# Patient Record
Sex: FEMALE | Race: WHITE | NOT HISPANIC OR LATINO | Employment: OTHER | URBAN - METROPOLITAN AREA
[De-identification: names, ages, dates, MRNs, and addresses within clinical notes are randomized per-mention and may not be internally consistent; named-entity substitution may affect disease eponyms.]

---

## 2017-11-16 ENCOUNTER — HOSPITAL ENCOUNTER (OUTPATIENT)
Dept: RADIOLOGY | Facility: HOSPITAL | Age: 82
Discharge: HOME | End: 2017-11-16
Attending: FAMILY MEDICINE
Payer: MEDICARE

## 2017-11-16 VITALS — HEIGHT: 60 IN | BODY MASS INDEX: 23.56 KG/M2 | WEIGHT: 120 LBS

## 2017-11-16 DIAGNOSIS — Z12.39 SCREENING BREAST EXAMINATION: ICD-10-CM

## 2017-11-16 DIAGNOSIS — Z12.39 SCREENING BREAST EXAMINATION: Primary | ICD-10-CM

## 2017-11-16 PROCEDURE — 77067 SCR MAMMO BI INCL CAD: CPT | Mod: TC

## 2017-11-20 DIAGNOSIS — R92.8 ABNORMAL MAMMOGRAM: Primary | ICD-10-CM

## 2017-11-30 ENCOUNTER — HOSPITAL ENCOUNTER (OUTPATIENT)
Dept: RADIOLOGY | Facility: HOSPITAL | Age: 82
Discharge: HOME | End: 2017-11-30
Attending: FAMILY MEDICINE
Payer: MEDICARE

## 2017-11-30 DIAGNOSIS — R92.8 ABNORMAL MAMMOGRAM: ICD-10-CM

## 2017-11-30 PROCEDURE — 77061 BREAST TOMOSYNTHESIS UNI: CPT | Mod: TC

## 2017-11-30 PROCEDURE — 77065 DX MAMMO INCL CAD UNI: CPT | Mod: 26,,, | Performed by: RADIOLOGY

## 2017-11-30 PROCEDURE — 77061 BREAST TOMOSYNTHESIS UNI: CPT | Mod: 26,,, | Performed by: RADIOLOGY

## 2018-08-08 ENCOUNTER — HOSPITAL ENCOUNTER (INPATIENT)
Facility: HOSPITAL | Age: 83
LOS: 2 days | Discharge: HOME | DRG: 683 | End: 2018-08-10
Attending: EMERGENCY MEDICINE | Admitting: INTERNAL MEDICINE
Payer: MEDICARE

## 2018-08-08 DIAGNOSIS — R55 SYNCOPE: ICD-10-CM

## 2018-08-08 DIAGNOSIS — I10 ESSENTIAL HYPERTENSION: ICD-10-CM

## 2018-08-08 DIAGNOSIS — N17.9 AKI (ACUTE KIDNEY INJURY): Primary | ICD-10-CM

## 2018-08-08 DIAGNOSIS — N17.9 ACUTE RENAL FAILURE SUPERIMPOSED ON CHRONIC KIDNEY DISEASE, UNSPECIFIED CKD STAGE, UNSPECIFIED ACUTE RENAL FAILURE TYPE: ICD-10-CM

## 2018-08-08 DIAGNOSIS — R55 SYNCOPE, UNSPECIFIED SYNCOPE TYPE: ICD-10-CM

## 2018-08-08 DIAGNOSIS — E86.0 DEHYDRATION: ICD-10-CM

## 2018-08-08 DIAGNOSIS — R55 SYNCOPE AND COLLAPSE: ICD-10-CM

## 2018-08-08 DIAGNOSIS — E78.5 HYPERLIPIDEMIA, UNSPECIFIED HYPERLIPIDEMIA TYPE: ICD-10-CM

## 2018-08-08 DIAGNOSIS — N18.9 ACUTE RENAL FAILURE SUPERIMPOSED ON CHRONIC KIDNEY DISEASE, UNSPECIFIED CKD STAGE, UNSPECIFIED ACUTE RENAL FAILURE TYPE: ICD-10-CM

## 2018-08-08 PROBLEM — K51.90 ULCERATIVE COLITIS: Status: ACTIVE | Noted: 2018-08-08

## 2018-08-08 PROBLEM — E03.9 HYPOTHYROID: Status: ACTIVE | Noted: 2018-08-08

## 2018-08-08 PROBLEM — M25.512 ACUTE PAIN OF LEFT SHOULDER: Status: ACTIVE | Noted: 2018-08-08

## 2018-08-08 PROBLEM — I50.9 CONGESTIVE HEART FAILURE (CHF): Status: ACTIVE | Noted: 2018-08-08

## 2018-08-08 PROBLEM — I24.89 DEMAND ISCHEMIA: Status: ACTIVE | Noted: 2018-08-08

## 2018-08-08 LAB
ALBUMIN SERPL BCP-MCNC: 3.7 G/DL
ALP SERPL-CCNC: 50 U/L
ALT SERPL W/O P-5'-P-CCNC: 25 U/L
ANION GAP SERPL CALC-SCNC: 18 MMOL/L
AST SERPL-CCNC: 26 U/L
BACTERIA #/AREA URNS HPF: ABNORMAL /HPF
BASOPHILS # BLD AUTO: 0 K/UL
BASOPHILS NFR BLD: 0 %
BILIRUB SERPL-MCNC: 1.1 MG/DL
BILIRUB UR QL STRIP: NEGATIVE
BUN SERPL-MCNC: 112 MG/DL
CALCIUM SERPL-MCNC: 10.5 MG/DL
CHLORIDE SERPL-SCNC: 93 MMOL/L
CLARITY UR: CLEAR
CO2 SERPL-SCNC: 25 MMOL/L
COLOR UR: YELLOW
CREAT SERPL-MCNC: 3.3 MG/DL
DIFFERENTIAL METHOD: ABNORMAL
EOSINOPHIL # BLD AUTO: 0 K/UL
EOSINOPHIL NFR BLD: 0.1 %
ERYTHROCYTE [DISTWIDTH] IN BLOOD BY AUTOMATED COUNT: 14.4 %
EST. GFR  (AFRICAN AMERICAN): 14 ML/MIN/1.73 M^2
EST. GFR  (NON AFRICAN AMERICAN): 12 ML/MIN/1.73 M^2
GLUCOSE SERPL-MCNC: 142 MG/DL
GLUCOSE UR QL STRIP: NEGATIVE
HCT VFR BLD AUTO: 38.6 %
HGB BLD-MCNC: 13 G/DL
HGB UR QL STRIP: NEGATIVE
HYALINE CASTS #/AREA URNS LPF: 1 /LPF
KETONES UR QL STRIP: NEGATIVE
LEUKOCYTE ESTERASE UR QL STRIP: ABNORMAL
LYMPHOCYTES # BLD AUTO: 0.5 K/UL
LYMPHOCYTES NFR BLD: 2.7 %
MAGNESIUM SERPL-MCNC: 2.4 MG/DL
MCH RBC QN AUTO: 31 PG
MCHC RBC AUTO-ENTMCNC: 33.7 G/DL
MCV RBC AUTO: 92 FL
MICROSCOPIC COMMENT: ABNORMAL
MONOCYTES # BLD AUTO: 1.2 K/UL
MONOCYTES NFR BLD: 6.5 %
NEUTROPHILS # BLD AUTO: 16.2 K/UL
NEUTROPHILS NFR BLD: 90.7 %
NITRITE UR QL STRIP: NEGATIVE
PH UR STRIP: 6 [PH] (ref 5–8)
PLATELET # BLD AUTO: 279 K/UL
PMV BLD AUTO: 9.1 FL
POTASSIUM SERPL-SCNC: 4.1 MMOL/L
PROT SERPL-MCNC: 7.6 G/DL
PROT UR QL STRIP: NEGATIVE
RBC # BLD AUTO: 4.19 M/UL
RBC #/AREA URNS HPF: 1 /HPF (ref 0–4)
SODIUM SERPL-SCNC: 136 MMOL/L
SP GR UR STRIP: <=1.005 (ref 1–1.03)
TROPONIN I SERPL DL<=0.01 NG/ML-MCNC: 0.03 NG/ML
URN SPEC COLLECT METH UR: ABNORMAL
UROBILINOGEN UR STRIP-ACNC: NEGATIVE EU/DL
WBC # BLD AUTO: 17.8 K/UL
WBC #/AREA URNS HPF: 2 /HPF (ref 0–5)

## 2018-08-08 PROCEDURE — 85025 COMPLETE CBC W/AUTO DIFF WBC: CPT

## 2018-08-08 PROCEDURE — 12000002 HC ACUTE/MED SURGE SEMI-PRIVATE ROOM

## 2018-08-08 PROCEDURE — 25000003 PHARM REV CODE 250: Performed by: EMERGENCY MEDICINE

## 2018-08-08 PROCEDURE — 83735 ASSAY OF MAGNESIUM: CPT

## 2018-08-08 PROCEDURE — 93010 ELECTROCARDIOGRAM REPORT: CPT | Mod: ,,, | Performed by: INTERNAL MEDICINE

## 2018-08-08 PROCEDURE — 85379 FIBRIN DEGRADATION QUANT: CPT

## 2018-08-08 PROCEDURE — 93005 ELECTROCARDIOGRAM TRACING: CPT

## 2018-08-08 PROCEDURE — 81000 URINALYSIS NONAUTO W/SCOPE: CPT

## 2018-08-08 PROCEDURE — 99222 1ST HOSP IP/OBS MODERATE 55: CPT | Mod: AI,,, | Performed by: INTERNAL MEDICINE

## 2018-08-08 PROCEDURE — 96360 HYDRATION IV INFUSION INIT: CPT

## 2018-08-08 PROCEDURE — 36415 COLL VENOUS BLD VENIPUNCTURE: CPT

## 2018-08-08 PROCEDURE — 83036 HEMOGLOBIN GLYCOSYLATED A1C: CPT

## 2018-08-08 PROCEDURE — 87086 URINE CULTURE/COLONY COUNT: CPT

## 2018-08-08 PROCEDURE — 63600175 PHARM REV CODE 636 W HCPCS: Performed by: NURSE PRACTITIONER

## 2018-08-08 PROCEDURE — 82550 ASSAY OF CK (CPK): CPT

## 2018-08-08 PROCEDURE — 84484 ASSAY OF TROPONIN QUANT: CPT

## 2018-08-08 PROCEDURE — 99285 EMERGENCY DEPT VISIT HI MDM: CPT | Mod: 25

## 2018-08-08 PROCEDURE — 80053 COMPREHEN METABOLIC PANEL: CPT

## 2018-08-08 RX ORDER — RAMELTEON 8 MG/1
8 TABLET ORAL NIGHTLY PRN
Status: DISCONTINUED | OUTPATIENT
Start: 2018-08-08 | End: 2018-08-10 | Stop reason: HOSPADM

## 2018-08-08 RX ORDER — GLUCAGON 1 MG
1 KIT INJECTION
Status: DISCONTINUED | OUTPATIENT
Start: 2018-08-08 | End: 2018-08-10 | Stop reason: HOSPADM

## 2018-08-08 RX ORDER — HYDRALAZINE HYDROCHLORIDE 50 MG/1
50 TABLET, FILM COATED ORAL EVERY 12 HOURS
COMMUNITY
End: 2019-01-01 | Stop reason: SDUPTHER

## 2018-08-08 RX ORDER — ASPIRIN 81 MG/1
81 TABLET ORAL DAILY
Status: DISCONTINUED | OUTPATIENT
Start: 2018-08-09 | End: 2018-08-10 | Stop reason: HOSPADM

## 2018-08-08 RX ORDER — ACETAMINOPHEN 325 MG/1
650 TABLET ORAL EVERY 4 HOURS PRN
Status: DISCONTINUED | OUTPATIENT
Start: 2018-08-08 | End: 2018-08-10 | Stop reason: HOSPADM

## 2018-08-08 RX ORDER — LEVALBUTEROL 1.25 MG/.5ML
1.25 SOLUTION, CONCENTRATE RESPIRATORY (INHALATION) EVERY 8 HOURS
Status: DISCONTINUED | OUTPATIENT
Start: 2018-08-09 | End: 2018-08-10 | Stop reason: HOSPADM

## 2018-08-08 RX ORDER — FOLIC ACID 0.4 MG
800 TABLET ORAL DAILY
Status: DISCONTINUED | OUTPATIENT
Start: 2018-08-09 | End: 2018-08-10 | Stop reason: HOSPADM

## 2018-08-08 RX ORDER — GLUCOSAM/CHONDRO/HERB 149/HYAL 750-100 MG
1 TABLET ORAL DAILY
Status: DISCONTINUED | OUTPATIENT
Start: 2018-08-09 | End: 2018-08-10 | Stop reason: HOSPADM

## 2018-08-08 RX ORDER — TORSEMIDE 20 MG/1
20 TABLET ORAL DAILY
Status: ON HOLD | COMMUNITY
End: 2018-08-10

## 2018-08-08 RX ORDER — IBUPROFEN 200 MG
24 TABLET ORAL
Status: DISCONTINUED | OUTPATIENT
Start: 2018-08-08 | End: 2018-08-10 | Stop reason: HOSPADM

## 2018-08-08 RX ORDER — ENOXAPARIN SODIUM 100 MG/ML
30 INJECTION SUBCUTANEOUS EVERY 24 HOURS
Status: DISCONTINUED | OUTPATIENT
Start: 2018-08-08 | End: 2018-08-10 | Stop reason: HOSPADM

## 2018-08-08 RX ORDER — SODIUM CHLORIDE 0.9 % (FLUSH) 0.9 %
5 SYRINGE (ML) INJECTION
Status: DISCONTINUED | OUTPATIENT
Start: 2018-08-08 | End: 2018-08-10 | Stop reason: HOSPADM

## 2018-08-08 RX ORDER — ONDANSETRON 2 MG/ML
4 INJECTION INTRAMUSCULAR; INTRAVENOUS EVERY 8 HOURS PRN
Status: DISCONTINUED | OUTPATIENT
Start: 2018-08-08 | End: 2018-08-10 | Stop reason: HOSPADM

## 2018-08-08 RX ORDER — SODIUM CHLORIDE 9 MG/ML
INJECTION, SOLUTION INTRAVENOUS CONTINUOUS
Status: DISCONTINUED | OUTPATIENT
Start: 2018-08-08 | End: 2018-08-09

## 2018-08-08 RX ORDER — IBUPROFEN 200 MG
16 TABLET ORAL
Status: DISCONTINUED | OUTPATIENT
Start: 2018-08-08 | End: 2018-08-10 | Stop reason: HOSPADM

## 2018-08-08 RX ORDER — PRAVASTATIN SODIUM 10 MG/1
20 TABLET ORAL DAILY
Status: DISCONTINUED | OUTPATIENT
Start: 2018-08-09 | End: 2018-08-10 | Stop reason: HOSPADM

## 2018-08-08 RX ORDER — AMOXICILLIN 250 MG
1 CAPSULE ORAL 2 TIMES DAILY
Status: DISCONTINUED | OUTPATIENT
Start: 2018-08-08 | End: 2018-08-10 | Stop reason: HOSPADM

## 2018-08-08 RX ADMIN — SODIUM CHLORIDE 500 ML: 0.9 INJECTION, SOLUTION INTRAVENOUS at 04:08

## 2018-08-08 RX ADMIN — ENOXAPARIN SODIUM 30 MG: 100 INJECTION SUBCUTANEOUS at 10:08

## 2018-08-08 NOTE — ED NOTES
"Pt presents to ED with c/o near syncopal episode x 2 today. States first episode occurred this AM. Pt denies dizziness; lightheadedness, nausea or vomiting. No c/o chest pain or SOB. Pt states she just feels "fatigued." Denies LOC. States she did hit her head during the fall and has a mild headache. Pt placed on BP, pulse ox, cardiac monitor. KEITH. MD at bedside. Will monitor.  "

## 2018-08-08 NOTE — ED PROVIDER NOTES
[facility]: [facility]2018    SCRIBE #1 NOTE: I, Nena Gallardo, am scribing for, and in the presence of, Dr. Villalpando .       History     Chief Complaint   Patient presents with    Fatigue     near syncope this am.       Time seen by provider: 3:09 PM on 08/08/2018    Izabella Fulton is a 85 y.o. female with a hx of HTN, TIA, and thyroid disease who presents to the ED via EMS with c/o left arm and hand pain s/p near syncopal episode PTA. Pt states she "didn't feel right" before the episode. She notes she hit her head on the floor. Pt is being treated for "fluid around my heart" with Torsemide BID. She states she had 3 xrays taken yesterday which show improvement. She is followed by Corinne GAN in Dr. Steve Mitchell's office. NKDA noted. Pt denies CP, abd pain, vomiting and diarrhea.       The history is provided by the patient.     Review of patient's allergies indicates:  No Known Allergies  Past Medical History:   Diagnosis Date    Anticoagulant long-term use     baby aspirin    Hypertension     Thyroid disease     TIA (transient ischemic attack)     Ulcerative colitis     Wears hearing aid      Past Surgical History:   Procedure Laterality Date    COLON SURGERY  05/17/12    exp lap, perfered colon    EYE SURGERY      left eye cataract     History reviewed. No pertinent family history.  Social History   Substance Use Topics    Smoking status: Never Smoker    Smokeless tobacco: Not on file    Alcohol use No     Review of Systems   Constitutional: Negative for fever.        + for "near syncope"   HENT: Negative for sore throat.    Eyes: Negative for redness.   Respiratory: Negative for shortness of breath.    Cardiovascular: Negative for chest pain.   Gastrointestinal: Negative for abdominal pain, diarrhea, nausea and vomiting.   Genitourinary: Negative for dysuria.   Musculoskeletal: Positive for arthralgias (Lt hand pain ) and myalgias (LUE). Negative for back pain.   Skin: Negative for rash. "   Neurological: Negative for weakness.   Hematological: Does not bruise/bleed easily.       Physical Exam     Initial Vitals [08/08/18 1506]   BP Pulse Resp Temp SpO2   (!) 152/65 72 16 98 °F (36.7 °C) 96 %      MAP       --         Physical Exam    Nursing note and vitals reviewed.  Constitutional: She appears well-developed and well-nourished. She is not diaphoretic. No distress.   HENT:   Head: Normocephalic and atraumatic.   Eyes: EOM are normal. Pupils are equal, round, and reactive to light.   Neck: Normal range of motion. Neck supple.   Cardiovascular: Normal rate, regular rhythm, normal heart sounds and intact distal pulses. Exam reveals no gallop and no friction rub.    No murmur heard.  Pulmonary/Chest: Breath sounds normal. No respiratory distress. She has no wheezes. She has no rhonchi. She has no rales.   Abdominal: Soft. Bowel sounds are normal. There is no tenderness. There is no rebound and no guarding.   Musculoskeletal: Normal range of motion.   Neurological: She is alert and oriented to person, place, and time.   Skin: Skin is warm and dry.   Psychiatric: She has a normal mood and affect. Her behavior is normal. Judgment and thought content normal.         ED Course   Critical Care  Performed by: Joce Villalpando MD  Authorized by: Joce Villalpando MD   Direct patient critical care time: 14 minutes  Ordering / reviewing critical care time: 11 minutes  Documentation critical care time: 12 minutes  Consulting other physicians critical care time: 7 minutes  Total critical care time (exclusive of procedural time) : 44 minutes  Critical care was time spent personally by me on the following activities: examination of patient, development of treatment plan with patient or surrogate, ordering and performing treatments and interventions, ordering and review of laboratory studies and obtaining history from patient or surrogate.        Labs Reviewed   COMPREHENSIVE METABOLIC PANEL - Abnormal; Notable for  the following:        Result Value    Chloride 93 (*)     Glucose 142 (*)     BUN, Bld 112 (*)     Creatinine 3.3 (*)     Total Bilirubin 1.1 (*)     Alkaline Phosphatase 50 (*)     Anion Gap 18 (*)     eGFR if  14 (*)     eGFR if non  12 (*)     All other components within normal limits   CBC W/ AUTO DIFFERENTIAL - Abnormal; Notable for the following:     WBC 17.80 (*)     MPV 9.1 (*)     Gran # (ANC) 16.2 (*)     Lymph # 0.5 (*)     Mono # 1.2 (*)     Gran% 90.7 (*)     Lymph% 2.7 (*)     All other components within normal limits   TROPONIN I - Abnormal; Notable for the following:     Troponin I 0.027 (*)     All other components within normal limits   URINALYSIS - Abnormal; Notable for the following:     Specific Gravity, UA <=1.005 (*)     Leukocytes, UA 2+ (*)     All other components within normal limits   URINALYSIS MICROSCOPIC - Abnormal; Notable for the following:     Bacteria, UA Few (*)     All other components within normal limits   MAGNESIUM     EKG Readings: (Independently Interpreted)   Sinus bradycardia at a rate of 59 bpm. Jackie axis. Normal interval. No acute ST segment changes.        Imaging Results          CT Head Without Contrast (Final result)  Result time 08/08/18 16:20:44    Final result by Kathleen Saunders MD (08/08/18 16:20:44)                 Impression:      Mild involutional changes and findings suggesting mild microvascular ischemic change with no acute intracranial findings      Electronically signed by: Kathleen Saunders MD  Date:    08/08/2018  Time:    16:20             Narrative:    EXAMINATION:  CT HEAD WITHOUT CONTRAST    CLINICAL HISTORY:  Head trauma, ataxia;    TECHNIQUE:  Low dose axial images were obtained through the head.  Coronal and sagittal reformations were also performed. Contrast was not administered.    COMPARISON:  None.    FINDINGS:  There is mild dilatation of ventricles, sulci, fissures.  There is mild decreased density in white  matter suggesting mild microvascular ischemic change.  There is no acute intracranial finding.  There is no acute intracranial hemorrhage.  There is no intracranial mass effect.  There is no acute major vascular territory infarct.  The mastoids appear well pneumatized, visualized paranasal sinuses essentially clear, and the calvarium appears intact without depressed skull fracture.                                 Medical Decision Making:   History:   Old Medical Records: I decided to obtain old medical records.  Initial Assessment:   85-year-old female presented with a chief complaint of fatigue and a near syncopal episode.  Clinical Tests:   Lab Tests: Ordered and Reviewed  Radiological Study: Ordered and Reviewed  Medical Tests: Ordered and Reviewed  ED Management:  The patient was emergently evaluated in the emergency department, her evaluation was significant for an elderly female with a normal neurologic exam.  The patient's EKG showed no acute abnormalities per my independent interpretation.  The patient's CT scan of her head showed no acute abnormalities.  The patient's labs were concerning for acute kidney injury, an elevated BUN, dehydration, a mildly elevated troponin,  and metabolic acidosis.  The patient was aggressively treated with an IV fluid bolus in the emergency department.  The etiology of her symptoms is likely her recent usage of diuretics.  The patient will be admitted for further IV fluid rehydration.  I have discussed the case with the hospitalist on call, Dr. Chavis.  He has accepted the patient for admission.            Scribe Attestation:   Scribe #1: I performed the above scribed service and the documentation accurately describes the services I performed. I attest to the accuracy of the note.    I, Dr. Joce Villalpando, personally performed the services described in this documentation. All medical record entries made by the scribe were at my direction and in my presence.  I have reviewed  the chart and agree that the record reflects my personal performance and is accurate and complete. Joce Villalpando MD.  6:04 PM 08/08/2018             Clinical Impression:     1. FERCHO (acute kidney injury)    2. Syncope    3. Dehydration                                 Joce Villalpando MD  08/08/18 9381       Joce Villalpando MD  08/14/18 2881

## 2018-08-08 NOTE — ED NOTES
Report called to floor. Pt updated. Verbalizes understanding. Pt in NAD and VS are stable at care handoff. No other needs are identified.

## 2018-08-09 PROBLEM — M25.512 ACUTE PAIN OF LEFT SHOULDER: Status: ACTIVE | Noted: 2018-08-09

## 2018-08-09 PROBLEM — I24.89 DEMAND ISCHEMIA: Status: ACTIVE | Noted: 2018-08-09

## 2018-08-09 LAB
ALBUMIN SERPL BCP-MCNC: 3 G/DL
ALP SERPL-CCNC: 44 U/L
ALT SERPL W/O P-5'-P-CCNC: 18 U/L
ANION GAP SERPL CALC-SCNC: 12 MMOL/L
AST SERPL-CCNC: 21 U/L
BASOPHILS # BLD AUTO: 0 K/UL
BASOPHILS NFR BLD: 0.5 %
BILIRUB SERPL-MCNC: 0.9 MG/DL
BUN SERPL-MCNC: 99 MG/DL
CALCIUM SERPL-MCNC: 8.8 MG/DL
CHLORIDE SERPL-SCNC: 103 MMOL/L
CK SERPL-CCNC: 135 U/L
CO2 SERPL-SCNC: 25 MMOL/L
CREAT SERPL-MCNC: 2.3 MG/DL
D DIMER PPP IA.FEU-MCNC: 29.6 MG/L FEU
DIFFERENTIAL METHOD: ABNORMAL
EOSINOPHIL # BLD AUTO: 0.6 K/UL
EOSINOPHIL NFR BLD: 6.8 %
ERYTHROCYTE [DISTWIDTH] IN BLOOD BY AUTOMATED COUNT: 14.8 %
EST. GFR  (AFRICAN AMERICAN): 22 ML/MIN/1.73 M^2
EST. GFR  (NON AFRICAN AMERICAN): 19 ML/MIN/1.73 M^2
ESTIMATED AVG GLUCOSE: 111 MG/DL
GLUCOSE SERPL-MCNC: 104 MG/DL
HBA1C MFR BLD HPLC: 5.5 %
HCT VFR BLD AUTO: 34.6 %
HGB BLD-MCNC: 11.7 G/DL
LYMPHOCYTES # BLD AUTO: 1.5 K/UL
LYMPHOCYTES NFR BLD: 17.9 %
MAGNESIUM SERPL-MCNC: 2.5 MG/DL
MCH RBC QN AUTO: 31.7 PG
MCHC RBC AUTO-ENTMCNC: 33.9 G/DL
MCV RBC AUTO: 94 FL
MONOCYTES # BLD AUTO: 1.1 K/UL
MONOCYTES NFR BLD: 13.2 %
NEUTROPHILS # BLD AUTO: 5.2 K/UL
NEUTROPHILS NFR BLD: 61.6 %
PHOSPHATE SERPL-MCNC: 4.1 MG/DL
PLATELET # BLD AUTO: 255 K/UL
PMV BLD AUTO: 8.7 FL
POCT GLUCOSE: 128 MG/DL (ref 70–110)
POCT GLUCOSE: 146 MG/DL (ref 70–110)
POCT GLUCOSE: 220 MG/DL (ref 70–110)
POTASSIUM SERPL-SCNC: 3.5 MMOL/L
PROT SERPL-MCNC: 6.4 G/DL
RBC # BLD AUTO: 3.69 M/UL
SODIUM SERPL-SCNC: 140 MMOL/L
TROPONIN I SERPL DL<=0.01 NG/ML-MCNC: 0.05 NG/ML
TROPONIN I SERPL DL<=0.01 NG/ML-MCNC: 0.05 NG/ML
WBC # BLD AUTO: 8.5 K/UL

## 2018-08-09 PROCEDURE — 12000002 HC ACUTE/MED SURGE SEMI-PRIVATE ROOM

## 2018-08-09 PROCEDURE — 84100 ASSAY OF PHOSPHORUS: CPT

## 2018-08-09 PROCEDURE — G8978 MOBILITY CURRENT STATUS: HCPCS | Mod: CK

## 2018-08-09 PROCEDURE — 97162 PT EVAL MOD COMPLEX 30 MIN: CPT

## 2018-08-09 PROCEDURE — G8988 SELF CARE GOAL STATUS: HCPCS | Mod: CK

## 2018-08-09 PROCEDURE — 25000003 PHARM REV CODE 250: Performed by: EMERGENCY MEDICINE

## 2018-08-09 PROCEDURE — 94640 AIRWAY INHALATION TREATMENT: CPT

## 2018-08-09 PROCEDURE — 25000003 PHARM REV CODE 250: Performed by: NURSE PRACTITIONER

## 2018-08-09 PROCEDURE — 97535 SELF CARE MNGMENT TRAINING: CPT

## 2018-08-09 PROCEDURE — 25000242 PHARM REV CODE 250 ALT 637 W/ HCPCS: Performed by: NURSE PRACTITIONER

## 2018-08-09 PROCEDURE — 83735 ASSAY OF MAGNESIUM: CPT

## 2018-08-09 PROCEDURE — 80053 COMPREHEN METABOLIC PANEL: CPT

## 2018-08-09 PROCEDURE — G8987 SELF CARE CURRENT STATUS: HCPCS | Mod: CK

## 2018-08-09 PROCEDURE — 99232 SBSQ HOSP IP/OBS MODERATE 35: CPT | Mod: ,,, | Performed by: INTERNAL MEDICINE

## 2018-08-09 PROCEDURE — 36415 COLL VENOUS BLD VENIPUNCTURE: CPT

## 2018-08-09 PROCEDURE — 94761 N-INVAS EAR/PLS OXIMETRY MLT: CPT

## 2018-08-09 PROCEDURE — 84484 ASSAY OF TROPONIN QUANT: CPT

## 2018-08-09 PROCEDURE — 97165 OT EVAL LOW COMPLEX 30 MIN: CPT

## 2018-08-09 PROCEDURE — 63600175 PHARM REV CODE 636 W HCPCS: Performed by: NURSE PRACTITIONER

## 2018-08-09 PROCEDURE — 85025 COMPLETE CBC W/AUTO DIFF WBC: CPT

## 2018-08-09 PROCEDURE — G8979 MOBILITY GOAL STATUS: HCPCS | Mod: CH

## 2018-08-09 RX ORDER — INSULIN ASPART 100 [IU]/ML
0-5 INJECTION, SOLUTION INTRAVENOUS; SUBCUTANEOUS
Status: DISCONTINUED | OUTPATIENT
Start: 2018-08-09 | End: 2018-08-10 | Stop reason: HOSPADM

## 2018-08-09 RX ADMIN — SODIUM CHLORIDE: 0.9 INJECTION, SOLUTION INTRAVENOUS at 01:08

## 2018-08-09 RX ADMIN — INSULIN ASPART 2 UNITS: 100 INJECTION, SOLUTION INTRAVENOUS; SUBCUTANEOUS at 12:08

## 2018-08-09 RX ADMIN — FOLIC ACID TAB 400 MCG 800 MCG: 400 TAB at 10:08

## 2018-08-09 RX ADMIN — LEVOTHYROXINE SODIUM 75 MCG: 25 TABLET ORAL at 06:08

## 2018-08-09 RX ADMIN — ASPIRIN 81 MG: 81 TABLET, COATED ORAL at 10:08

## 2018-08-09 RX ADMIN — LEVALBUTEROL 1.25 MG: 1.25 SOLUTION, CONCENTRATE RESPIRATORY (INHALATION) at 08:08

## 2018-08-09 RX ADMIN — ENOXAPARIN SODIUM 30 MG: 100 INJECTION SUBCUTANEOUS at 05:08

## 2018-08-09 RX ADMIN — OMEGA-3 FATTY ACIDS CAP 1000 MG 1 CAPSULE: 1000 CAP at 10:08

## 2018-08-09 RX ADMIN — LEVALBUTEROL 1.25 MG: 1.25 SOLUTION, CONCENTRATE RESPIRATORY (INHALATION) at 03:08

## 2018-08-09 NOTE — ASSESSMENT & PLAN NOTE
Likely secondary to IVVD.  Baseline creat/eGFR is unknown; however, pt states that she is followed by Dr. Shultz.  Continue IVF hydration.  Follow renal panel and electrolytes closely.  Follow renal recommendations-will consult Dr. Shultz.  Check Renal Ultrasound.  Avoid nephrotoxins.  Urine analysis-done.

## 2018-08-09 NOTE — PLAN OF CARE
The pt stated that she lives with her spouse who was also at bedside, Ry Fulton [phone]. She denies HH and DME. [facility] choice [facility]. PCP is Dr. Mitchell and insurance is [employer]. I educated the pt on the blue discharge folder and wrote my name and number on the pts white board. Betsy PHILLIP Ramakrishna, [facility] [place]     08/09/18 1207   Discharge Assessment   Assessment Type Discharge Planning Assessment   Confirmed/corrected address and phone number on facesheet? Yes   Assessment information obtained from? Patient;Caregiver   Communicated expected length of stay with patient/caregiver no   Prior to hospitilization cognitive status: Alert/Oriented   Prior to hospitalization functional status: Independent   Current cognitive status: Alert/Oriented   Current Functional Status: Independent   Lives With spouse   Able to Return to Prior Arrangements yes   Is patient able to care for self after discharge? Yes   Who are your caregiver(s) and their phone number(s)? spouse- Ry Fulton [phone]   Readmission Within The Last 30 Days no previous admission in last 30 days   Patient currently being followed by outpatient case management? No   Patient currently receives any other outside agency services? No   Equipment Currently Used at Home none   Do you have any problems affording any of your prescribed medications? No   Is the patient taking medications as prescribed? yes   Does the patient have transportation home? Yes   Transportation Available family or friend will provide   Does the patient receive services at the [facility]? No   Discharge Plan A Home with family   Discharge Plan B Home   Patient/Family In Agreement With Plan yes

## 2018-08-09 NOTE — SUBJECTIVE & OBJECTIVE
Past Medical History:   Diagnosis Date    Anticoagulant long-term use     baby aspirin    Hypertension     Thyroid disease     TIA (transient ischemic attack)     Ulcerative colitis     Wears hearing aid        Past Surgical History:   Procedure Laterality Date    COLON SURGERY  05/17/12    exp lap, perfered colon    EYE SURGERY      left eye cataract       Review of patient's allergies indicates:  No Known Allergies    No current facility-administered medications on file prior to encounter.      Current Outpatient Prescriptions on File Prior to Encounter   Medication Sig    acetaminophen (TYLENOL) 325 MG tablet Take 2 tablets (650 mg total) by mouth every 6 (six) hours as needed (or equal to 101 degree F).    amlodipine (NORVASC) 5 MG tablet Take 5 mg by mouth once daily.     aspirin (ECOTRIN) 81 MG EC tablet Take 81 mg by mouth once daily.      calcium-vitamin D 500-125 mg-unit tablet Take 1 tablet by mouth 2 (two) times daily.      DOCOSAHEXANOIC ACID/VIT C/LUT (EYE HEALTH ORAL) Take 1 tablet by mouth 2 (two) times daily.      fish oil-omega-3 fatty acids 300-1,000 mg capsule Take 1 g by mouth once daily.      folic acid (FOLVITE) 800 MCG tablet Take 800 mcg by mouth once daily.      hydrochlorothiazide (MICROZIDE) 12.5 mg capsule Take 12.5 mg by mouth once daily.      levothyroxine (SYNTHROID) 75 MCG tablet Take 75 mcg by mouth once daily.      losartan (COZAAR) 50 MG tablet Take 50 mg by mouth once daily.      mesalamine (ASACOL) 400 mg EC tablet Take 400 mg by mouth 2 (two) times daily.      metoprolol tartrate (LOPRESSOR) 50 MG tablet Take 50 mg by mouth 2 (two) times daily.     pravastatin (PRAVACHOL) 20 MG tablet Take 20 mg by mouth once daily.      [DISCONTINUED] alendronate (FOSAMAX) 70 MG tablet Take 70 mg by mouth every 7 days. Take 1 tablet once weekly in the morning with a full glass of water on an empty stomach. Do not consume food or lie down for at least 30 minutes afterwards.      [DISCONTINUED] furosemide (LASIX) 40 MG tablet Take 20 mg by mouth every Mon, Wed, Fri.       Family History     Problem Relation (Age of Onset)    Alzheimer's disease Mother    Heart disease Father        Social History Main Topics    Smoking status: Never Smoker    Smokeless tobacco: Not on file    Alcohol use No    Drug use: No    Sexual activity: Not on file     Review of Systems   Constitutional: Positive for fatigue. Negative for chills and fever.   HENT: Negative for sore throat and trouble swallowing.    Eyes: Negative for photophobia and visual disturbance.   Respiratory: Positive for cough and shortness of breath. Negative for wheezing.    Cardiovascular: Negative for chest pain and palpitations.   Gastrointestinal: Negative for abdominal pain, diarrhea, nausea and vomiting.   Endocrine: Negative for polyphagia and polyuria.   Genitourinary: Negative for dysuria and frequency.   Musculoskeletal: Positive for arthralgias (left shoulder and left hand, 5th finger). Negative for joint swelling.   Skin: Negative for rash and wound.   Neurological: Positive for dizziness, syncope, weakness and light-headedness.   Psychiatric/Behavioral: Negative for agitation and confusion.   All other systems reviewed and are negative.    Objective:     Vital Signs (Most Recent):  Temp: 97.7 °F (36.5 °C) (08/08/18 2011)  Pulse: 61 (08/08/18 2011)  Resp: 16 (08/08/18 2011)  BP: (!) 146/63 (08/08/18 2011)  SpO2: (!) 93 % (08/08/18 2011) Vital Signs (24h Range):  Temp:  [97.7 °F (36.5 °C)-98 °F (36.7 °C)] 97.7 °F (36.5 °C)  Pulse:  [59-72] 61  Resp:  [16-18] 16  SpO2:  [92 %-96 %] 93 %  BP: (111-176)/(54-77) 146/63     Weight: 54.4 kg (119 lb 14.9 oz)  Body mass index is 23.42 kg/m².    Physical Exam   Constitutional: She is oriented to person, place, and time. She appears well-developed.   Thin appearing female   HENT:   Head: Normocephalic and atraumatic.   Mouth/Throat: Oropharynx is clear and moist.   Eyes:  Conjunctivae and EOM are normal. Pupils are equal, round, and reactive to light.   Neck: Normal range of motion. Neck supple. No JVD present.   Cardiovascular: Normal rate, regular rhythm and intact distal pulses.    Pulmonary/Chest: Effort normal and breath sounds normal.   Abdominal: Soft. Bowel sounds are normal. She exhibits no distension. There is no tenderness.   Musculoskeletal: Normal range of motion. She exhibits no edema.   Neurological: She is alert and oriented to person, place, and time.   Skin: Skin is warm and dry. Capillary refill takes less than 2 seconds.   Psychiatric: She has a normal mood and affect. Her behavior is normal. Judgment and thought content normal.   Vitals reviewed.        CRANIAL NERVES     CN III, IV, VI   Pupils are equal, round, and reactive to light.  Extraocular motions are normal.        Significant Labs:   CBC:   Recent Labs  Lab 08/08/18  1534   WBC 17.80*   HGB 13.0   HCT 38.6        CMP:   Recent Labs  Lab 08/08/18  1535      K 4.1   CL 93*   CO2 25   *   *   CREATININE 3.3*   CALCIUM 10.5   PROT 7.6   ALBUMIN 3.7   BILITOT 1.1*   ALKPHOS 50*   AST 26   ALT 25   ANIONGAP 18*   EGFRNONAA 12*     Cardiac Markers: No results for input(s): CKMB, MYOGLOBIN, BNP, TROPISTAT in the last 48 hours.  Magnesium:   Recent Labs  Lab 08/08/18  1535   MG 2.4     Urine Studies:   Recent Labs  Lab 08/08/18  1650   COLORU Yellow   APPEARANCEUA Clear   PHUR 6.0   SPECGRAV <=1.005*   PROTEINUA Negative   GLUCUA Negative   KETONESU Negative   BILIRUBINUA Negative   OCCULTUA Negative   NITRITE Negative   UROBILINOGEN Negative   LEUKOCYTESUR 2+*   RBCUA 1   WBCUA 2   BACTERIA Few*   HYALINECASTS 1       Significant Imaging:  CT head:    Impression:       Mild involutional changes and findings suggesting mild microvascular ischemic change with no acute intracranial findings

## 2018-08-09 NOTE — PROGRESS NOTES
08/09/18 0859   Patient Assessment/Suction   Level of Consciousness (AVPU) alert   Respiratory Effort Normal   Expansion/Accessory Muscles/Retractions expansion symmetric   All Lung Fields Breath Sounds clear;diminished   PRE-TX-O2-ETCO2   O2 Device (Oxygen Therapy) room air   SpO2 (!) 94 %   Pulse Oximetry Type Intermittent   $ Pulse Oximetry - Multiple Charge Pulse Oximetry - Multiple   Pulse 84   Resp 18   Aerosol Therapy   $ Aerosol Therapy Charges Aerosol Treatment   Respiratory Treatment Status given   SVN/Inhaler Treatment Route mouthpiece   Position During Treatment HOB at 45 degrees   Patient Tolerance good   Xop 1.25 Q8, tolerated tx well, vitals as charted.

## 2018-08-09 NOTE — PLAN OF CARE
Problem: Physical Therapy Goal  Goal: Physical Therapy Goal  Goals to be met by: 2018     Patient will increase functional independence with mobility by performin. Supine to sit with [place]  2. Sit to supine with [place]  3. Sit to stand transfer with [place]  4. Bed to chair transfer with modified [place] using RW.  5. Gait  x 250 feet with Modified [place] using RW     Outcome: Ongoing (interventions implemented as appropriate)  PT lorraine completed this am. Pt was able to tolerate bed mobility, transfers and gait x 35ft x 2 CGA with RW. Pt was left supine in bed w/ needs in reach, lines intact, and  at bedside.

## 2018-08-09 NOTE — PLAN OF CARE
I attempted to complete the discharge assessment however the pt is having a procedure done at bedside. Betsy Durbin LMSW     08/09/18 1135   Discharge Assessment   Assessment Type Discharge Planning Assessment

## 2018-08-09 NOTE — PLAN OF CARE
Problem: Occupational Therapy Goal  Goal: Occupational Therapy Goal  Goals to be met by: 8/17/18     Patient will increase functional independence with ADLs by performing:    LE Dressing with Set-up Assistance, Stand-by Assistance and Assistive Devices as needed.  Grooming while standing at sink with Supervision and Assistive Devices as needed.  Toileting from toilet with Stand-by Assistance and Assistive Devices as needed for hygiene and clothing management.   Toilet transfer to toilet with Supervision.  Upper extremity exercise program x10 reps per handout, with supervision.    Outcome: Ongoing (interventions implemented as appropriate)  OT evaluation completed today. Goals & care plan established.    GOPI Valentin  8/9/2018

## 2018-08-09 NOTE — PLAN OF CARE
Problem: Patient Care Overview  Goal: Plan of Care Review  Outcome: Ongoing (interventions implemented as appropriate)  PT AAO X4. PT able to verbalize needs/want. Plan of care reviewed with patient/spouce at the beginning of the shift. Patient/spouce  verbalized understanding. Hourly rounds completed on this patient throughout this shift. Patient denies any pain this shift.  at bedside attentive to patient. Patient ambulates with 1 person assist. Patient has remained free from fall/injury. Side rails up X2, bed in lowest, locked position, needs attended to , call light within reach will continue to monitor.

## 2018-08-09 NOTE — CONSULTS
"Nephrology Consult Note        Patient Name: Izabella Fulton  MRN: [id number]    Patient Class: IP- Inpatient   Admission Date: 8/8/2018  Length of Stay: 1 days  Date of Service: 8/9/2018    Attending Physician: Rachel Chvais MD  Primary Care Provider: Steve Mitchell MD    Reason for Consult: kaitlynn/ckd3/syncope/anemia/chf/htn    SUBJECTIVE:     HPI: 85F who follows with Dr. 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 She hit her head, and seem mine she lost consciousness briefly. Notably, she had her diuretics increased recently for "fluid around heart". She feels fine now. CT-head is OK, CXR is unremarkable, renal US shows luis alfredo-vesicular calcifications ? Fibroma.    Past Medical History:   Diagnosis Date    Anticoagulant long-term use     baby aspirin    Hypertension     Thyroid disease     TIA (transient ischemic attack)     Ulcerative colitis     Wears hearing aid      Past Surgical History:   Procedure Laterality Date    COLON SURGERY  05/17/12    exp lap, perfered colon    EYE SURGERY      left eye cataract     Family History   Problem Relation Age of Onset    Alzheimer's disease Mother     Heart disease Father      Social History   Substance Use Topics    Smoking status: Never Smoker    Smokeless tobacco: Not on file    Alcohol use No       Review of patient's allergies indicates:  No Known Allergies    Outpatient meds:  No current facility-administered medications on file prior to encounter.      Current Outpatient Prescriptions on File Prior to Encounter   Medication Sig Dispense Refill    acetaminophen (TYLENOL) 325 MG tablet Take 2 tablets (650 mg total) by mouth every 6 (six) hours as needed (or equal to 101 degree F). 30 tablet 0    amlodipine (NORVASC) 5 MG tablet Take 5 mg by mouth once daily.       aspirin (ECOTRIN) 81 MG EC tablet Take 81 mg by mouth once daily.        calcium-vitamin D 500-125 mg-unit tablet Take 1 tablet by mouth 2 (two) times daily.    "     DOCOSAHEXANOIC ACID/VIT C/LUT (EYE HEALTH ORAL) Take 1 tablet by mouth 2 (two) times daily.        fish oil-omega-3 fatty acids 300-1,000 mg capsule Take 1 g by mouth once daily.        folic acid (FOLVITE) 800 MCG tablet Take 800 mcg by mouth once daily.        hydrochlorothiazide (MICROZIDE) 12.5 mg capsule Take 12.5 mg by mouth once daily.        levothyroxine (SYNTHROID) 75 MCG tablet Take 75 mcg by mouth once daily.        losartan (COZAAR) 50 MG tablet Take 50 mg by mouth once daily.        mesalamine (ASACOL) 400 mg EC tablet Take 400 mg by mouth 2 (two) times daily.        metoprolol tartrate (LOPRESSOR) 50 MG tablet Take 50 mg by mouth 2 (two) times daily.       pravastatin (PRAVACHOL) 20 MG tablet Take 20 mg by mouth once daily.           Scheduled meds:   aspirin  81 mg Oral Daily    enoxaparin  30 mg Subcutaneous Daily    folic acid  800 mcg Oral Daily    levalbuterol  1.25 mg Nebulization Q8H    levothyroxine  75 mcg Oral Before breakfast    omega 3-dha-epa-fish oil  1 capsule Oral Daily    pravastatin  20 mg Oral Daily    senna-docusate 8.6-50 mg  1 tablet Oral BID       Infusions:   sodium chloride 0.9% 125 mL/hr at 08/09/18 0157       PRN meds:  acetaminophen, dextrose 50%, dextrose 50%, glucagon (human recombinant), glucose, glucose, insulin aspart U-100, ondansetron, ramelteon, sodium chloride 0.9%    Review of Systems:  Review of Systems   Constitutional: Negative for chills, fever, malaise/fatigue and weight loss.   HENT: Negative for hearing loss and nosebleeds.    Eyes: Negative for blurred vision, double vision and photophobia.   Respiratory: Negative for cough, shortness of breath and wheezing.    Cardiovascular: Negative for chest pain, palpitations and leg swelling.   Gastrointestinal: Negative for abdominal pain, constipation, diarrhea, heartburn, nausea and vomiting.   Genitourinary: Negative for dysuria, frequency and urgency.   Musculoskeletal: Negative for falls,  joint pain and myalgias.   Skin: Negative for itching and rash.   Neurological: Negative for dizziness, speech change, focal weakness, loss of consciousness and headaches.   Endo/Heme/Allergies: Does not bruise/bleed easily.   Psychiatric/Behavioral: Negative for depression and substance abuse. The patient is not nervous/anxious.        OBJECTIVE:     Vital Signs and IO (Last 24H):  Temp:  [97.4 °F (36.3 °C)-98.2 °F (36.8 °C)]   Pulse:  [59-84]   Resp:  [16-18]   BP: ()/(46-82)   SpO2:  [90 %-96 %]   I/O last 3 completed shifts:  In: 781.3 [P.O.:400; I.V.:381.3]  Out: -     Wt Readings from Last 5 Encounters:   08/09/18 54 kg (119 lb)   11/16/17 54.4 kg (120 lb)   10/30/16 54.4 kg (120 lb)   05/22/12 54.4 kg (120 lb)   05/21/12 55.3 kg (122 lb)         Physical Exam:  Physical Exam   Constitutional: She is oriented to person, place, and time. She appears well-developed and well-nourished.   HENT:   Head: Normocephalic and atraumatic.   Mouth/Throat: Oropharynx is clear and moist.   Eyes: EOM are normal. Pupils are equal, round, and reactive to light. No scleral icterus.   Neck: Neck supple.   Cardiovascular: Normal rate and regular rhythm.    Pulmonary/Chest: Effort normal. No stridor. No respiratory distress.   Abdominal: Soft. She exhibits no distension.   Musculoskeletal: Normal range of motion. She exhibits no edema or deformity.   Neurological: She is alert and oriented to person, place, and time. No cranial nerve deficit.   Skin: Skin is warm and dry. No rash noted. She is not diaphoretic. No erythema.   Psychiatric: She has a normal mood and affect. Her behavior is normal.       Body mass index is 23.24 kg/m².    Laboratory:    Recent Labs  Lab 08/08/18  1535 08/09/18  0529    140   K 4.1 3.5   CL 93* 103   CO2 25 25   * 99*   CREATININE 3.3* 2.3*   ESTGFRAFRICA 14* 22*   EGFRNONAA 12* 19*   CALCIUM 10.5 8.8   ALBUMIN 3.7 3.0*   PHOS  --  4.1         Recent Labs  Lab 08/08/18  8649  08/09/18  0529   WBC 17.80* 8.50   HGB 13.0 11.7*   HCT 38.6 34.6*    255   MCV 92 94   MCHC 33.7 33.9   MONO 6.5  1.2* 13.2  1.1*         Recent Labs  Lab 08/08/18  1535 08/09/18  0529   ALKPHOS 50* 44*   BILITOT 1.1* 0.9   PROT 7.6 6.4   ALBUMIN 3.7 3.0*   ALT 25 18   AST 26 21         ASSESSMENT/PLAN:     Active Hospital Problems    Diagnosis  POA    *Acute-on-chronic kidney injury [N17.9, N18.9]  Yes    Syncope and collapse [R55]  Yes    Essential hypertension [I10]  Yes    Hypothyroid [E03.9]  Yes    Ulcerative colitis [K51.90]  Yes    Hyperlipidemia [E78.5]  Yes    Congestive heart failure (CHF) [I50.9]  Yes    Acute pain of left shoulder [M25.512]  Unknown    Demand ischemia [I24.8]  Unknown      Resolved Hospital Problems    Diagnosis Date Resolved POA   No resolved problems to display.     FERCHO seesm hemodynamic due to volume depletion and/or hypotension.  CKD stage 3, baseline scr 1.5  Syncope, likely cardiogenic.  No NSAIDs, ACEI/ARB, IV contrast or other nephrotoxins.  Keep MAP > 60, SBP > 100.  Dose meds for GFR < 30 ml/min.  Renal diet - low K, low phos.    Anemia  Stable. Monitor.  No need for JONNY.  No need for IV iron.    HTN, h/o CHF  Hypotensive. Minimize fluids.  Tolerate asymptomatic HTN up to -160.  Hold home BP meds.  Cards consult.    Thank you for allowing us to participate in the care of your patient!   We will follow the patient and provide recommendations as needed.    Alexei Araiza MD    [facility]  [address] [place] [address]  [place] Patel [id number]    [phone] - tel  [phone] - fax    8/9/2018 1:35 PM

## 2018-08-09 NOTE — PT/OT/SLP EVAL
Occupational Therapy   Evaluation    Name: Izabella Fulton  MRN: [id number]  Admitting Diagnosis:  Acute-on-chronic kidney injury      Recommendations:     Discharge Recommendations: home health PT  Discharge Equipment Recommendations:  none  Barriers to discharge:  None    History:     Occupational Profile:  Living Environment: Pt lives with  in a H with 1 small BJORN.  Previous level of function: Independent with all ADL's and IADL's including driving, shopping, cooking and cleaning.  Roles and Routines: Pt enjoys sewing her own clothes.  Equipment Owned:  cane, straight, walker, rolling, bath bench, 3-in-1 commode (Pt reported she was not using any of this equipment.)  Assistance upon Discharge: Pt's  can help her.    Past Medical History:   Diagnosis Date    Anticoagulant long-term use     baby aspirin    Hypertension     Thyroid disease     TIA (transient ischemic attack)     Ulcerative colitis     Wears hearing aid        Past Surgical History:   Procedure Laterality Date    COLON SURGERY  05/17/12    exp lap, perfered colon    EYE SURGERY      left eye cataract       Subjective     Chief Complaint: coccyx pain with bed mobility  Patient/Family stated goals: To go home soon.  Communicated with: Rao, nurse prior to session.  Pain/Comfort:  · Pain Rating 1: 0/10  · Location - Side 1: Left  · Location 1: shoulder  · Pain Addressed 1:  (Brief pain with shoulder flexion > 90* which subsided once she lowered her arm.)  · Pain Rating Post-Intervention 1: 0/10    Patients cultural, spiritual, Confucianist conflicts given the current situation:      Objective:     Patient found with: peripheral IV, telemetry    General Precautions: Standard, fall   Orthopedic Precautions:N/A   Braces: N/A     Occupational Performance:    Bed Mobility:    · Patient completed Scooting/Bridging with stand by assistance  · Patient completed Supine to Sit with stand by assistance and with side rail    Functional  Mobility/Transfers:  · Patient completed Sit <> Stand Transfer with contact guard assistance and cues for hand placement  with  rolling walker   · Patient completed Toilet Transfer Stand Pivot technique with contact guard assistance and cues for hand placement with  rolling walker and grab bars  · Functional Mobility: Pt walked from bedside to bathroom, then to sink & back to bedside using walker with CGA & no LOB noted but with a few cues for walker management.    Activities of Daily Living:  · Feeding:  independence    · Grooming: stand by assistance standing at sink for all tasks  · Lower Body Dressing: stand by assistance  to don/doff socks seated EOB; CGA in standing for clothing management.  · Toileting: contact guard to minimum assistance and and cues for safety & to complete task seated on toilet with reaching to floor to pull her shorts up at toilet. Pt attempted with difficulty to reach floor from standing level.    Cognitive/Visual Perceptual:  Cognitive/Psychosocial Skills:     -       Oriented to: Person, Place, Time and Situation   -       Follows Commands/attention:Follows multistep  commands  -       Communication: clear/fluent  -       Safety awareness/insight to disability: impaired   -       Mood/Affect/Coping skills/emotional control: Appropriate to situation, Cooperative and Pleasant  Visual/Perceptual:      -Intact    Physical Exam:  Balance:    -       SBA static & CGA dynamic standing  Postural examination/scapula alignment:    -       Rounded shoulders  -       Forward head  -       Posterior pelvic tilt  Skin integrity: Visible skin intact  Edema:  None noted  Dominant hand:    -       right  Upper Extremity Range of Motion:     -       Right Upper Extremity: WFL  -       Left Upper Extremity: WFL except shoulder 2/2 pain >90 flexion  Upper Extremity Strength:    -       Right Upper Extremity: WFL  -       Left Upper Extremity: WFL except shoulder   Strength:    -       Right Upper  "Extremity: WFL  -       Left Upper Extremity: WFL  Fine Motor Coordination:    -       Intact    Patient left up in chair with all lines intact, call button in reach and Yma notified and  present    [facility] 6 Click:  [facility] Total Score: 19    Treatment & Education:  OT ed pt on OT role & POC as well as discharge recommendations.  OT ed patient on safety with walker use for functional mobility with cues for hand placement & sequencing.   OT educated patient on toilet transfer techniques using rolling walker.  OT ed pt on fall risk and strongly advised pt to call for help for all OOB mobility.  Education:    Assessment:     Izabella Fulton is a 85 y.o. female with a medical diagnosis of Acute-on-chronic kidney injury.  She presents with generalized weakness, impaired activity tolerance, balance and safety awareness and L shoulder pain from her fall a decline in functional status due to the below listed impairments, impacting ADLs and functional mobility. Pt needed a few cues for safety with standing ADL's and walker use and is at risk for falls. Pt put forth good effort with all treatment activities and should progress with continued therapy.  Performance deficits affecting function are impaired self care skills, weakness, impaired balance, impaired endurance, impaired functional mobilty, gait instability, decreased safety awareness, decreased upper extremity function, pain.      Rehab Prognosis:  Good; patient would benefit from acute skilled OT services to address these deficits and reach maximum level of function.         Clinical Decision Makin.  OT Low:  "Pt evaluation falls under low complexity for evaluation coding due to performance deficits noted in 1-3 areas as stated above and 0 co-morbities affecting current functional status. Data obtained from problem focused assessments. No modifications or assistance was required for completion of evaluation. Only brief occupational profile and history " "review completed."     Plan:     Patient to be seen 2 x/week to address the above listed problems via self-care/home management, therapeutic activities, therapeutic exercises  · Plan of Care Expires: 09/09/18  · Plan of Care Reviewed with: patient, spouse    This Plan of care has been discussed with the patient who was involved in its development and understands and is in agreement with the identified goals and treatment plan    GOALS:    Occupational Therapy Goals        Problem: Occupational Therapy Goal    Goal Priority Disciplines Outcome Interventions   Occupational Therapy Goal     OT, PT/OT Ongoing (interventions implemented as appropriate)    Description:  Goals to be met by: 8/17/18     Patient will increase functional independence with ADLs by performing:    LE Dressing with Set-up Assistance, Stand-by Assistance and Assistive Devices as needed.  Grooming while standing at sink with Supervision and Assistive Devices as needed.  Toileting from toilet with Stand-by Assistance and Assistive Devices as needed for hygiene and clothing management.   Toilet transfer to toilet with Supervision.  Upper extremity exercise program x10 reps per handout, with supervision.                      Time Tracking:     OT Date of Treatment: 08/09/18  OT Start Time: 1346  OT Stop Time: 1429  OT Total Time (min): 43 min    Billable Minutes:Evaluation 10  Self Care/Home Management 33    GOPI Guerrero  8/9/2018    "

## 2018-08-09 NOTE — PROGRESS NOTES
"Progress Note  Hospital Medicine  Patient Name:Izabella Fulton  MRN:  [id number]  Patient Class: IP- Inpatient  Admit Date: 8/8/2018  Length of Stay: 1 days  Expected Discharge Date:   Attending Physician: Rachel Chavis MD  Primary Care Provider:  Steve Mithcell MD    SUBJECTIVE:     Principal Problem: Acute-on-chronic kidney injury  Initial history of present illness: Izabella Fulton is a 85 y.o. female with a hx of HTN, TIA, HLD, CKD, ulcerative colitis, and thyroid disease who presented to the ED via EMS following a syncopal event while at home earlier today.  Pt states that she was in her kitchen when she began feeling dizzy.  She next remembers finding herself on the floor.  She fell onto her L side, and reports L shoulder and L hand pain which is now improving. She began having shortness of breath, cough and lower extremity edema after returning home from a vacation in [place] 3 weeks ago.  She has been followed closely by her PCP, stating that she has been seen weekly over the past 3 weeks.  Reports a history of intermittent lower extremity edema for which she takes torsemide as needed.  Reports that torsemide was increased to twice daily after being seen in her doctor's office 3 weeks ago due to "fluid around my heart."  She reports serial CXR weekly over the past 3 weeks, the last being yesterday demonstrating continued improvement.  Endorses lower extremity edema and shortness of breath have greatly improved.  She was unable to get off the floor after her fall today, but was able to call 911 for assistance.  Pt denies CP, abd pain, dysuria, vomiting and diarrhea. She is followed by Dr. Shultz for CKD.  Assessment in ED revealed positive orthostatic blood pressure, , and creat 3.3.  She is admitted to the service of hospital medicine for further evaluation and treatment.        PMH/PSH/SH/FH/Meds: reviewed.    Symptoms/Review of Systems: Feeling better with IVF hydration. No shortness of breath, cough, " chest pain or headache, fever or abdominal pain.     Diet:  Adequate intake.    Activity level: Up with assistance  Pain:  Patient reports no pain.       OBJECTIVE:   Vital Signs (Most Recent):      Temp: 98.2 °F (36.8 °C) (08/09/18 0746)  Pulse: 84 (08/09/18 0859)  Resp: 18 (08/09/18 0859)  BP: (!) 141/82 (08/09/18 0746)  SpO2: (!) 94 % (08/09/18 0859)       Vital Signs Range (Last 24H):  Temp:  [97.5 °F (36.4 °C)-98.2 °F (36.8 °C)]   Pulse:  [59-84]   Resp:  [16-18]   BP: ()/(46-82)   SpO2:  [90 %-96 %]     Weight: 54.4 kg (119 lb 14.9 oz)  Body mass index is 23.42 kg/m².    Intake/Output Summary (Last 24 hours) at 08/09/18 0954  Last data filed at 08/09/18 0500   Gross per 24 hour   Intake           781.25 ml   Output                0 ml   Net           781.25 ml     Physical Examination:  Constitutional: She is oriented to person, place, and time. She appears well-developed.   Thin appearing female   HENT:   Head: Normocephalic and atraumatic.   Mouth/Throat: Oropharynx is clear and moist.   Eyes: Conjunctivae and EOM are normal. Pupils are equal, round, and reactive to light.   Neck: Normal range of motion. Neck supple. No JVD present.   Cardiovascular: Normal rate, regular rhythm and intact distal pulses.    Pulmonary/Chest: Effort normal and breath sounds normal.   Abdominal: Soft. Bowel sounds are normal. She exhibits no distension. There is no tenderness.   Musculoskeletal: Normal range of motion. She exhibits no edema.   Neurological: She is alert and oriented to person, place, and time.   Skin: Skin is warm and dry. Capillary refill takes less than 2 seconds.   Psychiatric: She has a normal mood and affect. Her behavior is normal. Judgment and thought content normal.   Vitals reviewed.  CRANIAL NERVES   CN III, IV, VI   Pupils are equal, round, and reactive to light.  Extraocular motions are normal.     CBC:    Recent Labs  Lab 08/08/18  1534 08/09/18  0529   WBC 17.80* 8.50   RBC 4.19 3.69*   HGB  13.0 11.7*   HCT 38.6 34.6*    255   MCV 92 94   MCH 31.0 31.7*   MCHC 33.7 33.9   BMP    Recent Labs  Lab 08/08/18  1535 08/09/18  0529   * 104    140   K 4.1 3.5   CL 93* 103   CO2 25 25   * 99*   CREATININE 3.3* 2.3*   CALCIUM 10.5 8.8   MG 2.4 2.5      Diagnostic Results:  Microbiology Results (last 7 days)     Procedure Component Value Units Date/Time    Urine culture [[id number]] Collected:  08/08/18 1650    Order Status:  Sent Specimen:  Urine Updated:  08/08/18 2236         CT head: Mild involutional changes and findings suggesting mild microvascular ischemic change with no acute intracranial findings.    CXR: No acute cardiopulmonary disease.    VQ scan:   Perfusion: Uniform perfusion.  No mismatch other perfusion defects suggesting pulmonary artery embolism. Ventilation: Uniform ventilation.  No significant air trapping.      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  Small 5 mm bone fragment a probable accessory ossicle.  Acute fracture or dislocation is not seen.    Left hand x-ray:  Severe degenerative change, osteopenia. No acute fracture or dislocation as visualized on the 2 projections.    Renal US:   Elevated resistive indices bilaterally consistent with intrinsic renal disease.  No hydronephrosis right kidney.  Just mild prominence of the left renal collecting structures.  Left ureteral jet is visualized suggesting that is not left ureteral obstruction. Cystic appear posterior to the bladder on the right and large calcific density projecting posterior to the bladder midline.  Calcification may represent calcified uterine fibroid and the cystic lesion is also suspect suggested on earlier CT of 2012 although there are normal recent studies for comparison.      Assessment/Plan:     * Acute-on-chronic kidney injury     Likely secondary to IVVD.  Baseline creat/eGFR is unknown; however, pt states that she is followed by Dr. Shultz.  Continue IVF  hydration.  Follow renal panel and electrolytes closely.  Follow renal recommendations-will consult Dr. Shultz.  Follow Renal Ultrasound.  Avoid nephrotoxins.  Urine analysis-done.       Demand ischemia     Likely secondary to hypotension Vs renal insufficiency.   Initial troponin mildly elevated in the absence of chest pain-will trend  Monitor telemetry  Follow ECHO results.       Acute pain of left shoulder     Sustained after syncopal episode at home, causing patient to fall onto L side with resulting pain.  Follow xray L shoulder  PT/OT consult  Treat for pain as needed.       Congestive heart failure (CHF)     Suspected chronic  TTE in am.       Hyperlipidemia      Chronic; continue current statin       Ulcerative colitis     Chronic; controlled  Hold mesalamine due to FERCHO-resume when clinically indicated       Elevated d-dimer  VQ scan reviewed.  Order venous doppler scan of legs.     Hypothyroid     Chronic  Obtain TSH; continue levothyroxine       Essential hypertension      Chronic.  Hold antihypertensives for now due to + orthostatic BP.  Monitor and treat as clinically indicated.        Syncope and collapse     Likely secondary to postural hypotension-positive orthostatic B/P.  She has been taking torsemide bid x 3 weeks, /creat 3.3.  IVVD.  IV hydration initiated in ED-continue /hr.  Orthostatic BP qshift  CT head-done in ED negative for acute process.   Check d-dimer-c/o SOB, hx recent travel  Monitor telemetry.  Hold antihypertensives for now.    Trend troponin-initial mildly elevated.  Will also check CK-pt on floor, unable to get up for unknown period of time.  TTE in am     VTE Risk Mitigation         Ordered     enoxaparin injection 30 mg  Daily      08/08/18 2120     Place sequential compression device  Until discontinued      08/08/18 2120     IP VTE HIGH RISK PATIENT  Once      08/08/18 2120     Place ESTUARDO hose  Until discontinued      08/08/18 1809     Place sequential compression  device  Until discontinued      08/08/18 1701        Rachel Chavis MD  Department of Hospital Medicine   [facility]

## 2018-08-09 NOTE — PT/OT/SLP EVAL
Physical Therapy Evaluation    Patient Name:  Izabella Fulton   MRN:  [id number]    Recommendations:     Discharge Recommendations:  home health PT   Discharge Equipment Recommendations: none   Barriers to discharge: None    Assessment:     Izabella Fulton is a 85 y.o. female admitted with a medical diagnosis of Acute-on-chronic kidney injury.  She presents with the following impairments/functional limitations:  weakness, impaired endurance, impaired functional mobilty, gait instability, impaired balance, decreased coordination which limits safe functional mobility.    Rehab Prognosis:  GOOD; patient would benefit from acute skilled PT services to address these deficits and reach maximum level of function.      Recent Surgery: * No surgery found *      Plan:     During this hospitalization, patient to be seen 6 x/week to address the above listed problems via gait training, therapeutic activities, therapeutic exercises  · Plan of Care Expires:  08/31/18   Plan of Care Reviewed with: patient, spouse    Subjective     Communicated with nurse Rao prior to session.  Patient found supine in bed upon PT entry to room, agreeable to evaluation.      Chief Complaint: no major complaints  Patient comments/goals: to return home  Pain/Comfort:  · Pain Rating 1: 0/10    Patients cultural, spiritual, Temple conflicts given the current situation:      Living Environment:  Pt lives in single story home with small threshold step.  Prior to admission, patients level of function was independent.  Patient has the following equipment: cane, straight, walker, rolling, bath bench.  DME owned (not currently used): pt asserts that she did not use any DME/AD at home but they were from previous surgeties.  Upon discharge, patient will have assistance from family and will benefit from HHPT.    Objective:     Patient found with: peripheral IV, telemetry     General Precautions: Standard, fall   Orthopedic Precautions:N/A   Braces: N/A      Exams:  · RLE ROM: WFL  · RLE Strength: WFL  · LLE ROM: WFL  · LLE Strength: WFL    Functional Mobility:  · Bed Mobility:     · Supine to Sit: stand by assistance  · Sit to Supine: stand by assistance  · Transfers:     · Sit to Stand:  contact guard assistance with rolling walker  · Bed to Chair: contact guard assistance with  rolling walker  using  Stand Pivot  · Gait: 35ft x 2 CGA with RW    AM-PAC 6 CLICK MOBILITY  Total Score:16       Therapeutic Activities and Exercises:       Patient left supine with all lines intact, call button in reach and nursing notified.    GOALS:    Physical Therapy Goals        Problem: Physical Therapy Goal    Goal Priority Disciplines Outcome Goal Variances Interventions   Physical Therapy Goal     PT/OT, PT Ongoing (interventions implemented as appropriate)     Description:  Goals to be met by: 2018     Patient will increase functional independence with mobility by performin. Supine to sit with [place]  2. Sit to supine with [place]  3. Sit to stand transfer with [place]  4. Bed to chair transfer with modified [place] using RW.  5. Gait  x 250 feet with Modified [place] using RW                       History:     Past Medical History:   Diagnosis Date    Anticoagulant long-term use     baby aspirin    Hypertension     Thyroid disease     TIA (transient ischemic attack)     Ulcerative colitis     Wears hearing aid        Past Surgical History:   Procedure Laterality Date    COLON SURGERY  12    exp lap, perfered colon    EYE SURGERY      left eye cataract       Clinical Decision Making:     History  Co-morbidities and personal factors that may impact the plan of care Examination  Body Structures and Functions, activity limitations and participation restrictions that may impact the plan of care Clinical Presentation   Decision Making/ Complexity Score   Co-morbidities:   [] Time since onset of injury / illness / exacerbation  []  Status of current condition  []Patient's cognitive status and safety concerns    [] Multiple Medical Problems (see med hx)  Personal Factors:   [] Patient's age  [] Prior Level of function   [] Patient's home situation (environment and family support)  [] Patient's level of motivation  [] Expected progression of patient      HISTORY:(criteria)    [] 13194 - no personal factors/history    [] 47323 - has 1-2 personal factor/comorbidity     [] 61459 - has >3 personal factor/comorbidity     Body Regions:  [] Objective examination findings  [] Head     []  Neck  [] Trunk   [] Upper Extremity  [] Lower Extremity    Body Systems:  [] For communication ability, affect, cognition, language, and learning style: the assessment of the ability to make needs known, consciousness, orientation (person, place, and time), expected emotional /behavioral responses, and learning preferences (eg, learning barriers, education  needs)  [] For the neuromuscular system: a general assessment of gross coordinated movement (eg, balance, gait, locomotion, transfers, and transitions) and motor function  (motor control and motor learning)  [] For the musculoskeletal system: the assessment of gross symmetry, gross range of motion, gross strength, height, and weight  [] For the integumentary system: the assessment of pliability(texture), presence of scar formation, skin color, and skin integrity  [] For cardiovascular/pulmonary system: the assessment of heart rate, respiratory rate, blood pressure, and edema     Activity limitations:    [] Patient's cognitive status and saf ety concerns          [] Status of current condition      [] Weight bearing restriction  [] Cardiopulmunary Restriction    Participation Restrictions:   [] Goals and goal agreement with the patient     [] Rehab potential (prognosis) and probable outcome      Examination of Body System: (criteria)    [] 71776 - addressing 1-2 elements    [] 02384 - addressing a total of 3 or more  elements     [] 05672 -  Addressing a total of 4 or more elements         Clinical Presentation: (criteria)  Choose one     On examination of body system using standardized tests and measures patient presents with (CHOOSE ONE) elements from any of the following: body structures and functions, activity limitations, and/or participation restrictions.  Leading to a clinical presentation that is considered (CHOOSE ONE)                              Clinical Decision Making  (Eval Complexity):  Choose One     Time Tracking:     PT Received On: 08/09/18  PT Start Time: 1030     PT Stop Time: 1043  PT Total Time (min): 13 min     Billable Minutes: Evaluation 13      [facility] Kylie, PT  08/09/2018

## 2018-08-09 NOTE — ASSESSMENT & PLAN NOTE
Chronic.  Hold antihypertensives for now due to + orthostatic BP.  Monitor and treat as clinically indicated.

## 2018-08-09 NOTE — H&P
"[facility] Hospital Medicine  History & Physical    Patient Name: Izabella Fulton  MRN: [id number]  Admission Date: 8/8/2018  Attending Physician: Rachel Chavis MD   Primary Care Provider: Steve Mitchell MD         Patient information was obtained from patient and ER records.     Subjective:     Principal Problem:Acute-on-chronic kidney injury    Chief Complaint:   Chief Complaint   Patient presents with    Fatigue     near syncope this am.        HPI: Izabella Fulton is a 85 y.o. female with a hx of HTN, TIA, HLD, CKD, ulcerative colitis, and thyroid disease who presented to the ED via EMS following a syncopal event while at home earlier today.  Pt states that she was in her kitchen when she began feeling dizzy.  She next remembers finding herself on the floor.  She fell onto her L side, and reports L shoulder and L hand pain which is now improving. She began having shortness of breath, cough and lower extremity edema after returning home from a vacation in [place] 3 weeks ago.  She has been followed closely by her PCP, stating that she has been seen weekly over the past 3 weeks.  Reports a history of intermittent lower extremity edema for which she takes torsemide as needed.  Reports that torsemide was increased to twice daily after being seen in her doctor's office 3 weeks ago due to "fluid around my heart."  She reports serial CXR weekly over the past 3 weeks, the last being yesterday demonstrating continued improvement.  Endorses lower extremity edema and shortness of breath have greatly improved.  She was unable to get off the floor after her fall today, but was able to call 911 for assistance.  Pt denies CP, abd pain, dysuria, vomiting and diarrhea. She is followed by Dr. Shultz for CKD.  Assessment in ED revealed positive orthostatic blood pressure, , and creat 3.3.  She is admitted to the service of hospital medicine for further evaluation and treatment.    Past Medical History: "   Diagnosis Date    Anticoagulant long-term use       baby aspirin    Hypertension      Thyroid disease      TIA (transient ischemic attack)      Ulcerative colitis      Wears hearing aid                 Past Surgical History:   Procedure Laterality Date    COLON SURGERY   05/17/12     exp lap, perfered colon    EYE SURGERY         left eye cataract         Review of patient's allergies indicates:  No Known Allergies     No current facility-administered medications on file prior to encounter.            Current Outpatient Prescriptions on File Prior to Encounter   Medication Sig    acetaminophen (TYLENOL) 325 MG tablet Take 2 tablets (650 mg total) by mouth every 6 (six) hours as needed (or equal to 101 degree F).    amlodipine (NORVASC) 5 MG tablet Take 5 mg by mouth once daily.     aspirin (ECOTRIN) 81 MG EC tablet Take 81 mg by mouth once daily.      calcium-vitamin D 500-125 mg-unit tablet Take 1 tablet by mouth 2 (two) times daily.      DOCOSAHEXANOIC ACID/VIT C/LUT (EYE HEALTH ORAL) Take 1 tablet by mouth 2 (two) times daily.      fish oil-omega-3 fatty acids 300-1,000 mg capsule Take 1 g by mouth once daily.      folic acid (FOLVITE) 800 MCG tablet Take 800 mcg by mouth once daily.      hydrochlorothiazide (MICROZIDE) 12.5 mg capsule Take 12.5 mg by mouth once daily.      levothyroxine (SYNTHROID) 75 MCG tablet Take 75 mcg by mouth once daily.      losartan (COZAAR) 50 MG tablet Take 50 mg by mouth once daily.      mesalamine (ASACOL) 400 mg EC tablet Take 400 mg by mouth 2 (two) times daily.      metoprolol tartrate (LOPRESSOR) 50 MG tablet Take 50 mg by mouth 2 (two) times daily.     pravastatin (PRAVACHOL) 20 MG tablet Take 20 mg by mouth once daily.      [DISCONTINUED] alendronate (FOSAMAX) 70 MG tablet Take 70 mg by mouth every 7 days. Take 1 tablet once weekly in the morning with a full glass of water on an empty stomach. Do not consume food or lie down for at least 30 minutes  afterwards.     [DISCONTINUED] furosemide (LASIX) 40 MG tablet Take 20 mg by mouth every Mon, Wed, Fri.             Family History      Problem Relation (Age of Onset)     Alzheimer's disease Mother     Heart disease Father               Social History Main Topics    Smoking status: Never Smoker    Smokeless tobacco: Not on file    Alcohol use No    Drug use: No    Sexual activity: Not on file      Review of Systems   Constitutional: Positive for fatigue. Negative for chills and fever.   HENT: Negative for sore throat and trouble swallowing.    Eyes: Negative for photophobia and visual disturbance.   Respiratory: Positive for cough and shortness of breath. Negative for wheezing.    Cardiovascular: Negative for chest pain and palpitations.   Gastrointestinal: Negative for abdominal pain, diarrhea, nausea and vomiting.   Endocrine: Negative for polyphagia and polyuria.   Genitourinary: Negative for dysuria and frequency.   Musculoskeletal: Positive for arthralgias (left shoulder and left hand, 5th finger). Negative for joint swelling.   Skin: Negative for rash and wound.   Neurological: Positive for dizziness, syncope, weakness and light-headedness.   Psychiatric/Behavioral: Negative for agitation and confusion.   All other systems reviewed and are negative.     Objective:      Vital Signs (Most Recent):  Temp: 97.7 °F (36.5 °C) (08/08/18 2011)  Pulse: 61 (08/08/18 2011)  Resp: 16 (08/08/18 2011)  BP: (!) 146/63 (08/08/18 2011)  SpO2: (!) 93 % (08/08/18 2011) Vital Signs (24h Range):  Temp:  [97.7 °F (36.5 °C)-98 °F (36.7 °C)] 97.7 °F (36.5 °C)  Pulse:  [59-72] 61  Resp:  [16-18] 16  SpO2:  [92 %-96 %] 93 %  BP: (111-176)/(54-77) 146/63      Weight: 54.4 kg (119 lb 14.9 oz)  Body mass index is 23.42 kg/m².     Physical Exam   Constitutional: She is oriented to person, place, and time. She appears well-developed.   Thin appearing female   HENT:   Head: Normocephalic and atraumatic.   Mouth/Throat: Oropharynx is  clear and moist.   Eyes: Conjunctivae and EOM are normal. Pupils are equal, round, and reactive to light.   Neck: Normal range of motion. Neck supple. No JVD present.   Cardiovascular: Normal rate, regular rhythm and intact distal pulses.    Pulmonary/Chest: Effort normal and breath sounds normal.   Abdominal: Soft. Bowel sounds are normal. She exhibits no distension. There is no tenderness.   Musculoskeletal: Normal range of motion. She exhibits no edema.   Neurological: She is alert and oriented to person, place, and time.   Skin: Skin is warm and dry. Capillary refill takes less than 2 seconds.   Psychiatric: She has a normal mood and affect. Her behavior is normal. Judgment and thought content normal.   Vitals reviewed.          CRANIAL NERVES      CN III, IV, VI   Pupils are equal, round, and reactive to light.  Extraocular motions are normal.         Significant Labs:   CBC:   Recent Labs  Lab 08/08/18  1534   WBC 17.80*   HGB 13.0   HCT 38.6         CMP:   Recent Labs  Lab 08/08/18  1535      K 4.1   CL 93*   CO2 25   *   *   CREATININE 3.3*   CALCIUM 10.5   PROT 7.6   ALBUMIN 3.7   BILITOT 1.1*   ALKPHOS 50*   AST 26   ALT 25   ANIONGAP 18*   EGFRNONAA 12*      Magnesium:   Recent Labs  Lab 08/08/18  1535   MG 2.4      Urine Studies:   Recent Labs  Lab 08/08/18  1650   COLORU Yellow   APPEARANCEUA Clear   PHUR 6.0   SPECGRAV <=1.005*   PROTEINUA Negative   GLUCUA Negative   KETONESU Negative   BILIRUBINUA Negative   OCCULTUA Negative   NITRITE Negative   UROBILINOGEN Negative   LEUKOCYTESUR 2+*   RBCUA 1   WBCUA 2   BACTERIA Few*   HYALINECASTS 1         Significant Imaging:  CT head:    Impression:       Mild involutional changes and findings suggesting mild microvascular ischemic change with no acute intracranial findings           Assessment/Plan:     * Acute-on-chronic kidney injury    Likely secondary to IVVD.  Baseline creat/eGFR is unknown; however, pt states that she is  followed by Dr. Shultz.  Continue IVF hydration.  Follow renal panel and electrolytes closely.  Follow renal recommendations-will consult Dr. Shultz.  Check Renal Ultrasound.  Avoid nephrotoxins.  Urine analysis-done.                    Demand ischemia    Likely secondary to hypotension.  Initial troponin mildly elevated in the absence of chest pain-will trend  EKG obtained in ED with no evidence of ischemia  Monitor telemetry  TTE in am.            Acute pain of left shoulder    Sustained after syncopal episode at home, causing patient to fall onto L side with resulting pain.  Obtain xray L shoulder  PT/OT consult  Treat for pain as needed.          Congestive heart failure (CHF)    Suspected chronic  TTE in am.          Hyperlipidemia     Chronic; continue current statin          Ulcerative colitis    Chronic; controlled  Hold mesalamine due to FERCHO-resume when clinically indicated        Hypothyroid    Chronic  Obtain TSH; continue levothyroxine            Essential hypertension     Chronic.  Hold antihypertensives for now due to + orthostatic BP.  Monitor and treat as clinically indicated.           Syncope and collapse    Likely secondary to postural hypotension-positive orthostatic B/P.  She has been taking torsemide bid x 3 weeks, /creat 3.3.  IVVD.  IV hydration initiated in ED-continue /hr.  Orthostatic BP qshift  CT head-done in ED negative for acute process.   Check d-dimer-c/o SOB, hx recent travel  Monitor telemetry.  Hold antihypertensives for now.    Trend troponin-initial mildly elevated.  Will also check CK-pt on floor, unable to get up for unknown period of time.  TTE in am            VTE Risk Mitigation         Ordered     enoxaparin injection 30 mg  Daily      08/08/18 2120     Place ESTUARDO hose  Until discontinued      08/08/18 2120     Place sequential compression device  Until discontinued      08/08/18 2120     IP VTE HIGH RISK PATIENT  Once      08/08/18 2120     Place ESTUARDO hose  Until  discontinued      08/08/18 1809     Place sequential compression device  Until discontinued      08/08/18 1809         Time spent seeing patient( greater than 1/2 spent in direct contact) : 60 minutes      PARI Moore  Department of Hospital Medicine   [facility]

## 2018-08-09 NOTE — HPI
"Izabella Fulton is a 85 y.o. female with a hx of HTN, TIA, HLD, CKD, ulcerative colitis, and thyroid disease who presented to the ED via EMS following a syncopal event while at home earlier today.  Pt states that she was in her kitchen when she began feeling dizzy.  She next remembers finding herself on the floor.  She fell onto her L side, and reports L shoulder and L hand pain which is now improving. She began having shortness of breath, cough and lower extremity edema after returning home from a vacation in [place] 3 weeks ago.  She has been followed closely by her PCP, stating that she has been seen weekly over the past 3 weeks.  Reports a history of intermittent lower extremity edema for which she takes torsemide as needed.  Reports that torsemide was increased to twice daily after being seen in her doctor's office 3 weeks ago due to "fluid around my heart."  She reports serial CXR weekly over the past 3 weeks, the last being yesterday demonstrating continued improvement.  Endorses lower extremity edema and shortness of breath have greatly improved.  She was unable to get off the floor after her fall today, but was able to call 911 for assistance.  Pt denies CP, abd pain, dysuria, vomiting and diarrhea. She is followed by Dr. Shultz for CKD.  Assessment in ED revealed positive orthostatic blood pressure, , and creat 3.3.  She is admitted to the service of hospital medicine for further evaluation and treatment.  "

## 2018-08-09 NOTE — ASSESSMENT & PLAN NOTE
Likely secondary to hypotension.  Initial troponin mildly elevated in the absence of chest pain-will trend  EKG obtained in ED with no evidence of ischemia  Monitor telemetry  TTE in am.

## 2018-08-09 NOTE — ASSESSMENT & PLAN NOTE
Likely secondary to postural hypotension-positive orthostatic B/P.  She has been taking torsemide bid x 3 weeks, /creat 3.3.  IVVD.  IV hydration initiated in ED-continue /hr.  Orthostatic BP qshift  CT head-done in ED negative for acute process.   Check d-dimer-c/o SOB, hx recent travel  Monitor telemetry.  Hold antihypertensives for now.    Trend troponin-initial WNL  TTE in am

## 2018-08-09 NOTE — H&P
"[facility] Hospital Medicine  History & Physical    Patient Name: Izabella Fulton  MRN: [id number]  Admission Date: 8/8/2018  Attending Physician: Rachel Chavis MD   Primary Care Provider: Steve Mitchell MD         Patient information was obtained from patient and ER records.     Subjective:     Principal Problem:Acute-on-chronic kidney injury    Chief Complaint:   Chief Complaint   Patient presents with    Fatigue     near syncope this am.        HPI: Izabella Fulton is a 85 y.o. female with a hx of HTN, TIA, HLD, CKD, ulcerative colitis, and thyroid disease who presented to the ED via EMS following a syncopal event while at home earlier today.  Pt states that she was in her kitchen when she began feeling dizzy.  She next remembers finding herself on the floor.  She fell onto her L side, and reports L shoulder and L hand pain which is now improving. She began having shortness of breath, cough and lower extremity edema after returning home from a vacation in [place] 3 weeks ago.  She has been followed closely by her PCP, stating that she has been seen weekly over the past 3 weeks.  Reports a history of intermittent lower extremity edema for which she takes torsemide as needed.  Reports that torsemide was increased to twice daily after being seen in her doctor's office 3 weeks ago due to "fluid around my heart."  She reports serial CXR weekly over the past 3 weeks, the last being yesterday demonstrating continued improvement.  Endorses lower extremity edema and shortness of breath have greatly improved.  She was unable to get off the floor after her fall today, but was able to call 911 for assistance.  Pt denies CP, abd pain, dysuria, vomiting and diarrhea. She is followed by Dr. Shultz for CKD.  Assessment in ED revealed positive orthostatic blood pressure, , and creat 3.3.  She is admitted to the service of hospital medicine for further evaluation and treatment.      Assessment/Plan:     * " Acute-on-chronic kidney injury    Likely secondary to IVVD.  Baseline creat/eGFR is unknown; however, pt states that she is followed by Dr. Shultz.  Continue IVF hydration.  Follow renal panel and electrolytes closely.  Follow renal recommendations-will consult Dr. Shultz.  Check Renal Ultrasound.  Avoid nephrotoxins.  Urine analysis-done.                    Acute pain of left shoulder    Sustained after syncopal episode at home, causing patient to fall onto L side with resulting pain.  Obtain xray L shoulder  PT/OT consult  Treat for pain as needed.          Congestive heart failure (CHF)    Suspected chronic  TTE in am.          Hyperlipidemia     Chronic; continue current statin          Ulcerative colitis    Chronic; controlled  Hold mesalamine due to FERCHO-resume when clinically indicated        Hypothyroid    Chronic  Obtain TSH; continue levothyroxine            Essential hypertension     Chronic.  Hold antihypertensives for now due to + orthostatic BP.  Monitor and treat as clinically indicated.           Syncope and collapse    Likely secondary to postural hypotension-positive orthostatic B/P.  She has been taking torsemide bid x 3 weeks, /creat 3.3.  IVVD.  IV hydration initiated in ED-continue /hr.  Orthostatic BP qshift  CT head-done in ED negative for acute process.   Check d-dimer-c/o SOB, hx recent travel  Monitor telemetry.  Hold antihypertensives for now.    Trend troponin-initial WNL  TTE in am            VTE Risk Mitigation         Ordered     enoxaparin injection 30 mg  Daily      08/08/18 2120     Place ESTUARDO hose  Until discontinued      08/08/18 2120     Place sequential compression device  Until discontinued      08/08/18 2120     IP VTE HIGH RISK PATIENT  Once      08/08/18 2120     Place ESTUARDO hose  Until discontinued      08/08/18 1809     Place sequential compression device  Until discontinued      08/08/18 1809      Time spent seeing patient( greater than 1/2 spent in direct  contact) : 60 minutes       PARI Moore  Department of Hospital Medicine   [facility]

## 2018-08-09 NOTE — PLAN OF CARE
Problem: Patient Care Overview  Goal: Plan of Care Review  Outcome: Ongoing (interventions implemented as appropriate)  Bed in low position, call light within reach, patient instructed to use call light for mobility to maintain fall precautions.  Cardiac and VS monitored.  Patient is NPO since midnight T.T.E. in A.M. .   NaCl running 125 ml/hr.  No complaints at this time, patient is resting between care.

## 2018-08-09 NOTE — ASSESSMENT & PLAN NOTE
Sustained after syncopal episode at home, causing patient to fall onto L side with resulting pain.  Obtain xray L shoulder  PT/OT consult  Treat for pain as needed.

## 2018-08-09 NOTE — ASSESSMENT & PLAN NOTE
Likely secondary to postural hypotension-positive orthostatic B/P.  She has been taking torsemide bid x 3 weeks, /creat 3.3.  IVVD.  IV hydration initiated in ED-continue /hr.  Orthostatic BP qshift  CT head-done in ED negative for acute process.   Check d-dimer-c/o SOB, hx recent travel  Monitor telemetry.  Hold antihypertensives for now.    Trend troponin-initial mildly elevated.  Will also check CK-pt on floor, unable to get up for unknown period of time.  TTE in am

## 2018-08-10 VITALS
SYSTOLIC BLOOD PRESSURE: 132 MMHG | HEIGHT: 60 IN | OXYGEN SATURATION: 94 % | RESPIRATION RATE: 18 BRPM | WEIGHT: 119 LBS | HEART RATE: 77 BPM | BODY MASS INDEX: 23.36 KG/M2 | DIASTOLIC BLOOD PRESSURE: 59 MMHG | TEMPERATURE: 97 F

## 2018-08-10 LAB
ALBUMIN SERPL BCP-MCNC: 2.9 G/DL
ALP SERPL-CCNC: 45 U/L
ALT SERPL W/O P-5'-P-CCNC: 17 U/L
ANION GAP SERPL CALC-SCNC: 11 MMOL/L
AORTIC ROOT ANNULUS: 2.32 CM
AORTIC VALVE CUSP SEPERATION: 1.66 CM
AST SERPL-CCNC: 19 U/L
AV MEAN GRADIENT: 10.29 MMHG
AV PEAK GRADIENT: 17.33 MMHG
AV VALVE AREA: 2.67 CM2
BACTERIA UR CULT: NORMAL
BASOPHILS # BLD AUTO: 0.1 K/UL
BASOPHILS NFR BLD: 0.6 %
BILIRUB SERPL-MCNC: 0.6 MG/DL
BSA FOR ECHO PROCEDURE: 1.51 M2
BUN SERPL-MCNC: 64 MG/DL
CALCIUM SERPL-MCNC: 8.6 MG/DL
CHLORIDE SERPL-SCNC: 108 MMOL/L
CO2 SERPL-SCNC: 24 MMOL/L
CREAT SERPL-MCNC: 1.6 MG/DL
CV ECHO LV RWT: 0.72 CM
DIFFERENTIAL METHOD: ABNORMAL
DOP CALC AO PEAK VEL: 2.08 M/S
DOP CALC AO VTI: 47.4 CM
DOP CALC LVOT AREA: 3.27 CM2
DOP CALC LVOT DIAMETER: 2.04 CM
DOP CALC LVOT STROKE VOLUME: 126.69 CM3
DOP CALCLVOT PEAK VEL VTI: 38.78 CM
E WAVE DECELERATION TIME: 174.96 MSEC
E/A RATIO: 1.11
E/E' RATIO: 12.4
ECHO LV POSTERIOR WALL: 1.14 CM (ref 0.6–1.1)
EOSINOPHIL # BLD AUTO: 0.3 K/UL
EOSINOPHIL NFR BLD: 3.7 %
ERYTHROCYTE [DISTWIDTH] IN BLOOD BY AUTOMATED COUNT: 14.8 %
EST. GFR  (AFRICAN AMERICAN): 34 ML/MIN/1.73 M^2
EST. GFR  (NON AFRICAN AMERICAN): 29 ML/MIN/1.73 M^2
FRACTIONAL SHORTENING: 38 % (ref 28–44)
GLUCOSE SERPL-MCNC: 108 MG/DL
HCT VFR BLD AUTO: 33.8 %
HGB BLD-MCNC: 11.3 G/DL
INTERVENTRICULAR SEPTUM: 1.21 CM (ref 0.6–1.1)
IVRT: 0.08 MSEC
LEFT ATRIUM SIZE: 3.54 CM
LEFT INTERNAL DIMENSION IN SYSTOLE: 2.03 CM (ref 2.1–4)
LEFT VENTRICLE MASS INDEX: 78.2 G/M2
LEFT VENTRICULAR INTERNAL DIMENSION IN DIASTOLE: 3.25 CM (ref 3.5–6)
LEFT VENTRICULAR MASS: 118.15 G
LV LATERAL E/E' RATIO: 10.33
LV SEPTAL E/E' RATIO: 15.5
LYMPHOCYTES # BLD AUTO: 1.7 K/UL
LYMPHOCYTES NFR BLD: 19.7 %
MCH RBC QN AUTO: 31.5 PG
MCHC RBC AUTO-ENTMCNC: 33.4 G/DL
MCV RBC AUTO: 94 FL
MONOCYTES # BLD AUTO: 1.2 K/UL
MONOCYTES NFR BLD: 13.6 %
MV PEAK A VEL: 0.84 M/S
MV PEAK E VEL: 0.93 M/S
MV STENOSIS PRESSURE HALF TIME: 50.74 MS
MV VALVE AREA P 1/2 METHOD: 4.34 CM2
NEUTROPHILS # BLD AUTO: 5.4 K/UL
NEUTROPHILS NFR BLD: 62.4 %
PISA TR MAX VEL: 3.09 M/S
PLATELET # BLD AUTO: 235 K/UL
PMV BLD AUTO: 8.8 FL
POTASSIUM SERPL-SCNC: 3.4 MMOL/L
PROT SERPL-MCNC: 6.3 G/DL
PULM VEIN A" WAVE DURATION": 115 MSEC
PV PEAK GRADIENT: 5.94 MMHG
PV PEAK VELOCITY: 1.22 CM/S
RA PRESSURE: 3 MMHG
RBC # BLD AUTO: 3.58 M/UL
RIGHT VENTRICULAR END-DIASTOLIC DIMENSION: 3.79 CM
SODIUM SERPL-SCNC: 143 MMOL/L
TDI LATERAL: 0.09
TDI SEPTAL: 0.06
TDI: 0.08
TR MAX PG: 38.19 MMHG
TRICUSPID ANNULAR PLANE SYSTOLIC EXCURSION: 0.04 CM
TV REST PULMONARY ARTERY PRESSURE: 41.19 MMHG
WBC # BLD AUTO: 8.7 K/UL

## 2018-08-10 PROCEDURE — 25000242 PHARM REV CODE 250 ALT 637 W/ HCPCS: Performed by: NURSE PRACTITIONER

## 2018-08-10 PROCEDURE — 25000003 PHARM REV CODE 250: Performed by: NURSE PRACTITIONER

## 2018-08-10 PROCEDURE — 25000003 PHARM REV CODE 250: Performed by: INTERNAL MEDICINE

## 2018-08-10 PROCEDURE — 36415 COLL VENOUS BLD VENIPUNCTURE: CPT

## 2018-08-10 PROCEDURE — 94640 AIRWAY INHALATION TREATMENT: CPT

## 2018-08-10 PROCEDURE — 80053 COMPREHEN METABOLIC PANEL: CPT

## 2018-08-10 PROCEDURE — 99239 HOSP IP/OBS DSCHRG MGMT >30: CPT | Mod: ,,, | Performed by: INTERNAL MEDICINE

## 2018-08-10 PROCEDURE — 85025 COMPLETE CBC W/AUTO DIFF WBC: CPT

## 2018-08-10 PROCEDURE — 97116 GAIT TRAINING THERAPY: CPT

## 2018-08-10 PROCEDURE — 94761 N-INVAS EAR/PLS OXIMETRY MLT: CPT

## 2018-08-10 RX ORDER — METOPROLOL TARTRATE 50 MG/1
50 TABLET ORAL 2 TIMES DAILY
Status: DISCONTINUED | OUTPATIENT
Start: 2018-08-10 | End: 2018-08-10 | Stop reason: HOSPADM

## 2018-08-10 RX ORDER — LOSARTAN POTASSIUM 25 MG/1
50 TABLET ORAL DAILY
Status: DISCONTINUED | OUTPATIENT
Start: 2018-08-10 | End: 2018-08-10 | Stop reason: HOSPADM

## 2018-08-10 RX ORDER — FERROUS SULFATE, DRIED 160(50) MG
1 TABLET, EXTENDED RELEASE ORAL 2 TIMES DAILY
Status: DISCONTINUED | OUTPATIENT
Start: 2018-08-10 | End: 2018-08-10 | Stop reason: HOSPADM

## 2018-08-10 RX ORDER — AMLODIPINE BESYLATE 5 MG/1
5 TABLET ORAL DAILY
Status: DISCONTINUED | OUTPATIENT
Start: 2018-08-10 | End: 2018-08-10 | Stop reason: HOSPADM

## 2018-08-10 RX ORDER — TORSEMIDE 10 MG/1
10 TABLET ORAL DAILY
Qty: 30 TABLET | Refills: 0 | Status: ON HOLD | OUTPATIENT
Start: 2018-08-10 | End: 2020-01-01 | Stop reason: SDUPTHER

## 2018-08-10 RX ORDER — HYDRALAZINE HYDROCHLORIDE 25 MG/1
25 TABLET, FILM COATED ORAL EVERY 12 HOURS
Status: DISCONTINUED | OUTPATIENT
Start: 2018-08-10 | End: 2018-08-10 | Stop reason: HOSPADM

## 2018-08-10 RX ADMIN — LEVOTHYROXINE SODIUM 75 MCG: 25 TABLET ORAL at 05:08

## 2018-08-10 RX ADMIN — HYDRALAZINE HYDROCHLORIDE 25 MG: 25 TABLET, FILM COATED ORAL at 05:08

## 2018-08-10 RX ADMIN — AMLODIPINE BESYLATE 5 MG: 5 TABLET ORAL at 12:08

## 2018-08-10 RX ADMIN — LEVALBUTEROL 1.25 MG: 1.25 SOLUTION, CONCENTRATE RESPIRATORY (INHALATION) at 12:08

## 2018-08-10 RX ADMIN — METOPROLOL TARTRATE 50 MG: 50 TABLET ORAL at 12:08

## 2018-08-10 RX ADMIN — FOLIC ACID TAB 400 MCG 800 MCG: 400 TAB at 08:08

## 2018-08-10 RX ADMIN — CALCIUM CARBONATE-VITAMIN D TAB 500 MG-200 UNIT 1 TABLET: 500-200 TAB at 12:08

## 2018-08-10 RX ADMIN — LEVALBUTEROL 1.25 MG: 1.25 SOLUTION, CONCENTRATE RESPIRATORY (INHALATION) at 07:08

## 2018-08-10 RX ADMIN — ASPIRIN 81 MG: 81 TABLET, COATED ORAL at 08:08

## 2018-08-10 RX ADMIN — LOSARTAN POTASSIUM 50 MG: 25 TABLET ORAL at 12:08

## 2018-08-10 RX ADMIN — OMEGA-3 FATTY ACIDS CAP 1000 MG 1 CAPSULE: 1000 CAP at 08:08

## 2018-08-10 NOTE — PT/OT/SLP PROGRESS
"Physical Therapy Treatment    Patient Name:  Izabella Fulton   MRN:  [id number]    Recommendations:     Discharge Recommendations:  home health PT, home with home health   Discharge Equipment Recommendations: none   Barriers to discharge: None    Assessment:     Izabella Fulton is a 85 y.o. female admitted with a medical diagnosis of Acute-on-chronic kidney injury.  She presents with the following impairments/functional limitations:  weakness, impaired endurance, gait instability, decreased coordination, impaired balance which limits safe functional mobility.    Rehab Prognosis:  GOOD; patient would benefit from acute skilled PT services to address these deficits and reach maximum level of function.      Recent Surgery: * No surgery found *      Plan:     During this hospitalization, patient to be seen 6 x/week to address the above listed problems via gait training, therapeutic activities, therapeutic exercises  · Plan of Care Expires:  08/31/18   Plan of Care Reviewed with: patient, spouse    Subjective     Communicated with nurse Cervantes prior to session.  Patient found supine in bed with  at bedside upon PT entry to room, agreeable to treatment.      Chief Complaint: no complaints  Patient comments/goals: to go home "I wasn't planning on staying too long"  Pain/Comfort:  · Pain Rating 1: 0/10    Patients cultural, spiritual, Yarsani conflicts given the current situation:      Objective:     Patient found with: peripheral IV, telemetry     General Precautions: Standard, fall   Orthopedic Precautions:N/A   Braces: N/A     Functional Mobility:  · Bed Mobility:     · Supine to Sit: stand by assistance  · Sit to Supine: stand by assistance  · Transfers:     · Sit to Stand:  stand by assistance/contact guard assistance with rolling walker  · Bed to Chair: stand by assistance/contact guard assistance with  rolling walker  using  Stand Pivot  · Gait: 250ft CGA/SBA with RW, occasional issues with obstacle " avoidance      AM-PAC 6 CLICK MOBILITY          Therapeutic Activities and Exercises:   tolerated AP, LAQ, hip abd/add and marching x 10    Patient left supine with all lines intact, call button in reach, nursing notified and  present..    GOALS:    Physical Therapy Goals        Problem: Physical Therapy Goal    Goal Priority Disciplines Outcome Goal Variances Interventions   Physical Therapy Goal     PT/OT, PT Ongoing (interventions implemented as appropriate)     Description:  Goals to be met by: 2018     Patient will increase functional independence with mobility by performin. Supine to sit with [place]  2. Sit to supine with [place]  3. Sit to stand transfer with [place]  4. Bed to chair transfer with modified [place] using RW.  5. Gait  x 250 feet with Modified [place] using RW                       Time Tracking:     PT Received On: 08/10/18  PT Start Time: 0954     PT Stop Time: 1013  PT Total Time (min): 19 min     Billable Minutes: Gait Training 19    Treatment Type: Treatment  PT/PTA: PT           Alisson Espinal, PT  08/10/2018

## 2018-08-10 NOTE — PROGRESS NOTES
08/10/18 0031   Patient Assessment/Suction   Level of Consciousness (AVPU) alert   Respiratory Effort Unlabored   All Lung Fields Breath Sounds diminished   NOEL Breath Sounds diminished   LLL Breath Sounds diminished   RUL Breath Sounds diminished   RML Breath Sounds diminished   RLL Breath Sounds diminished   PRE-TX-O2-ETCO2   O2 Device (Oxygen Therapy) room air   SpO2 (!) 93 %  (ra)   Pulse Oximetry Type Intermittent   $ Pulse Oximetry - Multiple Charge Pulse Oximetry - Multiple   Pulse 71   Resp 14   Aerosol Therapy   $ Aerosol Therapy Charges Aerosol Treatment   Respiratory Treatment Status given   SVN/Inhaler Treatment Route mouthpiece;with oxygen   Position During Treatment HOB at 45 degrees   Patient Tolerance good   Post-Treatment   Post-treatment Heart Rate (beats/min) 70   Post-treatment Resp Rate (breaths/min) 15   All Fields Breath Sounds unchanged

## 2018-08-10 NOTE — CONSULTS
"Nephrology Consult Note        Patient Name: Izabella Fulton  MRN: [id number]    Patient Class: IP- Inpatient   Admission Date: 8/8/2018  Length of Stay: 2 days  Date of Service: 8/10/2018    Attending Physician: Rachel Chavis MD  Primary Care Provider: Steve Mitchell MD    Reason for Consult: kaitlynn/ckd3/syncope/anemia/chf/htn    SUBJECTIVE:     HPI: 85F who follows with Dr. 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 She hit her head, and seem mine she lost consciousness briefly. Notably, she had her diuretics increased recently for "fluid around heart". She feels fine now. CT-head is OK, CXR is unremarkable, renal US shows luis alfredo-vesicular calcifications ? Fibroma.    8/10 VSS, no complains. Can be dc from renals stand-point with outpatient f/u.    Review of patient's allergies indicates:  No Known Allergies    Outpatient meds:  No current facility-administered medications on file prior to encounter.      Current Outpatient Prescriptions on File Prior to Encounter   Medication Sig Dispense Refill    acetaminophen (TYLENOL) 325 MG tablet Take 2 tablets (650 mg total) by mouth every 6 (six) hours as needed (or equal to 101 degree F). 30 tablet 0    amlodipine (NORVASC) 5 MG tablet Take 5 mg by mouth once daily.       aspirin (ECOTRIN) 81 MG EC tablet Take 81 mg by mouth once daily.        calcium-vitamin D 500-125 mg-unit tablet Take 1 tablet by mouth 2 (two) times daily.        DOCOSAHEXANOIC ACID/VIT C/LUT (EYE HEALTH ORAL) Take 1 tablet by mouth 2 (two) times daily.        fish oil-omega-3 fatty acids 300-1,000 mg capsule Take 1 g by mouth once daily.        folic acid (FOLVITE) 800 MCG tablet Take 800 mcg by mouth once daily.        hydrochlorothiazide (MICROZIDE) 12.5 mg capsule Take 12.5 mg by mouth once daily.        levothyroxine (SYNTHROID) 75 MCG tablet Take 75 mcg by mouth once daily.        losartan (COZAAR) 50 MG tablet Take 50 mg by mouth once daily.        " mesalamine (ASACOL) 400 mg EC tablet Take 400 mg by mouth 2 (two) times daily.        metoprolol tartrate (LOPRESSOR) 50 MG tablet Take 50 mg by mouth 2 (two) times daily.       pravastatin (PRAVACHOL) 20 MG tablet Take 20 mg by mouth once daily.           Scheduled meds:   amLODIPine  5 mg Oral Daily    aspirin  81 mg Oral Daily    calcium-vitamin D3  1 tablet Oral BID    enoxaparin  30 mg Subcutaneous Daily    folic acid  800 mcg Oral Daily    hydrALAZINE  25 mg Oral Q12H    levalbuterol  1.25 mg Nebulization Q8H    levothyroxine  75 mcg Oral Before breakfast    losartan  50 mg Oral Daily    metoprolol tartrate  50 mg Oral BID    omega 3-dha-epa-fish oil  1 capsule Oral Daily    pravastatin  20 mg Oral Daily    senna-docusate 8.6-50 mg  1 tablet Oral BID       Infusions:      PRN meds:  acetaminophen, dextrose 50%, dextrose 50%, glucagon (human recombinant), glucose, glucose, insulin aspart U-100, ondansetron, ramelteon, sodium chloride 0.9%    Review of Systems:  ROS    OBJECTIVE:     Vital Signs and IO (Last 24H):  Temp:  [96.4 °F (35.8 °C)-98.3 °F (36.8 °C)]   Pulse:  []   Resp:  [14-19]   BP: (128-197)/(60-80)   SpO2:  [89 %-97 %]   I/O last 3 completed shifts:  In: 1611.3 [P.O.:1230; I.V.:381.3]  Out: -     Wt Readings from Last 5 Encounters:   08/09/18 54 kg (119 lb)   11/16/17 54.4 kg (120 lb)   10/30/16 54.4 kg (120 lb)   05/22/12 54.4 kg (120 lb)   05/21/12 55.3 kg (122 lb)         Physical Exam:  Physical Exam   Constitutional: She is oriented to person, place, and time. She appears well-developed and well-nourished.   HENT:   Head: Normocephalic and atraumatic.   Mouth/Throat: Oropharynx is clear and moist.   Eyes: EOM are normal. Pupils are equal, round, and reactive to light. No scleral icterus.   Neck: Neck supple.   Cardiovascular: Normal rate and regular rhythm.    Pulmonary/Chest: Effort normal. No stridor. No respiratory distress.   Abdominal: Soft. She exhibits no  distension.   Musculoskeletal: Normal range of motion. She exhibits no edema or deformity.   Neurological: She is alert and oriented to person, place, and time. No cranial nerve deficit.   Skin: Skin is warm and dry. No rash noted. She is not diaphoretic. No erythema.   Psychiatric: She has a normal mood and affect. Her behavior is normal.       Body mass index is 23.24 kg/m².    Laboratory:    Recent Labs  Lab 08/08/18  1535 08/09/18  0529 08/10/18  0440    140 143   K 4.1 3.5 3.4*   CL 93* 103 108   CO2 25 25 24   * 99* 64*   CREATININE 3.3* 2.3* 1.6*   ESTGFRAFRICA 14* 22* 34*   EGFRNONAA 12* 19* 29*   CALCIUM 10.5 8.8 8.6*   ALBUMIN 3.7 3.0* 2.9*   PHOS  --  4.1  --          Recent Labs  Lab 08/08/18  1534 08/09/18  0529 08/10/18  0440   WBC 17.80* 8.50 8.70   HGB 13.0 11.7* 11.3*   HCT 38.6 34.6* 33.8*    255 235   MCV 92 94 94   MCHC 33.7 33.9 33.4   MONO 6.5  1.2* 13.2  1.1* 13.6  1.2*         Recent Labs  Lab 08/08/18  1535 08/09/18  0529 08/10/18  0440   ALKPHOS 50* 44* 45*   BILITOT 1.1* 0.9 0.6   PROT 7.6 6.4 6.3   ALBUMIN 3.7 3.0* 2.9*   ALT 25 18 17   AST 26 21 19         ASSESSMENT/PLAN:     Active Hospital Problems    Diagnosis  POA    *Acute-on-chronic kidney injury [N17.9, N18.9]  Yes    Acute pain of left shoulder [M25.512]  Yes    Demand ischemia [I24.8]  Yes    Syncope and collapse [R55]  Yes    Essential hypertension [I10]  Yes    Hypothyroid [E03.9]  Yes    Ulcerative colitis [K51.90]  Yes    Hyperlipidemia [E78.5]  Yes    Congestive heart failure (CHF) [I50.9]  Yes      Resolved Hospital Problems    Diagnosis Date Resolved POA   No resolved problems to display.     FERCHO seesm hemodynamic due to volume depletion and/or hypotension. Resolved  CKD stage 3, baseline scr 1.5  Syncope  Keep MAP > 60, SBP > 100.  Dose meds for GFR < 30 ml/min.  Renal diet - low K, low phos.    Anemia  Stable. Monitor.  No need for JONNY.  No need for IV iron.    HTN, h/o CHF  Tolerate  asymptomatic HTN up to -160.    Thank you for allowing us to participate in the care of your patient!   We will follow the patient and provide recommendations as needed.    Alexei Araiza MD    [facility]  [address] [place] [address]  [place]el [address]    [phone] - tel  [phone] - fax    8/10/2018 1:35 PM

## 2018-08-10 NOTE — PLAN OF CARE
Pt's nurseBilly verified with the pt's  at the bedside that they do have a RW at home for the pt to use....ANDI Jaramillo       08/10/18 1007   Discharge Reassessment   Assessment Type Discharge Planning Reassessment

## 2018-08-10 NOTE — PLAN OF CARE
08/10/18 0738   Patient Assessment/Suction   Level of Consciousness (AVPU) alert   Respiratory Effort Unlabored;Normal   Expansion/Accessory Muscles/Retractions no retractions;no use of accessory muscles   All Lung Fields Breath Sounds diminished;clear   PRE-TX-O2-ETCO2   O2 Device (Oxygen Therapy) room air   SpO2 97 %   Pulse Oximetry Type Intermittent   $ Pulse Oximetry - Multiple Charge Pulse Oximetry - Multiple   Pulse 68   Resp 16   Aerosol Therapy   $ Aerosol Therapy Charges Aerosol Treatment   Respiratory Treatment Status given   SVN/Inhaler Treatment Route mouthpiece   Position During Treatment HOB at 45 degrees   Patient Tolerance good   Post-Treatment   Post-treatment Heart Rate (beats/min) 71   Post-treatment Resp Rate (breaths/min) 16   All Fields Breath Sounds unchanged

## 2018-08-10 NOTE — PLAN OF CARE
Problem: Fall Risk (Adult)  Goal: Absence of Falls  Patient will demonstrate the desired outcomes by discharge/transition of care.   Outcome: Ongoing (interventions implemented as appropriate)  Alert/oriented/pleasant  C/o mild pain with movement but refuses medication each time offered.   BP increased. Denies s/s. Will continue to monitor.  Out of bed with moderate assist  Sinus rhythm  Clear yellow urine  Denies syncope  Will monitor labs  Concerned about being sent home and inability to care for self and elderly and ailing spouse. Discussed plan of care options  Safe

## 2018-08-10 NOTE — PLAN OF CARE
Problem: Physical Therapy Goal  Goal: Physical Therapy Goal  Goals to be met by: 2018     Patient will increase functional independence with mobility by performin. Supine to sit with [place]  2. Sit to supine with [place]  3. Sit to stand transfer with [place]  4. Bed to chair transfer with modified [place] using RW.  5. Gait  x 250 feet with Modified [place] using RW      Outcome: Ongoing (interventions implemented as appropriate)  PT session completed this am. Pt was able to tolerate bed mobility, transfers and gait x 250ft CGA/SBA with RW. Demonstrates narrow LEONARDO, decreased step length and foot clearance, kyphotic posture. Tolerated seated BLE therex. Pt was left supine in bed w/ needs in reach and lines intact.

## 2018-08-10 NOTE — DISCHARGE SUMMARY
"Discharge Summary  Hospital Medicine    Admit Date: 8/8/2018    Date and Time: 8/10/40665:37 PM    Discharge Attending Physician: Rachel Chavis MD    Primary Care Physician: Steve Mitchell MD    Diagnoses:  Active Hospital Problems    Diagnosis  POA    *Acute-on-chronic kidney injury [N17.9, N18.9]  Yes    FERCHO (acute kidney injury) [N17.9]  Unknown    Dehydration [E86.0]  Unknown    Acute pain of left shoulder [M25.512]  Yes    Demand ischemia [I24.8]  Yes    Syncope [R55]  Yes    Essential hypertension [I10]  Yes    Hypothyroid [E03.9]  Yes    Ulcerative colitis [K51.90]  Yes    Hyperlipidemia [E78.5]  Yes    Congestive heart failure (CHF) [I50.9]  Yes      Resolved Hospital Problems    Diagnosis Date Resolved POA   No resolved problems to display.     Discharged Condition: Good    Hospital Course:   Izabella Fulton is a 85 y.o. female with a hx of HTN, TIA, HLD, CKD, ulcerative colitis, and thyroid disease who presented to the ED via EMS following a syncopal event while at home earlier today.  Pt states that she was in her kitchen when she began feeling dizzy.  She next remembered finding herself on the floor.  She fell onto her L side, and reports L shoulder and L hand pain which was now improving. She began having shortness of breath, cough and lower extremity edema after returning home from a vacation in [place] 3 weeks ago.  She has been followed closely by her PCP, stating that she has been seen weekly over the past 3 weeks.  Reported a history of intermittent lower extremity edema for which she takes torsemide as needed.  Reports that torsemide was increased to twice daily after being seen in her doctor's office 3 weeks ago due to "fluid around my heart."  She reports serial CXR weekly over the past 3 weeks, the last being yesterday demonstrating continued improvement.  Endorses lower extremity edema and shortness of breath have greatly improved.  She was unable to get off the floor after her fall " today, but was able to call 911 for assistance.  Pt denies CP, abd pain, dysuria, vomiting and diarrhea. She is followed by Dr. Shultz for CKD.  Assessment in ED revealed positive orthostatic blood pressure, , and creat 3.3.  She is admitted to the service of hospital medicine for further evaluation and treatment. Patient was admitted to Hospitalist medicine service. Patient was evaluated by Dr. Araiza. Patient treated with IVF hydration with much improved renal functions. Symptoms improved, patient is ambulating without any problems, anxious to go home. Fall precautions discussed. Diuretic dose has been reduced. Follow up BMP in 7 days is scheduled. Patient was discharged home in stable condition with following discharge plan of care. Total time with the patient was 30 minutes and greater than 50% was spent in counseling and coordination of care. The assessment and plan have been discussed at length. Physicians' notes reviewed. Labs and procedure reviewed.     Consults: Dr. Araiza    Significant Diagnostic Studies:   CT head: Mild involutional changes and findings suggesting mild microvascular ischemic change with no acute intracranial findings.     CXR: No acute cardiopulmonary disease.     VQ scan:   Perfusion: Uniform perfusion.  No mismatch other perfusion defects suggesting pulmonary artery embolism. Ventilation: Uniform ventilation.  No significant air trapping.        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  Small 5 mm bone fragment a probable accessory ossicle.  Acute fracture or dislocation is not seen.     Left hand x-ray:  Severe degenerative change, osteopenia. No acute fracture or dislocation as visualized on the 2 projections.     Renal US:   Elevated resistive indices bilaterally consistent with intrinsic renal disease.  No hydronephrosis right kidney.  Just mild prominence of the left renal collecting structures.  Left ureteral jet is visualized suggesting  that is not left ureteral obstruction. Cystic appear posterior to the bladder on the right and large calcific density projecting posterior to the bladder midline.  Calcification may represent calcified uterine fibroid and the cystic lesion is also suspect suggested on earlier CT of 2012 although there are normal recent studies for comparison.     Venous doppler scan of lower extremities: No evidence of deep venous thrombosis in either lower extremity.    Microbiology Results (last 7 days)     Procedure Component Value Units Date/Time    Urine culture [[id number]] Collected:  08/08/18 1650    Order Status:  Completed Specimen:  Urine Updated:  08/10/18 1402     Urine Culture, Routine No significant growth        Special Treatments/Procedures: None  Disposition: Home or Self Care    Medications:  Reconciled Home Medications: Current Discharge Medication List      CONTINUE these medications which have CHANGED    Details   torsemide (DEMADEX) 10 MG Tab Take 1 tablet (10 mg total) by mouth once daily.  Qty: 30 tablet, Refills: 0         CONTINUE these medications which have NOT CHANGED    Details   acetaminophen (TYLENOL) 325 MG tablet Take 2 tablets (650 mg total) by mouth every 6 (six) hours as needed (or equal to 101 degree F).  Qty: 30 tablet, Refills: 0      amlodipine (NORVASC) 5 MG tablet Take 5 mg by mouth once daily.       aspirin (ECOTRIN) 81 MG EC tablet Take 81 mg by mouth once daily.        calcium-vitamin D 500-125 mg-unit tablet Take 1 tablet by mouth 2 (two) times daily.        denosumab (PROLIA) 60 mg/mL Syrg Inject 60 mg into the skin every 6 (six) months.       DOCOSAHEXANOIC ACID/VIT C/LUT (EYE HEALTH ORAL) Take 1 tablet by mouth 2 (two) times daily.        fish oil-omega-3 fatty acids 300-1,000 mg capsule Take 1 g by mouth once daily.        folic acid (FOLVITE) 800 MCG tablet Take 800 mcg by mouth once daily.        hydrALAZINE (APRESOLINE) 25 MG tablet Take 25 mg by mouth every 12 (twelve) hours.       levothyroxine (SYNTHROID) 75 MCG tablet Take 75 mcg by mouth once daily.        losartan (COZAAR) 50 MG tablet Take 50 mg by mouth once daily.        mesalamine (ASACOL) 400 mg EC tablet Take 400 mg by mouth 2 (two) times daily.        metoprolol tartrate (LOPRESSOR) 50 MG tablet Take 50 mg by mouth 2 (two) times daily.       pravastatin (PRAVACHOL) 20 MG tablet Take 20 mg by mouth once daily.           STOP taking these medications       hydrochlorothiazide (MICROZIDE) 12.5 mg capsule Comments:   Reason for Stopping:               Discharge Procedure Orders  Diet diabetic     Diet Cardiac   Scheduling Instructions: Patient to monitor daily weight, follow low salt diet and in case if gain more than 3 pounds in 24 hours, please call your doctor for further advice.     Other restrictions (specify):   Order Comments: PLEASE OBSERVE FALL PRECAUTIONS   Scheduling Instructions: Use walker for ambulation     Call MD for:   Order Comments: For worsening symptoms, chest pain, shortness of breath, increased abdominal pain, high grade fever, stroke or stroke like symptoms, immediately go to the nearest Emergency Room or call 911 as soon as possible.       Follow-up Information     Steve Mitchell MD In 1 week.    Specialty:  Family Medicine  Contact information:  [address] [facility]  [address]  [facility]  [place] [address]  [phone]             David Shultz MD In 2 weeks.    Specialty:  Nephrology  Contact information:  [address] [facility] [place]  [place] [address]  [phone]             Please follow up.    Contact information:  Have BMP checked in 7 days.            Please follow up.    Contact information:  Wear thigh high stockings during daytime.

## 2018-08-10 NOTE — NURSING
Discharge instructions given to pt and  Rx for lab and torsemide given to pt.  Pt verbalizes understanding of discharge instructions and has no further questions about avs at this time.  Pt discharged to home via w/c with  and transporter.

## 2018-08-11 NOTE — PLAN OF CARE
08/11/18 0831   Final Note   Assessment Type Final Discharge Note   Discharge Disposition Home

## 2019-01-01 ENCOUNTER — TELEPHONE (OUTPATIENT)
Dept: FAMILY MEDICINE | Facility: CLINIC | Age: 84
End: 2019-01-01

## 2019-01-01 ENCOUNTER — HOSPITAL ENCOUNTER (INPATIENT)
Facility: HOSPITAL | Age: 84
LOS: 1 days | Discharge: HOME | DRG: 394 | End: 2019-05-15
Attending: EMERGENCY MEDICINE | Admitting: INTERNAL MEDICINE
Payer: MEDICARE

## 2019-01-01 ENCOUNTER — HOSPITAL ENCOUNTER (OUTPATIENT)
Dept: RADIOLOGY | Facility: HOSPITAL | Age: 84
Discharge: HOME | End: 2019-12-27
Attending: NURSE PRACTITIONER
Payer: MEDICARE

## 2019-01-01 ENCOUNTER — ANESTHESIA (OUTPATIENT)
Dept: MEDSURG UNIT | Facility: HOSPITAL | Age: 84
End: 2019-01-01

## 2019-01-01 ENCOUNTER — PATIENT OUTREACH (OUTPATIENT)
Dept: ADMINISTRATIVE | Facility: CLINIC | Age: 84
End: 2019-01-01

## 2019-01-01 ENCOUNTER — ANESTHESIA EVENT (OUTPATIENT)
Dept: MEDSURG UNIT | Facility: HOSPITAL | Age: 84
End: 2019-01-01

## 2019-01-01 ENCOUNTER — HOSPITAL ENCOUNTER (EMERGENCY)
Facility: HOSPITAL | Age: 84
Discharge: HOME | End: 2019-12-26
Attending: EMERGENCY MEDICINE
Payer: MEDICARE

## 2019-01-01 ENCOUNTER — OFFICE VISIT (OUTPATIENT)
Dept: FAMILY MEDICINE | Facility: CLINIC | Age: 84
End: 2019-01-01
Payer: MEDICARE

## 2019-01-01 ENCOUNTER — CLINICAL SUPPORT (OUTPATIENT)
Dept: FAMILY MEDICINE | Facility: CLINIC | Age: 84
End: 2019-01-01
Payer: MEDICARE

## 2019-01-01 VITALS
OXYGEN SATURATION: 96 % | TEMPERATURE: 98 F | RESPIRATION RATE: 18 BRPM | WEIGHT: 110 LBS | BODY MASS INDEX: 23.6 KG/M2 | HEART RATE: 67 BPM | DIASTOLIC BLOOD PRESSURE: 72 MMHG | SYSTOLIC BLOOD PRESSURE: 159 MMHG

## 2019-01-01 VITALS
HEIGHT: 60 IN | HEART RATE: 76 BPM | TEMPERATURE: 100 F | DIASTOLIC BLOOD PRESSURE: 67 MMHG | BODY MASS INDEX: 21.01 KG/M2 | SYSTOLIC BLOOD PRESSURE: 152 MMHG | OXYGEN SATURATION: 94 % | WEIGHT: 107 LBS | RESPIRATION RATE: 18 BRPM

## 2019-01-01 VITALS
HEIGHT: 57 IN | SYSTOLIC BLOOD PRESSURE: 174 MMHG | BODY MASS INDEX: 23.73 KG/M2 | HEART RATE: 56 BPM | DIASTOLIC BLOOD PRESSURE: 64 MMHG | WEIGHT: 110 LBS

## 2019-01-01 DIAGNOSIS — S22.49XA CLOSED FRACTURE OF MULTIPLE RIBS, UNSPECIFIED LATERALITY, INITIAL ENCOUNTER: Primary | ICD-10-CM

## 2019-01-01 DIAGNOSIS — N18.4 CKD (CHRONIC KIDNEY DISEASE) STAGE 4, GFR 15-29 ML/MIN: ICD-10-CM

## 2019-01-01 DIAGNOSIS — I10 HYPERTENSION, UNSPECIFIED TYPE: Primary | ICD-10-CM

## 2019-01-01 DIAGNOSIS — K46.0 INCARCERATED HERNIA: ICD-10-CM

## 2019-01-01 DIAGNOSIS — E03.9 ACQUIRED HYPOTHYROIDISM: ICD-10-CM

## 2019-01-01 DIAGNOSIS — K56.609 SBO (SMALL BOWEL OBSTRUCTION): Primary | ICD-10-CM

## 2019-01-01 DIAGNOSIS — N18.4 HYPERTENSIVE KIDNEY DISEASE WITH STAGE 4 CHRONIC KIDNEY DISEASE: Primary | ICD-10-CM
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
## 2019-01-01 DIAGNOSIS — I12.9 HYPERTENSIVE KIDNEY DISEASE WITH STAGE 4 CHRONIC KIDNEY DISEASE: Primary | ICD-10-CM

## 2019-01-01 DIAGNOSIS — E03.9 HYPOTHYROIDISM, UNSPECIFIED TYPE: ICD-10-CM

## 2019-01-01 DIAGNOSIS — E78.5 HYPERLIPIDEMIA, UNSPECIFIED HYPERLIPIDEMIA TYPE: Primary | ICD-10-CM

## 2019-01-01 DIAGNOSIS — J90 PLEURAL EFFUSION: ICD-10-CM

## 2019-01-01 DIAGNOSIS — S22.42XA CLOSED FRACTURE OF MULTIPLE RIBS OF LEFT SIDE, INITIAL ENCOUNTER: Primary | ICD-10-CM

## 2019-01-01 DIAGNOSIS — I10 ESSENTIAL HYPERTENSION: ICD-10-CM

## 2019-01-01 DIAGNOSIS — E78.2 MIXED HYPERLIPIDEMIA: ICD-10-CM

## 2019-01-01 DIAGNOSIS — I87.2 VENOUS INSUFFICIENCY OF BOTH LOWER EXTREMITIES: ICD-10-CM

## 2019-01-01 DIAGNOSIS — M81.0 OSTEOPOROSIS, UNSPECIFIED OSTEOPOROSIS TYPE, UNSPECIFIED PATHOLOGICAL FRACTURE PRESENCE: Primary | ICD-10-CM

## 2019-01-01 DIAGNOSIS — I10 HYPERTENSION, UNSPECIFIED TYPE: ICD-10-CM

## 2019-01-01 DIAGNOSIS — S22.49XA CLOSED FRACTURE OF MULTIPLE RIBS, UNSPECIFIED LATERALITY, INITIAL ENCOUNTER: ICD-10-CM

## 2019-01-01 LAB
ABO GROUP BLD: NORMAL
ALBUMIN SERPL BCP-MCNC: 2.9 G/DL (ref 3.5–5.2)
ALBUMIN SERPL BCP-MCNC: 3.2 G/DL (ref 3.5–5.2)
ALBUMIN SERPL BCP-MCNC: 3.8 G/DL (ref 3.5–5.2)
ALBUMIN SERPL-MCNC: 3.9 G/DL (ref 3.6–5.1)
ALBUMIN/CREAT UR: 196 MCG/MG CREAT
ALBUMIN/GLOB SERPL: 1.2 (CALC) (ref 1–2.5)
ALP SERPL-CCNC: 51 U/L (ref 55–135)
ALP SERPL-CCNC: 57 U/L (ref 33–130)
ALP SERPL-CCNC: 58 U/L (ref 55–135)
ALT SERPL W/O P-5'-P-CCNC: 24 U/L (ref 10–44)
ALT SERPL W/O P-5'-P-CCNC: 35 U/L (ref 10–44)
ALT SERPL-CCNC: 14 U/L (ref 6–29)
ANION GAP SERPL CALC-SCNC: 12 MMOL/L (ref 8–16)
ANION GAP SERPL CALC-SCNC: 12 MMOL/L (ref 8–16)
ANION GAP SERPL CALC-SCNC: 9 MMOL/L (ref 8–16)
APPEARANCE UR: CLEAR
AST SERPL-CCNC: 21 U/L (ref 10–35)
AST SERPL-CCNC: 27 U/L (ref 10–40)
AST SERPL-CCNC: 39 U/L (ref 10–40)
BACTERIA #/AREA URNS HPF: ABNORMAL /HPF
BACTERIA #/AREA URNS HPF: NORMAL /HPF
BACTERIA UR CULT: ABNORMAL
BACTERIA UR CULT: NORMAL
BASOPHILS # BLD AUTO: 0 K/UL (ref 0–0.2)
BASOPHILS # BLD AUTO: 0 K/UL (ref 0–0.2)
BASOPHILS # BLD AUTO: 0.03 K/UL (ref 0–0.2)
BASOPHILS # BLD AUTO: 51 CELLS/UL (ref 0–200)
BASOPHILS NFR BLD AUTO: 0.8 %
BASOPHILS NFR BLD: 0.2 % (ref 0–1.9)
BASOPHILS NFR BLD: 0.3 % (ref 0–1.9)
BASOPHILS NFR BLD: 0.4 % (ref 0–1.9)
BILIRUB SERPL-MCNC: 0.3 MG/DL (ref 0.1–1)
BILIRUB SERPL-MCNC: 0.5 MG/DL (ref 0.1–1)
BILIRUB SERPL-MCNC: 0.5 MG/DL (ref 0.2–1.2)
BILIRUB UR QL STRIP: NEGATIVE
BILIRUB UR QL STRIP: NEGATIVE
BLD GP AB SCN CELLS X3 SERPL QL: NORMAL
BUN SERPL-MCNC: 35 MG/DL (ref 8–23)
BUN SERPL-MCNC: 40 MG/DL (ref 8–23)
BUN SERPL-MCNC: 53 MG/DL (ref 8–23)
BUN SERPL-MCNC: 56 MG/DL (ref 7–25)
BUN/CREAT SERPL: 30 (CALC) (ref 6–22)
CALCIUM SERPL-MCNC: 8.3 MG/DL (ref 8.7–10.5)
CALCIUM SERPL-MCNC: 9.3 MG/DL (ref 8.6–10.4)
CALCIUM SERPL-MCNC: 9.3 MG/DL (ref 8.7–10.5)
CALCIUM SERPL-MCNC: 9.6 MG/DL (ref 8.7–10.5)
CHLORIDE SERPL-SCNC: 104 MMOL/L (ref 95–110)
CHLORIDE SERPL-SCNC: 106 MMOL/L (ref 98–110)
CHLORIDE SERPL-SCNC: 107 MMOL/L (ref 95–110)
CHLORIDE SERPL-SCNC: 109 MMOL/L (ref 95–110)
CHOLEST SERPL-MCNC: 154 MG/DL
CHOLEST/HDLC SERPL: 2.3 (CALC)
CLARITY UR: CLEAR
CO2 SERPL-SCNC: 21 MMOL/L (ref 23–29)
CO2 SERPL-SCNC: 21 MMOL/L (ref 23–29)
CO2 SERPL-SCNC: 24 MMOL/L (ref 20–32)
CO2 SERPL-SCNC: 24 MMOL/L (ref 23–29)
COLOR UR: YELLOW
COLOR UR: YELLOW
CREAT SERPL-MCNC: 1.5 MG/DL (ref 0.5–1.4)
CREAT SERPL-MCNC: 1.8 MG/DL (ref 0.5–1.4)
CREAT SERPL-MCNC: 1.85 MG/DL (ref 0.6–0.88)
CREAT SERPL-MCNC: 2.3 MG/DL (ref 0.5–1.4)
CREAT UR-MCNC: 67 MG/DL (ref 20–275)
CRP SERPL-MCNC: 0.7 MG/L (ref 0–8.2)
DIFFERENTIAL METHOD: ABNORMAL
EOSINOPHIL # BLD AUTO: 0 K/UL (ref 0–0.5)
EOSINOPHIL # BLD AUTO: 0 K/UL (ref 0–0.5)
EOSINOPHIL # BLD AUTO: 0.1 K/UL (ref 0–0.5)
EOSINOPHIL # BLD AUTO: 179 CELLS/UL (ref 15–500)
EOSINOPHIL NFR BLD AUTO: 2.8 %
EOSINOPHIL NFR BLD: 0.1 % (ref 0–8)
EOSINOPHIL NFR BLD: 0.2 % (ref 0–8)
EOSINOPHIL NFR BLD: 0.6 % (ref 0–8)
ERYTHROCYTE [DISTWIDTH] IN BLOOD BY AUTOMATED COUNT: 13.2 % (ref 11–15)
ERYTHROCYTE [DISTWIDTH] IN BLOOD BY AUTOMATED COUNT: 14.6 % (ref 11.5–14.5)
ERYTHROCYTE [DISTWIDTH] IN BLOOD BY AUTOMATED COUNT: 14.6 % (ref 11.5–14.5)
ERYTHROCYTE [DISTWIDTH] IN BLOOD BY AUTOMATED COUNT: 14.9 % (ref 11.5–14.5)
EST. GFR  (AFRICAN AMERICAN): 22 ML/MIN/1.73 M^2
EST. GFR  (AFRICAN AMERICAN): 29 ML/MIN/1.73 M^2
EST. GFR  (AFRICAN AMERICAN): 36 ML/MIN/1.73 M^2
EST. GFR  (NON AFRICAN AMERICAN): 19 ML/MIN/1.73 M^2
EST. GFR  (NON AFRICAN AMERICAN): 25 ML/MIN/1.73 M^2
EST. GFR  (NON AFRICAN AMERICAN): 31 ML/MIN/1.73 M^2
GFRSERPLBLD MDRD-ARVRAT: 24 ML/MIN/1.73M2
GLOBULIN SER CALC-MCNC: 3.2 G/DL (CALC) (ref 1.9–3.7)
GLUCOSE SERPL-MCNC: 114 MG/DL (ref 70–110)
GLUCOSE SERPL-MCNC: 127 MG/DL (ref 70–110)
GLUCOSE SERPL-MCNC: 86 MG/DL (ref 70–110)
GLUCOSE SERPL-MCNC: 96 MG/DL (ref 65–99)
GLUCOSE UR QL STRIP: NEGATIVE
GLUCOSE UR QL STRIP: NEGATIVE
HCT VFR BLD AUTO: 36.6 % (ref 35–45)
HCT VFR BLD AUTO: 36.6 % (ref 37–48.5)
HCT VFR BLD AUTO: 37.4 % (ref 37–48.5)
HCT VFR BLD AUTO: 41.5 % (ref 37–48.5)
HDLC SERPL-MCNC: 68 MG/DL
HGB BLD-MCNC: 11.8 G/DL (ref 11.7–15.5)
HGB BLD-MCNC: 12 G/DL (ref 12–16)
HGB BLD-MCNC: 12.3 G/DL (ref 12–16)
HGB BLD-MCNC: 13.5 G/DL (ref 12–16)
HGB UR QL STRIP: NEGATIVE
HGB UR QL STRIP: NEGATIVE
HYALINE CASTS #/AREA URNS LPF: 0 /LPF
HYALINE CASTS #/AREA URNS LPF: ABNORMAL /LPF
IMM GRANULOCYTES # BLD AUTO: 0.05 K/UL (ref 0–0.04)
KETONES UR QL STRIP: NEGATIVE
KETONES UR QL STRIP: NEGATIVE
LDLC SERPL CALC-MCNC: 72 MG/DL (CALC)
LEUKOCYTE ESTERASE UR QL STRIP: ABNORMAL
LEUKOCYTE ESTERASE UR QL STRIP: NEGATIVE
LIPASE SERPL-CCNC: 18 U/L (ref 4–60)
LIPASE SERPL-CCNC: 30 U/L (ref 4–60)
LYMPHOCYTES # BLD AUTO: 0.7 K/UL (ref 1–4.8)
LYMPHOCYTES # BLD AUTO: 0.8 K/UL (ref 1–4.8)
LYMPHOCYTES # BLD AUTO: 0.9 K/UL (ref 1–4.8)
LYMPHOCYTES # BLD AUTO: 986 CELLS/UL (ref 850–3900)
LYMPHOCYTES NFR BLD AUTO: 15.4 %
LYMPHOCYTES NFR BLD: 7.2 % (ref 18–48)
LYMPHOCYTES NFR BLD: 8.2 % (ref 18–48)
LYMPHOCYTES NFR BLD: 8.9 % (ref 18–48)
MCH RBC QN AUTO: 30.8 PG (ref 27–31)
MCH RBC QN AUTO: 31.1 PG (ref 27–31)
MCH RBC QN AUTO: 31.6 PG (ref 27–33)
MCH RBC QN AUTO: 31.8 PG (ref 27–31)
MCHC RBC AUTO-ENTMCNC: 32.2 G/DL (ref 32–36)
MCHC RBC AUTO-ENTMCNC: 32.4 G/DL (ref 32–36)
MCHC RBC AUTO-ENTMCNC: 32.9 G/DL (ref 32–36)
MCHC RBC AUTO-ENTMCNC: 32.9 G/DL (ref 32–36)
MCV RBC AUTO: 95 FL (ref 82–98)
MCV RBC AUTO: 95 FL (ref 82–98)
MCV RBC AUTO: 97 FL (ref 82–98)
MCV RBC AUTO: 98.1 FL (ref 80–100)
MICROALBUMIN UR-MCNC: 13.1 MG/DL
MICROSCOPIC COMMENT: NORMAL
MONOCYTES # BLD AUTO: 0.9 K/UL (ref 0.3–1)
MONOCYTES # BLD AUTO: 1.1 K/UL (ref 0.3–1)
MONOCYTES # BLD AUTO: 1.3 K/UL (ref 0.3–1)
MONOCYTES # BLD AUTO: 870 CELLS/UL (ref 200–950)
MONOCYTES NFR BLD AUTO: 13.6 %
MONOCYTES NFR BLD: 10 % (ref 4–15)
MONOCYTES NFR BLD: 14.5 % (ref 4–15)
MONOCYTES NFR BLD: 9.8 % (ref 4–15)
NEUTROPHILS # BLD AUTO: 4314 CELLS/UL (ref 1500–7800)
NEUTROPHILS # BLD AUTO: 6.9 K/UL (ref 1.8–7.7)
NEUTROPHILS # BLD AUTO: 7.8 K/UL (ref 1.8–7.7)
NEUTROPHILS # BLD AUTO: 8.9 K/UL (ref 1.8–7.7)
NEUTROPHILS NFR BLD AUTO: 67.4 %
NEUTROPHILS NFR BLD: 75.8 % (ref 38–73)
NEUTROPHILS NFR BLD: 81 % (ref 38–73)
NEUTROPHILS NFR BLD: 82.2 % (ref 38–73)
NITRITE UR QL STRIP: NEGATIVE
NITRITE UR QL STRIP: NEGATIVE
NONHDLC SERPL-MCNC: 86 MG/DL (CALC)
NRBC BLD-RTO: 0 /100 WBC
PH UR STRIP: 6.5 [PH] (ref 5–8)
PH UR STRIP: 7 [PH] (ref 5–8)
PHOSPHATE SERPL-MCNC: 3.7 MG/DL (ref 2.7–4.5)
PLATELET # BLD AUTO: 203 K/UL (ref 150–350)
PLATELET # BLD AUTO: 251 K/UL (ref 150–350)
PLATELET # BLD AUTO: 268 THOUSAND/UL (ref 140–400)
PLATELET # BLD AUTO: 274 K/UL (ref 150–350)
PMV BLD AUTO: 10.6 FL (ref 9.2–12.9)
PMV BLD AUTO: 8.8 FL (ref 9.2–12.9)
PMV BLD AUTO: 9 FL (ref 9.2–12.9)
PMV BLD REES-ECKER: 11.2 FL (ref 7.5–12.5)
POTASSIUM SERPL-SCNC: 4 MMOL/L (ref 3.5–5.1)
POTASSIUM SERPL-SCNC: 4 MMOL/L (ref 3.5–5.1)
POTASSIUM SERPL-SCNC: 4.2 MMOL/L (ref 3.5–5.3)
POTASSIUM SERPL-SCNC: 4.3 MMOL/L (ref 3.5–5.1)
PROT SERPL-MCNC: 6.8 G/DL (ref 6–8.4)
PROT SERPL-MCNC: 7.1 G/DL (ref 6.1–8.1)
PROT SERPL-MCNC: 7.7 G/DL (ref 6–8.4)
PROT UR QL STRIP: ABNORMAL
PROT UR QL STRIP: ABNORMAL
RBC # BLD AUTO: 3.73 MILLION/UL (ref 3.8–5.1)
RBC # BLD AUTO: 3.86 M/UL (ref 4–5.4)
RBC # BLD AUTO: 3.87 M/UL (ref 4–5.4)
RBC # BLD AUTO: 4.37 M/UL (ref 4–5.4)
RBC #/AREA URNS HPF: 2 /HPF (ref 0–4)
RBC #/AREA URNS HPF: ABNORMAL /HPF
RH BLD: NORMAL
SODIUM SERPL-SCNC: 137 MMOL/L (ref 136–145)
SODIUM SERPL-SCNC: 140 MMOL/L (ref 136–145)
SODIUM SERPL-SCNC: 142 MMOL/L (ref 135–146)
SODIUM SERPL-SCNC: 142 MMOL/L (ref 136–145)
SP GR UR STRIP: 1.01 (ref 1–1.03)
SP GR UR STRIP: 1.02 (ref 1–1.03)
SQUAMOUS #/AREA URNS HPF: ABNORMAL /HPF
TRIGL SERPL-MCNC: 60 MG/DL
TSH SERPL-ACNC: 0.9 MIU/L (ref 0.4–4.5)
URN SPEC COLLECT METH UR: ABNORMAL
UROBILINOGEN UR STRIP-ACNC: NEGATIVE EU/DL
WBC # BLD AUTO: 10.8 K/UL (ref 3.9–12.7)
WBC # BLD AUTO: 6.4 THOUSAND/UL (ref 3.8–10.8)
WBC # BLD AUTO: 9.06 K/UL (ref 3.9–12.7)
WBC # BLD AUTO: 9.6 K/UL (ref 3.9–12.7)
WBC #/AREA URNS HPF: 1 /HPF (ref 0–5)
WBC #/AREA URNS HPF: ABNORMAL /HPF

## 2019-01-01 PROCEDURE — 85025 COMPLETE CBC W/AUTO DIFF WBC: CPT

## 2019-01-01 PROCEDURE — 71046 X-RAY EXAM CHEST 2 VIEWS: CPT | Mod: TC,FY

## 2019-01-01 PROCEDURE — 1101F PT FALLS ASSESS-DOCD LE1/YR: CPT | Mod: S$GLB,,, | Performed by: NURSE PRACTITIONER

## 2019-01-01 PROCEDURE — 99223 1ST HOSP IP/OBS HIGH 75: CPT | Mod: ,,, | Performed by: SURGERY

## 2019-01-01 PROCEDURE — 99284 EMERGENCY DEPT VISIT MOD MDM: CPT | Mod: 25

## 2019-01-01 PROCEDURE — 83690 ASSAY OF LIPASE: CPT

## 2019-01-01 PROCEDURE — 12000002 HC ACUTE/MED SURGE SEMI-PRIVATE ROOM

## 2019-01-01 PROCEDURE — 99285 EMERGENCY DEPT VISIT HI MDM: CPT | Mod: 25

## 2019-01-01 PROCEDURE — 96375 TX/PRO/DX INJ NEW DRUG ADDON: CPT

## 2019-01-01 PROCEDURE — 96374 THER/PROPH/DIAG INJ IV PUSH: CPT

## 2019-01-01 PROCEDURE — 25000003 PHARM REV CODE 250: Performed by: NURSE PRACTITIONER

## 2019-01-01 PROCEDURE — 99213 PR OFFICE/OUTPT VISIT, EST, LEVL III, 20-29 MIN: ICD-10-PCS | Mod: S$GLB,,, | Performed by: NURSE PRACTITIONER

## 2019-01-01 PROCEDURE — 86901 BLOOD TYPING SEROLOGIC RH(D): CPT

## 2019-01-01 PROCEDURE — 36415 COLL VENOUS BLD VENIPUNCTURE: CPT

## 2019-01-01 PROCEDURE — 80053 COMPREHEN METABOLIC PANEL: CPT

## 2019-01-01 PROCEDURE — 1159F MED LIST DOCD IN RCRD: CPT | Mod: S$GLB,,, | Performed by: NURSE PRACTITIONER

## 2019-01-01 PROCEDURE — 99223 PR INITIAL HOSPITAL CARE,LEVL III: ICD-10-PCS | Mod: ,,, | Performed by: SURGERY

## 2019-01-01 PROCEDURE — 96376 TX/PRO/DX INJ SAME DRUG ADON: CPT

## 2019-01-01 PROCEDURE — 1101F PR PT FALLS ASSESS DOC 0-1 FALLS W/OUT INJ PAST YR: ICD-10-PCS | Mod: S$GLB,,, | Performed by: NURSE PRACTITIONER

## 2019-01-01 PROCEDURE — 1159F PR MEDICATION LIST DOCUMENTED IN MEDICAL RECORD: ICD-10-PCS | Mod: S$GLB,,, | Performed by: NURSE PRACTITIONER

## 2019-01-01 PROCEDURE — 80069 RENAL FUNCTION PANEL: CPT

## 2019-01-01 PROCEDURE — G0180 PR HOME HEALTH MD CERTIFICATION: ICD-10-PCS | Mod: ,,, | Performed by: HOSPITALIST

## 2019-01-01 PROCEDURE — 99900035 HC TECH TIME PER 15 MIN (STAT)

## 2019-01-01 PROCEDURE — 94799 UNLISTED PULMONARY SVC/PX: CPT

## 2019-01-01 PROCEDURE — 25000003 PHARM REV CODE 250: Performed by: EMERGENCY MEDICINE

## 2019-01-01 PROCEDURE — G0180 MD CERTIFICATION HHA PATIENT: HCPCS | Mod: ,,, | Performed by: HOSPITALIST

## 2019-01-01 PROCEDURE — 81000 URINALYSIS NONAUTO W/SCOPE: CPT

## 2019-01-01 PROCEDURE — 86850 RBC ANTIBODY SCREEN: CPT

## 2019-01-01 PROCEDURE — 96372 THER/PROPH/DIAG INJ SC/IM: CPT | Mod: S$GLB,,, | Performed by: FAMILY MEDICINE

## 2019-01-01 PROCEDURE — 71046 XR CHEST PA AND LATERAL: ICD-10-PCS | Mod: 26,,, | Performed by: RADIOLOGY

## 2019-01-01 PROCEDURE — 96372 PR INJECTION,THERAP/PROPH/DIAG2ST, IM OR SUBCUT: ICD-10-PCS | Mod: S$GLB,,, | Performed by: FAMILY MEDICINE

## 2019-01-01 PROCEDURE — 71046 X-RAY EXAM CHEST 2 VIEWS: CPT | Mod: 26,,, | Performed by: RADIOLOGY

## 2019-01-01 PROCEDURE — 63600175 PHARM REV CODE 636 W HCPCS: Performed by: EMERGENCY MEDICINE

## 2019-01-01 PROCEDURE — 94761 N-INVAS EAR/PLS OXIMETRY MLT: CPT

## 2019-01-01 PROCEDURE — 99213 OFFICE O/P EST LOW 20 MIN: CPT | Mod: S$GLB,,, | Performed by: NURSE PRACTITIONER

## 2019-01-01 PROCEDURE — 86140 C-REACTIVE PROTEIN: CPT

## 2019-01-01 PROCEDURE — 96361 HYDRATE IV INFUSION ADD-ON: CPT

## 2019-01-01 RX ORDER — AMLODIPINE BESYLATE 5 MG/1
5 TABLET ORAL
Status: COMPLETED | OUTPATIENT
Start: 2019-01-01 | End: 2019-01-01

## 2019-01-01 RX ORDER — MORPHINE SULFATE 2 MG/ML
2 INJECTION, SOLUTION INTRAMUSCULAR; INTRAVENOUS
Status: COMPLETED | OUTPATIENT
Start: 2019-01-01 | End: 2019-01-01

## 2019-01-01 RX ORDER — AMLODIPINE BESYLATE 5 MG/1
5 TABLET ORAL DAILY
Qty: 90 TABLET | Refills: 1 | Status: ON HOLD | OUTPATIENT
Start: 2019-01-01 | End: 2020-01-01 | Stop reason: HOSPADM

## 2019-01-01 RX ORDER — TELMISARTAN 20 MG/1
40 TABLET ORAL DAILY
Status: DISCONTINUED | OUTPATIENT
Start: 2019-01-01 | End: 2019-01-01 | Stop reason: HOSPADM

## 2019-01-01 RX ORDER — HYDRALAZINE HYDROCHLORIDE 50 MG/1
50 TABLET, FILM COATED ORAL EVERY 12 HOURS
Qty: 180 TABLET | Refills: 1 | Status: SHIPPED | OUTPATIENT
Start: 2019-01-01 | End: 2019-01-01 | Stop reason: SDUPTHER

## 2019-01-01 RX ORDER — CALCITRIOL 0.25 UG/1
0.25 CAPSULE ORAL
COMMUNITY

## 2019-01-01 RX ORDER — PRAVASTATIN SODIUM 20 MG/1
20 TABLET ORAL DAILY
Qty: 90 TABLET | Refills: 1 | Status: SHIPPED | OUTPATIENT
Start: 2019-01-01

## 2019-01-01 RX ORDER — AMLODIPINE BESYLATE 5 MG/1
5 TABLET ORAL DAILY
Status: DISCONTINUED | OUTPATIENT
Start: 2019-01-01 | End: 2019-01-01 | Stop reason: HOSPADM

## 2019-01-01 RX ORDER — ONDANSETRON 2 MG/ML
4 INJECTION INTRAMUSCULAR; INTRAVENOUS
Status: COMPLETED | OUTPATIENT
Start: 2019-01-01 | End: 2019-01-01

## 2019-01-01 RX ORDER — PRAVASTATIN SODIUM 10 MG/1
20 TABLET ORAL DAILY
Status: DISCONTINUED | OUTPATIENT
Start: 2019-01-01 | End: 2019-01-01 | Stop reason: HOSPADM

## 2019-01-01 RX ORDER — AMLODIPINE BESYLATE 5 MG/1
5 TABLET ORAL DAILY
Qty: 90 TABLET | Refills: 1 | Status: SHIPPED | OUTPATIENT
Start: 2019-01-01 | End: 2019-01-01 | Stop reason: SDUPTHER

## 2019-01-01 RX ORDER — HYDRALAZINE HYDROCHLORIDE 50 MG/1
50 TABLET, FILM COATED ORAL 3 TIMES DAILY
Qty: 270 TABLET | Refills: 1
Start: 2019-01-01 | End: 2020-01-01 | Stop reason: SDUPTHER

## 2019-01-01 RX ORDER — SODIUM CHLORIDE 9 MG/ML
INJECTION, SOLUTION INTRAVENOUS CONTINUOUS
Status: DISCONTINUED | OUTPATIENT
Start: 2019-01-01 | End: 2019-01-01 | Stop reason: HOSPADM

## 2019-01-01 RX ORDER — LEVOTHYROXINE SODIUM 100 UG/1
100 TABLET ORAL
Status: DISCONTINUED | OUTPATIENT
Start: 2019-01-01 | End: 2019-01-01 | Stop reason: HOSPADM

## 2019-01-01 RX ORDER — MORPHINE SULFATE 2 MG/ML
2 INJECTION, SOLUTION INTRAMUSCULAR; INTRAVENOUS EVERY 4 HOURS PRN
Status: DISCONTINUED | OUTPATIENT
Start: 2019-01-01 | End: 2019-01-01 | Stop reason: HOSPADM

## 2019-01-01 RX ORDER — RAMELTEON 8 MG/1
8 TABLET ORAL NIGHTLY PRN
Status: DISCONTINUED | OUTPATIENT
Start: 2019-01-01 | End: 2019-01-01 | Stop reason: HOSPADM

## 2019-01-01 RX ORDER — ACETAMINOPHEN 325 MG/1
650 TABLET ORAL EVERY 4 HOURS PRN
Status: DISCONTINUED | OUTPATIENT
Start: 2019-01-01 | End: 2019-01-01 | Stop reason: HOSPADM

## 2019-01-01 RX ORDER — METOPROLOL TARTRATE 50 MG/1
50 TABLET ORAL 2 TIMES DAILY
Status: DISCONTINUED | OUTPATIENT
Start: 2019-01-01 | End: 2019-01-01 | Stop reason: HOSPADM

## 2019-01-01 RX ORDER — TELMISARTAN 40 MG/1
40 TABLET ORAL DAILY
Status: ON HOLD | COMMUNITY
End: 2020-01-01 | Stop reason: HOSPADM

## 2019-01-01 RX ORDER — ONDANSETRON 2 MG/ML
8 INJECTION INTRAMUSCULAR; INTRAVENOUS EVERY 8 HOURS PRN
Status: DISCONTINUED | OUTPATIENT
Start: 2019-01-01 | End: 2019-01-01 | Stop reason: HOSPADM

## 2019-01-01 RX ORDER — NEOMYCIN SULFATE, POLYMYXIN B SULFATE AND DEXAMETHASONE 3.5; 10000; 1 MG/ML; [USP'U]/ML; MG/ML
1 SUSPENSION/ DROPS OPHTHALMIC EVERY 8 HOURS
COMMUNITY
End: 2020-01-01 | Stop reason: CLARIF

## 2019-01-01 RX ORDER — HYDRALAZINE HYDROCHLORIDE 20 MG/ML
10 INJECTION INTRAMUSCULAR; INTRAVENOUS EVERY 6 HOURS PRN
Status: DISCONTINUED | OUTPATIENT
Start: 2019-01-01 | End: 2019-01-01 | Stop reason: HOSPADM

## 2019-01-01 RX ORDER — HYDRALAZINE HYDROCHLORIDE 25 MG/1
50 TABLET, FILM COATED ORAL EVERY 12 HOURS
Status: DISCONTINUED | OUTPATIENT
Start: 2019-01-01 | End: 2019-01-01 | Stop reason: HOSPADM

## 2019-01-01 RX ORDER — LEVOTHYROXINE SODIUM 100 UG/1
100 TABLET ORAL DAILY
Qty: 90 TABLET | Refills: 1 | Status: SHIPPED | OUTPATIENT
Start: 2019-01-01

## 2019-01-01 RX ORDER — HYDRALAZINE HYDROCHLORIDE 25 MG/1
25 TABLET, FILM COATED ORAL
Status: COMPLETED | OUTPATIENT
Start: 2019-01-01 | End: 2019-01-01

## 2019-01-01 RX ADMIN — LEVOTHYROXINE SODIUM 100 MCG: 100 TABLET ORAL at 06:05

## 2019-01-01 RX ADMIN — SODIUM CHLORIDE 500 ML: 0.9 INJECTION, SOLUTION INTRAVENOUS at 08:05

## 2019-01-01 RX ADMIN — TELMISARTAN 40 MG: 20 TABLET ORAL at 09:05

## 2019-01-01 RX ADMIN — MORPHINE SULFATE 2 MG: 2 INJECTION, SOLUTION INTRAMUSCULAR; INTRAVENOUS at 11:05

## 2019-01-01 RX ADMIN — METOPROLOL TARTRATE 50 MG: 50 TABLET ORAL at 09:05

## 2019-01-01 RX ADMIN — AMLODIPINE BESYLATE 5 MG: 5 TABLET ORAL at 09:05

## 2019-01-01 RX ADMIN — HYDRALAZINE HYDROCHLORIDE 25 MG: 25 TABLET, FILM COATED ORAL at 09:05

## 2019-01-01 RX ADMIN — ONDANSETRON 4 MG: 2 INJECTION INTRAMUSCULAR; INTRAVENOUS at 11:05

## 2019-01-01 RX ADMIN — SODIUM CHLORIDE: 0.9 INJECTION, SOLUTION INTRAVENOUS at 09:05

## 2019-01-01 RX ADMIN — PRAVASTATIN SODIUM 20 MG: 10 TABLET ORAL at 09:05

## 2019-01-01 RX ADMIN — HYDRALAZINE HYDROCHLORIDE 50 MG: 25 TABLET, FILM COATED ORAL at 09:05

## 2019-01-01 RX ADMIN — SODIUM CHLORIDE: 0.9 INJECTION, SOLUTION INTRAVENOUS at 01:05

## 2019-01-01 RX ADMIN — ONDANSETRON 4 MG: 2 INJECTION INTRAMUSCULAR; INTRAVENOUS at 08:05

## 2019-05-14 PROBLEM — K56.609 SBO (SMALL BOWEL OBSTRUCTION): Status: ACTIVE | Noted: 2019-01-01

## 2019-05-15 PROBLEM — K46.0 INCARCERATED HERNIA: Status: ACTIVE | Noted: 2019-01-01

## 2019-05-15 PROBLEM — R55 SYNCOPE: Status: RESOLVED | Noted: 2018-08-08 | Resolved: 2019-01-01

## 2019-05-15 PROBLEM — N18.30 CKD (CHRONIC KIDNEY DISEASE) STAGE 3, GFR 30-59 ML/MIN: Status: ACTIVE | Noted: 2019-01-01

## 2019-05-15 PROBLEM — I24.89 DEMAND ISCHEMIA: Status: RESOLVED | Noted: 2018-08-09 | Resolved: 2019-01-01

## 2019-05-15 PROBLEM — N17.9 ACUTE-ON-CHRONIC KIDNEY INJURY: Status: RESOLVED | Noted: 2018-08-08 | Resolved: 2019-01-01

## 2019-05-15 PROBLEM — N18.9 ACUTE-ON-CHRONIC KIDNEY INJURY: Status: RESOLVED | Noted: 2018-08-08 | Resolved: 2019-01-01

## 2019-05-15 PROBLEM — M25.512 ACUTE PAIN OF LEFT SHOULDER: Status: RESOLVED | Noted: 2018-08-09 | Resolved: 2019-01-01

## 2019-05-15 PROBLEM — I50.9 CONGESTIVE HEART FAILURE (CHF): Status: RESOLVED | Noted: 2018-08-08 | Resolved: 2019-01-01

## 2019-05-15 NOTE — PLAN OF CARE
05/15/19 1500   Medicare Message   Important Message from Medicare regarding Discharge Appeal Rights Other (comments)  (patient left before signed)

## 2019-05-15 NOTE — ASSESSMENT & PLAN NOTE
I have booked pt for emergency surgery to repair incarcerated hernia believed to cause obstruction. Pt refusing surgery.  Notes that she is no longer in pain.  Denies n/v.  Pt is not in pain but does have an incarcerated hernia for which I would recommend surgery.  PT continues to refuse surgery stating that she is too old and does not want mesh.  Risks of refusal were d/w pt.  She expresses understanding.     Juan Pablo d/w primary team.  Okay to advance diet and d/c home

## 2019-05-15 NOTE — H&P
[facility] Medicine  History & Physical    Patient Name: Izabella Fulton  MRN: [id number]  Admission Date: 5/14/2019  Attending Physician: Bharath Gonzales MD   Primary Care Provider: Steve Mitchell MD         Patient information was obtained from patient, past medical records and ER records.     Subjective:     Principal Problem:SBO (small bowel obstruction)    Chief Complaint:   Chief Complaint   Patient presents with    Nausea     started today, emesis x 3    Abdominal Pain        HPI: Ms. Fulton is an 87yo F with a PMH of HTN, HLP, CKD3, Ulcerative Colitis, Hypothyroid, and TIA. She presents to the ED with c/o Abdominal pain. It started today and is associated with N&V. She also had 1 episode of diarrhea. While in the ED, laisha had an abdominal CT which showed a SBO and incarcerated hernia. General surgery was notified and determined that surgical intervention not needed tonight. She was ordered 8mg IV Zofran and 2mg IV Morphine. She was also hypertensive and given amlodipine 5mg and po hydralazine 25mg. Nurse is currently at bedside giving her the Morphine and Zofran. Her spouse is at bedside.        Past Medical History:   Diagnosis Date    Anticoagulant long-term use     baby aspirin    Hypertension     Thyroid disease     TIA (transient ischemic attack)     Ulcerative colitis     Wears hearing aid        Past Surgical History:   Procedure Laterality Date    COLON SURGERY  05/17/12    exp lap, perfered colon    COLONOSCOPY N/A 5/22/2012    Performed by [facility] at [facility] ENDO    EXPLORATORY-LAPAROTOMY  5/23/2012    Performed by Rene Bell MD at [facility] OR    EYE SURGERY      left eye cataract       Review of patient's allergies indicates:  No Known Allergies    No current facility-administered medications on file prior to encounter.      Current Outpatient Medications on File Prior to Encounter   Medication Sig    amlodipine (NORVASC) 5 MG tablet Take 5 mg by mouth  once daily.     aspirin (ECOTRIN) 81 MG EC tablet Take 81 mg by mouth once daily.      calcitRIOL (ROCALTROL) 0.25 MCG Cap Take 0.25 mcg by mouth twice a week.  and Thursday    calcium-vitamin D 500-125 mg-unit tablet Take 1 tablet by mouth 2 (two) times daily.      denosumab (PROLIA) 60 mg/mL Syrg Inject 60 mg into the skin every 6 (six) months.     DOCOSAHEXANOIC ACID/VIT C/LUT (EYE HEALTH ORAL) Take 1 tablet by mouth 2 (two) times daily.      fish oil-omega-3 fatty acids 300-1,000 mg capsule Take 1 g by mouth once daily.      folic acid (FOLVITE) 800 MCG tablet Take 800 mcg by mouth once daily.      hydrALAZINE (APRESOLINE) 50 MG tablet Take 50 mg by mouth every 12 (twelve) hours.     levothyroxine (SYNTHROID) 100 MCG tablet Take 100 mcg by mouth once daily.     metoprolol tartrate (LOPRESSOR) 50 MG tablet Take 50 mg by mouth 2 (two) times daily.     neomycin-polymyxin-dexamethasone (MAXITROL) 3.5mg/mL-10,000 unit/mL-0.1 % DrpS Place 1 drop into both eyes every 8 (eight) hours.    pravastatin (PRAVACHOL) 20 MG tablet Take 20 mg by mouth once daily.      telmisartan (MICARDIS) 40 MG Tab Take 40 mg by mouth once daily.    torsemide (DEMADEX) 10 MG Tab Take 1 tablet (10 mg total) by mouth once daily. (Patient taking differently: Take 10 mg by mouth twice a week. Tuesday and Friday)     Family History     Problem Relation (Age of Onset)    Mother:   Father:      Alzheimer's disease Mother    Heart disease Father        Tobacco Use    Smoking status: Never Smoker    Smokeless tobacco: Never Used   Substance and Sexual Activity    Alcohol use: No    Drug use: No    Sexual activity: Not on file     Review of Systems   Constitutional: Negative for chills, diaphoresis and fever.   HENT: Negative for congestion.    Eyes: Negative for visual disturbance.   Respiratory: Negative for cough and shortness of breath.    Cardiovascular: Negative for chest pain and palpitations.    Gastrointestinal: Positive for abdominal pain, diarrhea, nausea and vomiting. Negative for blood in stool.   Genitourinary: Negative for dysuria and hematuria.   Musculoskeletal: Negative for arthralgias.   Skin: Negative for wound.   Neurological: Negative for dizziness, syncope and headaches.   Psychiatric/Behavioral: Negative for confusion and hallucinations.     Objective:     Vital Signs (Most Recent):  Temp: 97.6 °F (36.4 °C) (05/14/19 1953)  Pulse: 63 (05/14/19 2300)  Resp: 15 (05/14/19 1953)  BP: (!) 184/78 (05/14/19 2259)  SpO2: 95 % (05/14/19 2300) Vital Signs (24h Range):  Temp:  [97.6 °F (36.4 °C)] 97.6 °F (36.4 °C)  Pulse:  [63-69] 63  Resp:  [15] 15  SpO2:  [95 %] 95 %  BP: (184-217)/(78-88) 184/78     Weight: 48.5 kg (107 lb)  Body mass index is 20.9 kg/m².    Physical Exam   Constitutional: She is oriented to person, place, and time. No distress.   HENT:   Head: Normocephalic.   Eyes: Pupils are equal, round, and reactive to light.   Neck: Normal range of motion. Neck supple. No JVD present. No tracheal deviation present.   Cardiovascular: Normal rate, regular rhythm, normal heart sounds and intact distal pulses.   Pulmonary/Chest: Effort normal and breath sounds normal. No respiratory distress.   Abdominal: Soft. Bowel sounds are normal. She exhibits no distension. There is tenderness. There is no guarding.   (+) umbilical hernia, tender to palpation   Musculoskeletal: Normal range of motion. She exhibits no edema.   Generalized weakness   Neurological: She is alert and oriented to person, place, and time. No cranial nerve deficit.   Skin: Skin is warm and dry. Capillary refill takes less than 2 seconds.         CRANIAL NERVES     CN III, IV, VI   Pupils are equal, round, and reactive to light.       Significant Labs:   CBC:   Recent Labs   Lab 05/14/19 2053   WBC 10.80   HGB 13.5   HCT 41.5        CMP:   Recent Labs   Lab 05/14/19 2053      K 4.0      CO2 21*   *   BUN  40*   CREATININE 1.8*   CALCIUM 9.6   PROT 7.7   ALBUMIN 3.8   BILITOT 0.5   ALKPHOS 58   AST 27   ALT 24   ANIONGAP 12   EGFRNONAA 25*     Urine Studies:   Recent Labs   Lab 05/14/19  2212   COLORU Yellow   APPEARANCEUA Clear   PHUR 7.0   SPECGRAV 1.010   PROTEINUA 2+*   GLUCUA Negative   KETONESU Negative   BILIRUBINUA Negative   OCCULTUA Negative   NITRITE Negative   UROBILINOGEN Negative   LEUKOCYTESUR Negative   RBCUA 2   WBCUA 1   BACTERIA Rare   HYALINECASTS 0       Significant Imaging:     CT Abd/pelvis w/o Contrast: Reviewed radiologist's report-- IMPRESSION:  1. Multifocal right paramedian and inferior lateral right lower quadrant abdominal wall hernias  containing distal small bowel loops with appearance of small bowel incarceration and developing  distal small bowel obstruction.  2. Colonic diverticulosis without CT evidence of diverticulitis.  3. Calcified uterine fibroid.  4. Atherosclerotic vascular disease.    Assessment/Plan:     * SBO (small bowel obstruction)  NPO  IV Antiemetics  IV pain management  IVF Hydration  Consult General Surgery  Insert NGT if develops intractable N&V      Incarcerated hernia  NPO  Consult General surgery  IV pain management      CKD (chronic kidney disease) stage 3, GFR 30-59 ml/min  Creat stable, but labs show mild dehydration  IVF Hydration  Avoid NSAIDs/Nephrotoxic agents  Renal dose medications  Monitor labs      Ulcerative colitis  Had diarrhea x1 today  Not on maintenance medication  Check CRP        Hypothyroid  Chronic. Continue home levothyroxine dose        Essential hypertension  Currently uncontrolled--likely due to missing BP medications today and pain  Given oral hydralazine and amlodipine in ED  Continue chronic medications, except diuretic  IV Hydralazine prn  Monitor VS        VTE Risk Mitigation (From admission, onward)    High Risk  TEDs for possible surgery        Joelle Freedman NP  Department of Hospital Medicine   [facility]    Addendum: Patient seen and examined. I agree with the findings, assessment and plan as outlined in the note by NP.  Patient is a pleasant 86-year-old  female with past medical history significant for ulcerative colitis, chronic kidney disease stage 3, hypothyroidism, prior history of transient ischemic attack, hypertension and hyperlipidemia.  Patient has a longstanding incisional abdominal wall hernia.  Patient was admitted with complaint of abdominal pain and 1 episode of diarrhea.  A CT scan of the abdomen showed small-bowel obstruction with incarcerated hernia.  Patient was evaluated by Dr. Valle from General surgery.  Later patient's abdominal pain dramatically resolved.  Despite recommendation by Dr. Valle for surgical repair of incarcerated hernia which believed to cause small-bowel obstruction, patient refused surgery.  Patient stated her pain has resolved and she has had this hernia for years.  Patient and her  are aware of risks of not undergoing surgical repair which include worsening of her symptom, recurrent small-bowel obstruction and intestinal ischemia/infarction.  Dr. Valle approved advancement of diet which patient is tolerating well and is anxious to be discharged.  Patient states she will discuss with her primary care physician in 2 days and will make definite decisions later on.  Patient will follow up with Dr. Valle in case if she decides to undergo elective abdominal surgery.  Patient has been cleared for discharge as per Dr. Valle.  See discharge orders for details.

## 2019-05-15 NOTE — ASSESSMENT & PLAN NOTE
Creat stable, but labs show mild dehydration  IVF Hydration  Avoid NSAIDs/Nephrotoxic agents  Renal dose medications  Monitor labs

## 2019-05-15 NOTE — PLAN OF CARE
Problem: Adult Inpatient Plan of Care  Goal: Plan of Care Review  Outcome: Ongoing (interventions implemented as appropriate)  A&Ox4. Pt educated to call for assistance. Plan of care discussed with patient and spouse, all questions answered. C/o pain addressed with prn medication. Comfort level established. Pt remains free from fall/injury throughout shift. IV fluids infusing per orders, pt tolerating well. NPO orders maintained. Telemetry on and being monitored. Pt in NSR. Spouse at bedside. Non-skid socks in place when pt out of bed. Bed in lowest position, wheels locked, side rails up x2, and call light within reach. No distress noted at this time. Will Continue to observe.

## 2019-05-15 NOTE — SUBJECTIVE & OBJECTIVE
Past Medical History:   Diagnosis Date    Anticoagulant long-term use     baby aspirin    Hypertension     Thyroid disease     TIA (transient ischemic attack)     Ulcerative colitis     Wears hearing aid        Past Surgical History:   Procedure Laterality Date    COLON SURGERY  05/17/12    exp lap, perfered colon    COLONOSCOPY N/A 5/22/2012    Performed by [facility] at [facility] ENDO    EXPLORATORY-LAPAROTOMY  5/23/2012    Performed by Rene Bell MD at [facility] OR    EYE SURGERY      left eye cataract       Review of patient's allergies indicates:  No Known Allergies    No current facility-administered medications on file prior to encounter.      Current Outpatient Medications on File Prior to Encounter   Medication Sig    amlodipine (NORVASC) 5 MG tablet Take 5 mg by mouth once daily.     aspirin (ECOTRIN) 81 MG EC tablet Take 81 mg by mouth once daily.      calcitRIOL (ROCALTROL) 0.25 MCG Cap Take 0.25 mcg by mouth twice a week. Sunday and Thursday    calcium-vitamin D 500-125 mg-unit tablet Take 1 tablet by mouth 2 (two) times daily.      denosumab (PROLIA) 60 mg/mL Syrg Inject 60 mg into the skin every 6 (six) months.     DOCOSAHEXANOIC ACID/VIT C/LUT (EYE HEALTH ORAL) Take 1 tablet by mouth 2 (two) times daily.      fish oil-omega-3 fatty acids 300-1,000 mg capsule Take 1 g by mouth once daily.      folic acid (FOLVITE) 800 MCG tablet Take 800 mcg by mouth once daily.      hydrALAZINE (APRESOLINE) 50 MG tablet Take 50 mg by mouth every 12 (twelve) hours.     levothyroxine (SYNTHROID) 100 MCG tablet Take 100 mcg by mouth once daily.     metoprolol tartrate (LOPRESSOR) 50 MG tablet Take 50 mg by mouth 2 (two) times daily.     neomycin-polymyxin-dexamethasone (MAXITROL) 3.5mg/mL-10,000 unit/mL-0.1 % DrpS Place 1 drop into both eyes every 8 (eight) hours.    pravastatin (PRAVACHOL) 20 MG tablet Take 20 mg by mouth once daily.      telmisartan (MICARDIS) 40 MG Tab Take 40 mg by mouth once  daily.    torsemide (DEMADEX) 10 MG Tab Take 1 tablet (10 mg total) by mouth once daily. (Patient taking differently: Take 10 mg by mouth twice a week. Tuesday and Friday)     Family History     Problem Relation (Age of Onset)    Alzheimer's disease Mother    Heart disease Father        Tobacco Use    Smoking status: Never Smoker    Smokeless tobacco: Never Used   Substance and Sexual Activity    Alcohol use: No    Drug use: No    Sexual activity: Not on file     Review of Systems   Constitutional: Negative for chills, diaphoresis and fever.   HENT: Negative for congestion.    Eyes: Negative for visual disturbance.   Respiratory: Negative for cough and shortness of breath.    Cardiovascular: Negative for chest pain and palpitations.   Gastrointestinal: Positive for abdominal pain, diarrhea, nausea and vomiting. Negative for blood in stool.   Genitourinary: Negative for dysuria and hematuria.   Musculoskeletal: Negative for arthralgias.   Skin: Negative for wound.   Neurological: Negative for dizziness, syncope and headaches.   Psychiatric/Behavioral: Negative for confusion and hallucinations.     Objective:     Vital Signs (Most Recent):  Temp: 97.6 °F (36.4 °C) (05/14/19 1953)  Pulse: 63 (05/14/19 2300)  Resp: 15 (05/14/19 1953)  BP: (!) 184/78 (05/14/19 2259)  SpO2: 95 % (05/14/19 2300) Vital Signs (24h Range):  Temp:  [97.6 °F (36.4 °C)] 97.6 °F (36.4 °C)  Pulse:  [63-69] 63  Resp:  [15] 15  SpO2:  [95 %] 95 %  BP: (184-217)/(78-88) 184/78     Weight: 48.5 kg (107 lb)  Body mass index is 20.9 kg/m².    Physical Exam   Constitutional: She is oriented to person, place, and time. No distress.   HENT:   Head: Normocephalic.   Eyes: Pupils are equal, round, and reactive to light.   Neck: Normal range of motion. Neck supple. No JVD present. No tracheal deviation present.   Cardiovascular: Normal rate, regular rhythm, normal heart sounds and intact distal pulses.   Pulmonary/Chest: Effort normal and breath sounds  normal. No respiratory distress.   Abdominal: Soft. Bowel sounds are normal. She exhibits no distension. There is tenderness. There is no guarding.   (+) umbilical hernia, tender to palpation   Musculoskeletal: Normal range of motion. She exhibits no edema.   Generalized weakness   Neurological: She is alert and oriented to person, place, and time. No cranial nerve deficit.   Skin: Skin is warm and dry. Capillary refill takes less than 2 seconds.         CRANIAL NERVES     CN III, IV, VI   Pupils are equal, round, and reactive to light.       Significant Labs:   CBC:   Recent Labs   Lab 05/14/19 2053   WBC 10.80   HGB 13.5   HCT 41.5        CMP:   Recent Labs   Lab 05/14/19 2053      K 4.0      CO2 21*   *   BUN 40*   CREATININE 1.8*   CALCIUM 9.6   PROT 7.7   ALBUMIN 3.8   BILITOT 0.5   ALKPHOS 58   AST 27   ALT 24   ANIONGAP 12   EGFRNONAA 25*     Urine Studies:   Recent Labs   Lab 05/14/19 2212   COLORU Yellow   APPEARANCEUA Clear   PHUR 7.0   SPECGRAV 1.010   PROTEINUA 2+*   GLUCUA Negative   KETONESU Negative   BILIRUBINUA Negative   OCCULTUA Negative   NITRITE Negative   UROBILINOGEN Negative   LEUKOCYTESUR Negative   RBCUA 2   WBCUA 1   BACTERIA Rare   HYALINECASTS 0       Significant Imaging:     CT Abd/pelvis w/o Contrast: Reviewed radiologist's report-- IMPRESSION:  1. Multifocal right paramedian and inferior lateral right lower quadrant abdominal wall hernias  containing distal small bowel loops with appearance of small bowel incarceration and developing  distal small bowel obstruction.  2. Colonic diverticulosis without CT evidence of diverticulitis.  3. Calcified uterine fibroid.  4. Atherosclerotic vascular disease.

## 2019-05-15 NOTE — NURSING
Discharge and medication instructions reviewed with pt who verbalized understanding, PIV removed tolerated well, tele removed, pt belongings packed by pt and , safety maintained  1445 pt off unit via w/c with staff and , safety maintained

## 2019-05-15 NOTE — PLAN OF CARE
05/15/19 0827   PRE-TX-O2   O2 Device (Oxygen Therapy) room air   SpO2 95 %   Pulse Oximetry Type Intermittent   $ Pulse Oximetry - Multiple Charge Pulse Oximetry - Multiple

## 2019-05-15 NOTE — NURSING
Pt arrived to 3rd floor room 315A at approx: 0115 via wheelchair. Pt oriented to room, vital signs obtained and head to toe full body assessment completed. Bed wheels locked and in lowest position, call bell within reach, bed alarm on and working. Spouse at bedside. Will continue to observe.

## 2019-05-15 NOTE — HPI
Ms. Fulton is an 85yo F with a PMH of HTN, HLP, CKD3, Ulcerative Colitis, Hypothyroid, and TIA. She presents to the ED with c/o Abdominal pain. It started today and is associated with N&V. She also had 1 episode of diarrhea. While in the ED, laisha had an abdominal CT which showed a SBO and incarcerated hernia. General surgery was notified and determined that surgical intervention not needed tonight. She was ordered 8mg IV Zofran and 2mg IV Morphine. She was also hypertensive and given amlodipine 5mg and po hydralazine 25mg. Nurse is currently at bedside giving her the Morphine and Zofran. Her spouse is at bedside.

## 2019-05-15 NOTE — SUBJECTIVE & OBJECTIVE
No current facility-administered medications on file prior to encounter.      Current Outpatient Medications on File Prior to Encounter   Medication Sig    amlodipine (NORVASC) 5 MG tablet Take 5 mg by mouth once daily.     aspirin (ECOTRIN) 81 MG EC tablet Take 81 mg by mouth once daily.      calcitRIOL (ROCALTROL) 0.25 MCG Cap Take 0.25 mcg by mouth twice a week. Sunday and Thursday    calcium-vitamin D 500-125 mg-unit tablet Take 1 tablet by mouth 2 (two) times daily.      denosumab (PROLIA) 60 mg/mL Syrg Inject 60 mg into the skin every 6 (six) months.     DOCOSAHEXANOIC ACID/VIT C/LUT (EYE HEALTH ORAL) Take 1 tablet by mouth 2 (two) times daily.      fish oil-omega-3 fatty acids 300-1,000 mg capsule Take 1 g by mouth once daily.      folic acid (FOLVITE) 800 MCG tablet Take 800 mcg by mouth once daily.      hydrALAZINE (APRESOLINE) 50 MG tablet Take 50 mg by mouth every 12 (twelve) hours.     levothyroxine (SYNTHROID) 100 MCG tablet Take 100 mcg by mouth once daily.     metoprolol tartrate (LOPRESSOR) 50 MG tablet Take 50 mg by mouth 2 (two) times daily.     neomycin-polymyxin-dexamethasone (MAXITROL) 3.5mg/mL-10,000 unit/mL-0.1 % DrpS Place 1 drop into both eyes every 8 (eight) hours.    pravastatin (PRAVACHOL) 20 MG tablet Take 20 mg by mouth once daily.      telmisartan (MICARDIS) 40 MG Tab Take 40 mg by mouth once daily.    torsemide (DEMADEX) 10 MG Tab Take 1 tablet (10 mg total) by mouth once daily. (Patient taking differently: Take 10 mg by mouth twice a week. Tuesday and Friday)       Review of patient's allergies indicates:  No Known Allergies    Past Medical History:   Diagnosis Date    Anticoagulant long-term use     baby aspirin    Hypertension     Thyroid disease     TIA (transient ischemic attack)     Ulcerative colitis     Wears hearing aid      Past Surgical History:   Procedure Laterality Date    COLON SURGERY  05/17/12    exp lap, perfered colon    COLONOSCOPY N/A  5/22/2012    Performed by [facility] ENDO    EXPLORATORY-LAPAROTOMY  5/23/2012    Performed by Rene Bell MD at [facility] OR    EYE SURGERY      left eye cataract     Family History     Problem Relation (Age of Onset)    Alzheimer's disease Mother    Heart disease Father        Tobacco Use    Smoking status: Never Smoker    Smokeless tobacco: Never Used   Substance and Sexual Activity    Alcohol use: No    Drug use: No    Sexual activity: Not Currently     Partners: Male     Review of Systems   Constitutional: Negative for activity change, appetite change, fever and unexpected weight change.   Respiratory: Negative for chest tightness, shortness of breath and wheezing.    Cardiovascular: Negative for chest pain.   Gastrointestinal: Positive for abdominal pain and nausea. Negative for abdominal distention, anal bleeding, blood in stool, constipation, diarrhea and rectal pain.        Pain, n/v were all present on admission   Genitourinary: Negative for difficulty urinating, dysuria and frequency.   Skin: Negative for color change and wound.   Neurological: Negative for dizziness.   Hematological: Negative for adenopathy.   Psychiatric/Behavioral: Negative for agitation.     Objective:     Vital Signs (Most Recent):  Temp: 98.3 °F (36.8 °C) (05/15/19 0735)  Pulse: 78 (05/15/19 0735)  Resp: 18 (05/15/19 0735)  BP: (!) 169/93 (05/15/19 0735)  SpO2: (!) 92 % (05/15/19 0735) Vital Signs (24h Range):  Temp:  [97.6 °F (36.4 °C)-98.3 °F (36.8 °C)] 98.3 °F (36.8 °C)  Pulse:  [63-78] 78  Resp:  [15-18] 18  SpO2:  [92 %-97 %] 92 %  BP: (169-217)/(78-93) 169/93     Weight: 48.5 kg (107 lb)  Body mass index is 20.9 kg/m².    Physical Exam   Constitutional: She is oriented to person, place, and time. She appears well-developed and well-nourished.   HENT:   Head: Normocephalic and atraumatic.   Eyes: Pupils are equal, round, and reactive to light.   Neck: Normal range of motion. Neck supple. No tracheal deviation  present. No thyromegaly present.   Cardiovascular: Normal rate, regular rhythm and normal heart sounds.   No murmur heard.  Pulmonary/Chest: Effort normal and breath sounds normal. She exhibits no tenderness.   Abdominal: Soft. Bowel sounds are normal. She exhibits no distension, no abdominal bruit, no pulsatile midline mass and no mass. There is no hepatosplenomegaly. There is no tenderness. There is no rigidity, no rebound, no guarding, no tenderness at McBurney's point and negative Coello's sign. No hernia. Hernia confirmed negative in the ventral area.       Genitourinary: Rectum normal.   Musculoskeletal: Normal range of motion.   Neurological: She is alert and oriented to person, place, and time.   Skin: Skin is warm. No rash noted. No erythema.   Psychiatric: She has a normal mood and affect.   Vitals reviewed.      Significant Labs:  CBC:   Recent Labs   Lab 05/15/19  0437   WBC 9.60   RBC 3.86*   HGB 12.0   HCT 36.6*      MCV 95   MCH 31.1*   MCHC 32.9     BMP:   Recent Labs   Lab 05/15/19  0437   GLU 86      K 4.0      CO2 24   BUN 35*   CREATININE 1.5*   CALCIUM 8.3*       Significant Diagnostics:  CT scan reviewed: [facility] report consistent with incarcerated hernia and developing bowel obstruciton

## 2019-05-15 NOTE — ASSESSMENT & PLAN NOTE
NPO  IV Antiemetics  IV pain management  IVF Hydration  Consult General Surgery  Insert NGT if develops intractable N&V

## 2019-05-15 NOTE — ED PROVIDER NOTES
Encounter Date: 5/14/2019    SCRIBE #1 NOTE: I, Guera Ortiz, am scribing for, and in the presence of, Dr. Gonzales.       History     Chief Complaint   Patient presents with    Nausea     started today, emesis x 3    Abdominal Pain       Time seen by provider: 8:21 PM on 05/14/2019    Izabella Fulton is a 86 y.o. female who presents to the ED complaining of abdominal pain, nausea, and vomiting x 6 hours. Pt admits to 1 episode of diarrhea. She denies any sick contact. Pt adds that she has a periumbilical hernia for multiple years. The patient denies fever, cough, shortness of breath, chest pain, bloody stool or vomit, painful or bloody urine, or any other symptoms at this time. PMHx includes HTN and ulcerative colitis. SHx includes eye surgery and colon surgery. No known drug allergies noted.    The history is provided by the patient.     Review of patient's allergies indicates:  No Known Allergies  Past Medical History:   Diagnosis Date    Anticoagulant long-term use     baby aspirin    Hypertension     Thyroid disease     TIA (transient ischemic attack)     Ulcerative colitis     Wears hearing aid      Past Surgical History:   Procedure Laterality Date    COLON SURGERY  05/17/12    exp lap, perfered colon    COLONOSCOPY N/A 5/22/2012    [facility]formed [facility]    EXPLORATORY-LAPAROTOMY  5/23/2012    Performed by Rene Bell MD at [facility] OR    EYE SURGERY      left eye cataract     Family History   Problem Relation Age of Onset    Alzheimer's disease Mother     Heart disease Father      Social History     Tobacco Use    Smoking status: Never Smoker    Smokeless tobacco: Never Used   Substance Use Topics    Alcohol use: No    Drug use: No     Review of Systems   Constitutional: Negative for activity change, diaphoresis and fever.   HENT: Negative for ear pain, rhinorrhea, sore throat and trouble swallowing.    Eyes: Negative for pain and visual disturbance.   Respiratory:  Negative for cough, shortness of breath and stridor.    Cardiovascular: Negative for chest pain.   Gastrointestinal: Positive for abdominal pain, diarrhea, nausea and vomiting. Negative for blood in stool.        + periumbilical hernia.   Genitourinary: Negative for dysuria, hematuria, vaginal bleeding and vaginal discharge.   Musculoskeletal: Negative for gait problem.   Skin: Negative for rash.   Neurological: Negative for seizures and headaches.   Psychiatric/Behavioral: Negative for hallucinations and suicidal ideas.       Physical Exam     Initial Vitals [05/14/19 1953]   BP Pulse Resp Temp SpO2   (!) 217/88 69 15 97.6 °F (36.4 °C) 95 %      MAP       --         Physical Exam    Nursing note and vitals reviewed.  Constitutional: She appears well-developed. No distress.   Elderly appearing.   HENT:   Head: Normocephalic and atraumatic.   Nose: Nose normal.   Eyes: EOM are normal.   Neck: Normal range of motion. Neck supple.   Cardiovascular: Normal rate, regular rhythm, normal heart sounds and intact distal pulses. Exam reveals no gallop and no friction rub.    No murmur heard.  Pulmonary/Chest: Breath sounds normal. No stridor. No respiratory distress. She has no wheezes. She has no rhonchi. She has no rales.   Abdominal: Soft. Bowel sounds are normal. There is tenderness. There is no rebound and no guarding. A hernia is present.   Minimal diffuse abdominal tenderness. Incarcerated periumbilical hernia.   Musculoskeletal: Normal range of motion.   Neurological: She is alert and oriented to person, place, and time. No cranial nerve deficit.   Skin: Skin is warm and dry. No rash noted.   Psychiatric: She has a normal mood and affect.         ED Course   Procedures  Labs Reviewed   CBC W/ AUTO DIFFERENTIAL - Abnormal; Notable for the following components:       Result Value    RDW 14.6 (*)     MPV 8.8 (*)     Gran # (ANC) 8.9 (*)     Lymph # 0.8 (*)     Mono # 1.1 (*)     Gran% 82.2 (*)     Lymph% 7.2 (*)     All  other components within normal limits   COMPREHENSIVE METABOLIC PANEL - Abnormal; Notable for the following components:    CO2 21 (*)     Glucose 127 (*)     BUN, Bld 40 (*)     Creatinine 1.8 (*)     eGFR if  29 (*)     eGFR if non  25 (*)     All other components within normal limits   URINALYSIS, REFLEX TO URINE CULTURE - Abnormal; Notable for the following components:    Protein, UA 2+ (*)     All other components within normal limits    Narrative:     Preferred Collection Type->Urine, Clean Catch   LIPASE   URINALYSIS MICROSCOPIC    Narrative:     Preferred Collection Type->Urine, Clean Catch          Imaging Results          CT Abdomen Pelvis  Without Contrast (In process)    Procedure changed from CT Abdomen Pelvis With Contrast                  Medical Decision Making:   History:   Old Medical Records: I decided to obtain old medical records.  Clinical Tests:   Lab Tests: Ordered and Reviewed  Radiological Study: Ordered and Reviewed  ED Management:  Discussed CT findings of incarcerated hernia with SBO with patient and surgery. Both in agreement with plan to admit for inpatient management of SBO with IVF and antiemetics.        APC / Resident Notes:   11:35 PM  Dr. Gonzales spoke with Dr. Lazaro from general surgery who recommends no emergent surgery but instead admission and treatment for small bowel obstruction.       Scribe Attestation:   Scribe #1: I performed the above scribed service and the documentation accurately describes the services I performed. I attest to the accuracy of the note.      Attending Attestation:     Physician Attestation for Scribe:    I, Dr. Bharath Gonzales, personally performed the services described in this documentation.   All medical record entries made by the scribe were at my direction and in my presence.   I have reviewed the chart and agree that the record is accurate and complete.   Bharath Gonzales MD  5:46 AM 05/15/2019     DISCLAIMER:  This note was prepared with [employer] voice recognition transcription software. Garbled syntax, mangled pronouns, and other bizarre constructions may be attributed to that software system.          ED Course as of May 14 2345   Tue May 14, 2019   2030 85 y.o. female with a hx of HTN, TIA, HLD, CKD, ulcerative colitis, thyroid disease, abdominal hernia 2/2 colonic perforation from colonoscopy repaired by Dr. Bell 2 years ago pw abdominal pain and nausea.     [BD]   0903 IMPRESSION:  1. Multifocal right paramedian and inferior lateral right lower quadrant abdominal wall hernias  containing distal small bowel loops with appearance of small bowel incarceration and developing  distal small bowel obstruction.  2. Colonic diverticulosis without CT evidence of diverticulitis.  3. Calcified uterine fibroid.  4. Atherosclerotic vascular disease.  THIS REPORT CONTAINS FINDINGS THAT MAY BE CRITICAL TO PATIENT CARE. The findings  were verbally communicated via telephone conference with KELIN Dickerson at 11:00 PM  CDT on 5/14/2019. The findings were acknowledged and understood.  Thank you for allowing us to participate in the care of your patient.  Dictated and Authenticated by: Jr. Velasco Neil, MD  05/14/2019 11:07 PM Central Time ([place] & [place])    [BD]      ED Course User Index  [BD] Kelin Gonzales MD     Clinical Impression:       ICD-10-CM ICD-9-CM   1. SBO (small bowel obstruction) K56.609 560.9   2. Incarcerated hernia K46.0 552.9         Disposition:   Disposition: Admitted                        Kelin Gonzales MD  05/15/19 0548

## 2019-05-15 NOTE — PLAN OF CARE
05/15/19 1354   Final Note   Assessment Type Final Discharge Note   Anticipated Discharge Disposition Home

## 2019-05-15 NOTE — HPI
Pleasant 87 yo F admitted to hospital overnight with incarcerated incisional hernia with bowel obstruction.  Pt presented to ER with abd pain, nausea and vomiting.  Pt had ct scan in the ER demonstrating incisional hernia with possible obstruction with in the hernia.  Pt notes that hernia had been present for years but had not bothered her.  SHe had never had it repaired because of her age.  She had surgery in 2012 for colon perforation by Dr Bell.  This morning she notes that she is no longer having n/v.  SHe denies abd pain and wants to go home.

## 2019-05-15 NOTE — ASSESSMENT & PLAN NOTE
Currently uncontrolled--likely due to missing BP medications today and pain  Given oral hydralazine and amlodipine in ED  Continue chronic medications, except diuretic  IV Hydralazine prn  Monitor VS

## 2019-05-15 NOTE — DISCHARGE SUMMARY
Discharge Summary  Hospital Medicine    Admit Date: 5/14/2019    Date and Time: 5/15/00084:20 PM    Discharge Attending Physician: Rachel Chavis MD    Primary Care Physician: Steve Mitchell MD    Diagnoses:  Active Hospital Problems    Diagnosis  POA    *SBO (small bowel obstruction) [K56.609]  Yes    Incarcerated hernia [K46.0]  Yes    CKD (chronic kidney disease) stage 3, GFR 30-59 ml/min [N18.3]  Yes    Essential hypertension [I10]  Yes    Ulcerative colitis [K51.90]  Yes    Hypothyroid [E03.9]  Yes      Resolved Hospital Problems   No resolved problems to display.     Discharged Condition: Good    Hospital Course:   Ms. Fulton is an 85yo F with a PMH of HTN, HLP, CKD3, Ulcerative Colitis, Hypothyroid, and TIA. She presented to the ED with c/o Abdominal pain. It was associated with N&V. She also had 1 episode of diarrhea. While in the ED, ahe had an abdominal CT which showed a SBO and incarcerated hernia.  She was ordered 8mg IV Zofran and 2mg IV Morphine. She was also hypertensive and given amlodipine 5mg and po hydralazine 25mg. Patient was admitted to Hospitalist medicine service. Patient was evaluated by Dr. Valle from General surgery.  Later patient's abdominal pain dramatically resolved.  Despite recommendation by Dr. Valle for surgical repair of incarcerated hernia which believed to cause small-bowel obstruction, patient refused surgery.  Patient stated her pain has resolved and she has had this hernia for years.  Patient and her  are aware of risks of not undergoing surgical repair which include worsening of her symptom, recurrent small-bowel obstruction and intestinal ischemia/infarction.  Dr. Valle approved advancement of diet which patient is tolerating well and is anxious to be discharged.  Patient states she will discuss with her primary care physician in 2 days and will make definite decisions later on.  Patient will follow up with Dr. Valle in case if she decides to undergo elective  abdominal surgery.  Patient has been cleared for discharge as per Dr. Valle. Patient was discharged home in stable condition with following discharge plan of care.     Consults: Dr. Valle    Significant Diagnostic Studies:   CT scan of the abdomen and pelvis without contrast:  Bowel containing hernia of the right anterior pelvic wall with associated features concerning for developing small bowel obstruction. Osteopenia.  New severe compression fracture of L1, likely chronic.  Chronic moderate compression fracture of L4.  New sacral insufficiency fractures which are subacute to chronic. 4 mm left lower lobe nodule.  For a solid nodule <6 mm, Fleischner Society 2017 guidelines recommend no routine follow up for a low risk patient, or follow-up with non-contrast chest CT at 12 months in a high risk patient.    Special Treatments/Procedures: None  Disposition: Home or Self Care    Medications:  Reconciled Home Medications:   Current Discharge Medication List      CONTINUE these medications which have NOT CHANGED    Details   amlodipine (NORVASC) 5 MG tablet Take 5 mg by mouth once daily.       aspirin (ECOTRIN) 81 MG EC tablet Take 81 mg by mouth once daily.        calcitRIOL (ROCALTROL) 0.25 MCG Cap Take 0.25 mcg by mouth twice a week. Sunday and Thursday      calcium-vitamin D 500-125 mg-unit tablet Take 1 tablet by mouth 2 (two) times daily.        denosumab (PROLIA) 60 mg/mL Syrg Inject 60 mg into the skin every 6 (six) months.       DOCOSAHEXANOIC ACID/VIT C/LUT (EYE HEALTH ORAL) Take 1 tablet by mouth 2 (two) times daily.        fish oil-omega-3 fatty acids 300-1,000 mg capsule Take 1 g by mouth once daily.        folic acid (FOLVITE) 800 MCG tablet Take 800 mcg by mouth once daily.        hydrALAZINE (APRESOLINE) 50 MG tablet Take 50 mg by mouth every 12 (twelve) hours.       levothyroxine (SYNTHROID) 100 MCG tablet Take 100 mcg by mouth once daily.       metoprolol tartrate (LOPRESSOR) 50 MG tablet Take 50 mg by  mouth 2 (two) times daily.       neomycin-polymyxin-dexamethasone (MAXITROL) 3.5mg/mL-10,000 unit/mL-0.1 % DrpS Place 1 drop into both eyes every 8 (eight) hours.      pravastatin (PRAVACHOL) 20 MG tablet Take 20 mg by mouth once daily.        telmisartan (MICARDIS) 40 MG Tab Take 40 mg by mouth once daily.      torsemide (DEMADEX) 10 MG Tab Take 1 tablet (10 mg total) by mouth once daily.  Qty: 30 tablet, Refills: 0           Discharge Procedure Orders   Diet Cardiac   Order Comments: Soft bland diet     Other restrictions (specify):   Order Comments: PLEASE OBSERVE FALL PRECAUTIONS     Call MD for:   Order Comments: For worsening symptoms, chest pain, shortness of breath, increased abdominal pain, high grade fever, stroke or stroke like symptoms, immediately go to the nearest Emergency Room or call 911 as soon as possible.     Follow-up Information     Steve Mitchell MD In 1 week.    Specialty:  Family Medicine  Contact information:  [address]  [address]  [place] [facility] [address]  [phone]             Valeriy Valle MD.    Specialties:  General Surgery, Colon and Rectal Surgery, Surgery  Why:  As needed  Contact information:  [address] [place] [address]  [phone]                 Time spent on the discharge of patient: 32 minutes  Patient was seen and examined on the date of discharge and determined to be suitable for discharge.

## 2019-05-15 NOTE — CONSULTS
[facility]  General Surgery  Consult Note    Patient Name: Izabella Fulton  MRN: [id number]  Code Status: Full Code  Admission Date: 5/14/2019  Hospital Length of Stay: 1 days  Attending Physician: Rachel Chavis MD  Primary Care Provider: Steve Mitchell MD    Patient information was obtained from patient and ER records.     Inpatient consult to General Surgery  Consult performed by: Valeriy Valle MD  Consult ordered by: Joelle Freedman NP  Reason for consult: Incarcerated hernia  Assessment/Recommendations: Surgery recommended.  Pt refused        Subjective:     Principal Problem: SBO (small bowel obstruction)    History of Present Illness: Pleasant 87 yo F admitted to hospital overnight with incarcerated incisional hernia with bowel obstruction.  Pt presented to ER with abd pain, nausea and vomiting.  Pt had ct scan in the ER demonstrating incisional hernia with possible obstruction with in the hernia.  Pt notes that hernia had been present for years but had not bothered her.  SHe had never had it repaired because of her age.  She had surgery in 2012 for colon perforation by Dr Bell.  This morning she notes that she is no longer having n/v.  SHe denies abd pain and wants to go home.    No current facility-administered medications on file prior to encounter.      Current Outpatient Medications on File Prior to Encounter   Medication Sig    amlodipine (NORVASC) 5 MG tablet Take 5 mg by mouth once daily.     aspirin (ECOTRIN) 81 MG EC tablet Take 81 mg by mouth once daily.      calcitRIOL (ROCALTROL) 0.25 MCG Cap Take 0.25 mcg by mouth twice a week. Sunday and Thursday    calcium-vitamin D 500-125 mg-unit tablet Take 1 tablet by mouth 2 (two) times daily.      denosumab (PROLIA) 60 mg/mL Syrg Inject 60 mg into the skin every 6 (six) months.     DOCOSAHEXANOIC ACID/VIT C/LUT (EYE HEALTH ORAL) Take 1 tablet by mouth 2 (two) times daily.      fish oil-omega-3 fatty acids 300-1,000 mg capsule  Take 1 g by mouth once daily.      folic acid (FOLVITE) 800 MCG tablet Take 800 mcg by mouth once daily.      hydrALAZINE (APRESOLINE) 50 MG tablet Take 50 mg by mouth every 12 (twelve) hours.     levothyroxine (SYNTHROID) 100 MCG tablet Take 100 mcg by mouth once daily.     metoprolol tartrate (LOPRESSOR) 50 MG tablet Take 50 mg by mouth 2 (two) times daily.     neomycin-polymyxin-dexamethasone (MAXITROL) 3.5mg/mL-10,000 unit/mL-0.1 % DrpS Place 1 drop into both eyes every 8 (eight) hours.    pravastatin (PRAVACHOL) 20 MG tablet Take 20 mg by mouth once daily.      telmisartan (MICARDIS) 40 MG Tab Take 40 mg by mouth once daily.    torsemide (DEMADEX) 10 MG Tab Take 1 tablet (10 mg total) by mouth once daily. (Patient taking differently: Take 10 mg by mouth twice a week. Tuesday and Friday)       Review of patient's allergies indicates:  No Known Allergies    Past Medical History:   Diagnosis Date    Anticoagulant long-term use     baby aspirin    Hypertension     Thyroid disease     TIA (transient ischemic attack)     Ulcerative colitis     Wears hearing aid      Past Surgical History:   Procedure Laterality Date    COLON SURGERY  05/17/12    exp lap, perfered colon    COLONOSCOPY N/A 5/22/2012    [facility] ENDO    EXPLORATORY-LAPAROTOMY  5/23/2012    [facility]formed by Rene Bell [facility] at [facility] OR    EYE SURGERY      left eye cataract     Family History     Problem Relation (Age of Onset)    Alzheimer's disease Mother    Heart disease Father        Tobacco Use    Smoking status: Never Smoker    Smokeless tobacco: Never Used   Substance and Sexual Activity    Alcohol use: No    Drug use: No    Sexual activity: Not Currently     Partners: Male     Review of Systems   Constitutional: Negative for activity change, appetite change, fever and unexpected weight change.   Respiratory: Negative for chest tightness, shortness of breath and wheezing.    Cardiovascular: Negative  for chest pain.   Gastrointestinal: Positive for abdominal pain and nausea. Negative for abdominal distention, anal bleeding, blood in stool, constipation, diarrhea and rectal pain.        Pain, n/v were all present on admission   Genitourinary: Negative for difficulty urinating, dysuria and frequency.   Skin: Negative for color change and wound.   Neurological: Negative for dizziness.   Hematological: Negative for adenopathy.   Psychiatric/Behavioral: Negative for agitation.     Objective:     Vital Signs (Most Recent):  Temp: 98.3 °F (36.8 °C) (05/15/19 0735)  Pulse: 78 (05/15/19 0735)  Resp: 18 (05/15/19 0735)  BP: (!) 169/93 (05/15/19 0735)  SpO2: (!) 92 % (05/15/19 0735) Vital Signs (24h Range):  Temp:  [97.6 °F (36.4 °C)-98.3 °F (36.8 °C)] 98.3 °F (36.8 °C)  Pulse:  [63-78] 78  Resp:  [15-18] 18  SpO2:  [92 %-97 %] 92 %  BP: (169-217)/(78-93) 169/93     Weight: 48.5 kg (107 lb)  Body mass index is 20.9 kg/m².    Physical Exam   Constitutional: She is oriented to person, place, and time. She appears well-developed and well-nourished.   HENT:   Head: Normocephalic and atraumatic.   Eyes: Pupils are equal, round, and reactive to light.   Neck: Normal range of motion. Neck supple. No tracheal deviation present. No thyromegaly present.   Cardiovascular: Normal rate, regular rhythm and normal heart sounds.   No murmur heard.  Pulmonary/Chest: Effort normal and breath sounds normal. She exhibits no tenderness.   Abdominal: Soft. Bowel sounds are normal. She exhibits no distension, no abdominal bruit, no pulsatile midline mass and no mass. There is no hepatosplenomegaly. There is no tenderness. There is no rigidity, no rebound, no guarding, no tenderness at McBurney's point and negative Coello's sign. No hernia. Hernia confirmed negative in the ventral area.       Genitourinary: Rectum normal.   Musculoskeletal: Normal range of motion.   Neurological: She is alert and oriented to person, place, and time.   Skin: Skin  is warm. No rash noted. No erythema.   Psychiatric: She has a normal mood and affect.   Vitals reviewed.      Significant Labs:  CBC:   Recent Labs   Lab 05/15/19  0437   WBC 9.60   RBC 3.86*   HGB 12.0   HCT 36.6*      MCV 95   MCH 31.1*   MCHC 32.9     BMP:   Recent Labs   Lab 05/15/19  0437   GLU 86      K 4.0      CO2 24   BUN 35*   CREATININE 1.5*   CALCIUM 8.3*       Significant Diagnostics:  CT scan reviewed: [facility] report consistent with incarcerated hernia and developing bowel obstruciton    Assessment/Plan:     Incarcerated hernia  I have booked pt for emergency surgery to repair incarcerated hernia believed to cause obstruction. Pt refusing surgery.  Notes that she is no longer in pain.  Denies n/v.  Pt is not in pain but does have an incarcerated hernia for which I would recommend surgery.  PT continues to refuse surgery stating that she is too old and does not want mesh.  Risks of refusal were d/w pt.  She expresses understanding.     Juan Pablo d/w primary team.  Okay to advance diet and d/c home      VTE Risk Mitigation (From admission, onward)        Ordered     IP VTE HIGH RISK PATIENT  Once      05/15/19 0131     Place ESTUARDO hose  Until discontinued      05/15/19 0131          Thank you for your consult. I will sign off. Please contact us if you have any additional questions.    Valeriy Valle MD  General Surgery  [facility]

## 2019-05-15 NOTE — PLAN OF CARE
Cm completed the assessment with pt and spouse at bedside.  Pt is independent in care and drives.  PCP is Dr. Mitchell.  POA is spouse.  Insurance verified as PHN.  Pt denies diabetes.  Disposition:  Pt will discharge to home.  No needs verbalized at this time.       05/15/19 1348   Discharge Assessment   Assessment Type Discharge Planning Assessment   Confirmed/corrected address and phone number on facesheet? Yes   Assessment information obtained from? Patient   Prior to hospitilization cognitive status: Alert/Oriented   Prior to hospitalization functional status: Independent   Current cognitive status: Alert/Oriented   Current Functional Status: Independent   Facility Arrived From: home   Lives With spouse   Able to Return to Prior Arrangements yes   Is patient able to care for self after discharge? Yes   Who are your caregiver(s) and their phone number(s)? spouse- Ykd- [phone]   Readmission Within the Last 30 Days no previous admission in last 30 days   Patient currently being followed by outpatient case management? Yes   If yes, name of outpatient case management following: insurance company assigned oupatient case management   Patient currently receives any other outside agency services? No   Equipment Currently Used at Home cane, straight  (Pt's spouse uses the cane)   Do you have any problems affording any of your prescribed medications? No   Is the patient taking medications as prescribed? yes   Does the patient have transportation home? Yes   Transportation Anticipated car, drives self;family or friend will provide   Dialysis Name and Scheduled days na   Does the patient receive services at the [facility]madin Clinic? No   Discharge Plan A Home with family   DME Needed Upon Discharge  none   Patient/Family in Agreement with Plan yes

## 2019-05-16 NOTE — PROGRESS NOTES
C3 nurse attempted to contact patient. No answer. The following message was left for the patient to return the call:  Good morning I am a nurse calling on behalf of [facility] from the [facility]ination [facility].  This is a Transitional Care Call for Izabella Fulton. When you have a moment please contact us at [phone] or [phone] Monday through Friday, between the hours of 8 am to 4 pm. We look forward to speaking with you. On behalf of [facility] have a nice day.    The patient does not have a scheduled HOSFU appointment within 7-14 days post hospital discharge date 5/15/19. Non [facility] Provider, unable to route at this time.

## 2019-05-17 NOTE — PATIENT INSTRUCTIONS
Small Bowel Obstruction  A small bowel obstruction occurs when there is partial or total blockage in the small intestine (bowel). As a result, waste cannot move through the bowel properly and out of the body. Treatment is needed right away to remove the blockage. This can relieve painful symptoms. It can also prevent serious complications such as tissue death or rupture of the small bowel. Without treatment, a small bowel obstruction can be fatal.  Causes of Small Bowel Obstruction  A small bowel obstruction can be caused by:  Scar tissue (adhesions). These may form after surgery or an infection.  Hernia (weakness or tear in the wall of the abdomen). This can cause a portion of the small bowel to push out and appear as a visible bulge under the skin.  Certain medical conditions. These include intussusception (occurs when a portion of the bowel slides inside another portion) and Crohns disease (illness that causes inflammation and sores to form in the digestive tract).  Abnormal tissue growths (tumors). These can form on the inside or outside of the small bowel, and are usually due to cancer.  Symptoms of Small Bowel Obstruction  Common symptoms include:  Abdominal cramping and pain  Abdominal swelling and bloating  Nausea and vomiting  Inability to pass gas  Inability to pass stool (constipation)  Diarrhea  Diagnosing Small Bowel Obstruction  Your doctor will ask about your symptoms and health history. Youll also have a physical exam. In addition, tests may be done to confirm the problem. These can include:  Imaging tests. These provide pictures of the small bowel. Common tests include x-rays and a computed tomography (CT) scan.  Blood tests. These check for infection and other problems such as dehydration.  Upper gastrointestinal (GI) series with a small bowel follow-through. This test is done to take x-rays of the upper digestive tract from the mouth through the small bowel. A contrast fluid is used. The  contrast fluid coats the inside of the upper digestive tract so it will show up clearly on x-rays.  Treating Small Bowel Obstruction  Treatment takes place in a hospital. As part of your care, the following may be done:  No food or drink is given by mouth. This allows your bowels to rest.  An intravenous (IV) line is placed in a vein in your arm or hand. The IV line is used to give fluids. It may also be used to give medications. These may be needed to relieve pain, nausea, and other symptoms. They may also be needed to treat or prevent infections.  A soft, thin, flexible tube (nasogastric tube) is inserted through your nose and into your stomach. The tube is used to remove extra gas and fluid in your stomach and bowels. This helps to relieve symptoms such as pain and swelling.  In severe cases, such as when the small bowel is almost or totally blocked, surgery is done. During surgery, the blockage is removed. Portions of the bowel may also be removed if there is tissue death. Other repair may be done as well, depending on the cause of the blockage. Your doctor will give you more information about surgery, if needed.  Youll be observed in the hospital until your symptoms improve. Your doctor will tell you when you can return home.  Long-term Concerns   After treatment, many people recover with no lasting effects. If a long portion of the bowel is removed, there is a greater chance for lifelong digestive problems such as irregular bowel movements. Work with your doctor to learn the best ways to manage any symptoms you may have and to protect your health.    When to Call the Doctor  Call your doctor right away if you have any of the following:  Abdominal swelling or cramping that wont go away  Inability to pass stool or gas  Nausea or vomiting (especially if the vomit looks or smells like stool)   © 3293-9448 [employer], [address] [place] [address], [place] [address]. All rights reserved. This information is not  intended as a substitute for professional medical care. Always follow your healthcare professional's instructions.

## 2019-09-23 NOTE — PROGRESS NOTES
Kidney function remains weak and your urine shows protein so it's important to make sure you blood pressure is well controlled and you follow up with Dr. Shultz. Cholesterol levels are well controlled- continue current medication. Liver, thyroid and blood count are normal. No infection on final urine culture. Repeat CMP, lipids in 6 months

## 2019-09-25 NOTE — TELEPHONE ENCOUNTER
----- Message from Lindsey Bustamante sent at 9/25/2019  9:28 AM CDT -----  Pt saw dr. cash last week and he said everything looks good. She is due to see him again in 6 months.  [phone]

## 2019-10-02 NOTE — TELEPHONE ENCOUNTER
----- Message from Lindsey Bustamante sent at 10/2/2019 12:40 PM CDT -----  vm- pt needs refill on pravastatin  [phone]

## 2019-10-14 NOTE — TELEPHONE ENCOUNTER
----- Message from Maye Maciel sent at 10/14/2019  1:23 PM CDT -----  vm- patient needs a refill of amlodipine 5mg.

## 2019-12-16 NOTE — TELEPHONE ENCOUNTER
Tried calling pt was put on hold for a long time. I hung up called back and phone kept going to .

## 2019-12-16 NOTE — TELEPHONE ENCOUNTER
----- Message from Xiomara Terry sent at 12/16/2019  8:25 AM CST -----  Contact: Izabella Silva- Patient called says she needs a refill on a Rx but didn't specify which one she needs   Pt# [phone]

## 2019-12-19 PROBLEM — I87.2 VENOUS INSUFFICIENCY OF BOTH LOWER EXTREMITIES: Status: ACTIVE | Noted: 2019-01-01

## 2019-12-19 PROBLEM — I12.9 HYPERTENSIVE KIDNEY DISEASE WITH STAGE 4 CHRONIC KIDNEY DISEASE: Status: ACTIVE | Noted: 2019-01-01

## 2019-12-19 PROBLEM — N18.4 HYPERTENSIVE KIDNEY DISEASE WITH STAGE 4 CHRONIC KIDNEY DISEASE: Status: ACTIVE | Noted: 2019-01-01

## 2019-12-19 PROBLEM — N18.4 CKD (CHRONIC KIDNEY DISEASE) STAGE 4, GFR 15-29 ML/MIN: Status: ACTIVE | Noted: 2019-01-01

## 2019-12-19 NOTE — PROGRESS NOTES
SUBJECTIVE:    Patient ID: Izabella Fulton is a 86 y.o. female.    Chief Complaint: Follow-up (brought bottles// SW)    Pt here for regular f/u. Overall doing okay. Reports left leg has been more swollen over past week. Admits missed 2 days of taking her hydralazine this week, just restarted last night. Admits also not wearing her compression socks. No pain in leg/calf.      Orders Only on 09/19/2019   Component Date Value Ref Range Status    Cholesterol 09/19/2019 154  <200 mg/dL Final    HDL 09/19/2019 68  >50 mg/dL Final    Triglycerides 09/19/2019 60  <150 mg/dL Final    LDL Cholesterol 09/19/2019 72  mg/dL (calc) Final    Hdl/Cholesterol Ratio 09/19/2019 2.3  <5.0 (calc) Final    Non HDL Chol. (LDL+VLDL) 09/19/2019 86  <130 mg/dL (calc) Final    Creatinine, Random Ur 09/19/2019 67  20 - 275 mg/dL Final    Microalb, Ur 09/19/2019 13.1  See Note: mg/dL Final    Microalb Creat Ratio 09/19/2019 196* <30 mcg/mg creat Final    Glucose 09/19/2019 96  65 - 99 mg/dL Final    BUN, Bld 09/19/2019 56* 7 - 25 mg/dL Final    Creatinine 09/19/2019 1.85* 0.60 - 0.88 mg/dL Final    eGFR if non African American 09/19/2019 24* > OR = 60 mL/min/1.73m2 Final    eGFR if African American 09/19/2019 28* > OR = 60 mL/min/1.73m2 Final    BUN/Creatinine Ratio 09/19/2019 30* 6 - 22 (calc) Final    Sodium 09/19/2019 142  135 - 146 mmol/L Final    Potassium 09/19/2019 4.2  3.5 - 5.3 mmol/L Final    Chloride 09/19/2019 106  98 - 110 mmol/L Final    CO2 09/19/2019 24  20 - 32 mmol/L Final    Calcium 09/19/2019 9.3  8.6 - 10.4 mg/dL Final    Total Protein 09/19/2019 7.1  6.1 - 8.1 g/dL Final    Albumin 09/19/2019 3.9  3.6 - 5.1 g/dL Final    Globulin, Total 09/19/2019 3.2  1.9 - 3.7 g/dL (calc) Final    Albumin/Globulin Ratio 09/19/2019 1.2  1.0 - 2.5 (calc) Final    Total Bilirubin 09/19/2019 0.5  0.2 - 1.2 mg/dL Final    Alkaline Phosphatase 09/19/2019 57  33 - 130 U/L Final    AST 09/19/2019 21  10 - 35 U/L Final     ALT 09/19/2019 14  6 - 29 U/L Final    Color, UA 09/19/2019 YELLOW  YELLOW Final    Appearance, UA 09/19/2019 CLEAR  CLEAR Final    Specific Gravity, UA 09/19/2019 1.016  1.001 - 1.035 Final    pH, UA 09/19/2019 6.5  5.0 - 8.0 Final    Glucose, UA 09/19/2019 NEGATIVE  NEGATIVE Final    Bilirubin, UA 09/19/2019 NEGATIVE  NEGATIVE Final    Ketones, UA 09/19/2019 NEGATIVE  NEGATIVE Final    Occult Blood UA 09/19/2019 NEGATIVE  NEGATIVE Final    Protein, UA 09/19/2019 1+* NEGATIVE Final    Nitrite, UA 09/19/2019 NEGATIVE  NEGATIVE Final    Leukocytes, UA 09/19/2019 3+* NEGATIVE Final    WBC Casts, UA 09/19/2019 10-20* < OR = 5 /HPF Final    RBC Casts, UA 09/19/2019 NONE SEEN  < OR = 2 /HPF Final    Squam Epithel, UA 09/19/2019 0-5  < OR = 5 /HPF Final    Bacteria, UA 09/19/2019 MODERATE* NONE SEEN /HPF Final    Hyaline Casts, UA 09/19/2019 NONE SEEN  NONE SEEN /LPF Final    Reflexive Urine Culture 09/19/2019 CULTURE INDICATED - RESULTS TO FOLLOW   Final    WBC 09/19/2019 6.4  3.8 - 10.8 Thousand/uL Final    RBC 09/19/2019 3.73* 3.80 - 5.10 Million/uL Final    Hemoglobin 09/19/2019 11.8  11.7 - 15.5 g/dL Final    Hematocrit 09/19/2019 36.6  35.0 - 45.0 % Final    Mean Corpuscular Volume 09/19/2019 98.1  80.0 - 100.0 fL Final    Mean Corpuscular Hemoglobin 09/19/2019 31.6  27.0 - 33.0 pg Final    Mean Corpuscular Hemoglobin Conc 09/19/2019 32.2  32.0 - 36.0 g/dL Final    RDW 09/19/2019 13.2  11.0 - 15.0 % Final    Platelets 09/19/2019 268  140 - 400 Thousand/uL Final    MPV 09/19/2019 11.2  7.5 - 12.5 fL Final    Neutrophils Absolute 09/19/2019 4,314  1,500 - 7,800 cells/uL Final    Lymph # 09/19/2019 986  850 - 3,900 cells/uL Final    Mono # 09/19/2019 870  200 - 950 cells/uL Final    Eos # 09/19/2019 179  15 - 500 cells/uL Final    Baso # 09/19/2019 51  0 - 200 cells/uL Final    Neutrophils Relative 09/19/2019 67.4  % Final    Lymph% 09/19/2019 15.4  % Final    Mono% 09/19/2019  13.6  % Final    Eosinophil% 09/19/2019 2.8  % Final    Basophil% 09/19/2019 0.8  % Final    TSH 09/19/2019 0.90  0.40 - 4.50 mIU/L Final    Urine Culture, Routine 09/19/2019    Final       Past Medical History:   Diagnosis Date    Anticoagulant long-term use     baby aspirin    Hypertension     Thyroid disease     TIA (transient ischemic attack)     Ulcerative colitis     Wears hearing aid      Past Surgical History:   Procedure Laterality Date    COLON SURGERY  05/17/12    exp lap, perfered colon    EYE SURGERY      left eye cataract     Family History   Problem Relation Age of Onset    Alzheimer's disease Mother     Heart disease Father        Marital Status:   Alcohol History:  reports that she does not drink alcohol.  Tobacco History:  reports that she has never smoked. She has never used smokeless tobacco.  Drug History:  reports that she does not use drugs.    Review of patient's allergies indicates:  No Known Allergies    Current Outpatient Medications:     amLODIPine (NORVASC) 5 MG tablet, Take 1 tablet (5 mg total) by mouth once daily., Disp: 90 tablet, Rfl: 1    aspirin (ECOTRIN) 81 MG EC tablet, Take 81 mg by mouth once daily.  , Disp: , Rfl:     calcitRIOL (ROCALTROL) 0.25 MCG Cap, Take 0.25 mcg by mouth twice a week. Sunday and Thursday, Disp: , Rfl:     calcium-vitamin D 500-125 mg-unit tablet, Take 1 tablet by mouth 2 (two) times daily.  , Disp: , Rfl:     denosumab (PROLIA) 60 mg/mL Syrg, Inject 60 mg into the skin every 6 (six) months. , Disp: , Rfl:     DOCOSAHEXANOIC ACID/VIT C/LUT (EYE HEALTH ORAL), Take 1 tablet by mouth 2 (two) times daily.  , Disp: , Rfl:     fish oil-omega-3 fatty acids 300-1,000 mg capsule, Take 1 g by mouth once daily.  , Disp: , Rfl:     folic acid (FOLVITE) 800 MCG tablet, Take 800 mcg by mouth once daily.  , Disp: , Rfl:     hydrALAZINE (APRESOLINE) 50 MG tablet, Take 1 tablet (50 mg total) by mouth 3 (three) times daily., Disp: 270 tablet,  "Rfl: 1    levothyroxine (SYNTHROID) 100 MCG tablet, Take 1 tablet (100 mcg total) by mouth once daily., Disp: 90 tablet, Rfl: 1    metoprolol tartrate (LOPRESSOR) 50 MG tablet, Take 50 mg by mouth 2 (two) times daily. , Disp: , Rfl:     neomycin-polymyxin-dexamethasone (MAXITROL) 3.5mg/mL-10,000 unit/mL-0.1 % DrpS, Place 1 drop into both eyes every 8 (eight) hours., Disp: , Rfl:     pravastatin (PRAVACHOL) 20 MG tablet, Take 1 tablet (20 mg total) by mouth once daily., Disp: 90 tablet, Rfl: 1    telmisartan (MICARDIS) 40 MG Tab, Take 40 mg by mouth once daily., Disp: , Rfl:     torsemide (DEMADEX) 10 MG Tab, Take 1 tablet (10 mg total) by mouth once daily. (Patient taking differently: Take 10 mg by mouth twice a week. Tuesday and Friday), Disp: 30 tablet, Rfl: 0    Review of Systems   Constitutional: Negative for chills and fever.   HENT: Negative for sore throat.    Respiratory: Positive for shortness of breath (SOB with heavy exertion). Negative for cough and wheezing.    Cardiovascular: Positive for leg swelling (L>R). Negative for chest pain and palpitations.   Gastrointestinal: Negative for abdominal pain, constipation and diarrhea.   Genitourinary: Negative for dysuria and hematuria.   Skin: Negative for rash.   Neurological: Negative for dizziness and headaches.   Psychiatric/Behavioral: Negative for dysphoric mood.          Objective:      Vitals:    12/19/19 0956 12/19/19 0957 12/19/19 1028   BP: (!) 170/66 (!) 170/76 (!) 174/64   Pulse: (!) 56     Weight: 49.9 kg (110 lb)     Height: 4' 9.25" (1.454 m)       Physical Exam   Constitutional: She is oriented to person, place, and time. She appears well-developed and well-nourished.   Elderly WF   HENT:   Head: Normocephalic and atraumatic.   Right Ear: Tympanic membrane and ear canal normal.   Left Ear: Tympanic membrane and ear canal normal.   Mouth/Throat: Mucous membranes are normal. No posterior oropharyngeal erythema.   Neck: Neck supple. Carotid " bruit is not present.   Cardiovascular: Normal rate and regular rhythm. Exam reveals no gallop and no friction rub.   No murmur heard.  Pulmonary/Chest: Effort normal and breath sounds normal. No respiratory distress. She has no wheezes. She has no rales.   Abdominal: Soft. She exhibits no distension. There is no tenderness.   Musculoskeletal: She exhibits edema (1+ right lower leg/ankle edema, 2+ left lower leg/ankle edema, no erythema, neg anthony's).   Kyphosis     Lymphadenopathy:     She has no cervical adenopathy.   Neurological: She is alert and oriented to person, place, and time. She has normal strength. Gait normal.   Skin: Skin is warm and dry. No rash noted.   Psychiatric: She has a normal mood and affect.   Nursing note and vitals reviewed.        Assessment:       1. Hypertensive kidney disease with stage 4 chronic kidney disease    2. CKD (chronic kidney disease) stage 4, GFR 15-29 ml/min    3. Acquired hypothyroidism    4. Venous insufficiency of both lower extremities    5. Mixed hyperlipidemia           Plan:       Hypertensive kidney disease with stage 4 chronic kidney disease  -     hydrALAZINE (APRESOLINE) 50 MG tablet; Take 1 tablet (50 mg total) by mouth 3 (three) times daily.  Dispense: 270 tablet; Refill: 1  -BP elevated today, reports accidentally missed hydralazine dosing for 2 days and just resumed yesterday. Pt advised to increase hydralazine dosing to TID for next few days and monitor BP. If BP improves to 130-140 range then may be able to resume BID dosing but needs to monitor closely. Stressed importance of BP control to prevent further renal decline    CKD (chronic kidney disease) stage 4, GFR 15-29 ml/min  -renal fxn slightly worsened on most recent labs, pt reports she has f/u upcoming with Dr. Shultz    Acquired hypothyroidism  -     levothyroxine (SYNTHROID) 100 MCG tablet; Take 1 tablet (100 mcg total) by mouth once daily.  Dispense: 90 tablet; Refill: 1  -stable    Venous  insufficiency of both lower extremities  -pt with hx of intermittent leg swelling r/t venous insufficiency- stressed importance of compression socks as well as elevation, BP control and low salt diet. Low suspicion for DVT given hx of venous stasis, as well as bilat LE swelling and no pain- cautioned though to call if swelling worsens or develops pain/redness to leg      Mixed hyperlipidemia  -well controlled on last labs    Follow up in about 6 months (around 6/19/2020) for Dr. Mitchell next visit, nurse visit in 2 weeks for BP check.        12/19/2019 Corinne Fletcher NP

## 2019-12-26 NOTE — ED NOTES
Pt stated that she fell on the carpet Saturday and hit her left lateral chest wall.  She states that it is sore and seems to not be resolving but does state that it does not hurt to take depp breaths.

## 2019-12-26 NOTE — ED PROVIDER NOTES
"Encounter Date: 12/26/2019    SCRIBE #1 NOTE: Agata ERNANDEZ, erik scribing for, and in the presence of, Bharath Gonzales MD.       History     Chief Complaint   Patient presents with    Fall     trip and fall Saturday; C/o left "side" pain       Time seen by provider: 11:08 AM on 12/26/2019    Izabella Fulton is a 86 y.o. female with PMHx of HTN, ulcerative colitis, and thyroid disease who presents to the ED with complaints of left sided rib pain s/p a mechanical fall that occurred x5 days ago. Patient reports tripping and falling onto carpet covering concrete slab. Patient denies LOC or neck pain. She denies having any preceding symptoms. Patient mentions having an episode of blood in the urine immediately after the fall but denies other episodes since. She also reports having constipation and states last normal BM was x4 days ago. She denies recent N/V, abdominal pain, chest pain, and SOB. Patient last took Tylenol for pain x3 hours ago. She is currently on ASA but denies other blood thinners. Patient has no other medical concerns or complaints at this moment. SHx includes colon surgery. NKDA.     The history is provided by the patient.     Review of patient's allergies indicates:  No Known Allergies  Past Medical History:   Diagnosis Date    Anticoagulant long-term use     baby aspirin    Hypertension     Thyroid disease     TIA (transient ischemic attack)     Ulcerative colitis     Wears hearing aid      Past Surgical History:   Procedure Laterality Date    COLON SURGERY  05/17/12    exp lap, perfered colon    EYE SURGERY      left eye cataract     Family History   Problem Relation Age of Onset    Alzheimer's disease Mother     Heart disease Father      Social History     Tobacco Use    Smoking status: Never Smoker    Smokeless tobacco: Never Used   Substance Use Topics    Alcohol use: No    Drug use: No     Review of Systems   Constitutional: Negative for activity change, chills, diaphoresis and " fever.   Respiratory: Negative for shortness of breath.    Cardiovascular: Negative for chest pain.        + left rib pain   Gastrointestinal: Positive for constipation. Negative for abdominal distention, abdominal pain, blood in stool, diarrhea, nausea and vomiting.   Genitourinary: Positive for hematuria. Negative for decreased urine volume and difficulty urinating.   Musculoskeletal: Negative for back pain, gait problem, joint swelling, myalgias and neck pain.   Skin: Negative for pallor.   Neurological: Negative for weakness, numbness and headaches.   Hematological: Does not bruise/bleed easily.   Psychiatric/Behavioral: The patient is not nervous/anxious.        Physical Exam     Initial Vitals [12/26/19 1050]   BP Pulse Resp Temp SpO2   (!) 159/72 68 18 97.9 °F (36.6 °C) 98 %      MAP       --         Physical Exam    Nursing note and vitals reviewed.  Constitutional: She appears well-developed. She is not diaphoretic. No distress.   HENT:   Head: Normocephalic and atraumatic.   Nose: Nose normal.   Eyes: EOM are normal. No scleral icterus.   Neck: Normal range of motion. Neck supple.   Cardiovascular: Normal rate, regular rhythm, normal heart sounds and intact distal pulses. Exam reveals no gallop and no friction rub.    No murmur heard.  Pulmonary/Chest: Breath sounds normal. No stridor. No respiratory distress. She has no wheezes. She has no rhonchi. She has no rales. She exhibits tenderness.   Left chest wall tenderness. No overlying skin changes including bruising or swelling.   Abdominal: Soft. Bowel sounds are normal. There is no tenderness.   Musculoskeletal: Normal range of motion. She exhibits no tenderness.   No C-spine, T-spine, and L-spine tenderness.   Neurological: She is alert and oriented to person, place, and time. No cranial nerve deficit.   Cranial nerves II through XII grossly intact. Finger to nose normal. Tone normal. Sensation intact to light touch. Strength 5/5 bilaterally upper and  lower extremities. Speech and cognition is normal.  No focal neurologic deficit.   Skin: Skin is warm and dry. Capillary refill takes less than 2 seconds. No rash noted.   Psychiatric: She has a normal mood and affect.         ED Course   Procedures  Labs Reviewed - No data to display       Imaging Results    None          Medical Decision Making:   History:   Old Medical Records: I decided to obtain old medical records.  Clinical Tests:   Radiological Study: Reviewed and Ordered            Scribe Attestation:   Scribe #1: I performed the above scribed service and the documentation accurately describes the services I performed. I attest to the accuracy of the note.        Attending Attestation:     Physician Attestation for Scribe:    I, Dr. Bharath Gonzales, personally performed the services described in this documentation.   All medical record entries made by the scribe were at my direction and in my presence.   I have reviewed the chart and agree that the record is accurate and complete.   Bharath Gonzales MD  12:42 AM 12/27/2019     DISCLAIMER: This note was prepared with [employer] voice recognition transcription software. Garbled syntax, mangled pronouns, and other bizarre constructions may be attributed to that software system.            ED Course as of Dec 27 0040   Thu Dec 26, 2019   1229 Impression      Multiple acute left rib fractures, better visualized on rib series of the same date.  Moderate left pleural effusion and left basilar atelectasis/airspace disease.  No pneumothorax.  Chronic L2 compression fracture and chronic right rib fractures.      Electronically signed by: Sami Meza MD  Date: 12/26/2019  Time: 12:07        [BD]   1229 Impression      Mildly displaced acute fractures of left ribs 3 through 10.  Small left pleural effusion and left basilar atelectasis/airspace disease.    Chronic L1 compression fracture.      Electronically signed by: Sami Meza MD  Date:  12/26/2019  Time: 12:06        [BD]   1355 86-year-old female presents today after fall approximately 6 days ago with left-sided rib pain. Afebrile.  Vital signs stable. Patient id tenderness to left-sided ribs therefore chest x-ray and rib series was done which showed mildly displaced fractures of ribs 3 through 10 as well as left sided effusion and left basilar atelectasis/airspace disease. Patient's H&H is stable and hemodynamically stable. Patient is pulling well on incentive spirometer.  Spoke to hospitalist Dr. Jones who also evaluated patient and joint decision making was with made with patient (who does not want to be admitted at this time) to discharge home with close follow-up tomorrow for repeat imaging and physical therapy set up for outpatient.  Strict return precautions given.  Patient and family understand agree with discharge instructions and return precautions and will follow up tomorrow.     [BD]      ED Course User Index  [BD] Bharath Gonzales MD        Clinical Impression:       ICD-10-CM ICD-9-CM   1. Closed fracture of multiple ribs of left side, initial encounter S22.42XA 807.09   2. Fall W19.XXXA E888.9                             Bharath Gonzales MD  12/27/19 0043

## 2019-12-26 NOTE — PLAN OF CARE
Face sheet,ED note,medical necessity and orders for home health with PT/OT faxed to [facility] [phone].. Confirmation of delivery by Right FAx..

## 2019-12-26 NOTE — PLAN OF CARE
12/26/19 1248   Incentive Spirometer   $ Incentive Spirometer Charges done with encouragement;proper technique demonstrated   Administration (IS) proper technique demonstrated   Number of Repetitions (IS) 10   Level Incentive Spirometer (mL) 1000   Patient Tolerance (IS) good

## 2019-12-27 NOTE — TELEPHONE ENCOUNTER
Spoke with pt and scheduled her for an ER f/up for her broken ribs. Pt refused cxr she says she is going today.

## 2019-12-27 NOTE — PROGRESS NOTES
Please let pt know CXR continues to show moderate pleural effusion unchanged from CXR in ER. Recommend repeat CXR the day before her visit with me next week

## 2019-12-27 NOTE — TELEPHONE ENCOUNTER
----- Message from Corinne Fletcher NP sent at 12/27/2019 12:58 PM CST -----  Please let pt know CXR continues to show moderate pleural effusion unchanged from CXR in ER. Recommend repeat CXR the day before her visit with me next week

## 2019-12-27 NOTE — PROGRESS NOTES
I spoke to this patient.  She doesn't think it is a good idea for her to have so many XRAYS and she can't get to [place] on the 2nd (ov on th 3rd) because her  has an appointment in [place] that day and that is enough for them in one day. She also cannot get to imaging early on the day of the appointment because she won't be able to wake up and get moving any earlier.  OV is at 10 AM. Please advise. Order for CXR created for signing.

## 2019-12-27 NOTE — TELEPHONE ENCOUNTER
I spoke to this patient.  She doesn't think it is a good idea for her to have so many XRAYS and she can't get to [place] on the 2nd (ov on th 3rd) because her  has an appointment in [place] that day and that is enough for them in one day. She also cannot get to imaging early on the day of the appointment because she won't be able to wake up and get moving any earlier.  OV is at 10 AM. Please advise. Order for CXR created for signing.

## 2019-12-30 NOTE — TELEPHONE ENCOUNTER
Spoke to patient.  She will see Corinne on Friday for her appointment.  She is now agreeable to the xray, but cannot get it done before OV.    She says SOB is the same as always/ba

## 2019-12-30 NOTE — TELEPHONE ENCOUNTER
Please let pt know if she's not having any SOB c/o's then can wait till her appt with me and we can determine when repeat CXR is needed. cxr ordered already entered

## 2020-01-01 ENCOUNTER — LAB VISIT (OUTPATIENT)
Dept: LAB | Facility: HOSPITAL | Age: 85
End: 2020-01-01
Attending: FAMILY MEDICINE
Payer: MEDICARE

## 2020-01-01 ENCOUNTER — TELEPHONE (OUTPATIENT)
Dept: FAMILY MEDICINE | Facility: CLINIC | Age: 85
End: 2020-01-01

## 2020-01-01 ENCOUNTER — TELEPHONE (OUTPATIENT)
Dept: PULMONOLOGY | Facility: CLINIC | Age: 85
End: 2020-01-01

## 2020-01-01 ENCOUNTER — PATIENT OUTREACH (OUTPATIENT)
Dept: ADMINISTRATIVE | Facility: CLINIC | Age: 85
End: 2020-01-01

## 2020-01-01 ENCOUNTER — OUTSIDE PLACE OF SERVICE (OUTPATIENT)
Dept: PULMONOLOGY | Facility: CLINIC | Age: 85
End: 2020-01-01
Payer: MEDICARE

## 2020-01-01 ENCOUNTER — TELEPHONE (OUTPATIENT)
Dept: MEDSURG UNIT | Facility: HOSPITAL | Age: 85
End: 2020-01-01

## 2020-01-01 ENCOUNTER — HOSPITAL ENCOUNTER (OUTPATIENT)
Dept: RADIOLOGY | Facility: HOSPITAL | Age: 85
Discharge: HOME | End: 2020-01-14
Attending: NURSE PRACTITIONER
Payer: MEDICARE

## 2020-01-01 ENCOUNTER — OFFICE VISIT (OUTPATIENT)
Dept: FAMILY MEDICINE | Facility: CLINIC | Age: 85
End: 2020-01-01
Payer: MEDICARE

## 2020-01-01 ENCOUNTER — HOSPITAL ENCOUNTER (INPATIENT)
Facility: HOSPITAL | Age: 85
LOS: 12 days | DRG: 291 | End: 2020-02-07
Attending: EMERGENCY MEDICINE | Admitting: INTERNAL MEDICINE
Payer: MEDICARE

## 2020-01-01 ENCOUNTER — HOSPITAL ENCOUNTER (INPATIENT)
Facility: HOSPITAL | Age: 85
LOS: 3 days | Discharge: HOME HEALTH | DRG: 291 | End: 2020-01-18
Attending: EMERGENCY MEDICINE | Admitting: INTERNAL MEDICINE
Payer: MEDICARE

## 2020-01-01 ENCOUNTER — TELEPHONE (OUTPATIENT)
Dept: HOME HEALTH SERVICES | Facility: HOSPITAL | Age: 85
End: 2020-01-01

## 2020-01-01 ENCOUNTER — EXTERNAL HOME HEALTH (OUTPATIENT)
Dept: HOME HEALTH SERVICES | Facility: HOSPITAL | Age: 85
End: 2020-01-01
Payer: MEDICARE

## 2020-01-01 ENCOUNTER — HOSPITAL ENCOUNTER (OUTPATIENT)
Dept: RADIOLOGY | Facility: HOSPITAL | Age: 85
Discharge: HOME | End: 2020-01-02
Attending: NURSE PRACTITIONER
Payer: MEDICARE

## 2020-01-01 VITALS
HEART RATE: 60 BPM | HEIGHT: 57 IN | BODY MASS INDEX: 24.16 KG/M2 | DIASTOLIC BLOOD PRESSURE: 60 MMHG | WEIGHT: 112 LBS | SYSTOLIC BLOOD PRESSURE: 170 MMHG

## 2020-01-01 VITALS
DIASTOLIC BLOOD PRESSURE: 76 MMHG | HEIGHT: 57 IN | SYSTOLIC BLOOD PRESSURE: 176 MMHG | HEART RATE: 68 BPM | HEART RATE: 64 BPM | SYSTOLIC BLOOD PRESSURE: 138 MMHG | HEIGHT: 57 IN | BODY MASS INDEX: 25.46 KG/M2 | BODY MASS INDEX: 26.83 KG/M2 | WEIGHT: 124.38 LBS | WEIGHT: 118 LBS | DIASTOLIC BLOOD PRESSURE: 72 MMHG

## 2020-01-01 VITALS
DIASTOLIC BLOOD PRESSURE: 21 MMHG | WEIGHT: 101 LBS | SYSTOLIC BLOOD PRESSURE: 56 MMHG | HEIGHT: 57 IN | TEMPERATURE: 98 F | BODY MASS INDEX: 21.79 KG/M2

## 2020-01-01 VITALS
HEIGHT: 59 IN | WEIGHT: 120.19 LBS | OXYGEN SATURATION: 96 % | RESPIRATION RATE: 16 BRPM | DIASTOLIC BLOOD PRESSURE: 77 MMHG | TEMPERATURE: 98 F | HEART RATE: 78 BPM | SYSTOLIC BLOOD PRESSURE: 177 MMHG | BODY MASS INDEX: 24.23 KG/M2

## 2020-01-01 DIAGNOSIS — J90 PLEURAL EFFUSION ON LEFT: Primary | ICD-10-CM

## 2020-01-01 DIAGNOSIS — E87.70 HYPERVOLEMIA, UNSPECIFIED HYPERVOLEMIA TYPE: Primary | ICD-10-CM

## 2020-01-01 DIAGNOSIS — J90 PLEURAL EFFUSION ON LEFT: ICD-10-CM

## 2020-01-01 DIAGNOSIS — I50.9 CHF (CONGESTIVE HEART FAILURE): ICD-10-CM

## 2020-01-01 DIAGNOSIS — J90 PLEURAL EFFUSION: ICD-10-CM

## 2020-01-01 DIAGNOSIS — I25.10 ASCVD (ARTERIOSCLEROTIC CARDIOVASCULAR DISEASE): ICD-10-CM

## 2020-01-01 DIAGNOSIS — J90 PLEURAL EFFUSION, LEFT: ICD-10-CM

## 2020-01-01 DIAGNOSIS — I12.9 HYPERTENSIVE KIDNEY DISEASE WITH STAGE 4 CHRONIC KIDNEY DISEASE: ICD-10-CM

## 2020-01-01 DIAGNOSIS — I50.31 HYPERTENSIVE HEART DISEASE WITH ACUTE DIASTOLIC CONGESTIVE HEART FAILURE: ICD-10-CM

## 2020-01-01 DIAGNOSIS — R06.02 SOB (SHORTNESS OF BREATH): ICD-10-CM

## 2020-01-01 DIAGNOSIS — N25.81 HYPERPARATHYROIDISM DUE TO RENAL INSUFFICIENCY: ICD-10-CM

## 2020-01-01 DIAGNOSIS — J90 EXUDATIVE PLEURISY: Primary | ICD-10-CM

## 2020-01-01 DIAGNOSIS — I50.31 ACUTE DIASTOLIC CONGESTIVE HEART FAILURE: ICD-10-CM

## 2020-01-01 DIAGNOSIS — N18.4 CKD (CHRONIC KIDNEY DISEASE) STAGE 4, GFR 15-29 ML/MIN: ICD-10-CM

## 2020-01-01 DIAGNOSIS — I49.9 CARDIAC RHYTHM DISORDER OR DISTURBANCE OR CHANGE: ICD-10-CM

## 2020-01-01 DIAGNOSIS — I50.31 ACUTE DIASTOLIC CONGESTIVE HEART FAILURE: Primary | ICD-10-CM

## 2020-01-01 DIAGNOSIS — S22.42XD CLOSED FRACTURE OF MULTIPLE RIBS OF LEFT SIDE WITH ROUTINE HEALING, SUBSEQUENT ENCOUNTER: Primary | ICD-10-CM

## 2020-01-01 DIAGNOSIS — J96.20 ACUTE ON CHRONIC RESPIRATORY FAILURE: ICD-10-CM

## 2020-01-01 DIAGNOSIS — I10 MALIGNANT HYPERTENSION: ICD-10-CM
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
## 2020-01-01 DIAGNOSIS — N18.4 HYPERTENSIVE KIDNEY DISEASE WITH STAGE 4 CHRONIC KIDNEY DISEASE: ICD-10-CM

## 2020-01-01 DIAGNOSIS — I50.9 HEART FAILURE: ICD-10-CM

## 2020-01-01 DIAGNOSIS — S22.49XA CLOSED FRACTURE OF MULTIPLE RIBS, UNSPECIFIED LATERALITY, INITIAL ENCOUNTER: ICD-10-CM

## 2020-01-01 DIAGNOSIS — I11.0 HYPERTENSIVE HEART DISEASE WITH ACUTE DIASTOLIC CONGESTIVE HEART FAILURE: ICD-10-CM

## 2020-01-01 DIAGNOSIS — I10 ESSENTIAL HYPERTENSION: ICD-10-CM

## 2020-01-01 DIAGNOSIS — R06.02 SHORTNESS OF BREATH: ICD-10-CM

## 2020-01-01 DIAGNOSIS — J96.20 ACUTE ON CHRONIC RESPIRATORY FAILURE, UNSPECIFIED WHETHER WITH HYPOXIA OR HYPERCAPNIA: Primary | ICD-10-CM

## 2020-01-01 DIAGNOSIS — I50.9 ACUTE EXACERBATION OF CHF (CONGESTIVE HEART FAILURE): ICD-10-CM

## 2020-01-01 LAB
ALBUMIN SERPL BCP-MCNC: 2.4 G/DL (ref 3.5–5.2)
ALBUMIN SERPL BCP-MCNC: 2.4 G/DL (ref 3.5–5.2)
ALBUMIN SERPL BCP-MCNC: 2.5 G/DL (ref 3.5–5.2)
ALBUMIN SERPL BCP-MCNC: 2.5 G/DL (ref 3.5–5.2)
ALBUMIN SERPL BCP-MCNC: 2.6 G/DL (ref 3.5–5.2)
ALBUMIN SERPL BCP-MCNC: 2.7 G/DL (ref 3.5–5.2)
ALBUMIN SERPL BCP-MCNC: 2.8 G/DL (ref 3.5–5.2)
ALBUMIN SERPL BCP-MCNC: 3.2 G/DL (ref 3.5–5.2)
ALBUMIN SERPL BCP-MCNC: 3.4 G/DL (ref 3.5–5.2)
ALLENS TEST: ABNORMAL
ALP SERPL-CCNC: 69 U/L (ref 55–135)
ALP SERPL-CCNC: 71 U/L (ref 55–135)
ALP SERPL-CCNC: 72 U/L (ref 55–135)
ALP SERPL-CCNC: 72 U/L (ref 55–135)
ALP SERPL-CCNC: 76 U/L (ref 55–135)
ALP SERPL-CCNC: 77 U/L (ref 55–135)
ALP SERPL-CCNC: 77 U/L (ref 55–135)
ALP SERPL-CCNC: 79 U/L (ref 55–135)
ALP SERPL-CCNC: 86 U/L (ref 55–135)
ALP SERPL-CCNC: 93 U/L (ref 55–135)
ALT SERPL W/O P-5'-P-CCNC: 33 U/L (ref 10–44)
ALT SERPL W/O P-5'-P-CCNC: 36 U/L (ref 10–44)
ALT SERPL W/O P-5'-P-CCNC: 37 U/L (ref 10–44)
ALT SERPL W/O P-5'-P-CCNC: 37 U/L (ref 10–44)
ALT SERPL W/O P-5'-P-CCNC: 38 U/L (ref 10–44)
ALT SERPL W/O P-5'-P-CCNC: 39 U/L (ref 10–44)
ALT SERPL W/O P-5'-P-CCNC: 48 U/L (ref 10–44)
ALT SERPL W/O P-5'-P-CCNC: 58 U/L (ref 10–44)
ALT SERPL W/O P-5'-P-CCNC: 61 U/L (ref 10–44)
ALT SERPL W/O P-5'-P-CCNC: 77 U/L (ref 10–44)
ANION GAP SERPL CALC-SCNC: 10 MMOL/L (ref 8–16)
ANION GAP SERPL CALC-SCNC: 11 MMOL/L (ref 8–16)
ANION GAP SERPL CALC-SCNC: 11 MMOL/L (ref 8–16)
ANION GAP SERPL CALC-SCNC: 12 MMOL/L (ref 8–16)
ANION GAP SERPL CALC-SCNC: 12 MMOL/L (ref 8–16)
ANION GAP SERPL CALC-SCNC: 13 MMOL/L (ref 8–16)
ANION GAP SERPL CALC-SCNC: 14 MMOL/L (ref 8–16)
ANION GAP SERPL CALC-SCNC: 14 MMOL/L (ref 8–16)
ANION GAP SERPL CALC-SCNC: 15 MMOL/L (ref 8–16)
ANION GAP SERPL CALC-SCNC: 18 MMOL/L (ref 8–16)
ANION GAP SERPL CALC-SCNC: 18 MMOL/L (ref 8–16)
ANION GAP SERPL CALC-SCNC: 8 MMOL/L (ref 8–16)
ANION GAP SERPL CALC-SCNC: 9 MMOL/L (ref 8–16)
AORTIC ROOT ANNULUS: 2.02 CM
AORTIC VALVE CUSP SEPERATION: 1.63 CM
APPEARANCE FLD: NORMAL
AST SERPL-CCNC: 26 U/L (ref 10–40)
AST SERPL-CCNC: 27 U/L (ref 10–40)
AST SERPL-CCNC: 28 U/L (ref 10–40)
AST SERPL-CCNC: 29 U/L (ref 10–40)
AST SERPL-CCNC: 31 U/L (ref 10–40)
AST SERPL-CCNC: 32 U/L (ref 10–40)
AST SERPL-CCNC: 43 U/L (ref 10–40)
AST SERPL-CCNC: 44 U/L (ref 10–40)
AST SERPL-CCNC: 48 U/L (ref 10–40)
AST SERPL-CCNC: 71 U/L (ref 10–40)
AV INDEX (PROSTH): 0.75
AV MEAN GRADIENT: 10 MMHG
AV PEAK GRADIENT: 17 MMHG
AV VALVE AREA: 2.34 CM2
AV VELOCITY RATIO: 0.72
BACTERIA SPEC AEROBE CULT: NO GROWTH
BACTERIA SPEC ANAEROBE CULT: NORMAL
BASOPHILS # BLD AUTO: 0.01 K/UL (ref 0–0.2)
BASOPHILS # BLD AUTO: 0.02 K/UL (ref 0–0.2)
BASOPHILS # BLD AUTO: 0.03 K/UL (ref 0–0.2)
BASOPHILS # BLD AUTO: 0.04 K/UL (ref 0–0.2)
BASOPHILS # BLD AUTO: 0.05 K/UL (ref 0–0.2)
BASOPHILS # BLD AUTO: ABNORMAL K/UL (ref 0–0.2)
BASOPHILS NFR BLD: 0 % (ref 0–1.9)
BASOPHILS NFR BLD: 0.1 % (ref 0–1.9)
BASOPHILS NFR BLD: 0.3 % (ref 0–1.9)
BASOPHILS NFR BLD: 0.4 % (ref 0–1.9)
BASOPHILS NFR BLD: 0.5 % (ref 0–1.9)
BILIRUB SERPL-MCNC: 0.3 MG/DL (ref 0.1–1)
BILIRUB SERPL-MCNC: 0.4 MG/DL (ref 0.1–1)
BILIRUB SERPL-MCNC: 0.5 MG/DL (ref 0.1–1)
BILIRUB SERPL-MCNC: 0.5 MG/DL (ref 0.1–1)
BILIRUB SERPL-MCNC: 0.6 MG/DL (ref 0.1–1)
BILIRUB SERPL-MCNC: 0.6 MG/DL (ref 0.1–1)
BNP SERPL-MCNC: 1152 PG/ML (ref 0–99)
BNP SERPL-MCNC: 1219 PG/ML (ref 0–99)
BNP SERPL-MCNC: 668 PG/ML (ref 0–99)
BODY FLD TYPE: NORMAL
BSA FOR ECHO PROCEDURE: 1.53 M2
BUN SERPL-MCNC: 100 MG/DL (ref 8–23)
BUN SERPL-MCNC: 107 MG/DL (ref 8–23)
BUN SERPL-MCNC: 24 MG/DL (ref 8–23)
BUN SERPL-MCNC: 32 MG/DL (ref 8–23)
BUN SERPL-MCNC: 47 MG/DL (ref 8–23)
BUN SERPL-MCNC: 50 MG/DL (ref 8–23)
BUN SERPL-MCNC: 51 MG/DL (ref 8–23)
BUN SERPL-MCNC: 54 MG/DL (ref 8–23)
BUN SERPL-MCNC: 56 MG/DL (ref 8–23)
BUN SERPL-MCNC: 59 MG/DL (ref 8–23)
BUN SERPL-MCNC: 59 MG/DL (ref 8–23)
BUN SERPL-MCNC: 75 MG/DL (ref 8–23)
BUN SERPL-MCNC: 75 MG/DL (ref 8–23)
BUN SERPL-MCNC: 80 MG/DL (ref 8–23)
BUN SERPL-MCNC: 84 MG/DL (ref 8–23)
BUN SERPL-MCNC: 88 MG/DL (ref 8–23)
BUN SERPL-MCNC: 90 MG/DL (ref 8–23)
BUN SERPL-MCNC: 93 MG/DL (ref 8–23)
CALCIUM SERPL-MCNC: 8.5 MG/DL (ref 8.7–10.5)
CALCIUM SERPL-MCNC: 8.6 MG/DL (ref 8.7–10.5)
CALCIUM SERPL-MCNC: 8.7 MG/DL (ref 8.7–10.5)
CALCIUM SERPL-MCNC: 9 MG/DL (ref 8.7–10.5)
CALCIUM SERPL-MCNC: 9 MG/DL (ref 8.7–10.5)
CALCIUM SERPL-MCNC: 9.1 MG/DL (ref 8.7–10.5)
CALCIUM SERPL-MCNC: 9.2 MG/DL (ref 8.7–10.5)
CALCIUM SERPL-MCNC: 9.5 MG/DL (ref 8.7–10.5)
CALCIUM SERPL-MCNC: 9.7 MG/DL (ref 8.7–10.5)
CALCIUM SERPL-MCNC: 9.7 MG/DL (ref 8.7–10.5)
CALCIUM SERPL-MCNC: 9.8 MG/DL (ref 8.7–10.5)
CALCIUM SERPL-MCNC: 9.9 MG/DL (ref 8.7–10.5)
CALCIUM SERPL-MCNC: 9.9 MG/DL (ref 8.7–10.5)
CHLORIDE SERPL-SCNC: 100 MMOL/L (ref 95–110)
CHLORIDE SERPL-SCNC: 104 MMOL/L (ref 95–110)
CHLORIDE SERPL-SCNC: 106 MMOL/L (ref 95–110)
CHLORIDE SERPL-SCNC: 107 MMOL/L (ref 95–110)
CHLORIDE SERPL-SCNC: 108 MMOL/L (ref 95–110)
CHLORIDE SERPL-SCNC: 108 MMOL/L (ref 95–110)
CHLORIDE SERPL-SCNC: 109 MMOL/L (ref 95–110)
CHLORIDE SERPL-SCNC: 110 MMOL/L (ref 95–110)
CHLORIDE SERPL-SCNC: 112 MMOL/L (ref 95–110)
CHLORIDE SERPL-SCNC: 95 MMOL/L (ref 95–110)
CHLORIDE SERPL-SCNC: 98 MMOL/L (ref 95–110)
CHLORIDE SERPL-SCNC: 99 MMOL/L (ref 95–110)
CO2 SERPL-SCNC: 17 MMOL/L (ref 23–29)
CO2 SERPL-SCNC: 17 MMOL/L (ref 23–29)
CO2 SERPL-SCNC: 19 MMOL/L (ref 23–29)
CO2 SERPL-SCNC: 19 MMOL/L (ref 23–29)
CO2 SERPL-SCNC: 20 MMOL/L (ref 23–29)
CO2 SERPL-SCNC: 21 MMOL/L (ref 23–29)
CO2 SERPL-SCNC: 22 MMOL/L (ref 23–29)
CO2 SERPL-SCNC: 22 MMOL/L (ref 23–29)
CO2 SERPL-SCNC: 23 MMOL/L (ref 23–29)
CO2 SERPL-SCNC: 32 MMOL/L (ref 23–29)
COLOR FLD: NORMAL
CREAT SERPL-MCNC: 1.9 MG/DL (ref 0.5–1.4)
CREAT SERPL-MCNC: 1.9 MG/DL (ref 0.5–1.4)
CREAT SERPL-MCNC: 2 MG/DL (ref 0.5–1.4)
CREAT SERPL-MCNC: 2.2 MG/DL (ref 0.5–1.4)
CREAT SERPL-MCNC: 2.3 MG/DL (ref 0.5–1.4)
CREAT SERPL-MCNC: 2.4 MG/DL (ref 0.5–1.4)
CREAT SERPL-MCNC: 2.7 MG/DL (ref 0.5–1.4)
CREAT SERPL-MCNC: 2.7 MG/DL (ref 0.5–1.4)
CREAT SERPL-MCNC: 2.9 MG/DL (ref 0.5–1.4)
CREAT SERPL-MCNC: 2.9 MG/DL (ref 0.5–1.4)
CREAT SERPL-MCNC: 3 MG/DL (ref 0.5–1.4)
CREAT SERPL-MCNC: 3.3 MG/DL (ref 0.5–1.4)
CREAT SERPL-MCNC: 3.5 MG/DL (ref 0.5–1.4)
CREAT SERPL-MCNC: 3.6 MG/DL (ref 0.5–1.4)
CREAT SERPL-MCNC: 3.8 MG/DL (ref 0.5–1.4)
CREAT SERPL-MCNC: 4.3 MG/DL (ref 0.5–1.4)
CRP SERPL-MCNC: 202.5 MG/L (ref 0–8.2)
CV ECHO LV RWT: 0.6 CM
D DIMER PPP IA.FEU-MCNC: 1.48 MG/L FEU
DELSYS: ABNORMAL
DIFFERENTIAL METHOD: ABNORMAL
DOP CALC AO PEAK VEL: 2.08 M/S
DOP CALC AO VTI: 42.92 CM
DOP CALC LVOT AREA: 3.1 CM2
DOP CALC LVOT DIAMETER: 2 CM
DOP CALC LVOT PEAK VEL: 1.49 M/S
DOP CALC LVOT STROKE VOLUME: 100.64 CM3
DOP CALCLVOT PEAK VEL VTI: 32.05 CM
E WAVE DECELERATION TIME: 260.09 MSEC
E/A RATIO: 0.69
E/E' RATIO: 13.83 M/S
ECHO LV POSTERIOR WALL: 1.04 CM (ref 0.6–1.1)
EOSINOPHIL # BLD AUTO: 0 K/UL (ref 0–0.5)
EOSINOPHIL # BLD AUTO: 0.1 K/UL (ref 0–0.5)
EOSINOPHIL # BLD AUTO: 0.2 K/UL (ref 0–0.5)
EOSINOPHIL # BLD AUTO: 0.4 K/UL (ref 0–0.5)
EOSINOPHIL # BLD AUTO: ABNORMAL K/UL (ref 0–0.5)
EOSINOPHIL NFR BLD: 0 % (ref 0–8)
EOSINOPHIL NFR BLD: 0.2 % (ref 0–8)
EOSINOPHIL NFR BLD: 0.3 % (ref 0–8)
EOSINOPHIL NFR BLD: 0.5 % (ref 0–8)
EOSINOPHIL NFR BLD: 0.6 % (ref 0–8)
EOSINOPHIL NFR BLD: 0.7 % (ref 0–8)
EOSINOPHIL NFR BLD: 1.2 % (ref 0–8)
EOSINOPHIL NFR BLD: 2.1 % (ref 0–8)
EOSINOPHIL NFR BLD: 2.4 % (ref 0–8)
EOSINOPHIL NFR BLD: 2.7 % (ref 0–8)
EOSINOPHIL NFR BLD: 3 % (ref 0–8)
EOSINOPHIL NFR BLD: 3 % (ref 0–8)
EOSINOPHIL NFR BLD: 4.8 % (ref 0–8)
EP: 5
EP: 8
ERYTHROCYTE [DISTWIDTH] IN BLOOD BY AUTOMATED COUNT: 15.1 % (ref 11.5–14.5)
ERYTHROCYTE [DISTWIDTH] IN BLOOD BY AUTOMATED COUNT: 15.3 % (ref 11.5–14.5)
ERYTHROCYTE [DISTWIDTH] IN BLOOD BY AUTOMATED COUNT: 15.4 % (ref 11.5–14.5)
ERYTHROCYTE [DISTWIDTH] IN BLOOD BY AUTOMATED COUNT: 15.5 % (ref 11.5–14.5)
ERYTHROCYTE [DISTWIDTH] IN BLOOD BY AUTOMATED COUNT: 15.6 % (ref 11.5–14.5)
ERYTHROCYTE [DISTWIDTH] IN BLOOD BY AUTOMATED COUNT: 15.6 % (ref 11.5–14.5)
ERYTHROCYTE [DISTWIDTH] IN BLOOD BY AUTOMATED COUNT: 15.7 % (ref 11.5–14.5)
ERYTHROCYTE [DISTWIDTH] IN BLOOD BY AUTOMATED COUNT: 15.7 % (ref 11.5–14.5)
ERYTHROCYTE [SEDIMENTATION RATE] IN BLOOD BY WESTERGREN METHOD: 12 MM/H
EST. GFR  (AFRICAN AMERICAN): 10 ML/MIN/1.73 M^2
EST. GFR  (AFRICAN AMERICAN): 12 ML/MIN/1.73 M^2
EST. GFR  (AFRICAN AMERICAN): 13 ML/MIN/1.73 M^2
EST. GFR  (AFRICAN AMERICAN): 14 ML/MIN/1.73 M^2
EST. GFR  (AFRICAN AMERICAN): 16 ML/MIN/1.73 M^2
EST. GFR  (AFRICAN AMERICAN): 18 ML/MIN/1.73 M^2
EST. GFR  (AFRICAN AMERICAN): 18 ML/MIN/1.73 M^2
EST. GFR  (AFRICAN AMERICAN): 20 ML/MIN/1.73 M^2
EST. GFR  (AFRICAN AMERICAN): 22 ML/MIN/1.73 M^2
EST. GFR  (AFRICAN AMERICAN): 23 ML/MIN/1.73 M^2
EST. GFR  (AFRICAN AMERICAN): 25 ML/MIN/1.73 M^2
EST. GFR  (AFRICAN AMERICAN): 27 ML/MIN/1.73 M^2
EST. GFR  (AFRICAN AMERICAN): 27 ML/MIN/1.73 M^2
EST. GFR  (NON AFRICAN AMERICAN): 10 ML/MIN/1.73 M^2
EST. GFR  (NON AFRICAN AMERICAN): 11 ML/MIN/1.73 M^2
EST. GFR  (NON AFRICAN AMERICAN): 12 ML/MIN/1.73 M^2
EST. GFR  (NON AFRICAN AMERICAN): 14 ML/MIN/1.73 M^2
EST. GFR  (NON AFRICAN AMERICAN): 15 ML/MIN/1.73 M^2
EST. GFR  (NON AFRICAN AMERICAN): 15 ML/MIN/1.73 M^2
EST. GFR  (NON AFRICAN AMERICAN): 18 ML/MIN/1.73 M^2
EST. GFR  (NON AFRICAN AMERICAN): 19 ML/MIN/1.73 M^2
EST. GFR  (NON AFRICAN AMERICAN): 20 ML/MIN/1.73 M^2
EST. GFR  (NON AFRICAN AMERICAN): 22 ML/MIN/1.73 M^2
EST. GFR  (NON AFRICAN AMERICAN): 24 ML/MIN/1.73 M^2
EST. GFR  (NON AFRICAN AMERICAN): 24 ML/MIN/1.73 M^2
EST. GFR  (NON AFRICAN AMERICAN): 9 ML/MIN/1.73 M^2
ESTIMATED AVG GLUCOSE: 114 MG/DL (ref 68–131)
FINAL PATHOLOGIC DIAGNOSIS: NORMAL
FIO2: 30
FIO2: 50
FRACTIONAL SHORTENING: 35 % (ref 28–44)
GLUCOSE FLD-MCNC: 133 MG/DL
GLUCOSE SERPL-MCNC: 103 MG/DL (ref 70–110)
GLUCOSE SERPL-MCNC: 110 MG/DL (ref 70–110)
GLUCOSE SERPL-MCNC: 136 MG/DL (ref 70–110)
GLUCOSE SERPL-MCNC: 66 MG/DL (ref 70–110)
GLUCOSE SERPL-MCNC: 66 MG/DL (ref 70–110)
GLUCOSE SERPL-MCNC: 71 MG/DL (ref 70–110)
GLUCOSE SERPL-MCNC: 79 MG/DL (ref 70–110)
GLUCOSE SERPL-MCNC: 81 MG/DL (ref 70–110)
GLUCOSE SERPL-MCNC: 83 MG/DL (ref 70–110)
GLUCOSE SERPL-MCNC: 84 MG/DL (ref 70–110)
GLUCOSE SERPL-MCNC: 86 MG/DL (ref 70–110)
GLUCOSE SERPL-MCNC: 90 MG/DL (ref 70–110)
GLUCOSE SERPL-MCNC: 90 MG/DL (ref 70–110)
GLUCOSE SERPL-MCNC: 92 MG/DL (ref 70–110)
GLUCOSE SERPL-MCNC: 96 MG/DL (ref 70–110)
GLUCOSE SERPL-MCNC: 96 MG/DL (ref 70–110)
GLUCOSE SERPL-MCNC: 97 MG/DL (ref 70–110)
GLUCOSE SERPL-MCNC: 97 MG/DL (ref 70–110)
GRAM STN SPEC: NORMAL
GRAM STN SPEC: NORMAL
HAV IGM SERPL QL IA: NEGATIVE
HBA1C MFR BLD HPLC: 5.6 % (ref 4–5.6)
HBV CORE AB SERPL QL IA: NEGATIVE
HBV CORE IGM SERPL QL IA: NEGATIVE
HBV SURFACE AB SER-ACNC: NEGATIVE M[IU]/ML
HBV SURFACE AG SERPL QL IA: NEGATIVE
HBV SURFACE AG SERPL QL IA: NEGATIVE
HCO3 UR-SCNC: 21.8 MMOL/L (ref 24–28)
HCO3 UR-SCNC: 24.2 MMOL/L (ref 24–28)
HCO3 UR-SCNC: 24.3 MMOL/L (ref 24–28)
HCO3 UR-SCNC: 25.7 MMOL/L (ref 24–28)
HCT VFR BLD AUTO: 25.2 % (ref 37–48.5)
HCT VFR BLD AUTO: 26.2 % (ref 37–48.5)
HCT VFR BLD AUTO: 26.3 % (ref 37–48.5)
HCT VFR BLD AUTO: 28.4 % (ref 37–48.5)
HCT VFR BLD AUTO: 29.5 % (ref 37–48.5)
HCT VFR BLD AUTO: 30 % (ref 37–48.5)
HCT VFR BLD AUTO: 30.4 % (ref 37–48.5)
HCT VFR BLD AUTO: 31.4 % (ref 37–48.5)
HCT VFR BLD AUTO: 31.4 % (ref 37–48.5)
HCT VFR BLD AUTO: 31.6 % (ref 37–48.5)
HCT VFR BLD AUTO: 33.1 % (ref 37–48.5)
HCT VFR BLD AUTO: 34.6 % (ref 37–48.5)
HCT VFR BLD AUTO: 36.7 % (ref 37–48.5)
HCT VFR BLD AUTO: 37.7 % (ref 37–48.5)
HCT VFR BLD AUTO: 38.7 % (ref 37–48.5)
HCV AB SERPL QL IA: NEGATIVE
HGB BLD-MCNC: 10.2 G/DL (ref 12–16)
HGB BLD-MCNC: 10.2 G/DL (ref 12–16)
HGB BLD-MCNC: 10.5 G/DL (ref 12–16)
HGB BLD-MCNC: 11.4 G/DL (ref 12–16)
HGB BLD-MCNC: 11.8 G/DL (ref 12–16)
HGB BLD-MCNC: 12.1 G/DL (ref 12–16)
HGB BLD-MCNC: 12.6 G/DL (ref 12–16)
HGB BLD-MCNC: 7.8 G/DL (ref 12–16)
HGB BLD-MCNC: 8.1 G/DL (ref 12–16)
HGB BLD-MCNC: 8.5 G/DL (ref 12–16)
HGB BLD-MCNC: 9.4 G/DL (ref 12–16)
HGB BLD-MCNC: 9.4 G/DL (ref 12–16)
HGB BLD-MCNC: 9.6 G/DL (ref 12–16)
HGB BLD-MCNC: 9.6 G/DL (ref 12–16)
HGB BLD-MCNC: 9.9 G/DL (ref 12–16)
IMM GRANULOCYTES # BLD AUTO: 0.02 K/UL (ref 0–0.04)
IMM GRANULOCYTES # BLD AUTO: 0.02 K/UL (ref 0–0.04)
IMM GRANULOCYTES # BLD AUTO: 0.03 K/UL (ref 0–0.04)
IMM GRANULOCYTES # BLD AUTO: 0.04 K/UL (ref 0–0.04)
IMM GRANULOCYTES # BLD AUTO: 0.04 K/UL (ref 0–0.04)
IMM GRANULOCYTES # BLD AUTO: 0.05 K/UL (ref 0–0.04)
IMM GRANULOCYTES # BLD AUTO: 0.07 K/UL (ref 0–0.04)
IMM GRANULOCYTES # BLD AUTO: 0.09 K/UL (ref 0–0.04)
IMM GRANULOCYTES # BLD AUTO: ABNORMAL K/UL (ref 0–0.04)
IMM GRANULOCYTES NFR BLD AUTO: 0.4 % (ref 0–0.5)
INR PPP: 1 (ref 0.8–1.2)
INR PPP: 1.1 (ref 0.8–1.2)
INTERVENTRICULAR SEPTUM: 1.5 CM (ref 0.6–1.1)
IP: 10
IP: 15
IVRT: 0.08 MSEC
LEFT ATRIUM SIZE: 4.26 CM
LEFT INTERNAL DIMENSION IN SYSTOLE: 2.26 CM (ref 2.1–4)
LEFT VENTRICLE DIASTOLIC VOLUME INDEX: 33.6 ML/M2
LEFT VENTRICLE DIASTOLIC VOLUME: 50.56 ML
LEFT VENTRICLE MASS INDEX: 98 G/M2
LEFT VENTRICLE SYSTOLIC VOLUME INDEX: 11.5 ML/M2
LEFT VENTRICLE SYSTOLIC VOLUME: 17.34 ML
LEFT VENTRICULAR INTERNAL DIMENSION IN DIASTOLE: 3.49 CM (ref 3.5–6)
LEFT VENTRICULAR MASS: 147.65 G
LV LATERAL E/E' RATIO: 10.38 M/S
LV SEPTAL E/E' RATIO: 20.75 M/S
LYMPHOCYTES # BLD AUTO: 0.3 K/UL (ref 1–4.8)
LYMPHOCYTES # BLD AUTO: 0.4 K/UL (ref 1–4.8)
LYMPHOCYTES # BLD AUTO: 0.4 K/UL (ref 1–4.8)
LYMPHOCYTES # BLD AUTO: 0.5 K/UL (ref 1–4.8)
LYMPHOCYTES # BLD AUTO: 0.6 K/UL (ref 1–4.8)
LYMPHOCYTES # BLD AUTO: 0.7 K/UL (ref 1–4.8)
LYMPHOCYTES # BLD AUTO: 0.8 K/UL (ref 1–4.8)
LYMPHOCYTES # BLD AUTO: 0.9 K/UL (ref 1–4.8)
LYMPHOCYTES # BLD AUTO: ABNORMAL K/UL (ref 1–4.8)
LYMPHOCYTES NFR BLD: 10.1 % (ref 18–48)
LYMPHOCYTES NFR BLD: 11 % (ref 18–48)
LYMPHOCYTES NFR BLD: 11.5 % (ref 18–48)
LYMPHOCYTES NFR BLD: 14 % (ref 18–48)
LYMPHOCYTES NFR BLD: 3.3 % (ref 18–48)
LYMPHOCYTES NFR BLD: 4.1 % (ref 18–48)
LYMPHOCYTES NFR BLD: 5 % (ref 18–48)
LYMPHOCYTES NFR BLD: 5.7 % (ref 18–48)
LYMPHOCYTES NFR BLD: 6.3 % (ref 18–48)
LYMPHOCYTES NFR BLD: 6.3 % (ref 18–48)
LYMPHOCYTES NFR BLD: 8.2 % (ref 18–48)
LYMPHOCYTES NFR BLD: 8.5 % (ref 18–48)
LYMPHOCYTES NFR BLD: 8.8 % (ref 18–48)
LYMPHOCYTES NFR BLD: 8.8 % (ref 18–48)
LYMPHOCYTES NFR BLD: 9.6 % (ref 18–48)
LYMPHOCYTES NFR FLD MANUAL: 72 %
MAGNESIUM SERPL-MCNC: 2 MG/DL (ref 1.6–2.6)
MAGNESIUM SERPL-MCNC: 2.1 MG/DL (ref 1.6–2.6)
MAGNESIUM SERPL-MCNC: 2.2 MG/DL (ref 1.6–2.6)
MAGNESIUM SERPL-MCNC: 2.2 MG/DL (ref 1.6–2.6)
MAGNESIUM SERPL-MCNC: 2.3 MG/DL (ref 1.6–2.6)
MAGNESIUM SERPL-MCNC: 2.3 MG/DL (ref 1.6–2.6)
MAGNESIUM SERPL-MCNC: 2.4 MG/DL (ref 1.6–2.6)
MAGNESIUM SERPL-MCNC: 2.4 MG/DL (ref 1.6–2.6)
MAGNESIUM SERPL-MCNC: 2.5 MG/DL (ref 1.6–2.6)
MAGNESIUM SERPL-MCNC: 2.6 MG/DL (ref 1.6–2.6)
MCH RBC QN AUTO: 30.4 PG (ref 27–31)
MCH RBC QN AUTO: 30.6 PG (ref 27–31)
MCH RBC QN AUTO: 30.8 PG (ref 27–31)
MCH RBC QN AUTO: 31 PG (ref 27–31)
MCH RBC QN AUTO: 31.1 PG (ref 27–31)
MCH RBC QN AUTO: 31.3 PG (ref 27–31)
MCH RBC QN AUTO: 31.6 PG (ref 27–31)
MCH RBC QN AUTO: 31.6 PG (ref 27–31)
MCH RBC QN AUTO: 31.7 PG (ref 27–31)
MCH RBC QN AUTO: 31.8 PG (ref 27–31)
MCH RBC QN AUTO: 31.9 PG (ref 27–31)
MCH RBC QN AUTO: 32.1 PG (ref 27–31)
MCH RBC QN AUTO: 32.4 PG (ref 27–31)
MCHC RBC AUTO-ENTMCNC: 30.9 G/DL (ref 32–36)
MCHC RBC AUTO-ENTMCNC: 31 G/DL (ref 32–36)
MCHC RBC AUTO-ENTMCNC: 31.3 G/DL (ref 32–36)
MCHC RBC AUTO-ENTMCNC: 31.5 G/DL (ref 32–36)
MCHC RBC AUTO-ENTMCNC: 31.6 G/DL (ref 32–36)
MCHC RBC AUTO-ENTMCNC: 31.7 G/DL (ref 32–36)
MCHC RBC AUTO-ENTMCNC: 32.1 G/DL (ref 32–36)
MCHC RBC AUTO-ENTMCNC: 32.2 G/DL (ref 32–36)
MCHC RBC AUTO-ENTMCNC: 32.3 G/DL (ref 32–36)
MCHC RBC AUTO-ENTMCNC: 32.3 G/DL (ref 32–36)
MCHC RBC AUTO-ENTMCNC: 32.5 G/DL (ref 32–36)
MCHC RBC AUTO-ENTMCNC: 32.5 G/DL (ref 32–36)
MCHC RBC AUTO-ENTMCNC: 32.6 G/DL (ref 32–36)
MCHC RBC AUTO-ENTMCNC: 32.9 G/DL (ref 32–36)
MCHC RBC AUTO-ENTMCNC: 33.1 G/DL (ref 32–36)
MCV RBC AUTO: 104 FL (ref 82–98)
MCV RBC AUTO: 96 FL (ref 82–98)
MCV RBC AUTO: 97 FL (ref 82–98)
MCV RBC AUTO: 98 FL (ref 82–98)
MCV RBC AUTO: 99 FL (ref 82–98)
MCV RBC AUTO: 99 FL (ref 82–98)
METAMYELOCYTES NFR BLD MANUAL: 1 %
MIN VOL: 10.4
MIN VOL: 5.5
MIN VOL: 6.7
MIN VOL: 7
MODE: ABNORMAL
MONOCYTES # BLD AUTO: 0.4 K/UL (ref 0.3–1)
MONOCYTES # BLD AUTO: 0.6 K/UL (ref 0.3–1)
MONOCYTES # BLD AUTO: 0.7 K/UL (ref 0.3–1)
MONOCYTES # BLD AUTO: 0.8 K/UL (ref 0.3–1)
MONOCYTES # BLD AUTO: 0.9 K/UL (ref 0.3–1)
MONOCYTES # BLD AUTO: 1 K/UL (ref 0.3–1)
MONOCYTES # BLD AUTO: 1.1 K/UL (ref 0.3–1)
MONOCYTES # BLD AUTO: 1.2 K/UL (ref 0.3–1)
MONOCYTES # BLD AUTO: ABNORMAL K/UL (ref 0.3–1)
MONOCYTES NFR BLD: 11.1 % (ref 4–15)
MONOCYTES NFR BLD: 11.3 % (ref 4–15)
MONOCYTES NFR BLD: 12 % (ref 4–15)
MONOCYTES NFR BLD: 12.3 % (ref 4–15)
MONOCYTES NFR BLD: 13.6 % (ref 4–15)
MONOCYTES NFR BLD: 13.6 % (ref 4–15)
MONOCYTES NFR BLD: 13.9 % (ref 4–15)
MONOCYTES NFR BLD: 14 % (ref 4–15)
MONOCYTES NFR BLD: 14.2 % (ref 4–15)
MONOCYTES NFR BLD: 7.1 % (ref 4–15)
MONOCYTES NFR BLD: 8 % (ref 4–15)
MONOCYTES NFR BLD: 9.6 % (ref 4–15)
MONOS+MACROS NFR FLD MANUAL: 24 %
MV A" WAVE DURATION": 176 MSEC
MV MEAN GRADIENT: 2 MMHG
MV PEAK A VEL: 1.2 M/S
MV PEAK E VEL: 0.83 M/S
MV STENOSIS PRESSURE HALF TIME: 53 MS
MV VALVE AREA P 1/2 METHOD: 4.15 CM2
MYELOCYTES NFR BLD MANUAL: 2 %
NEUTROPHILS # BLD AUTO: 3.5 K/UL (ref 1.8–7.7)
NEUTROPHILS # BLD AUTO: 4.9 K/UL (ref 1.8–7.7)
NEUTROPHILS # BLD AUTO: 5.1 K/UL (ref 1.8–7.7)
NEUTROPHILS # BLD AUTO: 5.2 K/UL (ref 1.8–7.7)
NEUTROPHILS # BLD AUTO: 5.3 K/UL (ref 1.8–7.7)
NEUTROPHILS # BLD AUTO: 6 K/UL (ref 1.8–7.7)
NEUTROPHILS # BLD AUTO: 6.9 K/UL (ref 1.8–7.7)
NEUTROPHILS # BLD AUTO: 7.4 K/UL (ref 1.8–7.7)
NEUTROPHILS # BLD AUTO: 8.4 K/UL (ref 1.8–7.7)
NEUTROPHILS # BLD AUTO: 8.6 K/UL (ref 1.8–7.7)
NEUTROPHILS NFR BLD: 50 % (ref 38–73)
NEUTROPHILS NFR BLD: 57 % (ref 38–73)
NEUTROPHILS NFR BLD: 64 % (ref 38–73)
NEUTROPHILS NFR BLD: 69.2 % (ref 38–73)
NEUTROPHILS NFR BLD: 72.5 % (ref 38–73)
NEUTROPHILS NFR BLD: 72.7 % (ref 38–73)
NEUTROPHILS NFR BLD: 73.6 % (ref 38–73)
NEUTROPHILS NFR BLD: 74.5 % (ref 38–73)
NEUTROPHILS NFR BLD: 77.5 % (ref 38–73)
NEUTROPHILS NFR BLD: 78.3 % (ref 38–73)
NEUTROPHILS NFR BLD: 80.4 % (ref 38–73)
NEUTROPHILS NFR BLD: 83.5 % (ref 38–73)
NEUTROPHILS NFR BLD: 83.6 % (ref 38–73)
NEUTROPHILS NFR BLD: 84.2 % (ref 38–73)
NEUTROPHILS NFR BLD: 85.5 % (ref 38–73)
NEUTROPHILS NFR FLD MANUAL: 4 %
NEUTS BAND NFR BLD MANUAL: 11 %
NEUTS BAND NFR BLD MANUAL: 30 %
NEUTS BAND NFR BLD MANUAL: 31 %
NRBC BLD-RTO: 0 /100 WBC
PCO2 BLDA: 32.9 MMHG (ref 35–45)
PCO2 BLDA: 38.4 MMHG (ref 35–45)
PCO2 BLDA: 41.2 MMHG (ref 35–45)
PCO2 BLDA: 41.4 MMHG (ref 35–45)
PH SMN: 7.36 [PH] (ref 7.35–7.45)
PH SMN: 7.38 [PH] (ref 7.35–7.45)
PH SMN: 7.38 [PH] (ref 7.35–7.45)
PH SMN: 7.5 [PH] (ref 7.35–7.45)
PHOSPHATE SERPL-MCNC: 2.9 MG/DL (ref 2.7–4.5)
PHOSPHATE SERPL-MCNC: 3 MG/DL (ref 2.7–4.5)
PHOSPHATE SERPL-MCNC: 3.6 MG/DL (ref 2.7–4.5)
PHOSPHATE SERPL-MCNC: 4.6 MG/DL (ref 2.7–4.5)
PHOSPHATE SERPL-MCNC: 5.6 MG/DL (ref 2.7–4.5)
PHOSPHATE SERPL-MCNC: 5.8 MG/DL (ref 2.7–4.5)
PHOSPHATE SERPL-MCNC: 5.9 MG/DL (ref 2.7–4.5)
PHOSPHATE SERPL-MCNC: 6.2 MG/DL (ref 2.7–4.5)
PHOSPHATE SERPL-MCNC: 6.3 MG/DL (ref 2.7–4.5)
PHOSPHATE SERPL-MCNC: 6.4 MG/DL (ref 2.7–4.5)
PHOSPHATE SERPL-MCNC: 6.8 MG/DL (ref 2.7–4.5)
PHOSPHATE SERPL-MCNC: 7.2 MG/DL (ref 2.7–4.5)
PISA TR MAX VEL: 3.37 M/S
PLATELET # BLD AUTO: 143 K/UL (ref 150–350)
PLATELET # BLD AUTO: 143 K/UL (ref 150–350)
PLATELET # BLD AUTO: 159 K/UL (ref 150–350)
PLATELET # BLD AUTO: 165 K/UL (ref 150–350)
PLATELET # BLD AUTO: 168 K/UL (ref 150–350)
PLATELET # BLD AUTO: 169 K/UL (ref 150–350)
PLATELET # BLD AUTO: 177 K/UL (ref 150–350)
PLATELET # BLD AUTO: 186 K/UL (ref 150–350)
PLATELET # BLD AUTO: 187 K/UL (ref 150–350)
PLATELET # BLD AUTO: 193 K/UL (ref 150–350)
PLATELET # BLD AUTO: 213 K/UL (ref 150–350)
PLATELET # BLD AUTO: 226 K/UL (ref 150–350)
PLATELET # BLD AUTO: 235 K/UL (ref 150–350)
PLATELET # BLD AUTO: 262 K/UL (ref 150–350)
PLATELET # BLD AUTO: 297 K/UL (ref 150–350)
PLATELET BLD QL SMEAR: ABNORMAL
PMV BLD AUTO: 10.1 FL (ref 9.2–12.9)
PMV BLD AUTO: 10.2 FL (ref 9.2–12.9)
PMV BLD AUTO: 10.2 FL (ref 9.2–12.9)
PMV BLD AUTO: 10.3 FL (ref 9.2–12.9)
PMV BLD AUTO: 10.5 FL (ref 9.2–12.9)
PMV BLD AUTO: 10.5 FL (ref 9.2–12.9)
PMV BLD AUTO: 10.7 FL (ref 9.2–12.9)
PMV BLD AUTO: 11 FL (ref 9.2–12.9)
PMV BLD AUTO: 11.1 FL (ref 9.2–12.9)
PMV BLD AUTO: 11.2 FL (ref 9.2–12.9)
PMV BLD AUTO: 11.2 FL (ref 9.2–12.9)
PMV BLD AUTO: 11.3 FL (ref 9.2–12.9)
PMV BLD AUTO: 11.5 FL (ref 9.2–12.9)
PMV BLD AUTO: 12.1 FL (ref 9.2–12.9)
PMV BLD AUTO: 12.1 FL (ref 9.2–12.9)
PO2 BLDA: 139 MMHG (ref 80–100)
PO2 BLDA: 182 MMHG (ref 80–100)
PO2 BLDA: 72 MMHG (ref 80–100)
PO2 BLDA: 77 MMHG (ref 80–100)
POC BE: -1 MMOL/L
POC BE: -1 MMOL/L
POC BE: -4 MMOL/L
POC BE: 3 MMOL/L
POC SATURATED O2: 100 % (ref 95–100)
POC SATURATED O2: 94 % (ref 95–100)
POC SATURATED O2: 95 % (ref 95–100)
POC SATURATED O2: 99 % (ref 95–100)
POC TCO2: 23 MMOL/L (ref 23–27)
POC TCO2: 25 MMOL/L (ref 23–27)
POC TCO2: 26 MMOL/L (ref 23–27)
POC TCO2: 27 MMOL/L (ref 23–27)
POCT GLUCOSE: 108 MG/DL (ref 70–110)
POCT GLUCOSE: 110 MG/DL (ref 70–110)
POCT GLUCOSE: 191 MG/DL (ref 70–110)
POCT GLUCOSE: 68 MG/DL (ref 70–110)
POTASSIUM SERPL-SCNC: 3.6 MMOL/L (ref 3.5–5.1)
POTASSIUM SERPL-SCNC: 3.7 MMOL/L (ref 3.5–5.1)
POTASSIUM SERPL-SCNC: 3.7 MMOL/L (ref 3.5–5.1)
POTASSIUM SERPL-SCNC: 4 MMOL/L (ref 3.5–5.1)
POTASSIUM SERPL-SCNC: 4 MMOL/L (ref 3.5–5.1)
POTASSIUM SERPL-SCNC: 4.2 MMOL/L (ref 3.5–5.1)
POTASSIUM SERPL-SCNC: 4.2 MMOL/L (ref 3.5–5.1)
POTASSIUM SERPL-SCNC: 4.3 MMOL/L (ref 3.5–5.1)
POTASSIUM SERPL-SCNC: 4.5 MMOL/L (ref 3.5–5.1)
POTASSIUM SERPL-SCNC: 4.5 MMOL/L (ref 3.5–5.1)
POTASSIUM SERPL-SCNC: 4.7 MMOL/L (ref 3.5–5.1)
POTASSIUM SERPL-SCNC: 4.8 MMOL/L (ref 3.5–5.1)
POTASSIUM SERPL-SCNC: 4.9 MMOL/L (ref 3.5–5.1)
POTASSIUM SERPL-SCNC: 5.1 MMOL/L (ref 3.5–5.1)
POTASSIUM SERPL-SCNC: 5.2 MMOL/L (ref 3.5–5.1)
POTASSIUM SERPL-SCNC: 5.2 MMOL/L (ref 3.5–5.1)
PREALB SERPL-MCNC: 20 MG/DL (ref 20–43)
PROT FLD-MCNC: 2.5 G/DL
PROT SERPL-MCNC: 5.6 G/DL (ref 6–8.4)
PROT SERPL-MCNC: 5.8 G/DL (ref 6–8.4)
PROT SERPL-MCNC: 5.8 G/DL (ref 6–8.4)
PROT SERPL-MCNC: 6 G/DL (ref 6–8.4)
PROT SERPL-MCNC: 6.2 G/DL (ref 6–8.4)
PROT SERPL-MCNC: 6.2 G/DL (ref 6–8.4)
PROT SERPL-MCNC: 6.4 G/DL (ref 6–8.4)
PROT SERPL-MCNC: 6.5 G/DL (ref 6–8.4)
PROT SERPL-MCNC: 7.2 G/DL (ref 6–8.4)
PROT SERPL-MCNC: 7.3 G/DL (ref 6–8.4)
PROTHROMBIN TIME: 10.7 SEC (ref 9–12.5)
PROTHROMBIN TIME: 11 SEC (ref 9–12.5)
PULM VEIN A" WAVE DURATION": 125 MSEC
PV PEAK VELOCITY: 1 CM/S
RBC # BLD AUTO: 2.52 M/UL (ref 4–5.4)
RBC # BLD AUTO: 2.55 M/UL (ref 4–5.4)
RBC # BLD AUTO: 2.69 M/UL (ref 4–5.4)
RBC # BLD AUTO: 2.9 M/UL (ref 4–5.4)
RBC # BLD AUTO: 3.01 M/UL (ref 4–5.4)
RBC # BLD AUTO: 3.05 M/UL (ref 4–5.4)
RBC # BLD AUTO: 3.09 M/UL (ref 4–5.4)
RBC # BLD AUTO: 3.18 M/UL (ref 4–5.4)
RBC # BLD AUTO: 3.22 M/UL (ref 4–5.4)
RBC # BLD AUTO: 3.23 M/UL (ref 4–5.4)
RBC # BLD AUTO: 3.45 M/UL (ref 4–5.4)
RBC # BLD AUTO: 3.58 M/UL (ref 4–5.4)
RBC # BLD AUTO: 3.81 M/UL (ref 4–5.4)
RBC # BLD AUTO: 3.89 M/UL (ref 4–5.4)
RBC # BLD AUTO: 4.03 M/UL (ref 4–5.4)
RIGHT VENTRICULAR END-DIASTOLIC DIMENSION: 3.33 CM
SAMPLE: ABNORMAL
SITE: ABNORMAL
SODIUM SERPL-SCNC: 133 MMOL/L (ref 136–145)
SODIUM SERPL-SCNC: 135 MMOL/L (ref 136–145)
SODIUM SERPL-SCNC: 137 MMOL/L (ref 136–145)
SODIUM SERPL-SCNC: 138 MMOL/L (ref 136–145)
SODIUM SERPL-SCNC: 139 MMOL/L (ref 136–145)
SODIUM SERPL-SCNC: 139 MMOL/L (ref 136–145)
SODIUM SERPL-SCNC: 140 MMOL/L (ref 136–145)
SODIUM SERPL-SCNC: 141 MMOL/L (ref 136–145)
SODIUM SERPL-SCNC: 141 MMOL/L (ref 136–145)
SP02: 100
SP02: 96
SP02: 99
SP02: 99
SPECIMEN SOURCE: NORMAL
SPECIMEN SOURCE: NORMAL
SPONT RATE: 19
SPONT RATE: 6
SPONT RATE: 6
SPONT RATE: 9
T4 FREE SERPL-MCNC: 1.14 NG/DL (ref 0.71–1.51)
TDI LATERAL: 0.08 M/S
TDI SEPTAL: 0.04 M/S
TDI: 0.06 M/S
TR MAX PG: 45 MMHG
TRICUSPID ANNULAR PLANE SYSTOLIC EXCURSION: 2.78 CM
TROPONIN I SERPL DL<=0.01 NG/ML-MCNC: 0.03 NG/ML (ref 0–0.03)
TROPONIN I SERPL DL<=0.01 NG/ML-MCNC: 0.04 NG/ML (ref 0–0.03)
TSH SERPL DL<=0.005 MIU/L-ACNC: 6.42 UIU/ML (ref 0.4–4)
WBC # BLD AUTO: 10.03 K/UL (ref 3.9–12.7)
WBC # BLD AUTO: 10.21 K/UL (ref 3.9–12.7)
WBC # BLD AUTO: 10.31 K/UL (ref 3.9–12.7)
WBC # BLD AUTO: 4.8 K/UL (ref 3.9–12.7)
WBC # BLD AUTO: 6.19 K/UL (ref 3.9–12.7)
WBC # BLD AUTO: 6.34 K/UL (ref 3.9–12.7)
WBC # BLD AUTO: 6.97 K/UL (ref 3.9–12.7)
WBC # BLD AUTO: 7.03 K/UL (ref 3.9–12.7)
WBC # BLD AUTO: 7.07 K/UL (ref 3.9–12.7)
WBC # BLD AUTO: 7.12 K/UL (ref 3.9–12.7)
WBC # BLD AUTO: 7.57 K/UL (ref 3.9–12.7)
WBC # BLD AUTO: 7.71 K/UL (ref 3.9–12.7)
WBC # BLD AUTO: 8.53 K/UL (ref 3.9–12.7)
WBC # BLD AUTO: 9.04 K/UL (ref 3.9–12.7)
WBC # BLD AUTO: 9.47 K/UL (ref 3.9–12.7)
WBC # FLD: 391 /CU MM

## 2020-01-01 PROCEDURE — 84134 ASSAY OF PREALBUMIN: CPT

## 2020-01-01 PROCEDURE — 99496 TRANSJ CARE MGMT HIGH F2F 7D: CPT | Mod: S$GLB,,, | Performed by: NURSE PRACTITIONER

## 2020-01-01 PROCEDURE — 3288F PR FALLS RISK ASSESSMENT DOCUMENTED: ICD-10-PCS | Mod: S$GLB,,, | Performed by: NURSE PRACTITIONER

## 2020-01-01 PROCEDURE — 99900035 HC TECH TIME PER 15 MIN (STAT)

## 2020-01-01 PROCEDURE — 97116 GAIT TRAINING THERAPY: CPT | Mod: CQ

## 2020-01-01 PROCEDURE — 25000242 PHARM REV CODE 250 ALT 637 W/ HCPCS: Performed by: INTERNAL MEDICINE

## 2020-01-01 PROCEDURE — 83735 ASSAY OF MAGNESIUM: CPT

## 2020-01-01 PROCEDURE — 36415 COLL VENOUS BLD VENIPUNCTURE: CPT

## 2020-01-01 PROCEDURE — 85610 PROTHROMBIN TIME: CPT

## 2020-01-01 PROCEDURE — 63600175 PHARM REV CODE 636 W HCPCS: Performed by: INTERNAL MEDICINE

## 2020-01-01 PROCEDURE — 85027 COMPLETE CBC AUTOMATED: CPT

## 2020-01-01 PROCEDURE — 12000002 HC ACUTE/MED SURGE SEMI-PRIVATE ROOM

## 2020-01-01 PROCEDURE — 85025 COMPLETE CBC W/AUTO DIFF WBC: CPT

## 2020-01-01 PROCEDURE — 1100F PR PT FALLS ASSESS DOC 2+ FALLS/FALL W/INJURY/YR: ICD-10-PCS | Mod: S$GLB,,, | Performed by: NURSE PRACTITIONER

## 2020-01-01 PROCEDURE — 1101F PT FALLS ASSESS-DOCD LE1/YR: CPT | Mod: S$GLB,,, | Performed by: NURSE PRACTITIONER

## 2020-01-01 PROCEDURE — 83880 ASSAY OF NATRIURETIC PEPTIDE: CPT

## 2020-01-01 PROCEDURE — 80048 BASIC METABOLIC PNL TOTAL CA: CPT

## 2020-01-01 PROCEDURE — 86140 C-REACTIVE PROTEIN: CPT

## 2020-01-01 PROCEDURE — 63600175 PHARM REV CODE 636 W HCPCS: Performed by: HOSPITALIST

## 2020-01-01 PROCEDURE — 1111F DSCHRG MED/CURRENT MED MERGE: CPT | Mod: S$GLB,,, | Performed by: NURSE PRACTITIONER

## 2020-01-01 PROCEDURE — 27000221 HC OXYGEN, UP TO 24 HOURS

## 2020-01-01 PROCEDURE — 25000003 PHARM REV CODE 250: Performed by: INTERNAL MEDICINE

## 2020-01-01 PROCEDURE — 84100 ASSAY OF PHOSPHORUS: CPT

## 2020-01-01 PROCEDURE — 94761 N-INVAS EAR/PLS OXIMETRY MLT: CPT

## 2020-01-01 PROCEDURE — 82945 GLUCOSE OTHER FLUID: CPT

## 2020-01-01 PROCEDURE — 84484 ASSAY OF TROPONIN QUANT: CPT

## 2020-01-01 PROCEDURE — 99291 CRITICAL CARE FIRST HOUR: CPT | Mod: ,,, | Performed by: INTERNAL MEDICINE

## 2020-01-01 PROCEDURE — 99213 OFFICE O/P EST LOW 20 MIN: CPT | Mod: S$GLB,,, | Performed by: NURSE PRACTITIONER

## 2020-01-01 PROCEDURE — 25000003 PHARM REV CODE 250: Performed by: SPECIALIST

## 2020-01-01 PROCEDURE — 99291 PR CRITICAL CARE, E/M 30-74 MINUTES: ICD-10-PCS | Mod: ,,, | Performed by: INTERNAL MEDICINE

## 2020-01-01 PROCEDURE — 80100014 HC HEMODIALYSIS 1:1

## 2020-01-01 PROCEDURE — 20000000 HC ICU ROOM

## 2020-01-01 PROCEDURE — 80074 ACUTE HEPATITIS PANEL: CPT

## 2020-01-01 PROCEDURE — 36600 WITHDRAWAL OF ARTERIAL BLOOD: CPT

## 2020-01-01 PROCEDURE — 94640 AIRWAY INHALATION TREATMENT: CPT

## 2020-01-01 PROCEDURE — 63600175 PHARM REV CODE 636 W HCPCS: Performed by: NURSE PRACTITIONER

## 2020-01-01 PROCEDURE — 97530 THERAPEUTIC ACTIVITIES: CPT

## 2020-01-01 PROCEDURE — 80053 COMPREHEN METABOLIC PANEL: CPT

## 2020-01-01 PROCEDURE — 25000003 PHARM REV CODE 250: Performed by: HOSPITALIST

## 2020-01-01 PROCEDURE — 1101F PR PT FALLS ASSESS DOC 0-1 FALLS W/OUT INJ PAST YR: ICD-10-PCS | Mod: S$GLB,,, | Performed by: NURSE PRACTITIONER

## 2020-01-01 PROCEDURE — 97110 THERAPEUTIC EXERCISES: CPT | Mod: CO

## 2020-01-01 PROCEDURE — 97110 THERAPEUTIC EXERCISES: CPT | Mod: CQ

## 2020-01-01 PROCEDURE — 99231 PR SUBSEQUENT HOSPITAL CARE,LEVL I: ICD-10-PCS | Mod: ,,, | Performed by: INTERNAL MEDICINE

## 2020-01-01 PROCEDURE — 99231 SBSQ HOSP IP/OBS SF/LOW 25: CPT | Mod: ,,, | Performed by: INTERNAL MEDICINE

## 2020-01-01 PROCEDURE — 93005 ELECTROCARDIOGRAM TRACING: CPT

## 2020-01-01 PROCEDURE — 85007 BL SMEAR W/DIFF WBC COUNT: CPT

## 2020-01-01 PROCEDURE — 94660 CPAP INITIATION&MGMT: CPT

## 2020-01-01 PROCEDURE — 80069 RENAL FUNCTION PANEL: CPT

## 2020-01-01 PROCEDURE — 97530 THERAPEUTIC ACTIVITIES: CPT | Mod: CQ

## 2020-01-01 PROCEDURE — 87206 SMEAR FLUORESCENT/ACID STAI: CPT

## 2020-01-01 PROCEDURE — 63600175 PHARM REV CODE 636 W HCPCS: Performed by: EMERGENCY MEDICINE

## 2020-01-01 PROCEDURE — 88305 TISSUE EXAM BY PATHOLOGIST: ICD-10-PCS | Mod: 26,,, | Performed by: PATHOLOGY

## 2020-01-01 PROCEDURE — 84484 ASSAY OF TROPONIN QUANT: CPT | Mod: 91

## 2020-01-01 PROCEDURE — 63600175 PHARM REV CODE 636 W HCPCS

## 2020-01-01 PROCEDURE — 84157 ASSAY OF PROTEIN OTHER: CPT

## 2020-01-01 PROCEDURE — 86706 HEP B SURFACE ANTIBODY: CPT

## 2020-01-01 PROCEDURE — 27000190 HC CPAP FULL FACE MASK W/VALVE

## 2020-01-01 PROCEDURE — 96375 TX/PRO/DX INJ NEW DRUG ADDON: CPT

## 2020-01-01 PROCEDURE — 87205 SMEAR GRAM STAIN: CPT

## 2020-01-01 PROCEDURE — 97161 PT EVAL LOW COMPLEX 20 MIN: CPT

## 2020-01-01 PROCEDURE — 3288F FALL RISK ASSESSMENT DOCD: CPT | Mod: S$GLB,,, | Performed by: NURSE PRACTITIONER

## 2020-01-01 PROCEDURE — 87116 MYCOBACTERIA CULTURE: CPT

## 2020-01-01 PROCEDURE — 99285 EMERGENCY DEPT VISIT HI MDM: CPT | Mod: 25

## 2020-01-01 PROCEDURE — 99291 CRITICAL CARE FIRST HOUR: CPT | Mod: 25

## 2020-01-01 PROCEDURE — 97162 PT EVAL MOD COMPLEX 30 MIN: CPT

## 2020-01-01 PROCEDURE — 96374 THER/PROPH/DIAG INJ IV PUSH: CPT

## 2020-01-01 PROCEDURE — 82803 BLOOD GASES ANY COMBINATION: CPT

## 2020-01-01 PROCEDURE — 84443 ASSAY THYROID STIM HORMONE: CPT

## 2020-01-01 PROCEDURE — 25000003 PHARM REV CODE 250: Performed by: EMERGENCY MEDICINE

## 2020-01-01 PROCEDURE — S0171 BUMETANIDE 0.5 MG: HCPCS | Performed by: INTERNAL MEDICINE

## 2020-01-01 PROCEDURE — 25000003 PHARM REV CODE 250: Performed by: PHYSICIAN ASSISTANT

## 2020-01-01 PROCEDURE — 88305 TISSUE EXAM BY PATHOLOGIST: CPT | Mod: 26,,, | Performed by: PATHOLOGY

## 2020-01-01 PROCEDURE — 25000003 PHARM REV CODE 250: Performed by: NURSE PRACTITIONER

## 2020-01-01 PROCEDURE — 87070 CULTURE OTHR SPECIMN AEROBIC: CPT

## 2020-01-01 PROCEDURE — 99291 CRITICAL CARE FIRST HOUR: CPT | Mod: S$GLB,,, | Performed by: INTERNAL MEDICINE

## 2020-01-01 PROCEDURE — 1159F PR MEDICATION LIST DOCUMENTED IN MEDICAL RECORD: ICD-10-PCS | Mod: S$GLB,,, | Performed by: NURSE PRACTITIONER

## 2020-01-01 PROCEDURE — 88112 CYTOPATH CELL ENHANCE TECH: CPT | Mod: 26,,, | Performed by: PATHOLOGY

## 2020-01-01 PROCEDURE — 83036 HEMOGLOBIN GLYCOSYLATED A1C: CPT

## 2020-01-01 PROCEDURE — 71046 X-RAY EXAM CHEST 2 VIEWS: CPT | Mod: TC,PO

## 2020-01-01 PROCEDURE — 99496 TRANSITIONAL CARE MANAGE SERVICE 7 DAY DISCHARGE: ICD-10-PCS | Mod: S$GLB,,, | Performed by: NURSE PRACTITIONER

## 2020-01-01 PROCEDURE — 93010 EKG 12-LEAD: ICD-10-PCS | Mod: ,,, | Performed by: INTERNAL MEDICINE

## 2020-01-01 PROCEDURE — 97530 THERAPEUTIC ACTIVITIES: CPT | Mod: CO

## 2020-01-01 PROCEDURE — 87075 CULTR BACTERIA EXCEPT BLOOD: CPT

## 2020-01-01 PROCEDURE — 97164 PT RE-EVAL EST PLAN CARE: CPT

## 2020-01-01 PROCEDURE — 97802 MEDICAL NUTRITION INDIV IN: CPT

## 2020-01-01 PROCEDURE — 1159F MED LIST DOCD IN RCRD: CPT | Mod: S$GLB,,, | Performed by: NURSE PRACTITIONER

## 2020-01-01 PROCEDURE — 97803 MED NUTRITION INDIV SUBSEQ: CPT

## 2020-01-01 PROCEDURE — 97165 OT EVAL LOW COMPLEX 30 MIN: CPT

## 2020-01-01 PROCEDURE — 85379 FIBRIN DEGRADATION QUANT: CPT

## 2020-01-01 PROCEDURE — 96365 THER/PROPH/DIAG IV INF INIT: CPT

## 2020-01-01 PROCEDURE — 84439 ASSAY OF FREE THYROXINE: CPT

## 2020-01-01 PROCEDURE — 51702 INSERT TEMP BLADDER CATH: CPT

## 2020-01-01 PROCEDURE — 99223 1ST HOSP IP/OBS HIGH 75: CPT | Mod: ,,, | Performed by: INTERNAL MEDICINE

## 2020-01-01 PROCEDURE — 99291 PR CRITICAL CARE, E/M 30-74 MINUTES: ICD-10-PCS | Mod: S$GLB,,, | Performed by: INTERNAL MEDICINE

## 2020-01-01 PROCEDURE — 99223 PR INITIAL HOSPITAL CARE,LEVL III: ICD-10-PCS | Mod: ,,, | Performed by: INTERNAL MEDICINE

## 2020-01-01 PROCEDURE — 88305 TISSUE EXAM BY PATHOLOGIST: CPT | Performed by: PATHOLOGY

## 2020-01-01 PROCEDURE — 89051 BODY FLUID CELL COUNT: CPT

## 2020-01-01 PROCEDURE — 86704 HEP B CORE ANTIBODY TOTAL: CPT

## 2020-01-01 PROCEDURE — 93010 ELECTROCARDIOGRAM REPORT: CPT | Mod: ,,, | Performed by: INTERNAL MEDICINE

## 2020-01-01 PROCEDURE — 99213 PR OFFICE/OUTPT VISIT, EST, LEVL III, 20-29 MIN: ICD-10-PCS | Mod: S$GLB,,, | Performed by: NURSE PRACTITIONER

## 2020-01-01 PROCEDURE — 1100F PTFALLS ASSESS-DOCD GE2>/YR: CPT | Mod: S$GLB,,, | Performed by: NURSE PRACTITIONER

## 2020-01-01 PROCEDURE — 88112 CYTOPATH CELL ENHANCE TECH: CPT | Performed by: PATHOLOGY

## 2020-01-01 PROCEDURE — 1111F PR DISCHARGE MEDS RECONCILED W/ CURRENT OUTPATIENT MED LIST: ICD-10-PCS | Mod: S$GLB,,, | Performed by: NURSE PRACTITIONER

## 2020-01-01 PROCEDURE — 88112 PR  CYTOPATH, CELL ENHANCE TECH: ICD-10-PCS | Mod: 26,,, | Performed by: PATHOLOGY

## 2020-01-01 PROCEDURE — 97535 SELF CARE MNGMENT TRAINING: CPT | Mod: CO

## 2020-01-01 RX ORDER — TELMISARTAN 20 MG/1
40 TABLET ORAL DAILY
Status: DISCONTINUED | OUTPATIENT
Start: 2020-01-01 | End: 2020-01-01

## 2020-01-01 RX ORDER — NITROGLYCERIN 20 MG/100ML
5 INJECTION INTRAVENOUS CONTINUOUS
Status: DISCONTINUED | OUTPATIENT
Start: 2020-01-01 | End: 2020-01-01

## 2020-01-01 RX ORDER — ASPIRIN 81 MG/1
81 TABLET ORAL DAILY
Status: DISCONTINUED | OUTPATIENT
Start: 2020-01-01 | End: 2020-01-01 | Stop reason: HOSPADM

## 2020-01-01 RX ORDER — ISOSORBIDE DINITRATE 20 MG/1
40 TABLET ORAL 3 TIMES DAILY
Status: DISCONTINUED | OUTPATIENT
Start: 2020-01-01 | End: 2020-01-01

## 2020-01-01 RX ORDER — CARVEDILOL 12.5 MG/1
12.5 TABLET ORAL 2 TIMES DAILY
Qty: 60 TABLET | Refills: 2 | Status: SHIPPED | OUTPATIENT
Start: 2020-01-01 | End: 2020-04-17

## 2020-01-01 RX ORDER — DOCUSATE SODIUM 100 MG/1
100 CAPSULE, LIQUID FILLED ORAL 2 TIMES DAILY
Status: DISCONTINUED | OUTPATIENT
Start: 2020-01-01 | End: 2020-01-01

## 2020-01-01 RX ORDER — GLUCAGON 1 MG
1 KIT INJECTION
Status: DISCONTINUED | OUTPATIENT
Start: 2020-01-01 | End: 2020-01-01 | Stop reason: HOSPADM

## 2020-01-01 RX ORDER — HEPARIN SODIUM 5000 [USP'U]/ML
5000 INJECTION, SOLUTION INTRAVENOUS; SUBCUTANEOUS EVERY 8 HOURS
Status: DISCONTINUED | OUTPATIENT
Start: 2020-01-01 | End: 2020-01-01 | Stop reason: HOSPADM

## 2020-01-01 RX ORDER — HYDRALAZINE HYDROCHLORIDE 25 MG/1
100 TABLET, FILM COATED ORAL EVERY 8 HOURS
Status: DISCONTINUED | OUTPATIENT
Start: 2020-01-01 | End: 2020-01-01

## 2020-01-01 RX ORDER — LEVOTHYROXINE SODIUM 100 UG/1
100 TABLET ORAL
Status: DISCONTINUED | OUTPATIENT
Start: 2020-01-01 | End: 2020-01-01

## 2020-01-01 RX ORDER — HEPARIN SODIUM 1000 [USP'U]/ML
4000 INJECTION, SOLUTION INTRAVENOUS; SUBCUTANEOUS
Status: DISCONTINUED | OUTPATIENT
Start: 2020-01-01 | End: 2020-01-01 | Stop reason: HOSPADM

## 2020-01-01 RX ORDER — ONDANSETRON HYDROCHLORIDE 4 MG/5ML
4 SOLUTION ORAL ONCE
Status: COMPLETED | OUTPATIENT
Start: 2020-01-01 | End: 2020-01-01

## 2020-01-01 RX ORDER — FUROSEMIDE 10 MG/ML
60 INJECTION INTRAMUSCULAR; INTRAVENOUS
Status: COMPLETED | OUTPATIENT
Start: 2020-01-01 | End: 2020-01-01

## 2020-01-01 RX ORDER — HYDRALAZINE HYDROCHLORIDE 100 MG/1
100 TABLET, FILM COATED ORAL EVERY 8 HOURS
Qty: 90 TABLET | Refills: 2 | Status: SHIPPED | OUTPATIENT
Start: 2020-01-01 | End: 2020-04-17

## 2020-01-01 RX ORDER — SODIUM,POTASSIUM PHOSPHATES 280-250MG
2 POWDER IN PACKET (EA) ORAL
Status: DISCONTINUED | OUTPATIENT
Start: 2020-01-01 | End: 2020-01-01 | Stop reason: HOSPADM

## 2020-01-01 RX ORDER — ENALAPRILAT 1.25 MG/ML
0.62 INJECTION INTRAVENOUS
Status: ACTIVE | OUTPATIENT
Start: 2020-01-01 | End: 2020-01-01

## 2020-01-01 RX ORDER — LORAZEPAM 2 MG/ML
0.5 INJECTION INTRAMUSCULAR EVERY 4 HOURS PRN
Status: DISCONTINUED | OUTPATIENT
Start: 2020-01-01 | End: 2020-01-01 | Stop reason: HOSPADM

## 2020-01-01 RX ORDER — MORPHINE SULFATE 2 MG/ML
1 INJECTION, SOLUTION INTRAMUSCULAR; INTRAVENOUS
Status: DISCONTINUED | OUTPATIENT
Start: 2020-01-01 | End: 2020-01-01

## 2020-01-01 RX ORDER — HEPARIN SODIUM 5000 [USP'U]/ML
5000 INJECTION, SOLUTION INTRAVENOUS; SUBCUTANEOUS EVERY 8 HOURS
Status: DISCONTINUED | OUTPATIENT
Start: 2020-01-01 | End: 2020-01-01

## 2020-01-01 RX ORDER — PROPOFOL 10 MG/ML
5 INJECTION, EMULSION INTRAVENOUS CONTINUOUS
Status: DISCONTINUED | OUTPATIENT
Start: 2020-01-01 | End: 2020-01-01

## 2020-01-01 RX ORDER — CLONIDINE 0.2 MG/24H
1 PATCH, EXTENDED RELEASE TRANSDERMAL
Status: DISCONTINUED | OUTPATIENT
Start: 2020-01-01 | End: 2020-01-01 | Stop reason: HOSPADM

## 2020-01-01 RX ORDER — FOLIC ACID 1 MG/1
1 TABLET ORAL DAILY
Status: DISCONTINUED | OUTPATIENT
Start: 2020-01-01 | End: 2020-01-01

## 2020-01-01 RX ORDER — ISOSORBIDE MONONITRATE 30 MG/1
60 TABLET, EXTENDED RELEASE ORAL DAILY
Status: DISCONTINUED | OUTPATIENT
Start: 2020-01-01 | End: 2020-01-01 | Stop reason: HOSPADM

## 2020-01-01 RX ORDER — PRAVASTATIN SODIUM 10 MG/1
20 TABLET ORAL DAILY
Status: DISCONTINUED | OUTPATIENT
Start: 2020-01-01 | End: 2020-01-01

## 2020-01-01 RX ORDER — HEPARIN SODIUM 5000 [USP'U]/ML
5000 INJECTION, SOLUTION INTRAVENOUS; SUBCUTANEOUS
Status: CANCELLED | OUTPATIENT
Start: 2020-01-01

## 2020-01-01 RX ORDER — LEVALBUTEROL INHALATION SOLUTION 0.63 MG/3ML
0.63 SOLUTION RESPIRATORY (INHALATION) EVERY 6 HOURS
Status: DISCONTINUED | OUTPATIENT
Start: 2020-01-01 | End: 2020-01-01

## 2020-01-01 RX ORDER — ACETAMINOPHEN 500 MG
1000 TABLET ORAL EVERY 6 HOURS PRN
Status: DISCONTINUED | OUTPATIENT
Start: 2020-01-01 | End: 2020-01-01 | Stop reason: HOSPADM

## 2020-01-01 RX ORDER — MORPHINE SULFATE 2 MG/ML
2 INJECTION, SOLUTION INTRAMUSCULAR; INTRAVENOUS
Status: DISCONTINUED | OUTPATIENT
Start: 2020-01-01 | End: 2020-01-01 | Stop reason: HOSPADM

## 2020-01-01 RX ORDER — POLYETHYLENE GLYCOL 3350 17 G/17G
17 POWDER, FOR SOLUTION ORAL 2 TIMES DAILY PRN
Status: DISCONTINUED | OUTPATIENT
Start: 2020-01-01 | End: 2020-01-01 | Stop reason: HOSPADM

## 2020-01-01 RX ORDER — AMLODIPINE BESYLATE 5 MG/1
5 TABLET ORAL DAILY
Status: DISCONTINUED | OUTPATIENT
Start: 2020-01-01 | End: 2020-01-01

## 2020-01-01 RX ORDER — PANTOPRAZOLE SODIUM 40 MG/1
40 TABLET, DELAYED RELEASE ORAL DAILY
Status: DISCONTINUED | OUTPATIENT
Start: 2020-01-01 | End: 2020-01-01

## 2020-01-01 RX ORDER — FUROSEMIDE 10 MG/ML
40 INJECTION INTRAMUSCULAR; INTRAVENOUS ONCE
Status: COMPLETED | OUTPATIENT
Start: 2020-01-01 | End: 2020-01-01

## 2020-01-01 RX ORDER — PRAVASTATIN SODIUM 10 MG/1
20 TABLET ORAL DAILY
Status: DISCONTINUED | OUTPATIENT
Start: 2020-01-01 | End: 2020-01-01 | Stop reason: HOSPADM

## 2020-01-01 RX ORDER — CARVEDILOL 6.25 MG/1
12.5 TABLET ORAL 2 TIMES DAILY
Status: DISCONTINUED | OUTPATIENT
Start: 2020-01-01 | End: 2020-01-01

## 2020-01-01 RX ORDER — ACETAMINOPHEN 325 MG/1
650 TABLET ORAL EVERY 6 HOURS PRN
Status: DISCONTINUED | OUTPATIENT
Start: 2020-01-01 | End: 2020-01-01

## 2020-01-01 RX ORDER — GLUCOSAM/CHONDRO/HERB 149/HYAL 750-100 MG
1 TABLET ORAL DAILY
Status: DISCONTINUED | OUTPATIENT
Start: 2020-01-01 | End: 2020-01-01

## 2020-01-01 RX ORDER — TORSEMIDE 20 MG/1
20 TABLET ORAL DAILY
Status: DISCONTINUED | OUTPATIENT
Start: 2020-01-01 | End: 2020-01-01

## 2020-01-01 RX ORDER — SODIUM CHLORIDE 9 MG/ML
INJECTION, SOLUTION INTRAVENOUS ONCE
Status: CANCELLED | OUTPATIENT
Start: 2020-01-01 | End: 2020-01-01

## 2020-01-01 RX ORDER — LANOLIN ALCOHOL/MO/W.PET/CERES
800 CREAM (GRAM) TOPICAL
Status: DISCONTINUED | OUTPATIENT
Start: 2020-01-01 | End: 2020-01-01 | Stop reason: HOSPADM

## 2020-01-01 RX ORDER — HYDRALAZINE HYDROCHLORIDE 20 MG/ML
10 INJECTION INTRAMUSCULAR; INTRAVENOUS
Status: COMPLETED | OUTPATIENT
Start: 2020-01-01 | End: 2020-01-01

## 2020-01-01 RX ORDER — ONDANSETRON HYDROCHLORIDE 4 MG/5ML
8 SOLUTION ORAL EVERY 8 HOURS PRN
Status: DISCONTINUED | OUTPATIENT
Start: 2020-01-01 | End: 2020-01-01 | Stop reason: HOSPADM

## 2020-01-01 RX ORDER — POTASSIUM CHLORIDE 7.45 MG/ML
40 INJECTION INTRAVENOUS
Status: DISCONTINUED | OUTPATIENT
Start: 2020-01-01 | End: 2020-01-01

## 2020-01-01 RX ORDER — BENZONATATE 100 MG/1
100 CAPSULE ORAL 3 TIMES DAILY PRN
Status: DISCONTINUED | OUTPATIENT
Start: 2020-01-01 | End: 2020-01-01 | Stop reason: HOSPADM

## 2020-01-01 RX ORDER — FUROSEMIDE 10 MG/ML
20 INJECTION INTRAMUSCULAR; INTRAVENOUS
Status: COMPLETED | OUTPATIENT
Start: 2020-01-01 | End: 2020-01-01

## 2020-01-01 RX ORDER — MAGNESIUM SULFATE HEPTAHYDRATE 40 MG/ML
2 INJECTION, SOLUTION INTRAVENOUS
Status: DISCONTINUED | OUTPATIENT
Start: 2020-01-01 | End: 2020-01-01

## 2020-01-01 RX ORDER — CARVEDILOL 6.25 MG/1
12.5 TABLET ORAL 2 TIMES DAILY
Status: DISCONTINUED | OUTPATIENT
Start: 2020-01-01 | End: 2020-01-01 | Stop reason: HOSPADM

## 2020-01-01 RX ORDER — IBUPROFEN 200 MG
16 TABLET ORAL
Status: DISCONTINUED | OUTPATIENT
Start: 2020-01-01 | End: 2020-01-01 | Stop reason: HOSPADM

## 2020-01-01 RX ORDER — HYDROCODONE BITARTRATE AND ACETAMINOPHEN 5; 325 MG/1; MG/1
1 TABLET ORAL ONCE
Status: DISCONTINUED | OUTPATIENT
Start: 2020-01-01 | End: 2020-01-01 | Stop reason: HOSPADM

## 2020-01-01 RX ORDER — PANTOPRAZOLE SODIUM 40 MG/10ML
40 INJECTION, POWDER, LYOPHILIZED, FOR SOLUTION INTRAVENOUS DAILY
Status: DISCONTINUED | OUTPATIENT
Start: 2020-01-01 | End: 2020-01-01

## 2020-01-01 RX ORDER — MAGNESIUM SULFATE HEPTAHYDRATE 40 MG/ML
4 INJECTION, SOLUTION INTRAVENOUS
Status: DISCONTINUED | OUTPATIENT
Start: 2020-01-01 | End: 2020-01-01

## 2020-01-01 RX ORDER — IBUPROFEN 200 MG
24 TABLET ORAL
Status: DISCONTINUED | OUTPATIENT
Start: 2020-01-01 | End: 2020-01-01 | Stop reason: HOSPADM

## 2020-01-01 RX ORDER — HEPARIN SODIUM 1000 [USP'U]/ML
INJECTION, SOLUTION INTRAVENOUS; SUBCUTANEOUS
Status: COMPLETED
Start: 2020-01-01 | End: 2020-01-01

## 2020-01-01 RX ORDER — SODIUM CHLORIDE 0.9 % (FLUSH) 0.9 %
10 SYRINGE (ML) INJECTION
Status: DISCONTINUED | OUTPATIENT
Start: 2020-01-01 | End: 2020-01-01 | Stop reason: HOSPADM

## 2020-01-01 RX ORDER — HYDRALAZINE HYDROCHLORIDE 25 MG/1
TABLET, FILM COATED ORAL
Status: DISPENSED
Start: 2020-01-01 | End: 2020-01-01

## 2020-01-01 RX ORDER — TELMISARTAN 20 MG/1
20 TABLET ORAL DAILY
Status: DISCONTINUED | OUTPATIENT
Start: 2020-01-01 | End: 2020-01-01

## 2020-01-01 RX ORDER — METOPROLOL TARTRATE 50 MG/1
50 TABLET ORAL 2 TIMES DAILY
Status: DISCONTINUED | OUTPATIENT
Start: 2020-01-01 | End: 2020-01-01

## 2020-01-01 RX ORDER — ONDANSETRON 2 MG/ML
4 INJECTION INTRAMUSCULAR; INTRAVENOUS EVERY 6 HOURS PRN
Status: DISCONTINUED | OUTPATIENT
Start: 2020-01-01 | End: 2020-01-01 | Stop reason: HOSPADM

## 2020-01-01 RX ORDER — POTASSIUM CHLORIDE 20 MEQ/15ML
60 SOLUTION ORAL
Status: DISCONTINUED | OUTPATIENT
Start: 2020-01-01 | End: 2020-01-01 | Stop reason: HOSPADM

## 2020-01-01 RX ORDER — HYDRALAZINE HYDROCHLORIDE 50 MG/1
75 TABLET, FILM COATED ORAL 3 TIMES DAILY
Qty: 270 TABLET | Refills: 1 | Status: ON HOLD
Start: 2020-01-01 | End: 2020-01-01 | Stop reason: SDUPTHER

## 2020-01-01 RX ORDER — TORSEMIDE 10 MG/1
10 TABLET ORAL
Qty: 8 TABLET | Refills: 2 | Status: SHIPPED | OUTPATIENT
Start: 2020-01-01 | End: 2020-01-01

## 2020-01-01 RX ORDER — POTASSIUM CHLORIDE 20 MEQ/15ML
40 SOLUTION ORAL
Status: DISCONTINUED | OUTPATIENT
Start: 2020-01-01 | End: 2020-01-01 | Stop reason: HOSPADM

## 2020-01-01 RX ORDER — TORSEMIDE 10 MG/1
10 TABLET ORAL
Qty: 8 TABLET | Refills: 2
Start: 2020-01-01 | End: 2020-04-23

## 2020-01-01 RX ORDER — FUROSEMIDE 10 MG/ML
20 INJECTION INTRAMUSCULAR; INTRAVENOUS DAILY
Status: DISCONTINUED | OUTPATIENT
Start: 2020-01-01 | End: 2020-01-01

## 2020-01-01 RX ORDER — FERROUS SULFATE, DRIED 160(50) MG
1 TABLET, EXTENDED RELEASE ORAL 2 TIMES DAILY
Status: DISCONTINUED | OUTPATIENT
Start: 2020-01-01 | End: 2020-01-01

## 2020-01-01 RX ORDER — HYDRALAZINE HYDROCHLORIDE 20 MG/ML
10 INJECTION INTRAMUSCULAR; INTRAVENOUS
Status: DISCONTINUED | OUTPATIENT
Start: 2020-01-01 | End: 2020-01-01 | Stop reason: HOSPADM

## 2020-01-01 RX ORDER — MUPIROCIN 20 MG/G
OINTMENT TOPICAL 2 TIMES DAILY
Status: DISCONTINUED | OUTPATIENT
Start: 2020-01-01 | End: 2020-01-01 | Stop reason: HOSPADM

## 2020-01-01 RX ORDER — SCOLOPAMINE TRANSDERMAL SYSTEM 1 MG/1
1 PATCH, EXTENDED RELEASE TRANSDERMAL
Status: DISCONTINUED | OUTPATIENT
Start: 2020-01-01 | End: 2020-01-01 | Stop reason: HOSPADM

## 2020-01-01 RX ORDER — FERROUS SULFATE, DRIED 160(50) MG
1 TABLET, EXTENDED RELEASE ORAL 2 TIMES DAILY
Status: DISCONTINUED | OUTPATIENT
Start: 2020-01-01 | End: 2020-01-01 | Stop reason: HOSPADM

## 2020-01-01 RX ORDER — CALCITRIOL 0.25 UG/1
0.25 CAPSULE ORAL
Status: DISCONTINUED | OUTPATIENT
Start: 2020-01-01 | End: 2020-01-01

## 2020-01-01 RX ORDER — HYDRALAZINE HYDROCHLORIDE 25 MG/1
75 TABLET, FILM COATED ORAL EVERY 8 HOURS
Status: DISCONTINUED | OUTPATIENT
Start: 2020-01-01 | End: 2020-01-01

## 2020-01-01 RX ORDER — AMLODIPINE BESYLATE 5 MG/1
5 TABLET ORAL ONCE
Status: COMPLETED | OUTPATIENT
Start: 2020-01-01 | End: 2020-01-01

## 2020-01-01 RX ORDER — AMLODIPINE BESYLATE 5 MG/1
10 TABLET ORAL DAILY
Status: DISCONTINUED | OUTPATIENT
Start: 2020-01-01 | End: 2020-01-01

## 2020-01-01 RX ORDER — SODIUM CHLORIDE 9 MG/ML
INJECTION, SOLUTION INTRAVENOUS
Status: CANCELLED | OUTPATIENT
Start: 2020-01-01

## 2020-01-01 RX ORDER — HEPARIN SODIUM 1000 [USP'U]/ML
10000 INJECTION, SOLUTION INTRAVENOUS; SUBCUTANEOUS ONCE
Status: COMPLETED | OUTPATIENT
Start: 2020-01-01 | End: 2020-01-01

## 2020-01-01 RX ORDER — FUROSEMIDE 10 MG/ML
20 INJECTION INTRAMUSCULAR; INTRAVENOUS 2 TIMES DAILY
Status: DISCONTINUED | OUTPATIENT
Start: 2020-01-01 | End: 2020-01-01

## 2020-01-01 RX ORDER — ISOSORBIDE DINITRATE AND HYDRALAZINE HYDROCHLORIDE 37.5; 2 MG/1; MG/1
2 TABLET ORAL 3 TIMES DAILY
Status: DISCONTINUED | OUTPATIENT
Start: 2020-01-01 | End: 2020-01-01 | Stop reason: SDUPTHER

## 2020-01-01 RX ORDER — METOLAZONE 2.5 MG/1
2.5 TABLET ORAL DAILY
Status: DISCONTINUED | OUTPATIENT
Start: 2020-01-01 | End: 2020-01-01

## 2020-01-01 RX ORDER — CARVEDILOL 6.25 MG/1
6.25 TABLET ORAL 2 TIMES DAILY
Status: DISCONTINUED | OUTPATIENT
Start: 2020-01-01 | End: 2020-01-01

## 2020-01-01 RX ORDER — NITROGLYCERIN 20 MG/100ML
40 INJECTION INTRAVENOUS CONTINUOUS
Status: DISCONTINUED | OUTPATIENT
Start: 2020-01-01 | End: 2020-01-01

## 2020-01-01 RX ORDER — LEVOTHYROXINE SODIUM 100 UG/1
100 TABLET ORAL
Status: DISCONTINUED | OUTPATIENT
Start: 2020-01-01 | End: 2020-01-01 | Stop reason: HOSPADM

## 2020-01-01 RX ORDER — TALC
6 POWDER (GRAM) TOPICAL NIGHTLY PRN
Status: DISCONTINUED | OUTPATIENT
Start: 2020-01-01 | End: 2020-01-01 | Stop reason: HOSPADM

## 2020-01-01 RX ORDER — HYDRALAZINE HYDROCHLORIDE 25 MG/1
75 TABLET, FILM COATED ORAL 3 TIMES DAILY
Status: DISCONTINUED | OUTPATIENT
Start: 2020-01-01 | End: 2020-01-01

## 2020-01-01 RX ORDER — ASPIRIN 81 MG/1
81 TABLET ORAL DAILY
Status: DISCONTINUED | OUTPATIENT
Start: 2020-01-01 | End: 2020-01-01

## 2020-01-01 RX ORDER — FOLIC ACID 0.4 MG
800 TABLET ORAL DAILY
Status: DISCONTINUED | OUTPATIENT
Start: 2020-01-01 | End: 2020-01-01 | Stop reason: HOSPADM

## 2020-01-01 RX ORDER — FUROSEMIDE 10 MG/ML
80 INJECTION INTRAMUSCULAR; INTRAVENOUS 3 TIMES DAILY
Status: DISCONTINUED | OUTPATIENT
Start: 2020-01-01 | End: 2020-01-01

## 2020-01-01 RX ORDER — HYDRALAZINE HYDROCHLORIDE 20 MG/ML
10 INJECTION INTRAMUSCULAR; INTRAVENOUS EVERY 6 HOURS PRN
Status: DISCONTINUED | OUTPATIENT
Start: 2020-01-01 | End: 2020-01-01 | Stop reason: HOSPADM

## 2020-01-01 RX ADMIN — ASPIRIN 81 MG: 81 TABLET, COATED ORAL at 10:01

## 2020-01-01 RX ADMIN — DOCUSATE SODIUM 100 MG: 100 CAPSULE, LIQUID FILLED ORAL at 08:01

## 2020-01-01 RX ADMIN — PANTOPRAZOLE SODIUM 40 MG: 40 TABLET, DELAYED RELEASE ORAL at 08:01

## 2020-01-01 RX ADMIN — CALCIUM CARBONATE 500 MG (1,250 MG)-VITAMIN D3 200 UNIT TABLET 1 TABLET: at 09:01

## 2020-01-01 RX ADMIN — CARVEDILOL 12.5 MG: 6.25 TABLET, FILM COATED ORAL at 09:02

## 2020-01-01 RX ADMIN — CALCIUM CARBONATE 500 MG (1,250 MG)-VITAMIN D3 200 UNIT TABLET 1 TABLET: at 08:01

## 2020-01-01 RX ADMIN — ISOSORBIDE MONONITRATE 60 MG: 30 TABLET, EXTENDED RELEASE ORAL at 03:01

## 2020-01-01 RX ADMIN — HYDRALAZINE HYDROCHLORIDE 100 MG: 25 TABLET, FILM COATED ORAL at 03:01

## 2020-01-01 RX ADMIN — FUROSEMIDE 20 MG: 10 INJECTION, SOLUTION INTRAMUSCULAR; INTRAVENOUS at 10:01

## 2020-01-01 RX ADMIN — CLONIDINE 1 PATCH: 0.2 PATCH TRANSDERMAL at 03:01

## 2020-01-01 RX ADMIN — HYDRALAZINE HYDROCHLORIDE 10 MG: 20 INJECTION INTRAMUSCULAR; INTRAVENOUS at 01:02

## 2020-01-01 RX ADMIN — HEPARIN SODIUM 5000 UNITS: 5000 INJECTION, SOLUTION INTRAVENOUS; SUBCUTANEOUS at 02:01

## 2020-01-01 RX ADMIN — FUROSEMIDE 40 MG: 10 INJECTION, SOLUTION INTRAMUSCULAR; INTRAVENOUS at 03:01

## 2020-01-01 RX ADMIN — LEVALBUTEROL HYDROCHLORIDE 0.63 MG: 0.63 SOLUTION RESPIRATORY (INHALATION) at 01:02

## 2020-01-01 RX ADMIN — HYDRALAZINE HYDROCHLORIDE 100 MG: 25 TABLET, FILM COATED ORAL at 10:01

## 2020-01-01 RX ADMIN — HYDRALAZINE HYDROCHLORIDE 100 MG: 25 TABLET, FILM COATED ORAL at 06:01

## 2020-01-01 RX ADMIN — HYDRALAZINE HYDROCHLORIDE 100 MG: 25 TABLET, FILM COATED ORAL at 01:01

## 2020-01-01 RX ADMIN — PANTOPRAZOLE SODIUM 40 MG: 40 TABLET, DELAYED RELEASE ORAL at 09:02

## 2020-01-01 RX ADMIN — LEVALBUTEROL HYDROCHLORIDE 0.63 MG: 0.63 SOLUTION RESPIRATORY (INHALATION) at 07:02

## 2020-01-01 RX ADMIN — PRAVASTATIN SODIUM 20 MG: 10 TABLET ORAL at 08:01

## 2020-01-01 RX ADMIN — LEVALBUTEROL HYDROCHLORIDE 0.63 MG: 0.63 SOLUTION RESPIRATORY (INHALATION) at 11:01

## 2020-01-01 RX ADMIN — TORSEMIDE 20 MG: 20 TABLET ORAL at 08:01

## 2020-01-01 RX ADMIN — LEVALBUTEROL HYDROCHLORIDE 0.63 MG: 0.63 SOLUTION RESPIRATORY (INHALATION) at 12:02

## 2020-01-01 RX ADMIN — ISOSORBIDE DINITRATE 40 MG: 20 TABLET ORAL at 10:01

## 2020-01-01 RX ADMIN — OMEGA-3 FATTY ACIDS CAP 1000 MG 1 CAPSULE: 1000 CAP at 09:02

## 2020-01-01 RX ADMIN — AMLODIPINE BESYLATE 10 MG: 5 TABLET ORAL at 08:01

## 2020-01-01 RX ADMIN — OYSTER SHELL CALCIUM WITH VITAMIN D 1 TABLET: 500; 200 TABLET, FILM COATED ORAL at 10:01

## 2020-01-01 RX ADMIN — BENZONATATE 100 MG: 100 CAPSULE ORAL at 11:01

## 2020-01-01 RX ADMIN — HEPARIN SODIUM 5000 UNITS: 5000 INJECTION, SOLUTION INTRAVENOUS; SUBCUTANEOUS at 10:01

## 2020-01-01 RX ADMIN — FUROSEMIDE 40 MG: 10 INJECTION, SOLUTION INTRAMUSCULAR; INTRAVENOUS at 10:01

## 2020-01-01 RX ADMIN — LEVOTHYROXINE SODIUM 100 MCG: 100 TABLET ORAL at 04:01

## 2020-01-01 RX ADMIN — HEPARIN SODIUM 10000 UNITS: 1000 INJECTION, SOLUTION INTRAVENOUS; SUBCUTANEOUS at 08:02

## 2020-01-01 RX ADMIN — PRAVASTATIN SODIUM 20 MG: 10 TABLET ORAL at 09:02

## 2020-01-01 RX ADMIN — FUROSEMIDE 60 MG: 10 INJECTION, SOLUTION INTRAMUSCULAR; INTRAVENOUS at 09:01

## 2020-01-01 RX ADMIN — CALCITRIOL CAPSULES 0.25 MCG 0.25 MCG: 0.25 CAPSULE ORAL at 09:01

## 2020-01-01 RX ADMIN — OMEGA-3 FATTY ACIDS CAP 1000 MG 1 CAPSULE: 1000 CAP at 09:01

## 2020-01-01 RX ADMIN — DOCUSATE SODIUM 100 MG: 100 CAPSULE, LIQUID FILLED ORAL at 09:01

## 2020-01-01 RX ADMIN — PANTOPRAZOLE SODIUM 40 MG: 40 TABLET, DELAYED RELEASE ORAL at 09:01

## 2020-01-01 RX ADMIN — ASPIRIN 81 MG: 81 TABLET, COATED ORAL at 09:02

## 2020-01-01 RX ADMIN — AMLODIPINE BESYLATE 5 MG: 5 TABLET ORAL at 08:01

## 2020-01-01 RX ADMIN — HYDRALAZINE HYDROCHLORIDE 10 MG: 20 INJECTION INTRAMUSCULAR; INTRAVENOUS at 08:02

## 2020-01-01 RX ADMIN — DOCUSATE SODIUM 100 MG: 100 CAPSULE, LIQUID FILLED ORAL at 09:02

## 2020-01-01 RX ADMIN — LEVOTHYROXINE SODIUM 100 MCG: 100 TABLET ORAL at 05:01

## 2020-01-01 RX ADMIN — FOLIC ACID 1 MG: 1 TABLET ORAL at 08:01

## 2020-01-01 RX ADMIN — HYDRALAZINE HYDROCHLORIDE 75 MG: 25 TABLET, FILM COATED ORAL at 02:01

## 2020-01-01 RX ADMIN — MORPHINE SULFATE 2 MG: 2 INJECTION, SOLUTION INTRAMUSCULAR; INTRAVENOUS at 05:02

## 2020-01-01 RX ADMIN — LORAZEPAM 0.5 MG: 2 INJECTION, SOLUTION INTRAMUSCULAR; INTRAVENOUS at 04:02

## 2020-01-01 RX ADMIN — HYDRALAZINE HYDROCHLORIDE 10 MG: 20 INJECTION INTRAMUSCULAR; INTRAVENOUS at 04:02

## 2020-01-01 RX ADMIN — HEPARIN SODIUM 5000 UNITS: 5000 INJECTION, SOLUTION INTRAVENOUS; SUBCUTANEOUS at 06:02

## 2020-01-01 RX ADMIN — CALCITRIOL CAPSULES 0.25 MCG 0.25 MCG: 0.25 CAPSULE ORAL at 08:01

## 2020-01-01 RX ADMIN — MUPIROCIN: 20 OINTMENT TOPICAL at 09:02

## 2020-01-01 RX ADMIN — HEPARIN SODIUM 5000 UNITS: 5000 INJECTION, SOLUTION INTRAVENOUS; SUBCUTANEOUS at 08:01

## 2020-01-01 RX ADMIN — CARVEDILOL 6.25 MG: 6.25 TABLET, FILM COATED ORAL at 10:01

## 2020-01-01 RX ADMIN — FUROSEMIDE 80 MG: 10 INJECTION, SOLUTION INTRAMUSCULAR; INTRAVENOUS at 09:01

## 2020-01-01 RX ADMIN — LEVALBUTEROL HYDROCHLORIDE 0.63 MG: 0.63 SOLUTION RESPIRATORY (INHALATION) at 06:02

## 2020-01-01 RX ADMIN — DEXTROSE MONOHYDRATE 12.5 G: 25 INJECTION, SOLUTION INTRAVENOUS at 06:02

## 2020-01-01 RX ADMIN — SCOPALAMINE 1 PATCH: 1 PATCH, EXTENDED RELEASE TRANSDERMAL at 03:02

## 2020-01-01 RX ADMIN — EPOETIN ALFA-EPBX 10000 UNITS: 10000 INJECTION, SOLUTION INTRAVENOUS; SUBCUTANEOUS at 02:02

## 2020-01-01 RX ADMIN — ISOSORBIDE MONONITRATE 60 MG: 30 TABLET, EXTENDED RELEASE ORAL at 08:01

## 2020-01-01 RX ADMIN — AMLODIPINE BESYLATE 10 MG: 5 TABLET ORAL at 09:02

## 2020-01-01 RX ADMIN — CALCIUM CARBONATE 500 MG (1,250 MG)-VITAMIN D3 200 UNIT TABLET 1 TABLET: at 09:02

## 2020-01-01 RX ADMIN — LEVOTHYROXINE SODIUM 100 MCG: 100 TABLET ORAL at 05:02

## 2020-01-01 RX ADMIN — AMLODIPINE BESYLATE 5 MG: 5 TABLET ORAL at 01:01

## 2020-01-01 RX ADMIN — OMEGA-3 FATTY ACIDS CAP 1000 MG 1 CAPSULE: 1000 CAP at 08:01

## 2020-01-01 RX ADMIN — HYDRALAZINE HYDROCHLORIDE 10 MG: 20 INJECTION INTRAMUSCULAR; INTRAVENOUS at 02:01

## 2020-01-01 RX ADMIN — BUMETANIDE 0.5 MG/HR: 0.25 INJECTION INTRAMUSCULAR; INTRAVENOUS at 01:01

## 2020-01-01 RX ADMIN — HYDRALAZINE HYDROCHLORIDE 10 MG: 20 INJECTION INTRAMUSCULAR; INTRAVENOUS at 07:01

## 2020-01-01 RX ADMIN — PRAVASTATIN SODIUM 20 MG: 10 TABLET ORAL at 09:01

## 2020-01-01 RX ADMIN — MUPIROCIN: 20 OINTMENT TOPICAL at 08:02

## 2020-01-01 RX ADMIN — HEPARIN SODIUM 5000 UNITS: 5000 INJECTION, SOLUTION INTRAVENOUS; SUBCUTANEOUS at 03:01

## 2020-01-01 RX ADMIN — HEPARIN SODIUM 5000 UNITS: 5000 INJECTION, SOLUTION INTRAVENOUS; SUBCUTANEOUS at 09:01

## 2020-01-01 RX ADMIN — LORAZEPAM 0.5 MG: 2 INJECTION, SOLUTION INTRAMUSCULAR; INTRAVENOUS at 09:02

## 2020-01-01 RX ADMIN — ONDANSETRON 4 MG: 2 INJECTION INTRAMUSCULAR; INTRAVENOUS at 08:01

## 2020-01-01 RX ADMIN — CARVEDILOL 12.5 MG: 6.25 TABLET, FILM COATED ORAL at 09:01

## 2020-01-01 RX ADMIN — HYDRALAZINE HYDROCHLORIDE 75 MG: 25 TABLET, FILM COATED ORAL at 06:01

## 2020-01-01 RX ADMIN — HEPARIN SODIUM 5000 UNITS: 5000 INJECTION, SOLUTION INTRAVENOUS; SUBCUTANEOUS at 05:01

## 2020-01-01 RX ADMIN — ISOSORBIDE DINITRATE 40 MG: 20 TABLET ORAL at 09:01

## 2020-01-01 RX ADMIN — TELMISARTAN 40 MG: 20 TABLET ORAL at 08:01

## 2020-01-01 RX ADMIN — HEPARIN SODIUM 5000 UNITS: 5000 INJECTION, SOLUTION INTRAVENOUS; SUBCUTANEOUS at 04:01

## 2020-01-01 RX ADMIN — HYDRALAZINE HYDROCHLORIDE 10 MG: 20 INJECTION INTRAMUSCULAR; INTRAVENOUS at 07:02

## 2020-01-01 RX ADMIN — HEPARIN SODIUM 5000 UNITS: 5000 INJECTION, SOLUTION INTRAVENOUS; SUBCUTANEOUS at 09:02

## 2020-01-01 RX ADMIN — AMLODIPINE BESYLATE 5 MG: 5 TABLET ORAL at 09:01

## 2020-01-01 RX ADMIN — ASPIRIN 81 MG: 81 TABLET, COATED ORAL at 08:01

## 2020-01-01 RX ADMIN — FOLIC ACID 1 MG: 1 TABLET ORAL at 09:02

## 2020-01-01 RX ADMIN — HYDRALAZINE HYDROCHLORIDE 75 MG: 25 TABLET, FILM COATED ORAL at 09:01

## 2020-01-01 RX ADMIN — LEVOTHYROXINE SODIUM 100 MCG: 100 TABLET ORAL at 06:01

## 2020-01-01 RX ADMIN — CARVEDILOL 12.5 MG: 6.25 TABLET, FILM COATED ORAL at 10:02

## 2020-01-01 RX ADMIN — CARVEDILOL 12.5 MG: 6.25 TABLET, FILM COATED ORAL at 08:01

## 2020-01-01 RX ADMIN — MUPIROCIN: 20 OINTMENT TOPICAL at 12:02

## 2020-01-01 RX ADMIN — OYSTER SHELL CALCIUM WITH VITAMIN D 1 TABLET: 500; 200 TABLET, FILM COATED ORAL at 09:01

## 2020-01-01 RX ADMIN — Medication 4 MG: at 01:01

## 2020-01-01 RX ADMIN — ISOSORBIDE DINITRATE 40 MG: 20 TABLET ORAL at 08:01

## 2020-01-01 RX ADMIN — LEVALBUTEROL HYDROCHLORIDE 0.63 MG: 0.63 SOLUTION RESPIRATORY (INHALATION) at 07:01

## 2020-01-01 RX ADMIN — FOLIC ACID 1 MG: 1 TABLET ORAL at 09:01

## 2020-01-01 RX ADMIN — CARVEDILOL 6.25 MG: 6.25 TABLET, FILM COATED ORAL at 08:01

## 2020-01-01 RX ADMIN — Medication 1 EACH: at 02:02

## 2020-01-01 RX ADMIN — HYDRALAZINE HYDROCHLORIDE 10 MG: 20 INJECTION INTRAMUSCULAR; INTRAVENOUS at 03:01

## 2020-01-01 RX ADMIN — LEVALBUTEROL HYDROCHLORIDE 0.63 MG: 0.63 SOLUTION RESPIRATORY (INHALATION) at 08:02

## 2020-01-01 RX ADMIN — HEPARIN SODIUM 5000 UNITS: 5000 INJECTION, SOLUTION INTRAVENOUS; SUBCUTANEOUS at 05:02

## 2020-01-01 RX ADMIN — HEPARIN SODIUM 4000 UNITS: 1000 INJECTION, SOLUTION INTRAVENOUS; SUBCUTANEOUS at 03:02

## 2020-01-01 RX ADMIN — ACETAMINOPHEN 650 MG: 325 TABLET ORAL at 12:01

## 2020-01-01 RX ADMIN — OYSTER SHELL CALCIUM WITH VITAMIN D 1 TABLET: 500; 200 TABLET, FILM COATED ORAL at 08:01

## 2020-01-01 RX ADMIN — CALCITRIOL CAPSULES 0.25 MCG 0.25 MCG: 0.25 CAPSULE ORAL at 09:02

## 2020-01-01 RX ADMIN — METOLAZONE 2.5 MG: 2.5 TABLET ORAL at 09:02

## 2020-01-01 RX ADMIN — ISOSORBIDE DINITRATE 40 MG: 20 TABLET ORAL at 05:01

## 2020-01-01 RX ADMIN — HYDRALAZINE HYDROCHLORIDE 100 MG: 25 TABLET, FILM COATED ORAL at 04:01

## 2020-01-01 RX ADMIN — HYDRALAZINE HYDROCHLORIDE 100 MG: 25 TABLET, FILM COATED ORAL at 05:01

## 2020-01-01 RX ADMIN — TORSEMIDE 20 MG: 20 TABLET ORAL at 09:01

## 2020-01-01 RX ADMIN — HEPARIN SODIUM 5000 UNITS: 5000 INJECTION, SOLUTION INTRAVENOUS; SUBCUTANEOUS at 06:01

## 2020-01-01 RX ADMIN — HYDRALAZINE HYDROCHLORIDE 100 MG: 25 TABLET, FILM COATED ORAL at 12:01

## 2020-01-01 RX ADMIN — DOCUSATE SODIUM 100 MG: 100 CAPSULE, LIQUID FILLED ORAL at 10:02

## 2020-01-01 RX ADMIN — FOLIC ACID TAB 400 MCG 800 MCG: 400 TAB at 10:01

## 2020-01-01 RX ADMIN — MORPHINE SULFATE 2 MG: 2 INJECTION, SOLUTION INTRAMUSCULAR; INTRAVENOUS at 08:02

## 2020-01-01 RX ADMIN — MORPHINE SULFATE 2 MG: 2 INJECTION, SOLUTION INTRAMUSCULAR; INTRAVENOUS at 12:02

## 2020-01-01 RX ADMIN — HYDRALAZINE HYDROCHLORIDE 10 MG: 20 INJECTION INTRAMUSCULAR; INTRAVENOUS at 04:01

## 2020-01-01 RX ADMIN — CARVEDILOL 6.25 MG: 6.25 TABLET, FILM COATED ORAL at 11:01

## 2020-01-01 RX ADMIN — FUROSEMIDE 80 MG: 10 INJECTION, SOLUTION INTRAMUSCULAR; INTRAVENOUS at 08:01

## 2020-01-01 RX ADMIN — MORPHINE SULFATE 1 MG/HR: 10 INJECTION, SOLUTION INTRAMUSCULAR; INTRAVENOUS at 10:02

## 2020-01-01 RX ADMIN — CALCIUM CARBONATE 500 MG (1,250 MG)-VITAMIN D3 200 UNIT TABLET 1 TABLET: at 10:01

## 2020-01-01 RX ADMIN — HEPARIN SODIUM 4000 UNITS: 1000 INJECTION, SOLUTION INTRAVENOUS; SUBCUTANEOUS at 04:02

## 2020-01-01 RX ADMIN — HYDRALAZINE HYDROCHLORIDE 10 MG: 20 INJECTION INTRAMUSCULAR; INTRAVENOUS at 11:02

## 2020-01-01 RX ADMIN — HYDRALAZINE HYDROCHLORIDE 75 MG: 25 TABLET, FILM COATED ORAL at 05:01

## 2020-01-01 RX ADMIN — MORPHINE SULFATE 2 MG: 2 INJECTION, SOLUTION INTRAMUSCULAR; INTRAVENOUS at 02:02

## 2020-01-01 RX ADMIN — MUPIROCIN: 20 OINTMENT TOPICAL at 10:02

## 2020-01-01 RX ADMIN — METOLAZONE 2.5 MG: 2.5 TABLET ORAL at 01:01

## 2020-01-01 RX ADMIN — DOCUSATE SODIUM 100 MG: 100 CAPSULE, LIQUID FILLED ORAL at 10:01

## 2020-01-01 RX ADMIN — BENZONATATE 100 MG: 100 CAPSULE ORAL at 05:01

## 2020-01-01 RX ADMIN — LEVOTHYROXINE SODIUM 100 MCG: 100 TABLET ORAL at 06:02

## 2020-01-01 RX ADMIN — NITROGLYCERIN 20 MCG/MIN: 200 INJECTION, SOLUTION INTRAVENOUS at 03:01

## 2020-01-01 RX ADMIN — HYDRALAZINE HYDROCHLORIDE 10 MG: 20 INJECTION INTRAMUSCULAR; INTRAVENOUS at 08:01

## 2020-01-01 RX ADMIN — FUROSEMIDE 20 MG: 10 INJECTION, SOLUTION INTRAMUSCULAR; INTRAVENOUS at 02:01

## 2020-01-01 RX ADMIN — HEPARIN SODIUM 5000 UNITS: 5000 INJECTION, SOLUTION INTRAVENOUS; SUBCUTANEOUS at 12:01

## 2020-01-01 RX ADMIN — HYDRALAZINE HYDROCHLORIDE 100 MG: 25 TABLET, FILM COATED ORAL at 08:01

## 2020-01-01 RX ADMIN — FUROSEMIDE 20 MG: 10 INJECTION, SOLUTION INTRAMUSCULAR; INTRAVENOUS at 09:01

## 2020-01-01 RX ADMIN — PRAVASTATIN SODIUM 20 MG: 10 TABLET ORAL at 10:01

## 2020-01-01 RX ADMIN — HYDRALAZINE HYDROCHLORIDE 100 MG: 25 TABLET, FILM COATED ORAL at 05:02

## 2020-01-01 RX ADMIN — FOLIC ACID TAB 400 MCG 800 MCG: 400 TAB at 08:01

## 2020-01-01 RX ADMIN — HYDRALAZINE HYDROCHLORIDE 100 MG: 25 TABLET, FILM COATED ORAL at 11:01

## 2020-01-01 RX ADMIN — HYDRALAZINE HYDROCHLORIDE 75 MG: 25 TABLET, FILM COATED ORAL at 10:01

## 2020-01-01 RX ADMIN — ASPIRIN 81 MG: 81 TABLET, COATED ORAL at 09:01

## 2020-01-01 RX ADMIN — HEPARIN SODIUM 5000 UNITS: 5000 INJECTION, SOLUTION INTRAVENOUS; SUBCUTANEOUS at 01:01

## 2020-01-01 RX ADMIN — BENZONATATE 100 MG: 100 CAPSULE ORAL at 04:02

## 2020-01-01 RX ADMIN — MORPHINE SULFATE 1 MG: 2 INJECTION, SOLUTION INTRAMUSCULAR; INTRAVENOUS at 04:02

## 2020-01-01 RX ADMIN — FOLIC ACID TAB 400 MCG 800 MCG: 400 TAB at 09:01

## 2020-01-01 RX ADMIN — FUROSEMIDE 20 MG: 10 INJECTION, SOLUTION INTRAMUSCULAR; INTRAVENOUS at 05:01

## 2020-01-01 RX ADMIN — ISOSORBIDE DINITRATE 40 MG: 20 TABLET ORAL at 02:01

## 2020-01-01 RX ADMIN — NITROGLYCERIN 40 MCG/MIN: 20 INJECTION INTRAVENOUS at 09:01

## 2020-01-03 NOTE — PROGRESS NOTES
SUBJECTIVE:    Patient ID: Izabella Fulton is a 86 y.o. female.    Chief Complaint: Follow-up (ER, no bottles// SW)    Pt here for ER f/u- seen in ER on 12/27 with c/o's of left rib cage pain. Pt had fallen 5 days earlier. Found to have left rib 3rd thru 10th rib fractures with moderate left pleural effusion. Pt reports overall she's been feeling pretty good since ER discharge. Was given IS to use and ER ordered  OT and PT so walking daily. States pain is fairly well controlled, hurts worse first thing in AM- only taking tylenol for pain. Pt denies any change in her baseline SOB, no cough/sputum/hemoptysis. No fever/chills.  Brings in BP log from  with BP ranging 140-160/70's      Lab Visit on 01/02/2020   Component Date Value Ref Range Status    WBC 01/02/2020 7.17  3.90 - 12.70 K/uL Final    RBC 01/02/2020 3.91* 4.00 - 5.40 M/uL Final    Hemoglobin 01/02/2020 12.0  12.0 - 16.0 g/dL Final    Hematocrit 01/02/2020 37.9  37.0 - 48.5 % Final    Mean Corpuscular Volume 01/02/2020 97  82 - 98 fL Final    Mean Corpuscular Hemoglobin 01/02/2020 30.7  27.0 - 31.0 pg Final    Mean Corpuscular Hemoglobin Conc 01/02/2020 31.7* 32.0 - 36.0 g/dL Final    RDW 01/02/2020 14.9* 11.5 - 14.5 % Final    Platelets 01/02/2020 322  150 - 350 K/uL Final    MPV 01/02/2020 10.2  9.2 - 12.9 fL Final    Immature Granulocytes 01/02/2020 0.4  0.0 - 0.5 % Final    Gran # (ANC) 01/02/2020 5.1  1.8 - 7.7 K/uL Final    Immature Grans (Abs) 01/02/2020 0.03  0.00 - 0.04 K/uL Final    Lymph # 01/02/2020 1.0  1.0 - 4.8 K/uL Final    Mono # 01/02/2020 0.9  0.3 - 1.0 K/uL Final    Eos # 01/02/2020 0.1  0.0 - 0.5 K/uL Final    Baso # 01/02/2020 0.05  0.00 - 0.20 K/uL Final    nRBC 01/02/2020 0  0 /100 WBC Final    Gran% 01/02/2020 70.6  38.0 - 73.0 % Final    Lymph% 01/02/2020 14.2* 18.0 - 48.0 % Final    Mono% 01/02/2020 12.1  4.0 - 15.0 % Final    Eosinophil% 01/02/2020 2.0  0.0 - 8.0 % Final    Basophil% 01/02/2020 0.7  0.0  - 1.9 % Final    Differential Method 01/02/2020 Automated   Final   Admission on 12/26/2019, Discharged on 12/26/2019   Component Date Value Ref Range Status    WBC 12/26/2019 9.06  3.90 - 12.70 K/uL Final    RBC 12/26/2019 3.87* 4.00 - 5.40 M/uL Final    Hemoglobin 12/26/2019 12.3  12.0 - 16.0 g/dL Final    Hematocrit 12/26/2019 37.4  37.0 - 48.5 % Final    Mean Corpuscular Volume 12/26/2019 97  82 - 98 fL Final    Mean Corpuscular Hemoglobin 12/26/2019 31.8* 27.0 - 31.0 pg Final    Mean Corpuscular Hemoglobin Conc 12/26/2019 32.9  32.0 - 36.0 g/dL Final    RDW 12/26/2019 14.6* 11.5 - 14.5 % Final    Platelets 12/26/2019 203  150 - 350 K/uL Final    MPV 12/26/2019 10.6  9.2 - 12.9 fL Final    Gran # (ANC) 12/26/2019 6.9  1.8 - 7.7 K/uL Final    Immature Grans (Abs) 12/26/2019 0.05* 0.00 - 0.04 K/uL Final    Lymph # 12/26/2019 0.7* 1.0 - 4.8 K/uL Final    Mono # 12/26/2019 1.3* 0.3 - 1.0 K/uL Final    Eos # 12/26/2019 0.1  0.0 - 0.5 K/uL Final    Baso # 12/26/2019 0.03  0.00 - 0.20 K/uL Final    nRBC 12/26/2019 0  0 /100 WBC Final    Gran% 12/26/2019 75.8* 38.0 - 73.0 % Final    Lymph% 12/26/2019 8.2* 18.0 - 48.0 % Final    Mono% 12/26/2019 14.5  4.0 - 15.0 % Final    Eosinophil% 12/26/2019 0.6  0.0 - 8.0 % Final    Basophil% 12/26/2019 0.3  0.0 - 1.9 % Final    Differential Method 12/26/2019 Automated   Final    Sodium 12/26/2019 140  136 - 145 mmol/L Final    Potassium 12/26/2019 4.3  3.5 - 5.1 mmol/L Final    Chloride 12/26/2019 107  95 - 110 mmol/L Final    CO2 12/26/2019 21* 23 - 29 mmol/L Final    Glucose 12/26/2019 114* 70 - 110 mg/dL Final    BUN, Bld 12/26/2019 53* 8 - 23 mg/dL Final    Creatinine 12/26/2019 2.3* 0.5 - 1.4 mg/dL Final    Calcium 12/26/2019 9.3  8.7 - 10.5 mg/dL Final    Total Protein 12/26/2019 6.8  6.0 - 8.4 g/dL Final    Albumin 12/26/2019 3.2* 3.5 - 5.2 g/dL Final    Total Bilirubin 12/26/2019 0.3  0.1 - 1.0 mg/dL Final    Alkaline Phosphatase  12/26/2019 51* 55 - 135 U/L Final    AST 12/26/2019 39  10 - 40 U/L Final    ALT 12/26/2019 35  10 - 44 U/L Final    Anion Gap 12/26/2019 12  8 - 16 mmol/L Final    eGFR if  12/26/2019 22* >60 mL/min/1.73 m^2 Final    eGFR if non African American 12/26/2019 19* >60 mL/min/1.73 m^2 Final    Lipase 12/26/2019 30  4 - 60 U/L Final    ABO 12/26/2019 A   Final    Rh Type 12/26/2019 POS   Final    Indirect Ward 12/26/2019 NEG   Final   Orders Only on 09/19/2019   Component Date Value Ref Range Status    Cholesterol 09/19/2019 154  <200 mg/dL Final    HDL 09/19/2019 68  >50 mg/dL Final    Triglycerides 09/19/2019 60  <150 mg/dL Final    LDL Cholesterol 09/19/2019 72  mg/dL (calc) Final    Hdl/Cholesterol Ratio 09/19/2019 2.3  <5.0 (calc) Final    Non HDL Chol. (LDL+VLDL) 09/19/2019 86  <130 mg/dL (calc) Final    Creatinine, Random Ur 09/19/2019 67  20 - 275 mg/dL Final    Microalb, Ur 09/19/2019 13.1  See Note: mg/dL Final    Microalb Creat Ratio 09/19/2019 196* <30 mcg/mg creat Final    Glucose 09/19/2019 96  65 - 99 mg/dL Final    BUN, Bld 09/19/2019 56* 7 - 25 mg/dL Final    Creatinine 09/19/2019 1.85* 0.60 - 0.88 mg/dL Final    eGFR if non African American 09/19/2019 24* > OR = 60 mL/min/1.73m2 Final    eGFR if African American 09/19/2019 28* > OR = 60 mL/min/1.73m2 Final    BUN/Creatinine Ratio 09/19/2019 30* 6 - 22 (calc) Final    Sodium 09/19/2019 142  135 - 146 mmol/L Final    Potassium 09/19/2019 4.2  3.5 - 5.3 mmol/L Final    Chloride 09/19/2019 106  98 - 110 mmol/L Final    CO2 09/19/2019 24  20 - 32 mmol/L Final    Calcium 09/19/2019 9.3  8.6 - 10.4 mg/dL Final    Total Protein 09/19/2019 7.1  6.1 - 8.1 g/dL Final    Albumin 09/19/2019 3.9  3.6 - 5.1 g/dL Final    Globulin, Total 09/19/2019 3.2  1.9 - 3.7 g/dL (calc) Final    Albumin/Globulin Ratio 09/19/2019 1.2  1.0 - 2.5 (calc) Final    Total Bilirubin 09/19/2019 0.5  0.2 - 1.2 mg/dL Final    Alkaline  Phosphatase 09/19/2019 57  33 - 130 U/L Final    AST 09/19/2019 21  10 - 35 U/L Final    ALT 09/19/2019 14  6 - 29 U/L Final    Color, UA 09/19/2019 YELLOW  YELLOW Final    Appearance, UA 09/19/2019 CLEAR  CLEAR Final    Specific Gravity, UA 09/19/2019 1.016  1.001 - 1.035 Final    pH, UA 09/19/2019 6.5  5.0 - 8.0 Final    Glucose, UA 09/19/2019 NEGATIVE  NEGATIVE Final    Bilirubin, UA 09/19/2019 NEGATIVE  NEGATIVE Final    Ketones, UA 09/19/2019 NEGATIVE  NEGATIVE Final    Occult Blood UA 09/19/2019 NEGATIVE  NEGATIVE Final    Protein, UA 09/19/2019 1+* NEGATIVE Final    Nitrite, UA 09/19/2019 NEGATIVE  NEGATIVE Final    Leukocytes, UA 09/19/2019 3+* NEGATIVE Final    WBC Casts, UA 09/19/2019 10-20* < OR = 5 /HPF Final    RBC Casts, UA 09/19/2019 NONE SEEN  < OR = 2 /HPF Final    Squam Epithel, UA 09/19/2019 0-5  < OR = 5 /HPF Final    Bacteria, UA 09/19/2019 MODERATE* NONE SEEN /HPF Final    Hyaline Casts, UA 09/19/2019 NONE SEEN  NONE SEEN /LPF Final    Reflexive Urine Culture 09/19/2019 CULTURE INDICATED - RESULTS TO FOLLOW   Final    WBC 09/19/2019 6.4  3.8 - 10.8 Thousand/uL Final    RBC 09/19/2019 3.73* 3.80 - 5.10 Million/uL Final    Hemoglobin 09/19/2019 11.8  11.7 - 15.5 g/dL Final    Hematocrit 09/19/2019 36.6  35.0 - 45.0 % Final    Mean Corpuscular Volume 09/19/2019 98.1  80.0 - 100.0 fL Final    Mean Corpuscular Hemoglobin 09/19/2019 31.6  27.0 - 33.0 pg Final    Mean Corpuscular Hemoglobin Conc 09/19/2019 32.2  32.0 - 36.0 g/dL Final    RDW 09/19/2019 13.2  11.0 - 15.0 % Final    Platelets 09/19/2019 268  140 - 400 Thousand/uL Final    MPV 09/19/2019 11.2  7.5 - 12.5 fL Final    Neutrophils Absolute 09/19/2019 4,314  1,500 - 7,800 cells/uL Final    Lymph # 09/19/2019 986  850 - 3,900 cells/uL Final    Mono # 09/19/2019 870  200 - 950 cells/uL Final    Eos # 09/19/2019 179  15 - 500 cells/uL Final    Baso # 09/19/2019 51  0 - 200 cells/uL Final    Neutrophils  Relative 09/19/2019 67.4  % Final    Lymph% 09/19/2019 15.4  % Final    Mono% 09/19/2019 13.6  % Final    Eosinophil% 09/19/2019 2.8  % Final    Basophil% 09/19/2019 0.8  % Final    TSH 09/19/2019 0.90  0.40 - 4.50 mIU/L Final    Urine Culture, Routine 09/19/2019    Final       Past Medical History:   Diagnosis Date    Anticoagulant long-term use     baby aspirin    Hypertension     Thyroid disease     TIA (transient ischemic attack)     Ulcerative colitis     Wears hearing aid      Past Surgical History:   Procedure Laterality Date    COLON SURGERY  05/17/12    exp lap, perfered colon    EYE SURGERY      left eye cataract     Family History   Problem Relation Age of Onset    Alzheimer's disease Mother     Heart disease Father        Marital Status:   Alcohol History:  reports that she does not drink alcohol.  Tobacco History:  reports that she has never smoked. She has never used smokeless tobacco.  Drug History:  reports that she does not use drugs.    Review of patient's allergies indicates:  No Known Allergies    Current Outpatient Medications:     amLODIPine (NORVASC) 5 MG tablet, Take 1 tablet (5 mg total) by mouth once daily., Disp: 90 tablet, Rfl: 1    aspirin (ECOTRIN) 81 MG EC tablet, Take 81 mg by mouth once daily.  , Disp: , Rfl:     calcitRIOL (ROCALTROL) 0.25 MCG Cap, Take 0.25 mcg by mouth twice a week. Sunday and Thursday, Disp: , Rfl:     calcium-vitamin D 500-125 mg-unit tablet, Take 1 tablet by mouth 2 (two) times daily.  , Disp: , Rfl:     denosumab (PROLIA) 60 mg/mL Syrg, Inject 60 mg into the skin every 6 (six) months. , Disp: , Rfl:     DOCOSAHEXANOIC ACID/VIT C/LUT (EYE HEALTH ORAL), Take 1 tablet by mouth 2 (two) times daily.  , Disp: , Rfl:     fish oil-omega-3 fatty acids 300-1,000 mg capsule, Take 1 g by mouth once daily.  , Disp: , Rfl:     folic acid (FOLVITE) 800 MCG tablet, Take 800 mcg by mouth once daily.  , Disp: , Rfl:     hydrALAZINE (APRESOLINE)  "50 MG tablet, Take 1.5 tablets (75 mg total) by mouth 3 (three) times daily., Disp: 270 tablet, Rfl: 1    levothyroxine (SYNTHROID) 100 MCG tablet, Take 1 tablet (100 mcg total) by mouth once daily., Disp: 90 tablet, Rfl: 1    metoprolol tartrate (LOPRESSOR) 50 MG tablet, Take 50 mg by mouth 2 (two) times daily. , Disp: , Rfl:     neomycin-polymyxin-dexamethasone (MAXITROL) 3.5mg/mL-10,000 unit/mL-0.1 % DrpS, Place 1 drop into both eyes every 8 (eight) hours., Disp: , Rfl:     pravastatin (PRAVACHOL) 20 MG tablet, Take 1 tablet (20 mg total) by mouth once daily., Disp: 90 tablet, Rfl: 1    telmisartan (MICARDIS) 40 MG Tab, Take 40 mg by mouth once daily., Disp: , Rfl:     torsemide (DEMADEX) 10 MG Tab, Take 1 tablet (10 mg total) by mouth once daily. (Patient taking differently: Take 10 mg by mouth twice a week. Tuesday and Friday), Disp: 30 tablet, Rfl: 0    Review of Systems   Constitutional: Negative for chills and fever.   HENT: Negative for congestion, ear pain, postnasal drip, rhinorrhea, sinus pain and sore throat.    Respiratory: Negative for cough, shortness of breath and wheezing.    Cardiovascular: Positive for chest pain (left rib pain, improving) and leg swelling (was improved with compression socks but now can't put them on d/t rib pain).   Gastrointestinal: Negative for nausea and vomiting.   Genitourinary: Negative for dysuria and hematuria.   Skin: Negative for rash.   Neurological: Negative for dizziness and headaches.          Objective:      Vitals:    01/03/20 1014 01/03/20 1017   BP: (!) 172/66 (!) 170/60   Pulse: 60    Weight: 50.8 kg (112 lb)    Height: 4' 9.25" (1.454 m)      Physical Exam   Constitutional: She is oriented to person, place, and time. She appears well-developed and well-nourished. No distress.   Elderly frail WF, +kyphotic     Cardiovascular: Normal rate and regular rhythm. Exam reveals no gallop and no friction rub.   No murmur heard.  Pulmonary/Chest: Effort normal. " She has no wheezes. She has no rales.   Slightly diminished left base posteriorly otherwise clear       Musculoskeletal: She exhibits edema (2+ left lower calf/ankle edema, 1-2+ edema right ankle).        Lumbar back: She exhibits normal range of motion, no tenderness, no bony tenderness, no pain and no spasm.   Negative bilat SLR   Neurological: She is alert and oriented to person, place, and time. She has normal strength. No sensory deficit. Gait normal.   Skin: No rash noted.         Assessment:       1. Closed fracture of multiple ribs of left side with routine healing, subsequent encounter    2. Pleural effusion on left    3. Hypertensive kidney disease with stage 4 chronic kidney disease    4. Fall, subsequent encounter           Plan:       Closed fracture of multiple ribs of left side with routine healing, subsequent encounter  -     Cancel: X-Ray Chest PA And Lateral; Future; Expected date: 01/27/2020  -     X-Ray Chest PA And Lateral; Future; Expected date: 01/03/2020  -reviewed recent f/u CXR- effusion stable and clinically doing well. Cautioned to call me for any worseing in SOB or development of cough, hemoptysis, fever    Pleural effusion on left  -     Cancel: X-Ray Chest PA And Lateral; Future; Expected date: 01/27/2020  -     X-Ray Chest PA And Lateral; Future; Expected date: 01/03/2020  -stable on CXR- recommended repeating CXR in 2-3 weeks to ensure resolution however cautioned to call before then if she becomes more SOB or develops orthopnea    Hypertensive kidney disease with stage 4 chronic kidney disease  -     hydrALAZINE (APRESOLINE) 50 MG tablet; Take 1.5 tablets (75 mg total) by mouth 3 (three) times daily.  Dispense: 270 tablet; Refill: 1  -BP once again elevated at visit, somewhat better on  BP logs; given CKD recommended increasing hydralazine to 75mg TID for goal BP <150/90    Fall, subsequent encounter  -     WALKER FOR HOME USE  -pt requesting rollator for home use        Follow up  if symptoms worsen or fail to improve, for as scheduled.        1/5/2020 Corinne Fletcher NP

## 2020-01-06 NOTE — TELEPHONE ENCOUNTER
----- Message from Steve Mitchell MD sent at 1/4/2020  3:30 PM CST -----  Call patient.  She is not anemic hematocrit stable at 37.9.  White blood cells look normal also.  Continue current medications

## 2020-01-13 NOTE — TELEPHONE ENCOUNTER
----- Message from Steve Mitchell MD sent at 1/11/2020  9:00 PM CST -----  Call patient.  Anemia is stable hematocrit 36.7 continue current medication.  Repeat CBC CMP and lipids in 6 months

## 2020-01-14 NOTE — TELEPHONE ENCOUNTER
----- Message from Sunil Madrigal sent at 1/14/2020 11:26 AM CST -----  Pt left lung seem to have gotten worst since last week and pt is a little short of breath  [phone]

## 2020-01-14 NOTE — TELEPHONE ENCOUNTER
Recommend repeating CXR today or tomorrpw (there is order already in) and recommend taking extra dose of lasix- can she come in for appt?

## 2020-01-14 NOTE — TELEPHONE ENCOUNTER
Pt has had a 8lb weight gain. More diminished breathe sounds on the left than last week. Ankles have a 2 cm increase in swelling. O2 92% pt is in a little distress but refuses to go to the hosp. Only take lasix 10mg twice a week.

## 2020-01-14 NOTE — TELEPHONE ENCOUNTER
----- Message from Sunil Madrigal sent at 1/14/2020 11:54 AM CST -----  Home health called back, weight increase is 8 pds and ankle increase 2 pds  Pt is on fluid pills torsemide   [facility] [phone]

## 2020-01-15 PROBLEM — I50.9 ACUTE EXACERBATION OF CHF (CONGESTIVE HEART FAILURE): Status: ACTIVE | Noted: 2020-01-01

## 2020-01-15 PROBLEM — E78.5 HYPERLIPIDEMIA, UNSPECIFIED: Status: ACTIVE | Noted: 2017-04-06

## 2020-01-15 PROBLEM — R79.89 ELEVATED TROPONIN: Status: ACTIVE | Noted: 2020-01-01

## 2020-01-15 PROBLEM — I50.31 ACUTE DIASTOLIC CONGESTIVE HEART FAILURE: Status: ACTIVE | Noted: 2018-08-13

## 2020-01-15 PROBLEM — I16.1 HYPERTENSIVE EMERGENCY: Status: ACTIVE | Noted: 2020-01-01

## 2020-01-15 PROBLEM — R26.9 GAIT DISORDER: Status: ACTIVE | Noted: 2017-04-13

## 2020-01-15 PROBLEM — M80.00XS AGE-RELATED OSTEOPOROSIS WITH CURRENT PATHOLOGICAL FRACTURE, UNSPECIFIED SITE, SEQUELA: Status: ACTIVE | Noted: 2018-09-11

## 2020-01-15 PROBLEM — J90 PLEURAL EFFUSION: Status: ACTIVE | Noted: 2020-01-01

## 2020-01-15 PROBLEM — D03.59 MELANOMA IN SITU OF BACK: Status: ACTIVE | Noted: 2017-08-15

## 2020-01-15 PROBLEM — M51.36 DEGENERATION OF LUMBAR INTERVERTEBRAL DISC: Status: ACTIVE | Noted: 2020-01-01

## 2020-01-15 PROBLEM — M15.9 PRIMARY OSTEOARTHRITIS INVOLVING MULTIPLE JOINTS: Status: ACTIVE | Noted: 2018-09-11

## 2020-01-15 PROBLEM — E87.70 HYPERVOLEMIA: Status: ACTIVE | Noted: 2020-01-01

## 2020-01-15 NOTE — ASSESSMENT & PLAN NOTE
Will will start the patient on isosorbide and hydralazine combination  Will hold the metoprolol as under acute exacerbation of congestive heart failure.  We will review patient's respiratory status tomorrow and will consider resuming beta-blocker  The end-organ damage and this patient may be the heart considering that elevated troponins.

## 2020-01-15 NOTE — PROGRESS NOTES
SUBJECTIVE:    Patient ID: Izabella Fulton is a 86 y.o. female.    Chief Complaint: Weight Gain (no bottles / vm denied Tetanus, PNA and Shingles vaccine today) and Shortness of Breath    Pt presents with increased SOB and weight gain for the last few days. Seen in ER on 12/27 for mechanical fall resulting in multiple left sided rib fractures and a moderate pleural effusion. Seen in office on 1/5 with CXR that showed effusion unchanged. At the time, she had no c/o SOB or difficulties breathing. Yesterday called reporting she is very short of breath and had gained 8 lbs. CXR yesterday remains unchanged. Today she has gained a total of 12lbs since visit on 1/5. SpO2 96-97%. Having difficulty getting around and legs are swelling. Takes Torsemide twice weekly and took an extra dose yesterday with no improvement. Has CKD4, GFR on 12/26 was 19.      Lab Visit on 01/09/2020   Component Date Value Ref Range Status    WBC 01/09/2020 9.47  3.90 - 12.70 K/uL Final    RBC 01/09/2020 3.81* 4.00 - 5.40 M/uL Final    Hemoglobin 01/09/2020 11.8* 12.0 - 16.0 g/dL Final    Hematocrit 01/09/2020 36.7* 37.0 - 48.5 % Final    Mean Corpuscular Volume 01/09/2020 96  82 - 98 fL Final    Mean Corpuscular Hemoglobin 01/09/2020 31.0  27.0 - 31.0 pg Final    Mean Corpuscular Hemoglobin Conc 01/09/2020 32.2  32.0 - 36.0 g/dL Final    RDW 01/09/2020 15.4* 11.5 - 14.5 % Final    Platelets 01/09/2020 297  150 - 350 K/uL Final    MPV 01/09/2020 10.1  9.2 - 12.9 fL Final    Immature Granulocytes 01/09/2020 0.4  0.0 - 0.5 % Final    Gran # (ANC) 01/09/2020 7.4  1.8 - 7.7 K/uL Final    Immature Grans (Abs) 01/09/2020 0.04  0.00 - 0.04 K/uL Final    Lymph # 01/09/2020 0.8* 1.0 - 4.8 K/uL Final    Mono # 01/09/2020 0.9  0.3 - 1.0 K/uL Final    Eos # 01/09/2020 0.2  0.0 - 0.5 K/uL Final    Baso # 01/09/2020 0.05  0.00 - 0.20 K/uL Final    nRBC 01/09/2020 0  0 /100 WBC Final    Gran% 01/09/2020 78.3* 38.0 - 73.0 % Final    Lymph%  01/09/2020 8.8* 18.0 - 48.0 % Final    Mono% 01/09/2020 9.6  4.0 - 15.0 % Final    Eosinophil% 01/09/2020 2.4  0.0 - 8.0 % Final    Basophil% 01/09/2020 0.5  0.0 - 1.9 % Final    Differential Method 01/09/2020 Automated   Final   Lab Visit on 01/02/2020   Component Date Value Ref Range Status    WBC 01/02/2020 7.17  3.90 - 12.70 K/uL Final    RBC 01/02/2020 3.91* 4.00 - 5.40 M/uL Final    Hemoglobin 01/02/2020 12.0  12.0 - 16.0 g/dL Final    Hematocrit 01/02/2020 37.9  37.0 - 48.5 % Final    Mean Corpuscular Volume 01/02/2020 97  82 - 98 fL Final    Mean Corpuscular Hemoglobin 01/02/2020 30.7  27.0 - 31.0 pg Final    Mean Corpuscular Hemoglobin Conc 01/02/2020 31.7* 32.0 - 36.0 g/dL Final    RDW 01/02/2020 14.9* 11.5 - 14.5 % Final    Platelets 01/02/2020 322  150 - 350 K/uL Final    MPV 01/02/2020 10.2  9.2 - 12.9 fL Final    Immature Granulocytes 01/02/2020 0.4  0.0 - 0.5 % Final    Gran # (ANC) 01/02/2020 5.1  1.8 - 7.7 K/uL Final    Immature Grans (Abs) 01/02/2020 0.03  0.00 - 0.04 K/uL Final    Lymph # 01/02/2020 1.0  1.0 - 4.8 K/uL Final    Mono # 01/02/2020 0.9  0.3 - 1.0 K/uL Final    Eos # 01/02/2020 0.1  0.0 - 0.5 K/uL Final    Baso # 01/02/2020 0.05  0.00 - 0.20 K/uL Final    nRBC 01/02/2020 0  0 /100 WBC Final    Gran% 01/02/2020 70.6  38.0 - 73.0 % Final    Lymph% 01/02/2020 14.2* 18.0 - 48.0 % Final    Mono% 01/02/2020 12.1  4.0 - 15.0 % Final    Eosinophil% 01/02/2020 2.0  0.0 - 8.0 % Final    Basophil% 01/02/2020 0.7  0.0 - 1.9 % Final    Differential Method 01/02/2020 Automated   Final   Admission on 12/26/2019, Discharged on 12/26/2019   Component Date Value Ref Range Status    WBC 12/26/2019 9.06  3.90 - 12.70 K/uL Final    RBC 12/26/2019 3.87* 4.00 - 5.40 M/uL Final    Hemoglobin 12/26/2019 12.3  12.0 - 16.0 g/dL Final    Hematocrit 12/26/2019 37.4  37.0 - 48.5 % Final    Mean Corpuscular Volume 12/26/2019 97  82 - 98 fL Final    Mean Corpuscular Hemoglobin  12/26/2019 31.8* 27.0 - 31.0 pg Final    Mean Corpuscular Hemoglobin Conc 12/26/2019 32.9  32.0 - 36.0 g/dL Final    RDW 12/26/2019 14.6* 11.5 - 14.5 % Final    Platelets 12/26/2019 203  150 - 350 K/uL Final    MPV 12/26/2019 10.6  9.2 - 12.9 fL Final    Gran # (ANC) 12/26/2019 6.9  1.8 - 7.7 K/uL Final    Immature Grans (Abs) 12/26/2019 0.05* 0.00 - 0.04 K/uL Final    Lymph # 12/26/2019 0.7* 1.0 - 4.8 K/uL Final    Mono # 12/26/2019 1.3* 0.3 - 1.0 K/uL Final    Eos # 12/26/2019 0.1  0.0 - 0.5 K/uL Final    Baso # 12/26/2019 0.03  0.00 - 0.20 K/uL Final    nRBC 12/26/2019 0  0 /100 WBC Final    Gran% 12/26/2019 75.8* 38.0 - 73.0 % Final    Lymph% 12/26/2019 8.2* 18.0 - 48.0 % Final    Mono% 12/26/2019 14.5  4.0 - 15.0 % Final    Eosinophil% 12/26/2019 0.6  0.0 - 8.0 % Final    Basophil% 12/26/2019 0.3  0.0 - 1.9 % Final    Differential Method 12/26/2019 Automated   Final    Sodium 12/26/2019 140  136 - 145 mmol/L Final    Potassium 12/26/2019 4.3  3.5 - 5.1 mmol/L Final    Chloride 12/26/2019 107  95 - 110 mmol/L Final    CO2 12/26/2019 21* 23 - 29 mmol/L Final    Glucose 12/26/2019 114* 70 - 110 mg/dL Final    BUN, Bld 12/26/2019 53* 8 - 23 mg/dL Final    Creatinine 12/26/2019 2.3* 0.5 - 1.4 mg/dL Final    Calcium 12/26/2019 9.3  8.7 - 10.5 mg/dL Final    Total Protein 12/26/2019 6.8  6.0 - 8.4 g/dL Final    Albumin 12/26/2019 3.2* 3.5 - 5.2 g/dL Final    Total Bilirubin 12/26/2019 0.3  0.1 - 1.0 mg/dL Final    Alkaline Phosphatase 12/26/2019 51* 55 - 135 U/L Final    AST 12/26/2019 39  10 - 40 U/L Final    ALT 12/26/2019 35  10 - 44 U/L Final    Anion Gap 12/26/2019 12  8 - 16 mmol/L Final    eGFR if  12/26/2019 22* >60 mL/min/1.73 m^2 Final    eGFR if non African American 12/26/2019 19* >60 mL/min/1.73 m^2 Final    Lipase 12/26/2019 30  4 - 60 U/L Final    ABO 12/26/2019 A   Final    Rh Type 12/26/2019 POS   Final    Indirect Ward 12/26/2019 NEG   Final    Orders Only on 09/19/2019   Component Date Value Ref Range Status    Cholesterol 09/19/2019 154  <200 mg/dL Final    HDL 09/19/2019 68  >50 mg/dL Final    Triglycerides 09/19/2019 60  <150 mg/dL Final    LDL Cholesterol 09/19/2019 72  mg/dL (calc) Final    Hdl/Cholesterol Ratio 09/19/2019 2.3  <5.0 (calc) Final    Non HDL Chol. (LDL+VLDL) 09/19/2019 86  <130 mg/dL (calc) Final    Creatinine, Random Ur 09/19/2019 67  20 - 275 mg/dL Final    Microalb, Ur 09/19/2019 13.1  See Note: mg/dL Final    Microalb Creat Ratio 09/19/2019 196* <30 mcg/mg creat Final    Glucose 09/19/2019 96  65 - 99 mg/dL Final    BUN, Bld 09/19/2019 56* 7 - 25 mg/dL Final    Creatinine 09/19/2019 1.85* 0.60 - 0.88 mg/dL Final    eGFR if non African American 09/19/2019 24* > OR = 60 mL/min/1.73m2 Final    eGFR if African American 09/19/2019 28* > OR = 60 mL/min/1.73m2 Final    BUN/Creatinine Ratio 09/19/2019 30* 6 - 22 (calc) Final    Sodium 09/19/2019 142  135 - 146 mmol/L Final    Potassium 09/19/2019 4.2  3.5 - 5.3 mmol/L Final    Chloride 09/19/2019 106  98 - 110 mmol/L Final    CO2 09/19/2019 24  20 - 32 mmol/L Final    Calcium 09/19/2019 9.3  8.6 - 10.4 mg/dL Final    Total Protein 09/19/2019 7.1  6.1 - 8.1 g/dL Final    Albumin 09/19/2019 3.9  3.6 - 5.1 g/dL Final    Globulin, Total 09/19/2019 3.2  1.9 - 3.7 g/dL (calc) Final    Albumin/Globulin Ratio 09/19/2019 1.2  1.0 - 2.5 (calc) Final    Total Bilirubin 09/19/2019 0.5  0.2 - 1.2 mg/dL Final    Alkaline Phosphatase 09/19/2019 57  33 - 130 U/L Final    AST 09/19/2019 21  10 - 35 U/L Final    ALT 09/19/2019 14  6 - 29 U/L Final    Color, UA 09/19/2019 YELLOW  YELLOW Final    Appearance, UA 09/19/2019 CLEAR  CLEAR Final    Specific Gravity, UA 09/19/2019 1.016  1.001 - 1.035 Final    pH, UA 09/19/2019 6.5  5.0 - 8.0 Final    Glucose, UA 09/19/2019 NEGATIVE  NEGATIVE Final    Bilirubin, UA 09/19/2019 NEGATIVE  NEGATIVE Final    Ketones, UA 09/19/2019  NEGATIVE  NEGATIVE Final    Occult Blood UA 09/19/2019 NEGATIVE  NEGATIVE Final    Protein, UA 09/19/2019 1+* NEGATIVE Final    Nitrite, UA 09/19/2019 NEGATIVE  NEGATIVE Final    Leukocytes, UA 09/19/2019 3+* NEGATIVE Final    WBC Casts, UA 09/19/2019 10-20* < OR = 5 /HPF Final    RBC Casts, UA 09/19/2019 NONE SEEN  < OR = 2 /HPF Final    Squam Epithel, UA 09/19/2019 0-5  < OR = 5 /HPF Final    Bacteria, UA 09/19/2019 MODERATE* NONE SEEN /HPF Final    Hyaline Casts, UA 09/19/2019 NONE SEEN  NONE SEEN /LPF Final    Reflexive Urine Culture 09/19/2019 CULTURE INDICATED - RESULTS TO FOLLOW   Final    WBC 09/19/2019 6.4  3.8 - 10.8 Thousand/uL Final    RBC 09/19/2019 3.73* 3.80 - 5.10 Million/uL Final    Hemoglobin 09/19/2019 11.8  11.7 - 15.5 g/dL Final    Hematocrit 09/19/2019 36.6  35.0 - 45.0 % Final    Mean Corpuscular Volume 09/19/2019 98.1  80.0 - 100.0 fL Final    Mean Corpuscular Hemoglobin 09/19/2019 31.6  27.0 - 33.0 pg Final    Mean Corpuscular Hemoglobin Conc 09/19/2019 32.2  32.0 - 36.0 g/dL Final    RDW 09/19/2019 13.2  11.0 - 15.0 % Final    Platelets 09/19/2019 268  140 - 400 Thousand/uL Final    MPV 09/19/2019 11.2  7.5 - 12.5 fL Final    Neutrophils Absolute 09/19/2019 4,314  1,500 - 7,800 cells/uL Final    Lymph # 09/19/2019 986  850 - 3,900 cells/uL Final    Mono # 09/19/2019 870  200 - 950 cells/uL Final    Eos # 09/19/2019 179  15 - 500 cells/uL Final    Baso # 09/19/2019 51  0 - 200 cells/uL Final    Neutrophils Relative 09/19/2019 67.4  % Final    Lymph% 09/19/2019 15.4  % Final    Mono% 09/19/2019 13.6  % Final    Eosinophil% 09/19/2019 2.8  % Final    Basophil% 09/19/2019 0.8  % Final    TSH 09/19/2019 0.90  0.40 - 4.50 mIU/L Final    Urine Culture, Routine 09/19/2019    Final       Past Medical History:   Diagnosis Date    Anticoagulant long-term use     baby aspirin    Hypertension     Thyroid disease     TIA (transient ischemic attack)     Ulcerative  colitis     Wears hearing aid      Past Surgical History:   Procedure Laterality Date    COLON SURGERY  05/17/12    exp lap, perfered colon    EYE SURGERY      left eye cataract     Family History   Problem Relation Age of Onset    Alzheimer's disease Mother     Heart disease Father        Marital Status:   Alcohol History:  reports that she does not drink alcohol.  Tobacco History:  reports that she has never smoked. She has never used smokeless tobacco.  Drug History:  reports that she does not use drugs.    Review of patient's allergies indicates:  No Known Allergies    Current Outpatient Medications:     amLODIPine (NORVASC) 5 MG tablet, Take 1 tablet (5 mg total) by mouth once daily., Disp: 90 tablet, Rfl: 1    aspirin (ECOTRIN) 81 MG EC tablet, Take 81 mg by mouth once daily.  , Disp: , Rfl:     calcitRIOL (ROCALTROL) 0.25 MCG Cap, Take 0.25 mcg by mouth twice a week. Sunday and Thursday, Disp: , Rfl:     calcium-vitamin D 500-125 mg-unit tablet, Take 1 tablet by mouth 2 (two) times daily.  , Disp: , Rfl:     denosumab (PROLIA) 60 mg/mL Syrg, Inject 60 mg into the skin every 6 (six) months. , Disp: , Rfl:     DOCOSAHEXANOIC ACID/VIT C/LUT (EYE HEALTH ORAL), Take 1 tablet by mouth 2 (two) times daily.  , Disp: , Rfl:     fish oil-omega-3 fatty acids 300-1,000 mg capsule, Take 1 g by mouth once daily.  , Disp: , Rfl:     folic acid (FOLVITE) 800 MCG tablet, Take 800 mcg by mouth once daily.  , Disp: , Rfl:     hydrALAZINE (APRESOLINE) 50 MG tablet, Take 1.5 tablets (75 mg total) by mouth 3 (three) times daily., Disp: 270 tablet, Rfl: 1    levothyroxine (SYNTHROID) 100 MCG tablet, Take 1 tablet (100 mcg total) by mouth once daily., Disp: 90 tablet, Rfl: 1    metoprolol tartrate (LOPRESSOR) 50 MG tablet, Take 50 mg by mouth 2 (two) times daily. , Disp: , Rfl:     neomycin-polymyxin-dexamethasone (MAXITROL) 3.5mg/mL-10,000 unit/mL-0.1 % DrpS, Place 1 drop into both eyes every 8 (eight)  "hours., Disp: , Rfl:     pravastatin (PRAVACHOL) 20 MG tablet, Take 1 tablet (20 mg total) by mouth once daily., Disp: 90 tablet, Rfl: 1    telmisartan (MICARDIS) 40 MG Tab, Take 40 mg by mouth once daily., Disp: , Rfl:     torsemide (DEMADEX) 10 MG Tab, Take 1 tablet (10 mg total) by mouth once daily. (Patient taking differently: Take 10 mg by mouth twice a week. Tuesday and Friday), Disp: 30 tablet, Rfl: 0    Review of Systems   Constitutional: Positive for activity change, fatigue and unexpected weight change.   HENT: Negative.    Respiratory: Positive for shortness of breath.    Cardiovascular: Negative.    Gastrointestinal: Negative.    Genitourinary: Negative.    Neurological: Negative.           Objective:      Vitals:    01/15/20 1139   BP: 138/76   Pulse: 68   Weight: 56.4 kg (124 lb 6.4 oz)   Height: 4' 9" (1.448 m)     Body mass index is 26.92 kg/m².  Physical Exam   Constitutional: She is oriented to person, place, and time. She appears distressed (dificulty talking, trying to catch breath).   HENT:   Head: Normocephalic.   Eyes: Pupils are equal, round, and reactive to light.   Neck: Normal range of motion.   Cardiovascular: Normal rate and regular rhythm.   Pulmonary/Chest: Accessory muscle usage present. Tachypnea noted. She is in respiratory distress. She has decreased breath sounds in the left middle field and the left lower field.   Neurological: She is alert and oriented to person, place, and time.   Skin: Skin is warm and dry.         Assessment:       1. Pleural effusion on left    2. Shortness of breath         Plan:       Pleural effusion on left  - Unchanged per CXR yesterday    Shortness of breath  - Unable to catch breath. In apparent distress.   - Pt sent to ER, drove self. Spoke to [place] at [facility] for report.    Follow up for Pt to ER.            "

## 2020-01-15 NOTE — ED NOTES
Patient identifiers for Izabella Fulton checked and correct.  LOC: Patient is awake, alert, and aware of environment with an appropriate affect. Patient is oriented x 3 and speaking appropriately.  APPEARANCE: Patient resting comfortably and in no acute distress. Patient is clean and well groomed, patient's clothing is properly fastened.  SKIN: The skin is warm and dry. Patient has normal skin turgor and moist mucus membrances. Skin is intact; no bruising or breakdown noted. 2+ pitting edema Bilat LE   MUSKULOSKELETAL: Patient is moving all extremities well, no obvious deformities noted. Pulses intact. HX of falls, feels weak requires SBA for activity, short of breath   RESPIRATORY: Airway is open and patent. Respirations are spontaneous and non-labored with normal effort and rate. Increased shortness of breath past few days, recently FX 7 ribs with plural effusion, unable to speak through sentence using 02 per NC, wheezing heard at bedside, diminished right base, tachyphemia with labored breathing     CARDIAC: Patient has a normal rate and rhythm. No peripheral edema noted. Capillary refill < 3 seconds.  ABDOMEN: No distention noted. Bowel sounds active in all 4 quadrants. Soft and non-tender upon palpation.  NEUROLOGICAL: PERRL. Facial expression is symmetrical. Hand grasps are equal bilaterally. Normal sensation in all extremities when touched with finger.  Allergies reported: Review of patient's allergies indicates:  No Known Allergies

## 2020-01-15 NOTE — ASSESSMENT & PLAN NOTE
Will continue to monitor blood pressure  Currently blood pressure is 220/80  Patient received 10 mg hydralazine in the emergency room if the patient remains persistently elevated will consider giving another 10mg  Hydralazine  Will restart patient's hydralazine 75 p.o. and will add isosorbide dinitrate 40 mg 3 times a day  Will hold off on metoprolol because of the acute CHF

## 2020-01-15 NOTE — SUBJECTIVE & OBJECTIVE
Past Medical History:   Diagnosis Date    Anticoagulant long-term use     baby aspirin    Hypertension     Thyroid disease     TIA (transient ischemic attack)     Ulcerative colitis     Wears hearing aid        Past Surgical History:   Procedure Laterality Date    COLON SURGERY  05/17/12    exp lap, perfered colon    EYE SURGERY      left eye cataract       Review of patient's allergies indicates:  No Known Allergies    No current facility-administered medications on file prior to encounter.      Current Outpatient Medications on File Prior to Encounter   Medication Sig    amLODIPine (NORVASC) 5 MG tablet Take 1 tablet (5 mg total) by mouth once daily.    aspirin (ECOTRIN) 81 MG EC tablet Take 81 mg by mouth once daily.      calcitRIOL (ROCALTROL) 0.25 MCG Cap Take 0.25 mcg by mouth twice a week. Sunday and Thursday    calcium-vitamin D 500-125 mg-unit tablet Take 1 tablet by mouth 2 (two) times daily.      denosumab (PROLIA) 60 mg/mL Syrg Inject 60 mg into the skin every 6 (six) months.     DOCOSAHEXANOIC ACID/VIT C/LUT (EYE HEALTH ORAL) Take 1 tablet by mouth 2 (two) times daily.      fish oil-omega-3 fatty acids 300-1,000 mg capsule Take 1 g by mouth once daily.      folic acid (FOLVITE) 800 MCG tablet Take 800 mcg by mouth once daily.      hydrALAZINE (APRESOLINE) 50 MG tablet Take 1.5 tablets (75 mg total) by mouth 3 (three) times daily. (Patient taking differently: Take 50 mg by mouth 2 (two) times daily. )    levothyroxine (SYNTHROID) 100 MCG tablet Take 1 tablet (100 mcg total) by mouth once daily.    metoprolol tartrate (LOPRESSOR) 50 MG tablet Take 50 mg by mouth 2 (two) times daily.     pravastatin (PRAVACHOL) 20 MG tablet Take 1 tablet (20 mg total) by mouth once daily.    telmisartan (MICARDIS) 40 MG Tab Take 40 mg by mouth once daily.    torsemide (DEMADEX) 10 MG Tab Take 1 tablet (10 mg total) by mouth once daily. (Patient taking differently: Take 10 mg by mouth twice a week.  Tuesday and Friday)    [DISCONTINUED] neomycin-polymyxin-dexamethasone (MAXITROL) 3.5mg/mL-10,000 unit/mL-0.1 % DrpS Place 1 drop into both eyes every 8 (eight) hours.     Family History     Problem Relation (Age of Onset)    Alzheimer's disease Mother    Heart disease Father        Tobacco Use    Smoking status: Never Smoker    Smokeless tobacco: Never Used   Substance and Sexual Activity    Alcohol use: No    Drug use: No    Sexual activity: Not Currently     Partners: Male     Review of Systems   Constitutional: Negative for appetite change, chills, fatigue and fever.   HENT: Positive for congestion. Negative for hearing loss, rhinorrhea, sore throat, trouble swallowing and voice change.    Respiratory: Positive for cough and shortness of breath. Negative for chest tightness and wheezing.    Cardiovascular: Positive for leg swelling. Negative for chest pain and palpitations.   Gastrointestinal: Negative for abdominal pain, blood in stool, diarrhea, nausea and vomiting.   Genitourinary: Negative for difficulty urinating, frequency, hematuria and urgency.   Musculoskeletal: Negative for back pain, joint swelling and neck stiffness.   Skin: Negative for pallor and rash.   Neurological: Negative for tremors, seizures, syncope, speech difficulty, weakness, numbness and headaches.   Hematological: Negative for adenopathy.   Psychiatric/Behavioral: Negative for agitation, behavioral problems, confusion and sleep disturbance.     Objective:     Vital Signs (Most Recent):  Pulse: (!) 57 (01/15/20 1502)  Resp: (!) 24 (01/15/20 1249)  BP: (!) 228/94 (01/15/20 1432)  SpO2: 96 % (01/15/20 1502) Vital Signs (24h Range):  Pulse:  [52-68] 57  Resp:  [24] 24  SpO2:  [96 %-99 %] 96 %  BP: (138-230)/(76-96) 228/94     Weight: 56.4 kg (124 lb 5.4 oz)  Body mass index is 26.91 kg/m².    Physical Exam   Constitutional: She is oriented to person, place, and time. She appears well-developed and well-nourished. No distress.   HENT:    Head: Normocephalic and atraumatic.   Right Ear: External ear normal.   Left Ear: External ear normal.   Nose: Nose normal.   Mouth/Throat: Oropharynx is clear and moist.   Eyes: Pupils are equal, round, and reactive to light. Conjunctivae and EOM are normal. Right eye exhibits no discharge. Left eye exhibits no discharge. No scleral icterus.   Neck: Normal range of motion. Neck supple. No thyromegaly present.   Cardiovascular: Normal rate, regular rhythm, normal heart sounds and intact distal pulses.   No murmur heard.  Pulmonary/Chest: No stridor. No respiratory distress. She has no wheezes. She has rales.   Abdominal: Soft. Bowel sounds are normal. She exhibits no distension. There is no tenderness.   Musculoskeletal: She exhibits edema. She exhibits no tenderness.   Edema to the knee joint bilaterally   Lymphadenopathy:     She has no cervical adenopathy.   Neurological: She is alert and oriented to person, place, and time. No cranial nerve deficit or sensory deficit. She exhibits normal muscle tone. Coordination normal.   Skin: Skin is warm and dry. Capillary refill takes less than 2 seconds. No rash noted. She is not diaphoretic. No erythema.   Psychiatric: She has a normal mood and affect. Her behavior is normal. Judgment and thought content normal.   Nursing note and vitals reviewed.        CRANIAL NERVES     CN III, IV, VI   Pupils are equal, round, and reactive to light.  Extraocular motions are normal.        Significant Labs:   BMP:   Recent Labs   Lab 01/15/20  1329         K 4.9      CO2 17*   BUN 51*   CREATININE 1.9*   CALCIUM 9.2     CBC:   Recent Labs   Lab 01/15/20  1329   WBC 6.34   HGB 12.6   HCT 38.7          Significant Imaging: I have reviewed all pertinent imaging results/findings within the past 24 hours.

## 2020-01-15 NOTE — ASSESSMENT & PLAN NOTE
Most likely secondary to demand ischemia  Will trend troponins  If troponin  trending up will consider getting a repeat EKG to look for any changes  Consider getting Cardiology on board

## 2020-01-15 NOTE — ED NOTES
BSC placed at bedside pt and family aware to use call light when ready to use call light handed to pt and explained again.

## 2020-01-15 NOTE — ASSESSMENT & PLAN NOTE
Renal consulted  Will continue to monitor kidney function  Creatine stable for now. BMP reviewed- noted Estimated Creatinine Clearance: 18.9 mL/min (A) (based on SCr of 1.9 mg/dL (H)). according to latest data. Monitor UOP and serial BMP and adjust therapy as needed. Renally dose meds.

## 2020-01-15 NOTE — H&P
[facility] Medicine  History & Physical    Patient Name: Izabella Fulton  MRN: [id number]  Admission Date: 1/15/2020  Attending Physician: Oliverio Bennett MD   Primary Care Provider: Corinne Fletcher NP         Patient information was obtained from patient and ER records.     Subjective:     Principal Problem:Acute exacerbation of CHF (congestive heart failure)    Chief Complaint:   Chief Complaint   Patient presents with    Shortness of Breath     x a few days.        HPI: Izabella Fulton is a 86 y.o. female with HTN, CKD stage IV on torsemide twice a week, and prior TIA on daily Aspirin 81 mg  presented to the ED with  onset of worsening SOB with swelling of bilateral  lower extremity worsening over the last 2 days with associated weight gain.  Patient states that she has been doing well in terms of her breathing for the last 2 weeks since her rib fracture on December 24, 2019.  Patient states that she has been on torsemide twice a week as per renal recommendation and has been doing well and has not had any swelling of the leg prior to this.  She sustained multiple left-sided rib fractures and a moderate pleural effusion status post fall less than 3 weeks ago on 12/24.  Patient states that she went to the hospital on 26th of December and decided to leave the hospital despite recommendations to get admitted.  Patient has been getting multiple chest x-rays since her ER visit on the 26th of December and chest x-rays have been stable.  The patient had a repeat CXR obtained 10 days ago on 1/5 revealing no change in the effusion. At which time, she reports having a 8 lbs weight gain and increased SOB. Yesterday, the patient had an OP CXR that revealed no changes in the pleural effusion. She states her SOB worsened significantly in the last 24 hours.  No change in medications in the last 2 weeks.  The patient reports her rib pain is nearly resolved. She denies chest pain palpitations cough  fever chills.  No  history of dialysis or taking Lasix, fever, cough, chest pain, or any other symptoms at this time. No cardiopulmonary PSHx.      The history is provided by the patient.      Past Medical History:   Diagnosis Date    Anticoagulant long-term use     baby aspirin    Hypertension     Thyroid disease     TIA (transient ischemic attack)     Ulcerative colitis     Wears hearing aid        Past Surgical History:   Procedure Laterality Date    COLON SURGERY  05/17/12    exp lap, perfered colon    EYE SURGERY      left eye cataract       Review of patient's allergies indicates:  No Known Allergies    No current facility-administered medications on file prior to encounter.      Current Outpatient Medications on File Prior to Encounter   Medication Sig    amLODIPine (NORVASC) 5 MG tablet Take 1 tablet (5 mg total) by mouth once daily.    aspirin (ECOTRIN) 81 MG EC tablet Take 81 mg by mouth once daily.      calcitRIOL (ROCALTROL) 0.25 MCG Cap Take 0.25 mcg by mouth twice a week. Sunday and Thursday    calcium-vitamin D 500-125 mg-unit tablet Take 1 tablet by mouth 2 (two) times daily.      denosumab (PROLIA) 60 mg/mL Syrg Inject 60 mg into the skin every 6 (six) months.     DOCOSAHEXANOIC ACID/VIT C/LUT (EYE HEALTH ORAL) Take 1 tablet by mouth 2 (two) times daily.      fish oil-omega-3 fatty acids 300-1,000 mg capsule Take 1 g by mouth once daily.      folic acid (FOLVITE) 800 MCG tablet Take 800 mcg by mouth once daily.      hydrALAZINE (APRESOLINE) 50 MG tablet Take 1.5 tablets (75 mg total) by mouth 3 (three) times daily. (Patient taking differently: Take 50 mg by mouth 2 (two) times daily. )    levothyroxine (SYNTHROID) 100 MCG tablet Take 1 tablet (100 mcg total) by mouth once daily.    metoprolol tartrate (LOPRESSOR) 50 MG tablet Take 50 mg by mouth 2 (two) times daily.     pravastatin (PRAVACHOL) 20 MG tablet Take 1 tablet (20 mg total) by mouth once daily.    telmisartan  (MICARDIS) 40 MG Tab Take 40 mg by mouth once daily.    torsemide (DEMADEX) 10 MG Tab Take 1 tablet (10 mg total) by mouth once daily. (Patient taking differently: Take 10 mg by mouth twice a week. Tuesday and Friday)    [DISCONTINUED] neomycin-polymyxin-dexamethasone (MAXITROL) 3.5mg/mL-10,000 unit/mL-0.1 % DrpS Place 1 drop into both eyes every 8 (eight) hours.     Family History     Problem Relation (Age of Onset)    Alzheimer's disease Mother    Heart disease Father        Tobacco Use    Smoking status: Never Smoker    Smokeless tobacco: Never Used   Substance and Sexual Activity    Alcohol use: No    Drug use: No    Sexual activity: Not Currently     Partners: Male     Review of Systems   Constitutional: Negative for appetite change, chills, fatigue and fever.   HENT: Positive for congestion. Negative for hearing loss, rhinorrhea, sore throat, trouble swallowing and voice change.    Respiratory: Positive for cough and shortness of breath. Negative for chest tightness and wheezing.    Cardiovascular: Positive for leg swelling. Negative for chest pain and palpitations.   Gastrointestinal: Negative for abdominal pain, blood in stool, diarrhea, nausea and vomiting.   Genitourinary: Negative for difficulty urinating, frequency, hematuria and urgency.   Musculoskeletal: Negative for back pain, joint swelling and neck stiffness.   Skin: Negative for pallor and rash.   Neurological: Negative for tremors, seizures, syncope, speech difficulty, weakness, numbness and headaches.   Hematological: Negative for adenopathy.   Psychiatric/Behavioral: Negative for agitation, behavioral problems, confusion and sleep disturbance.     Objective:     Vital Signs (Most Recent):  Pulse: (!) 57 (01/15/20 1502)  Resp: (!) 24 (01/15/20 1249)  BP: (!) 228/94 (01/15/20 1432)  SpO2: 96 % (01/15/20 1502) Vital Signs (24h Range):  Pulse:  [52-68] 57  Resp:  [24] 24  SpO2:  [96 %-99 %] 96 %  BP: (138-230)/(76-96) 228/94     Weight: 56.4  kg (124 lb 5.4 oz)  Body mass index is 26.91 kg/m².    Physical Exam   Constitutional: She is oriented to person, place, and time. She appears well-developed and well-nourished. No distress.   HENT:   Head: Normocephalic and atraumatic.   Right Ear: External ear normal.   Left Ear: External ear normal.   Nose: Nose normal.   Mouth/Throat: Oropharynx is clear and moist.   Eyes: Pupils are equal, round, and reactive to light. Conjunctivae and EOM are normal. Right eye exhibits no discharge. Left eye exhibits no discharge. No scleral icterus.   Neck: Normal range of motion. Neck supple. No thyromegaly present.   Cardiovascular: Normal rate, regular rhythm, normal heart sounds and intact distal pulses.   No murmur heard.  Pulmonary/Chest: No stridor. No respiratory distress. She has no wheezes. She has rales.   Abdominal: Soft. Bowel sounds are normal. She exhibits no distension. There is no tenderness.   Musculoskeletal: She exhibits edema. She exhibits no tenderness.   Edema to the knee joint bilaterally   Lymphadenopathy:     She has no cervical adenopathy.   Neurological: She is alert and oriented to person, place, and time. No cranial nerve deficit or sensory deficit. She exhibits normal muscle tone. Coordination normal.   Skin: Skin is warm and dry. Capillary refill takes less than 2 seconds. No rash noted. She is not diaphoretic. No erythema.   Psychiatric: She has a normal mood and affect. Her behavior is normal. Judgment and thought content normal.   Nursing note and vitals reviewed.        CRANIAL NERVES     CN III, IV, VI   Pupils are equal, round, and reactive to light.  Extraocular motions are normal.        Significant Labs:   BMP:   Recent Labs   Lab 01/15/20  1329         K 4.9      CO2 17*   BUN 51*   CREATININE 1.9*   CALCIUM 9.2     CBC:   Recent Labs   Lab 01/15/20  1329   WBC 6.34   HGB 12.6   HCT 38.7          Significant Imaging: I have reviewed all pertinent imaging  results/findings within the past 24 hours.    Assessment/Plan:     * Acute exacerbation of CHF (congestive heart failure)  Supplemental O2 via nasal canula; titrate O2 saturation to >92%.  Serial Cardiac enzymes × 3.  Diuretic administration Lasix 20 IV b.i.d..   Monitor electrolytes for hypokalemia and hypomagnesemia.  Will consider Cardiology Consultation if the shortness of breath this persistent tomorrow  2-D Echocardiogram for evaluation of left ventricle systolic function or any valvular heart disease.  Daily weights.  Strict I/Os.  Fluid restriction.  Na restriction <2g/d.          Pleural effusion  Bilateral pleural effusion most likely secondary to CHF exacerbation  Chest x-ray shows Moderate left and small right pleural effusions without significant change with accompanying basilar atelectasis or infiltrate.  Chest x-ray has been stable over the course of last 2 weeks  Will continue treating CHF and will get a repeat the chest x-ray in the morning      Hypertensive emergency  Will will start the patient on isosorbide and hydralazine combination  Will hold the metoprolol as under acute exacerbation of congestive heart failure.  We will review patient's respiratory status tomorrow and will consider resuming beta-blocker  The end-organ damage and this patient may be the heart considering that elevated troponins.      Elevated troponin  Most likely secondary to demand ischemia  Will trend troponins  If troponin  trending up will consider getting a repeat EKG to look for any changes  Consider getting Cardiology on board        Primary osteoarthritis involving multiple joints   will continue Tylenol for pain related to osteoarthritis    Hypertensive kidney disease with stage 4 chronic kidney disease  Renal consulted  Will continue to monitor kidney function  Creatine stable for now. BMP reviewed- noted Estimated Creatinine Clearance: 18.9 mL/min (A) (based on SCr of 1.9 mg/dL (H)). according to latest data.  Monitor UOP and serial BMP and adjust therapy as needed. Renally dose meds.            Hyperlipidemia  Will continue home meds      Ulcerative colitis  Currently stable has not had a flare for over a year      Hypothyroid  Resume home meds home dose of levothyroxine      Essential hypertension  Will continue to monitor blood pressure  Currently blood pressure is 220/80  Patient received 10 mg hydralazine in the emergency room if the patient remains persistently elevated will consider giving another 10mg  Hydralazine  Will restart patient's hydralazine 75 p.o. and will add isosorbide dinitrate 40 mg 3 times a day  Will hold off on metoprolol because of the acute CHF        VTE Risk Mitigation (From admission, onward)         Ordered     heparin (porcine) injection 5,000 Units  Every 8 hours      01/15/20 1623     IP VTE HIGH RISK PATIENT  Once      01/15/20 1623     Place sequential compression device  Until discontinued      01/15/20 1623                   Aimlcar Weir MD  Department of Hospital Medicine   [facility]

## 2020-01-15 NOTE — HPI
Izabella Fulton is a 86 y.o. female with HTN, CKD stage IV on torsemide twice a week, and prior TIA on daily Aspirin 81 mg  presented to the ED with  onset of worsening SOB with swelling of bilateral  lower extremity worsening over the last 2 days with associated weight gain.  Patient states that she has been doing well in terms of her breathing for the last 2 weeks since her rib fracture on December 24, 2019.  Patient states that she has been on torsemide twice a week as per renal recommendation and has been doing well and has not had any swelling of the leg prior to this.  She sustained multiple left-sided rib fractures and a moderate pleural effusion status post fall less than 3 weeks ago on 12/24.  Patient states that she went to the hospital on 26th of December and decided to leave the hospital despite recommendations to get admitted.  Patient has been getting multiple chest x-rays since her ER visit on the 26th of December and chest x-rays have been stable.  The patient had a repeat CXR obtained 10 days ago on 1/5 revealing no change in the effusion. At which time, she reports having a 8 lbs weight gain and increased SOB. Yesterday, the patient had an OP CXR that revealed no changes in the pleural effusion. She states her SOB worsened significantly in the last 24 hours.  No change in medications in the last 2 weeks.  The patient reports her rib pain is nearly resolved. She denies chest pain palpitations cough fever chills.  No  history of dialysis or taking Lasix, fever, cough, chest pain, or any other symptoms at this time. No cardiopulmonary PSHx.      The history is provided by the patient.

## 2020-01-15 NOTE — ED PROVIDER NOTES
Encounter Date: 1/15/2020    SCRIBE #1 NOTE: I, Rosemarie Moody, am scribing for, and in the presence of, Oliverio Bennett MD.       History     Chief Complaint   Patient presents with    Shortness of Breath     x a few days.       Time seen by provider: 1:38 PM on 01/15/2020    Izabella Fulton is a 86 y.o. female with HTN, CKD stage IV on torsemide twice a week, and prior TIA on daily Aspirin 81 mg who presents to the ED with an onset of worsening SOB with associated weight gain. She sustained multiple left-sided rib fractures and a moderate pleural effusion status post fall less than 3 weeks ago on 12/27. The patient had a repeat CXR obtained 10 days ago on 1/5 revealing no change in the effusion. At which time, she reports having a 8 lbs weight gain and increased SOB. Yesterday, the patient had an OP CXR that revealed no changes in the pleural effusion. She states her SOB worsened significantly in the last 24 hours. The patient reports her rib pain is nearly resolved. She denies history of dialysis or taking Lasix, fever, cough, chest pain, or any other symptoms at this time. No cardiopulmonary PSHx.     The history is provided by the patient.     Review of patient's allergies indicates:  No Known Allergies  Past Medical History:   Diagnosis Date    Anticoagulant long-term use     baby aspirin    Hypertension     Thyroid disease     TIA (transient ischemic attack)     Ulcerative colitis     Wears hearing aid      Past Surgical History:   Procedure Laterality Date    COLON SURGERY  05/17/12    exp lap, perfered colon    EYE SURGERY      left eye cataract     Family History   Problem Relation Age of Onset    Alzheimer's disease Mother     Heart disease Father      Social History     Tobacco Use    Smoking status: Never Smoker    Smokeless tobacco: Never Used   Substance Use Topics    Alcohol use: No    Drug use: No     Review of Systems   Constitutional: Positive for unexpected weight change. Negative for  fever.   HENT: Negative for nosebleeds.    Eyes: Negative for visual disturbance.   Respiratory: Positive for shortness of breath. Negative for cough.    Cardiovascular: Negative for chest pain.   Gastrointestinal: Negative for abdominal pain, diarrhea, nausea and vomiting.   Genitourinary: Negative for dysuria and hematuria.   Musculoskeletal: Negative for back pain and neck pain.   Skin: Negative for rash.   Neurological: Negative for seizures, syncope and headaches.     Physical Exam     Initial Vitals [01/15/20 1249]   BP Pulse Resp Temp SpO2   (!) 201/82 (!) 57 (!) 24 -- 96 %      MAP       --         Physical Exam    Nursing note and vitals reviewed.  Constitutional: She appears well-developed and well-nourished.  Non-toxic appearance. No distress.   HENT:   Head: Normocephalic and atraumatic.   Eyes: EOM are normal. Pupils are equal, round, and reactive to light.   Neck: Normal range of motion. Neck supple. No neck rigidity. No JVD present.   Cardiovascular: Normal rate, regular rhythm, normal heart sounds and intact distal pulses. Exam reveals no gallop and no friction rub.    No murmur heard.  Pulmonary/Chest: Tachypnea noted. She has wheezes. She has no rhonchi. She has no rales.   Diminished breath sounds on the right base with faint and end expiratory wheezing. Labored breathing.    Abdominal: Soft. Bowel sounds are normal. She exhibits no distension. There is no tenderness. There is no rebound and no guarding.   Musculoskeletal: Normal range of motion. She exhibits edema.   2+ pitting edema to BLE's.    Neurological: She is alert and oriented to person, place, and time. She has normal strength and normal reflexes. No cranial nerve deficit or sensory deficit. She exhibits normal muscle tone. Coordination normal. GCS eye subscore is 4. GCS verbal subscore is 5. GCS motor subscore is 6.   Skin: Skin is warm and dry.   Psychiatric: She has a normal mood and affect. Her speech is normal and behavior is  normal. She is not actively hallucinating.       ED Course   Procedures  Labs Reviewed   CBC W/ AUTO DIFFERENTIAL - Abnormal; Notable for the following components:       Result Value    Mean Corpuscular Hemoglobin 31.3 (*)     RDW 15.3 (*)     Lymph # 0.6 (*)     Gran% 77.5 (*)     Lymph% 8.8 (*)     All other components within normal limits   COMPREHENSIVE METABOLIC PANEL - Abnormal; Notable for the following components:    CO2 17 (*)     BUN, Bld 51 (*)     Creatinine 1.9 (*)     Albumin 3.4 (*)     AST 43 (*)     eGFR if  27 (*)     eGFR if non  24 (*)     All other components within normal limits   TROPONIN I - Abnormal; Notable for the following components:    Troponin I 0.030 (*)     All other components within normal limits   B-TYPE NATRIURETIC PEPTIDE - Abnormal; Notable for the following components:    BNP 1,219 (*)     All other components within normal limits   PROTIME-INR   HEMOGLOBIN A1C   TROPONIN I     EKG Readings: (Independently Interpreted)   Initial Reading: No STEMI.   Sinus bradycardia at a rate of 54 bpm with normal axis and nonspecific ST & T-wave abnormality with prolonged QT interval of 496 m/s.      Imaging Results          X-Ray Chest PA And Lateral (Final result)  Result time 01/15/20 14:17:28    Final result by Sami Meza MD (01/15/20 14:17:28)                 Impression:      Moderate left and small right pleural effusions without significant change with accompanying basilar atelectasis or infiltrate.      Electronically signed by: Sami Meza MD  Date:    01/15/2020  Time:    14:17             Narrative:    EXAMINATION:  XR CHEST PA AND LATERAL    CLINICAL HISTORY:  Shortness of breath    TECHNIQUE:  PA and lateral views of the chest were performed.    COMPARISON:  01/14/2020    FINDINGS:  The heart is mildly enlarged.  There are moderate left and small right pleural effusions accompanied by bibasilar atelectasis more infiltrate.                                  Medical Decision Making:   History:   Old Medical Records: I decided to obtain old medical records.  Initial Assessment:   Patient is a very pleasant 86-year-old woman who presents emergency department for evaluation of worsening shortness of breath and lb weight gain over the past day.  She recently fell down and had multiple rib fractures several weeks ago and was evaluated and found to have pleural effusions.  She has a history of CKD stage 4 but I did not see any evidence of documented congestive heart failure.  She is on torsemide twice a week in sees Dr. Shultz.  She is visibly labored in her breathing.  Chest x-ray shows bilateral pleural effusions that are unchanged from previous x-ray.  BNP is elevated 1219.  Patient appears volume overloaded therefore started on 20 mg of Lasix IV.  Discussed with the hospitalist who agrees to admit the patient for diuresis and repeat echocardiogram to evaluate presence of heart failure.  Independently Interpreted Test(s):   I have ordered and independently interpreted EKG Reading(s) - see prior notes  Clinical Tests:   Lab Tests: Reviewed and Ordered  Radiological Study: Ordered and Reviewed  Medical Tests: Reviewed and Ordered            Scribe Attestation:   Scribe #1: I performed the above scribed service and the documentation accurately describes the services I performed. I attest to the accuracy of the note.     I, Donald Navarrete, personally performed the services described in this documentation. All medical record entries made by the scribe were at my direction and in my presence.  I have reviewed the chart and agree that the record reflects my personal performance and is accurate and complete. Oliverio Bennett MD.              Clinical Impression:       ICD-10-CM ICD-9-CM   1. Hypervolemia, unspecified hypervolemia type E87.70 276.69   2. SOB (shortness of breath) R06.02 786.05   3. Malignant hypertension I10 401.0   4. Pleural effusion  J90 511.9   5. CHF (congestive heart failure) I50.9 428.0         Disposition:   Disposition: Placed in Observation                     Oliverio Bennett MD  01/15/20 0422

## 2020-01-15 NOTE — ED NOTES
Assisted back to bed after using BSC appx 300 cc light yellow urine noted, very short of breath with activity

## 2020-01-15 NOTE — ASSESSMENT & PLAN NOTE
Supplemental O2 via nasal canula; titrate O2 saturation to >92%.  Serial Cardiac enzymes × 3.  Diuretic administration Lasix 20 IV b.i.d..   Monitor electrolytes for hypokalemia and hypomagnesemia.  Will consider Cardiology Consultation if the shortness of breath this persistent tomorrow  2-D Echocardiogram for evaluation of left ventricle systolic function or any valvular heart disease.  Daily weights.  Strict I/Os.  Fluid restriction.  Na restriction <2g/d.

## 2020-01-15 NOTE — ASSESSMENT & PLAN NOTE
Bilateral pleural effusion most likely secondary to CHF exacerbation  Chest x-ray shows Moderate left and small right pleural effusions without significant change with accompanying basilar atelectasis or infiltrate.  Chest x-ray has been stable over the course of last 2 weeks  Will continue treating CHF and will get a repeat the chest x-ray in the morning

## 2020-01-15 NOTE — ED NOTES
C/o increased shortness of breath for the past few days, recent plural effusion. Pt also states that she has gained 12 lbs. Resting in bed given warm blankets shown how to operate bed and handed call light spouse at bedside aware to notify nurse of needs or concerns. Even non labored respirations at this time, shortness of breath increased with activity.

## 2020-01-16 PROBLEM — S22.42XD CLOSED FRACTURE OF MULTIPLE RIBS OF LEFT SIDE WITH ROUTINE HEALING: Status: ACTIVE | Noted: 2020-01-01

## 2020-01-16 PROBLEM — I70.0 CALCIFICATION OF AORTA: Status: ACTIVE | Noted: 2020-01-01

## 2020-01-16 PROBLEM — R91.1 LUNG NODULE: Status: ACTIVE | Noted: 2020-01-01

## 2020-01-16 NOTE — ASSESSMENT & PLAN NOTE
Renal consulted  Will continue to monitor kidney function  Creatine stable for now. BMP reviewed- noted Estimated Creatinine Clearance: 17.9 mL/min (A) (based on SCr of 2 mg/dL (H)). according to latest data. Monitor UOP and serial BMP and adjust therapy as needed. Renally dose meds.  Results for FABIOLA CHAVEZ (MRN [id number]) as of 1/16/2020 16:32   Ref. Range 9/19/2019 08:42 12/26/2019 12:45 1/15/2020 13:29 1/16/2020 04:20   BUN, Bld Latest Ref Range: 8 - 23 mg/dL 56 (H) 53 (H) 51 (H) 56 (H)   Creatinine Latest Ref Range: 0.5 - 1.4 mg/dL 1.85 (H) 2.3 (H) 1.9 (H) 2.0 (H)

## 2020-01-16 NOTE — CONSULTS
"                                                        Food & Nutrition                                                           Education    Diet Education: Heart healthy diet   Time Spent: 25 minutes   Learners: Patient & Significant Other ()       Nutrition Education provided with handouts: Yes       Comments: Educated patient on heart healthy diet. Following fluid restriction and trying reduce sodium and " eat healthy," PTA. Patient demonstrated understanding of the importance of a heart healthy diet.  willing to help wife with compliance to diet changes.     *Usual PO intake % with fluid intake of 32-34 oz (4-5 cups) daily. Conducted NFPE 1/16/20 and noticed mild edema in both legs with moderate wasting in the clavicle area. Further education needed.       All questions and concerns answered. Dietitian's contact information provided.       Follow-Up: Will follow up as needed,     Please Re-consult as needed        Thanks!    "

## 2020-01-16 NOTE — SUBJECTIVE & OBJECTIVE
Interval History:  Patient respiratory status improved after 2 doses of Lasix.  Denies any chest pain palpitations cough fever chills    Review of Systems   Constitutional: Negative for appetite change, chills, fatigue and fever.   HENT: Positive for congestion. Negative for hearing loss, rhinorrhea, sore throat, trouble swallowing and voice change.    Respiratory: Positive for cough and shortness of breath. Negative for chest tightness and wheezing.    Cardiovascular: Positive for leg swelling. Negative for chest pain and palpitations.   Gastrointestinal: Negative for abdominal pain, blood in stool, diarrhea, nausea and vomiting.   Genitourinary: Negative for difficulty urinating, frequency, hematuria and urgency.   Musculoskeletal: Negative for back pain, joint swelling and neck stiffness.   Skin: Negative for pallor and rash.   Neurological: Negative for tremors, seizures, syncope, speech difficulty, weakness, numbness and headaches.   Hematological: Negative for adenopathy.   Psychiatric/Behavioral: Negative for agitation, behavioral problems, confusion and sleep disturbance.     Objective:     Vital Signs (Most Recent):  Temp: 98.3 °F (36.8 °C) (01/16/20 1548)  Pulse: 80 (01/16/20 1548)  Resp: 18 (01/16/20 1548)  BP: (!) 155/68 (01/16/20 1548)  SpO2: 96 % (01/16/20 1548) Vital Signs (24h Range):  Temp:  [96 °F (35.6 °C)-98.3 °F (36.8 °C)] 98.3 °F (36.8 °C)  Pulse:  [55-80] 80  Resp:  [16-20] 18  SpO2:  [91 %-97 %] 96 %  BP: (144-206)/(65-89) 155/68     Weight: 56.2 kg (124 lb)  Body mass index is 25.04 kg/m².    Intake/Output Summary (Last 24 hours) at 1/16/2020 1631  Last data filed at 1/15/2020 1800  Gross per 24 hour   Intake 480 ml   Output --   Net 480 ml      Physical Exam   Constitutional: She is oriented to person, place, and time. She appears well-developed and well-nourished. No distress.   HENT:   Head: Normocephalic and atraumatic.   Right Ear: External ear normal.   Left Ear: External ear normal.    Nose: Nose normal.   Mouth/Throat: Oropharynx is clear and moist.   Eyes: Pupils are equal, round, and reactive to light. Conjunctivae and EOM are normal. Right eye exhibits no discharge. Left eye exhibits no discharge. No scleral icterus.   Neck: Normal range of motion. Neck supple. No thyromegaly present.   Cardiovascular: Normal rate, regular rhythm, normal heart sounds and intact distal pulses.   No murmur heard.  Pulmonary/Chest: No stridor. No respiratory distress. She has no wheezes. She has rales.   Abdominal: Soft. Bowel sounds are normal. She exhibits no distension. There is no tenderness.   Musculoskeletal: She exhibits edema. She exhibits no tenderness.   Edema to the knee joint bilaterally   Lymphadenopathy:     She has no cervical adenopathy.   Neurological: She is alert and oriented to person, place, and time. No cranial nerve deficit or sensory deficit. She exhibits normal muscle tone. Coordination normal.   Skin: Skin is warm and dry. Capillary refill takes less than 2 seconds. No rash noted. She is not diaphoretic. No erythema.   Psychiatric: She has a normal mood and affect. Her behavior is normal. Judgment and thought content normal.   Nursing note and vitals reviewed.      Significant Labs:   BMP:   Recent Labs   Lab 01/16/20  0420 01/16/20  0421   GLU 71  --      --    K 4.5  --    *  --    CO2 20*  --    BUN 56*  --    CREATININE 2.0*  --    CALCIUM 8.5*  --    MG  --  2.2     CBC:   Recent Labs   Lab 01/15/20  1329   WBC 6.34   HGB 12.6   HCT 38.7          Significant Imaging: I have reviewed all pertinent imaging results/findings within the past 24 hours.

## 2020-01-16 NOTE — CONSULTS
"  01/16/2020      Admit Date: 1/15/2020  Izabella Fulton  New Patient Consult    Chief Complaint   Patient presents with    Shortness of Breath     x a few days.       History of Present Illness:  Pt never smoker, chr dizzy, h/o ulcerative colitis post colon perf with no recent colonoscopies- follows with Dr Lerma.  Pt fell before tammi attributed to chr dizzy, presented to [facility] and found to have 7 left rib fx with left effusion.  Pt relates appreciated no significant chest pain with rib fx.  On prolia for osteoporoses.  Pt had increased sob just pta. No cough nor mucous. No h/o dvt,nor sign leg swelling.  Er record indicates recent wt gain?  bp 201 /82 at admit-felt to be malignant?  Wheezes and 2+ edema noted at admit.  Pt is followed by renal for chronic renal failure.      Pt has had poor appetite recent months and relates 16 lbs wt loss from 120 to 104.      From hospitalist history of present illness done on January 15th, ,:"   HPI: Izabella Fulton is a 86 y.o. female with HTN, CKD stage IV on torsemide twice a week, and prior TIA on daily Aspirin 81 mg  presented to the ED with  onset of worsening SOB with swelling of bilateral  lower extremity worsening over the last 2 days with associated weight gain.  Patient states that she has been doing well in terms of her breathing for the last 2 weeks since her rib fracture on December 24, 2019.  Patient states that she has been on torsemide twice a week as per renal recommendation and has been doing well and has not had any swelling of the leg prior to this.  She sustained multiple left-sided rib fractures and a moderate pleural effusion status post fall less than 3 weeks ago on 12/24.  Patient states that she went to the hospital on 26th of December and decided to leave the hospital despite recommendations to get admitted.  Patient has been getting multiple chest x-rays since her ER visit on the 26th of December and chest x-rays have been stable.  The " "patient had a repeat CXR obtained 10 days ago on 1/5 revealing no change in the effusion. At which time, she reports having a 8 lbs weight gain and increased SOB. Yesterday, the patient had an OP CXR that revealed no changes in the pleural effusion. She states her SOB worsened significantly in the last 24 hours.  No change in medications in the last 2 weeks.  The patient reports her rib pain is nearly resolved. She denies chest pain palpitations cough fever chills.  No  history of dialysis or taking Lasix, fever, cough, chest pain, or any other symptoms at this time. No cardiopulmonary PSHx. "  PFSH:  Past Medical History:   Diagnosis Date    Anticoagulant long-term use     baby aspirin    Hypertension     Thyroid disease     TIA (transient ischemic attack)     Ulcerative colitis     Wears hearing aid      Past Surgical History:   Procedure Laterality Date    COLON SURGERY  05/17/12    exp lap, perfered colon    EYE SURGERY      left eye cataract     Social History     Tobacco Use    Smoking status: Never Smoker    Smokeless tobacco: Never Used   Substance Use Topics    Alcohol use: No    Drug use: No     Family History   Problem Relation Age of Onset    Alzheimer's disease Mother     Heart disease Father      Review of patient's allergies indicates:  No Known Allergies    Performance Status:Performance Status:The patient's activity level is mobility with asit devices.    Review of Systems:  a review of eleven systems covering constitutional, Psych, Eye, HEENT, Respiratory, Cardiac, GI, , Musculoskeletal, Endocrine, Dermatologicwas negative except the above mentioned abnormalities and for any pertinent findings as listed below: pertinent positive as above, rest is good         Exam:Comprehensive exam done. BP (!) 144/65   Pulse 74   Temp 98.2 °F (36.8 °C)   Resp 18   Ht 4' 11" (1.499 m)   Wt 56.2 kg (124 lb)   SpO2 96%   Breastfeeding? No   BMI 25.04 kg/m²   Exam included Vitals as listed, " and patient's appearance and affect and alertness and mood, oral exam for yeast and hygiene and pharynx lesions and Mallapatti (M) score, neck with inspection for jvd and masses and thyroid abnormalities and lymph nodes (supraclavicular and infraclavicular nodes also examined and noted if abn), chest exam included symmetry and effort and fremitus and percussion and auscultation, cardiac exam included rhythm and gallops and murmur and rubs and jvd and edema, abdominal exam for mass and hepatosplenomegaly and tenderness and hernias and bowel sounds, Musculoskeletal exam with muscle tone and posture and mobility/gait and  strenght, and skin for rashes and cyanosis and pallor and turgor, extremity for clubbing.  Findings were normal except as listed below:  Frail elderly, M1,cachexia, dull left 2/3 up and decreased bs left, marked kyphosis.  Good bs, no distress,trace edema, 1/6 sm.      Radiographs reviewed: view by direct vision chest x-ray January 16th with a left effusion and calcified aorta and large heart  No results found for this or any previous visit.]    Labs     No results for input(s): WBC, HGB, HCT, PLT, BAND, METAMYELOCYT, MYELOPCT, HGBA1C in the last 24 hours.  Recent Labs   Lab 01/16/20  0420 01/16/20  0421     --    K 4.5  --    *  --    CO2 20*  --    BUN 56*  --    CREATININE 2.0*  --    GLU 71  --    CALCIUM 8.5*  --    MG  --  2.2   TROPONINI  --  0.028*   No results for input(s): PH, PCO2, PO2, HCO3 in the last 24 hours.  Microbiology Results (last 7 days)     ** No results found for the last 168 hours. **      echo 8/9/18  Conclusion     · The left ventricle cavity is small.  · Left ventricle shows eccentric hypertrophy.  · Left ventricle ejection fraction is normal at 69%  · RV systolic function is normal.  · Normal central venous pressure (3 mm Hg).  · Mitral valve shows trace regurgitation.  · Tricuspid valve shows trace regurgitation.  · LA pressure is elevated.   Results for  FABIOLA CHAVEZ (MRN [id number]) as of 1/16/2020 16:58    Impression       Bowel containing hernia of the right anterior pelvic wall with associated features concerning for developing small bowel obstruction.    Osteopenia.  New severe compression fracture of L1, likely chronic.  Chronic moderate compression fracture of L4.  New sacral insufficiency fractures which are subacute to chronic.    4 mm left lower lobe nodule.  For a solid nodule <6 mm, Fleischner Society 2017 guidelines recommend no routine follow up for a low risk patient, or follow-up with non-contrast chest CT at 12 months in a high risk patient.      Electronically signed by: Sami Meza MD  Date: 05/15/2019  Time: 09:26          Ref. 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               Plan: pt marginal wrt function.  bnp very high at admit as was bp.  Left effusion has increased to my view.  Would recommend tap. Left lower lung did not look bad on ct abd in May.  Pt is old and frail- outlook is guarded.      htn and chf contributed to sob ppt admit. outpt post tap in am would be reasonable    If dced - would f/u cxr and pt in 2 wks in office.

## 2020-01-16 NOTE — PLAN OF CARE
Patient lives with spouse, denies who is also POA and she has a living will. They do not have any family locally.  And no children. Patient reports independence and that her PCP has ordered a rollator for her. Pharmacy is [facility], PCP is Corinne Fletcher NP. Patient had fall in Dec and was set up with HH then and wants to continue with [facility], patient choice disclosure signed. HH orders placed to resume previous orders.      01/16/20 1440   Discharge Assessment   Assessment Type Discharge Planning Assessment   Confirmed/corrected address and phone number on facesheet? Yes   Assessment information obtained from? Patient;Caregiver   Communicated expected length of stay with patient/caregiver yes   Prior to hospitilization cognitive status: Alert/Oriented   Prior to hospitalization functional status: Independent   Current cognitive status: Alert/Oriented   Current Functional Status: Independent   Lives With spouse   Able to Return to Prior Arrangements yes   Is patient able to care for self after discharge? Yes   Patient's perception of discharge disposition home health   Readmission Within the Last 30 Days no previous admission in last 30 days   Patient currently being followed by outpatient case management? No   Patient currently receives any other outside agency services? No   Equipment Currently Used at Home none  (PCP office is suppoosed to be getting rollator for her)   Do you have any problems affording any of your prescribed medications? No   Is the patient taking medications as prescribed? yes   Does the patient have transportation home? Yes   Transportation Anticipated family or friend will provide   Does the patient receive services at the [facility]? No   Discharge Plan A Home Health   DME Needed Upon Discharge  none   Patient/Family in Agreement with Plan yes

## 2020-01-16 NOTE — CARE UPDATE
01/16/20 0640   PRE-TX-O2   O2 Device (Oxygen Therapy) nasal cannula   $ Is the patient on Low Flow Oxygen? Yes   Flow (L/min) 3   SpO2 95 %   Pulse Oximetry Type Intermittent   $ Pulse Oximetry - Multiple Charge Pulse Oximetry - Multiple   Pulse 65   Resp 16

## 2020-01-16 NOTE — PLAN OF CARE
Patient AAO, VSS. Pt verbalized understanding of POC. Patient BP has been stable throughout shift. Tele monitored; nsr.  +1 Edema noted to bilateral lower extremities. Expiatory wheezing noted to right lobes. O2 @ 3L via nasal cannula. Purposeful hourly/q2hr rounding done during shift to promote patient safety. Patient free from falls and injury during shift. Will continue to monitor.

## 2020-01-16 NOTE — PLAN OF CARE
Patient AAO, VSS. Pt verbalized understanding of POC.Tele monitor in progress.  +1 Edema noted to bilateral lower extremities. Expiatory wheezing noted to right lobes. C/o pain to right lower leg Controlled with prn pain medication.O2 @ 3L via nasal cannula. Purposeful hourly/q2hr rounding done during shift to promote patient safety. Bed in low position and locked. Call light within reach.Patient free from falls and injury during shift. Will continue to monitor.

## 2020-01-16 NOTE — ASSESSMENT & PLAN NOTE
Supplemental O2 via nasal canula; titrate O2 saturation to >92%.  Results for FABIOLA CHAVEZ (MRN [id number]) as of 1/16/2020 16:32   Ref. Range 1/15/2020 13:29 1/15/2020 17:04 1/15/2020 23:58 1/16/2020 04:21   Troponin I Latest Ref Range: 0.000 - 0.026 ng/mL 0.030 (H) 0.028 (H) 0.030 (H) 0.028 (H)     Diuretic administration Lasix 20 IV b.i.d..   Monitor electrolytes for hypokalemia and hypomagnesemia.  Consulted Cardiology Consultation follow-up with recommendations  2-D Echocardiogram for evaluation of left ventricle systolic function or any valvular heart disease.  Daily weights.  Strict I/Os.  Fluid restriction.  Na restriction <2g/d.

## 2020-01-16 NOTE — HOSPITAL COURSE
Patient presented with insidious onset of worsening SOB with swelling of bilateral  lower extremity worsening over the prior 2 days with associated weight gain.  Patient was started on Lasix IV twice daily.  Nephrology was consulted. Patient had been following with up with Dr. Shultz, who had been managing her CKD stage 4 and her Lasix dose.  Cardiology was consulted for worsening of her CHF.  Echo was done, showing normal EF.  Patient also had pleural effusion bilaterally. Pulmonary was consulted for possibility of bedside thoracocentesis if needed.  A large pleural effusion was seen on the echo as well.  US guided thoracentesis was performed with 500 cc bloody fluid removed from left hemithorax.   Patient's respiratory status improved after the Lasix and thoracentesis.  Cardiologist and nephrologist made adjustments to the Bp meds.  The IV Lasix was discontinued.  Her BP remained elevated, but she was stable from cardiorespiratory standpoint.  She admitted that her BP was labile at home.  It was nothing new.  She asked to be discharged from the hospital.  Please see med list below.    Physical Exam   Constitutional: She is oriented to person, place, and time.  alert.  No distress.   Eyes: Pupils are equal, round, and reactive to light. EOM are normal.   Cardiovascular: Normal rate, regular rhythm and intact distal pulses.   No murmur heard.  Pulmonary/Chest: Effort normal and breath sounds normal.  no rales.  Abdominal: Soft. She exhibits no distension. There is no tenderness.

## 2020-01-16 NOTE — CONSULTS
INPATIENT NEPHROLOGY CONSULT   [facility] NEPHROLOGY    Izabella Fulton  01/16/2020    Reason for consultation:    kaitlynn    Chief Complaint:   Chief Complaint   Patient presents with    Shortness of Breath     x a few days.          History of Present Illness:    Per H and  P    Izabella Fulton is a 86 y.o. female with HTN, CKD stage IV on torsemide twice a week, and prior TIA on daily Aspirin 81 mg  presented to the ED with  onset of worsening SOB with swelling of bilateral  lower extremity worsening over the last 2 days with associated weight gain.  Patient states that she has been doing well in terms of her breathing for the last 2 weeks since her rib fracture on December 24, 2019.  Patient states that she has been on torsemide twice a week as per renal recommendation and has been doing well and has not had any swelling of the leg prior to this.  She sustained multiple left-sided rib fractures and a moderate pleural effusion status post fall less than 3 weeks ago on 12/24.  Patient states that she went to the hospital on 26th of December and decided to leave the hospital despite recommendations to get admitted.  Patient has been getting multiple chest x-rays since her ER visit on the 26th of December and chest x-rays have been stable.  The patient had a repeat CXR obtained 10 days ago on 1/5 revealing no change in the effusion. At which time, she reports having a 8 lbs weight gain and increased SOB. Yesterday, the patient had an OP CXR that revealed no changes in the pleural effusion. She states her SOB worsened significantly in the last 24 hours.  No change in medications in the last 2 weeks.  The patient reports her rib pain is nearly resolved. She denies chest pain palpitations cough fever chills.  No  history of dialysis or taking Lasix, fever, cough, chest pain, or any other symptoms at this time.    1/16  No nausea, chest pain, sob, fever, urinary or bowel complaint, new neurologic symptoms, new joint  pain,      Plan of Care:       Assessment:    Acute kidney injury--cr labile.   She runs between 1.5 and 2.0.    --no nsaids, coxibs  --keep net diuresis to 1 liter per day  --trend cr    CKDIV  --renal dose medication  For crcl 10-50    Decompensated heart failure  --continue lasix 20mg iv bid  --pt admits to slipping on salt restriction.  Pt counseled     Hypertension  --hydralazine to 100mg tid if bp still high after diuresis                       Thank you for allowing us to participate in this patient's care. We will continue to follow.    Vital Signs:  Temp Readings from Last 3 Encounters:   01/16/20 96.5 °F (35.8 °C) (Oral)   12/26/19 97.9 °F (36.6 °C)   05/15/19 99.5 °F (37.5 °C)       Pulse Readings from Last 3 Encounters:   01/16/20 65   01/15/20 68   01/03/20 60       BP Readings from Last 3 Encounters:   01/16/20 (!) 171/73   01/15/20 138/76   01/03/20 (!) 170/60       Weight:  Wt Readings from Last 3 Encounters:   01/15/20 56.4 kg (124 lb 5.4 oz)   01/15/20 56.4 kg (124 lb 6.4 oz)   01/03/20 50.8 kg (112 lb)       Past Medical & Surgical History:  Past Medical History:   Diagnosis Date    Anticoagulant long-term use     baby aspirin    Hypertension     Thyroid disease     TIA (transient ischemic attack)     Ulcerative colitis     Wears hearing aid        Past Surgical History:   Procedure Laterality Date    COLON SURGERY  05/17/12    exp lap, perfered colon    EYE SURGERY      left eye cataract       Past Social History:  Social History     Socioeconomic History    Marital status:      Spouse name: Not on file    Number of children: Not on file    Years of education: Not on file    Highest education level: Not on file   Occupational History    Not on file   Social Needs    Financial resource strain: Not on file    Food insecurity:     Worry: Not on file     Inability: Not on file    Transportation needs:     Medical: Not on file     Non-medical: Not on file   Tobacco Use     Smoking status: Never Smoker    Smokeless tobacco: Never Used   Substance and Sexual Activity    Alcohol use: No    Drug use: No    Sexual activity: Not Currently     Partners: Male   Lifestyle    Physical activity:     Days per week: Not on file     Minutes per session: Not on file    Stress: Not on file   Relationships    Social connections:     Talks on phone: Not on file     Gets together: Not on file     Attends Baptism service: Not on file     Active member of club or organization: Not on file     Attends meetings of clubs or organizations: Not on file     Relationship status: Not on file   Other Topics Concern    Not on file   Social History Narrative    Not on file       Medications:  No current facility-administered medications on file prior to encounter.      Current Outpatient Medications on File Prior to Encounter   Medication Sig Dispense Refill    amLODIPine (NORVASC) 5 MG tablet Take 1 tablet (5 mg total) by mouth once daily. 90 tablet 1    aspirin (ECOTRIN) 81 MG EC tablet Take 81 mg by mouth once daily.        calcitRIOL (ROCALTROL) 0.25 MCG Cap Take 0.25 mcg by mouth twice a week. Sunday and Thursday      calcium-vitamin D 500-125 mg-unit tablet Take 1 tablet by mouth 2 (two) times daily.        DOCOSAHEXANOIC ACID/VIT C/LUT (EYE HEALTH ORAL) Take 1 tablet by mouth 2 (two) times daily.        fish oil-omega-3 fatty acids 300-1,000 mg capsule Take 1 g by mouth once daily.        folic acid (FOLVITE) 800 MCG tablet Take 800 mcg by mouth once daily.        hydrALAZINE (APRESOLINE) 50 MG tablet Take 1.5 tablets (75 mg total) by mouth 3 (three) times daily. (Patient taking differently: Take 50 mg by mouth 2 (two) times daily. Pt takes 100mg twice a day) 270 tablet 1    levothyroxine (SYNTHROID) 100 MCG tablet Take 1 tablet (100 mcg total) by mouth once daily. 90 tablet 1    metoprolol tartrate (LOPRESSOR) 50 MG tablet Take 50 mg by mouth 2 (two) times daily.       pravastatin  "(PRAVACHOL) 20 MG tablet Take 1 tablet (20 mg total) by mouth once daily. 90 tablet 1    telmisartan (MICARDIS) 40 MG Tab Take 40 mg by mouth once daily.      torsemide (DEMADEX) 10 MG Tab Take 1 tablet (10 mg total) by mouth once daily. (Patient taking differently: Take 10 mg by mouth twice a week. Tuesday and Friday) 30 tablet 0    denosumab (PROLIA) 60 mg/mL Syrg Inject 60 mg into the skin every 6 (six) months.        Scheduled Meds:   amLODIPine  5 mg Oral Daily    aspirin  81 mg Oral Daily    calcium-vitamin D3  1 tablet Oral BID    folic acid  800 mcg Oral Daily    furosemide  20 mg Intravenous BID    heparin (porcine)  5,000 Units Subcutaneous Q8H    isosorbide dinitrate  40 mg Oral TID    And    hydrALAZINE  75 mg Oral Q8H    levothyroxine  100 mcg Oral Before breakfast    pravastatin  20 mg Oral Daily    telmisartan  40 mg Oral Daily     Continuous Infusions:  PRN Meds:.sodium chloride 0.9%    Allergies:  Patient has no known allergies.    Past Family History:  Reviewed; refer to Hospitalist Admission Note    Review of Systems:  Review of Systems - All 14 systems reviewed and negative, except as noted in HPI    Physical Exam:    BP (!) 171/73 (BP Location: Left arm, Patient Position: Sitting)   Pulse 65   Temp 96.5 °F (35.8 °C) (Oral)   Resp 20   Ht 4' 9" (1.448 m)   Wt 56.4 kg (124 lb 5.4 oz)   SpO2 95%   Breastfeeding? No   BMI 26.91 kg/m²     General Appearance:    Alert, cooperative, no distress, appears stated age   Head:    Normocephalic, without obvious abnormality, atraumatic   Eyes:    PER, conjunctiva/corneas clear, EOM's intact in both eyes        Throat:   Lips, mucosa, and tongue normal; teeth and gums normal   Back:     Symmetric, no curvature, ROM normal, no CVA tenderness   Lungs:     Left sided crackels   Chest wall:    No tenderness or deformity   Heart:    Regular rate and rhythm, S1 and S2 normal, no murmur, rub   or gallop   Abdomen:     Soft, non-tender, bowel " sounds active all four quadrants,     no masses, no organomegaly   Extremities:   Extremities normal, atraumatic, no cyanosis.  Edema present   Pulses:   2+ and symmetric all extremities   MSK:   Age appropriate muscular atrophy   Skin:   Skin color, texture, turgor normal, no rashes or lesions   Neurologic:   CNII-XII intact, normal strength and sensation       Throughout.  No flap     Results:  Lab Results   Component Value Date     01/16/2020    K 4.5 01/16/2020     (H) 01/16/2020    CO2 20 (L) 01/16/2020    BUN 56 (H) 01/16/2020    CREATININE 2.0 (H) 01/16/2020    CALCIUM 8.5 (L) 01/16/2020    ANIONGAP 8 01/16/2020    ESTGFRAFRICA 25 (A) 01/16/2020    EGFRNONAA 22 (A) 01/16/2020       Lab Results   Component Value Date    CALCIUM 8.5 (L) 01/16/2020    PHOS 3.7 05/15/2019       Recent Labs   Lab 01/15/20  1329   WBC 6.34   RBC 4.03   HGB 12.6   HCT 38.7      MCV 96   MCH 31.3*   MCHC 32.6       I have personally reviewed pertinent radiological imaging and reports.

## 2020-01-16 NOTE — ASSESSMENT & PLAN NOTE
Bilateral pleural effusion most likely secondary to CHF exacerbation  Chest x-ray shows Moderate left and small right pleural effusions without significant change with accompanying basilar atelectasis or infiltrate.  Will consult pulmonology for bedside thoracocentesis if needed will get IR to do thoracocentesis  Will continue treating CHF and will get a repeat the chest x-ray in the morning

## 2020-01-17 PROBLEM — I16.1 HYPERTENSIVE EMERGENCY: Status: RESOLVED | Noted: 2020-01-01 | Resolved: 2020-01-01

## 2020-01-17 PROBLEM — S22.42XD CLOSED FRACTURE OF MULTIPLE RIBS OF LEFT SIDE WITH ROUTINE HEALING: Status: RESOLVED | Noted: 2020-01-01 | Resolved: 2020-01-01

## 2020-01-17 PROBLEM — I50.33 ACUTE ON CHRONIC DIASTOLIC CONGESTIVE HEART FAILURE: Status: ACTIVE | Noted: 2020-01-01

## 2020-01-17 PROBLEM — I24.89 DEMAND ISCHEMIA: Status: ACTIVE | Noted: 2020-01-01

## 2020-01-17 NOTE — CONSULTS
[facility]  Cardiology  Progress Note    Patient Name: Izabella Fulton  MRN: [id number]  Admission Date: 1/15/2020  Hospital Length of Stay: 2 days  Code Status: Full Code   Attending Physician: Billy Jones MD   Primary Care Physician: Corinne Fletcher NP  Expected Discharge Date:   Principal Problem:Acute exacerbation of CHF (congestive heart failure)    Subjective:     Hospital Course: * feels ok   She denies beeing sob   Interval History:1) creatinine higher,  bp  Elevated   I reviewed echo = moderate severe lvh and increased  Mean lap  + some subaortic stenosis ( due to septal hypertrohy) + lawson dysfunction  Grade 1  ROS  Objective:     Vital Signs (Most Recent):  Temp: 97.4 °F (36.3 °C) (01/17/20 0756)  Pulse: 76 (01/17/20 0756)  Resp: 18 (01/17/20 0756)  BP: (!) 196/81 (01/17/20 0756)  SpO2: 96 % (01/17/20 0756) Vital Signs (24h Range):  Temp:  [96.7 °F (35.9 °C)-98.3 °F (36.8 °C)] 97.4 °F (36.3 °C)  Pulse:  [69-80] 76  Resp:  [18] 18  SpO2:  [93 %-96 %] 96 %  BP: (144-196)/(65-83) 196/81     Weight: 56.2 kg (124 lb)  Body mass index is 25.04 kg/m².    SpO2: 96 %  O2 Device (Oxygen Therapy): nasal cannula      Intake/Output Summary (Last 24 hours) at 1/17/2020 1111  Last data filed at 1/16/2020 1800  Gross per 24 hour   Intake 560 ml   Output --   Net 560 ml       Lines/Drains/Airways     Peripheral Intravenous Line                 Peripheral IV - Single Lumen 01/15/20 1331 20 G Left Forearm 1 day                Physical Exam    180/60  Lungs  Decreased bs  Post cor reg 1/6 sys m lsb     Significant Labs:   CMP   Recent Labs   Lab 01/15/20  1329 01/16/20  0420 01/17/20  0430    140 140   K 4.9 4.5 4.3    112* 109   CO2 17* 20* 22*    71 90   BUN 51* 56* 54*   CREATININE 1.9* 2.0* 2.2*   CALCIUM 9.2 8.5* 9.1   PROT 7.3  --   --    ALBUMIN 3.4*  --  2.8*   BILITOT 0.6  --   --    ALKPHOS 86  --   --    AST 43*  --   --    ALT 39  --   --    ANIONGAP 12 8 9   ESTGFRAFRICA  27* 25* 23*   EGFRNONAA 24* 22* 20*    and Troponin   Recent Labs   Lab 01/15/20  1704 01/15/20  2358 01/16/20  0421   TROPONINI 0.028* 0.030* 0.028*       Significant Imaging:   Assessment and Plan:   1)  Small heart and lawson dys HF pEF + mild subaortic obstruction + uncontrolled sys hi bp - add lo dose clonidine patch   2) ckd-  She may have exacerbation of  chf due to renal art stenosis -  If US shows severe DIEGO she might be candidate for stenting - decrease iv lasix   3) cxr shows l pleural effusion +  Pul edema - for   Thoracentesis   Brief HPI:     Active Diagnoses:    Diagnosis Date Noted POA    PRINCIPAL PROBLEM:  Acute exacerbation of CHF (congestive heart failure) [I50.9] 01/15/2020 Yes    Closed fracture of multiple ribs of left side with routine healing [S22.42XD] 01/16/2020 Not Applicable    Calcification of aorta [I70.0] 01/16/2020 Yes    Lung nodule [R91.1] 01/16/2020 Yes    Hypervolemia [E87.70] 01/15/2020 Yes    Pleural effusion [J90] 01/15/2020 Yes    Hypertensive emergency [I16.1] 01/15/2020 Yes    Elevated troponin [R79.89] 01/15/2020 Yes    Hypertensive kidney disease with stage 4 chronic kidney disease [I12.9, N18.4] 12/19/2019 Yes    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

## 2020-01-17 NOTE — PLAN OF CARE
Patient AAO, VSS. PIV to left forearm infusing well. O2 @ 2L in use. Cadiac monitoring ongoing. Pt verbalized understanding of POC. Purposeful hourly/q2hr rounding done during shift to promote patient safety. Patient free from falls and injury during shift.  Bed in lowest position, brakes locked, and call light within reach.  Will continue to monitor.

## 2020-01-17 NOTE — CONSULTS
INPATIENT NEPHROLOGY CONSULT   [facility] NEPHROLOGY    Izabella Fulton  01/17/2020    Reason for consultation:    kaitlynn    Chief Complaint:   Chief Complaint   Patient presents with    Shortness of Breath     x a few days.          History of Present Illness:    Per H and  P    Izabella Fulton is a 86 y.o. female with HTN, CKD stage IV on torsemide twice a week, and prior TIA on daily Aspirin 81 mg  presented to the ED with  onset of worsening SOB with swelling of bilateral  lower extremity worsening over the last 2 days with associated weight gain.  Patient states that she has been doing well in terms of her breathing for the last 2 weeks since her rib fracture on December 24, 2019.  Patient states that she has been on torsemide twice a week as per renal recommendation and has been doing well and has not had any swelling of the leg prior to this.  She sustained multiple left-sided rib fractures and a moderate pleural effusion status post fall less than 3 weeks ago on 12/24.  Patient states that she went to the hospital on 26th of December and decided to leave the hospital despite recommendations to get admitted.  Patient has been getting multiple chest x-rays since her ER visit on the 26th of December and chest x-rays have been stable.  The patient had a repeat CXR obtained 10 days ago on 1/5 revealing no change in the effusion. At which time, she reports having a 8 lbs weight gain and increased SOB. Yesterday, the patient had an OP CXR that revealed no changes in the pleural effusion. She states her SOB worsened significantly in the last 24 hours.  No change in medications in the last 2 weeks.  The patient reports her rib pain is nearly resolved. She denies chest pain palpitations cough fever chills.  No  history of dialysis or taking Lasix, fever, cough, chest pain, or any other symptoms at this time.    1/16  No nausea, chest pain, sob, fever, urinary or bowel complaint, new neurologic symptoms, new joint  pain,    1/17  No nausea, chest pain, sob, fever, urinary or bowel complaint, new neurologic symptoms, new joint pain,        Plan of Care:       Assessment:    Acute kidney injury--cr labile.   She runs between 1.5 and 2.0.    --no nsaids, coxibs  --keep net diuresis to 1 liter per day  --trend cr.  Cr up today.  Cut lasix to daily    CKDIV  --renal dose medication  For crcl 10-50    Decompensated heart failure  --lasix to daily due to kaitlynn  --pt admits to slipping on salt restriction.  Pt counseled   --thoracentesis ordered    Hypertension  --hydralazine to 100mg tid if bp still high after diuresis  --clonidine added by cardiology                        Thank you for allowing us to participate in this patient's care. We will continue to follow.    Vital Signs:  Temp Readings from Last 3 Encounters:   01/17/20 97.7 °F (36.5 °C)   12/26/19 97.9 °F (36.6 °C)   05/15/19 99.5 °F (37.5 °C)       Pulse Readings from Last 3 Encounters:   01/17/20 72   01/15/20 68   01/03/20 60       BP Readings from Last 3 Encounters:   01/17/20 (!) 157/73   01/15/20 138/76   01/03/20 (!) 170/60       Weight:  Wt Readings from Last 3 Encounters:   01/16/20 56.2 kg (124 lb)   01/15/20 56.4 kg (124 lb 6.4 oz)   01/03/20 50.8 kg (112 lb)       Past Medical & Surgical History:  Past Medical History:   Diagnosis Date    Anticoagulant long-term use     baby aspirin    Hypertension     Thyroid disease     TIA (transient ischemic attack)     Ulcerative colitis     Wears hearing aid        Past Surgical History:   Procedure Laterality Date    COLON SURGERY  05/17/12    exp lap, perfered colon    EYE SURGERY      left eye cataract       Past Social History:  Social History     Socioeconomic History    Marital status:      Spouse name: Not on file    Number of children: Not on file    Years of education: Not on file    Highest education level: Not on file   Occupational History    Not on file   Social Needs    Financial resource  strain: Not on file    Food insecurity:     Worry: Not on file     Inability: Not on file    Transportation needs:     Medical: Not on file     Non-medical: Not on file   Tobacco Use    Smoking status: Never Smoker    Smokeless tobacco: Never Used   Substance and Sexual Activity    Alcohol use: No    Drug use: No    Sexual activity: Not Currently     Partners: Male   Lifestyle    Physical activity:     Days per week: Not on file     Minutes per session: Not on file    Stress: Not on file   Relationships    Social connections:     Talks on phone: Not on file     Gets together: Not on file     Attends Caodaism service: Not on file     Active member of club or organization: Not on file     Attends meetings of clubs or organizations: Not on file     Relationship status: Not on file   Other Topics Concern    Not on file   Social History Narrative    Not on file       Medications:  No current facility-administered medications on file prior to encounter.      Current Outpatient Medications on File Prior to Encounter   Medication Sig Dispense Refill    amLODIPine (NORVASC) 5 MG tablet Take 1 tablet (5 mg total) by mouth once daily. 90 tablet 1    aspirin (ECOTRIN) 81 MG EC tablet Take 81 mg by mouth once daily.        calcitRIOL (ROCALTROL) 0.25 MCG Cap Take 0.25 mcg by mouth twice a week. Sunday and Thursday      calcium-vitamin D 500-125 mg-unit tablet Take 1 tablet by mouth 2 (two) times daily.        DOCOSAHEXANOIC ACID/VIT C/LUT (EYE HEALTH ORAL) Take 1 tablet by mouth 2 (two) times daily.        fish oil-omega-3 fatty acids 300-1,000 mg capsule Take 1 g by mouth once daily.        folic acid (FOLVITE) 800 MCG tablet Take 800 mcg by mouth once daily.        hydrALAZINE (APRESOLINE) 50 MG tablet Take 1.5 tablets (75 mg total) by mouth 3 (three) times daily. (Patient taking differently: Take 50 mg by mouth 2 (two) times daily. Pt takes 100mg twice a day) 270 tablet 1    levothyroxine (SYNTHROID) 100  "MCG tablet Take 1 tablet (100 mcg total) by mouth once daily. 90 tablet 1    metoprolol tartrate (LOPRESSOR) 50 MG tablet Take 50 mg by mouth 2 (two) times daily.       pravastatin (PRAVACHOL) 20 MG tablet Take 1 tablet (20 mg total) by mouth once daily. 90 tablet 1    telmisartan (MICARDIS) 40 MG Tab Take 40 mg by mouth once daily.      torsemide (DEMADEX) 10 MG Tab Take 1 tablet (10 mg total) by mouth once daily. (Patient taking differently: Take 10 mg by mouth twice a week. Tuesday and Friday) 30 tablet 0    denosumab (PROLIA) 60 mg/mL Syrg Inject 60 mg into the skin every 6 (six) months.        Scheduled Meds:   aspirin  81 mg Oral Daily    calcium-vitamin D3  1 tablet Oral BID    carvedilol  6.25 mg Oral BID    cloNIDine 0.2 mg/24 hr td ptwk  1 patch Transdermal Q7 Days    folic acid  800 mcg Oral Daily    [START ON 1/18/2020] furosemide  20 mg Intravenous Daily    heparin (porcine)  5,000 Units Subcutaneous Q8H    isosorbide dinitrate  40 mg Oral TID    And    hydrALAZINE  75 mg Oral Q8H    levothyroxine  100 mcg Oral Before breakfast    pravastatin  20 mg Oral Daily    [START ON 1/18/2020] telmisartan  20 mg Oral Daily     Continuous Infusions:  PRN Meds:.acetaminophen, sodium chloride 0.9%    Allergies:  Patient has no known allergies.    Past Family History:  Reviewed; refer to Hospitalist Admission Note    Review of Systems:  Review of Systems - All 14 systems reviewed and negative, except as noted in HPI    Physical Exam:    BP (!) 157/73   Pulse 72   Temp 97.7 °F (36.5 °C)   Resp 18   Ht 4' 11" (1.499 m)   Wt 56.2 kg (124 lb)   SpO2 96%   Breastfeeding? No   BMI 25.04 kg/m²     General Appearance:    Alert, cooperative, no distress, appears stated age   Head:    Normocephalic, without obvious abnormality, atraumatic   Eyes:    PER, conjunctiva/corneas clear, EOM's intact in both eyes        Throat:   Lips, mucosa, and tongue normal; teeth and gums normal   Back:     Symmetric, no " curvature, ROM normal, no CVA tenderness   Lungs:     Left sided crackels   Chest wall:    No tenderness or deformity   Heart:    Regular rate and rhythm, S1 and S2 normal, no murmur, rub   or gallop   Abdomen:     Soft, non-tender, bowel sounds active all four quadrants,     no masses, no organomegaly   Extremities:   Extremities normal, atraumatic, no cyanosis.  Edema present   Pulses:   2+ and symmetric all extremities   MSK:   Age appropriate muscular atrophy   Skin:   Skin color, texture, turgor normal, no rashes or lesions   Neurologic:   CNII-XII intact, normal strength and sensation       Throughout.  No flap     Results:  Lab Results   Component Value Date     01/17/2020    K 4.3 01/17/2020     01/17/2020    CO2 22 (L) 01/17/2020    BUN 54 (H) 01/17/2020    CREATININE 2.2 (H) 01/17/2020    CALCIUM 9.1 01/17/2020    ANIONGAP 9 01/17/2020    ESTGFRAFRICA 23 (A) 01/17/2020    EGFRNONAA 20 (A) 01/17/2020       Lab Results   Component Value Date    CALCIUM 9.1 01/17/2020    PHOS 4.6 (H) 01/17/2020       No results for input(s): WBC, RBC, HGB, HCT, PLT, MCV, MCH, MCHC in the last 24 hours.    I have personally reviewed pertinent radiological imaging and reports.

## 2020-01-17 NOTE — NURSING
Pt arrived to ultrasound via bed on 2 liters 02- Dr Penny consented patient for ultrasound guided thoracentesis, time out performed. Left sided thoracentesis performed by Dr Penny- 550 ml fluid drained- pt remained stable for duration of procedure. Specimens collected and sent off for testing. Post op chest xray performed. Report called to ANDI [facility]- pt transported back to room.

## 2020-01-17 NOTE — CONSULTS
[facility]  Cardiology  Consult Note    Patient Name: Izabella Fulton  MRN: [id number]  Admission Date: 1/15/2020  Hospital Length of Stay: 1 days  Code Status: Full Code   Attending Provider: Amilcar Weir MD   Consulting Provider: Steve Ames MD  Primary Care Physician: Corinne Fletcher NP  Principal Problem:Acute exacerbation of CHF (congestive heart failure)    Patient information was obtained from patient and ER records.     Consults  Subjective:     Chief Complaint:   SOB-      HPI:   Pleasant 85 yo  Woman  With ckd + hi bp + on torosemide 2x/week  Fell 2 weeks ago and had rib fractures . She has complained of sob  For several months ( before rib fx ) -  She is on arb+ torosemide+ bb  And amlodopine at home . She denies being told of chf, mi in past No hx of revasculaiztion procedures . She does have a high Na diet  . She denies cp     Past Medical History:   Diagnosis Date    Anticoagulant long-term use     baby aspirin    Hypertension     Thyroid disease     TIA (transient ischemic attack)     Ulcerative colitis     Wears hearing aid        Past Surgical History:   Procedure Laterality Date    COLON SURGERY  05/17/12    exp lap, perfered colon    EYE SURGERY      left eye cataract       Review of patient's allergies indicates:  No Known Allergies    No current facility-administered medications on file prior to encounter.      Current Outpatient Medications on File Prior to Encounter   Medication Sig    amLODIPine (NORVASC) 5 MG tablet Take 1 tablet (5 mg total) by mouth once daily.    aspirin (ECOTRIN) 81 MG EC tablet Take 81 mg by mouth once daily.      calcitRIOL (ROCALTROL) 0.25 MCG Cap Take 0.25 mcg by mouth twice a week. Sunday and Thursday    calcium-vitamin D 500-125 mg-unit tablet Take 1 tablet by mouth 2 (two) times daily.      DOCOSAHEXANOIC ACID/VIT C/LUT (EYE HEALTH ORAL) Take 1 tablet by mouth 2 (two) times daily.      fish oil-omega-3 fatty acids  300-1,000 mg capsule Take 1 g by mouth once daily.      folic acid (FOLVITE) 800 MCG tablet Take 800 mcg by mouth once daily.      hydrALAZINE (APRESOLINE) 50 MG tablet Take 1.5 tablets (75 mg total) by mouth 3 (three) times daily. (Patient taking differently: Take 50 mg by mouth 2 (two) times daily. Pt takes 100mg twice a day)    levothyroxine (SYNTHROID) 100 MCG tablet Take 1 tablet (100 mcg total) by mouth once daily.    metoprolol tartrate (LOPRESSOR) 50 MG tablet Take 50 mg by mouth 2 (two) times daily.     pravastatin (PRAVACHOL) 20 MG tablet Take 1 tablet (20 mg total) by mouth once daily.    telmisartan (MICARDIS) 40 MG Tab Take 40 mg by mouth once daily.    torsemide (DEMADEX) 10 MG Tab Take 1 tablet (10 mg total) by mouth once daily. (Patient taking differently: Take 10 mg by mouth twice a week. Tuesday and Friday)    denosumab (PROLIA) 60 mg/mL Syrg Inject 60 mg into the skin every 6 (six) months.      Family History     Problem Relation (Age of Onset)    Alzheimer's disease Mother    Heart disease Father        Tobacco Use    Smoking status: Never Smoker    Smokeless tobacco: Never Used   Substance and Sexual Activity    Alcohol use: No    Drug use: No    Sexual activity: Not Currently     Partners: Male     Review of Systems   Constitution: Positive for decreased appetite and weight gain.   Cardiovascular: Positive for dyspnea on exertion, leg swelling, orthopnea and paroxysmal nocturnal dyspnea. Negative for chest pain and irregular heartbeat.   Respiratory: Positive for cough and shortness of breath.    Endocrine: Negative.    Skin: Negative.    Musculoskeletal: Positive for arthritis, joint pain and joint swelling.   Gastrointestinal: Negative.    Genitourinary: Negative.    Neurological: Negative.    Psychiatric/Behavioral: Negative.      Objective:     Vital Signs (Most Recent):  Temp: 96.7 °F (35.9 °C) (01/16/20 2017)  Pulse: 73 (01/16/20 2017)  Resp: 18 (01/16/20 2017)  BP: (!)  186/83 (01/16/20 2017)  SpO2: (!) 94 % (01/16/20 2017) Vital Signs (24h Range):  Temp:  [96.5 °F (35.8 °C)-98.3 °F (36.8 °C)] 96.7 °F (35.9 °C)  Pulse:  [55-80] 73  Resp:  [16-20] 18  SpO2:  [94 %-97 %] 94 %  BP: (144-187)/(65-83) 186/83     Weight: 56.2 kg (124 lb)  Body mass index is 25.04 kg/m².    SpO2: (!) 94 %  O2 Device (Oxygen Therapy): nasal cannula      Intake/Output Summary (Last 24 hours) at 1/16/2020 2140  Last data filed at 1/16/2020 1800  Gross per 24 hour   Intake 920 ml   Output --   Net 920 ml       Lines/Drains/Airways     Peripheral Intravenous Line                 Peripheral IV - Single Lumen 01/15/20 1331 20 G Left Forearm 1 day                  Physical Exam  bp  160/80   heent  Neck veins distended    Lungs   Decreased BS major  On  Left post   Cor reg no  s3 + s4   abd mild Hepatomegaly   Extremities  + 1 edema     Significant Labs:   CMP   Recent Labs   Lab 01/15/20  1329 01/16/20  0420    140   K 4.9 4.5    112*   CO2 17* 20*    71   BUN 51* 56*   CREATININE 1.9* 2.0*   CALCIUM 9.2 8.5*   PROT 7.3  --    ALBUMIN 3.4*  --    BILITOT 0.6  --    ALKPHOS 86  --    AST 43*  --    ALT 39  --    ANIONGAP 12 8   ESTGFRAFRICA 27* 25*   EGFRNONAA 24* 22*   , CBC   Recent Labs   Lab 01/15/20  1329   WBC 6.34   HGB 12.6   HCT 38.7      , Troponin   Recent Labs   Lab 01/15/20  1704 01/15/20  2358 01/16/20  0421   TROPONINI 0.028* 0.030* 0.028*    and All pertinent lab results from the last 24 hours have been reviewed.    Significant Imaging:   Assessment and Plan:There is improved aeration of the right lung base.  There is slight blunting the right costophrenic sulcus and very mild stranding right medial lung base    There remains opacification left lower lung field consistent combination of pleural effusion and underlying infiltrate or atelectasis.  Left suprahilar region is slightly clear today.   ECHO reveals  Lvh, EF 60%  Mild pulmonary hi bp   Active Diagnoses:    Diagnosis  Date Noted POA    PRINCIPAL PROBLEM:  Acute exacerbation of CHF (congestive heart failure) [I50.9] 01/15/2020 Yes    Closed fracture of multiple ribs of left side with routine healing [S22.42XD] 01/16/2020 Not Applicable    Calcification of aorta [I70.0] 01/16/2020 Yes    Lung nodule [R91.1] 01/16/2020 Yes    Hypervolemia [E87.70] 01/15/2020 Yes    Pleural effusion [J90] 01/15/2020 Yes    Hypertensive emergency [I16.1] 01/15/2020 Yes    Elevated troponin [R79.89] 01/15/2020 Yes    Hypertensive kidney disease with stage 4 chronic kidney disease [I12.9, N18.4] 12/19/2019 Yes    Primary osteoarthritis involving multiple joints [M15.0] 09/11/2018 Yes    Essential hypertension [I10] 08/08/2018 Yes    Hypothyroid [E03.9] 08/08/2018 Yes    Ulcerative colitis [K51.90] 08/08/2018 Yes    Hyperlipidemia [E78.5] 08/08/2018 Yes      Problems Resolved During this Admission:   Imp 1) fx ribs and pleural effusion  And possible pulmonary contusion   2) HF pEF - possible restrictive HD as amyloid    REc  Continue as is - dc amlodopine as it exacerbates fluid retention  And add lo dose  BB and restrict na in diet   3) I dont think she has pe but ck D dimer- evaluate further only if  Very high level     VTE Risk Mitigation (From admission, onward)         Ordered     heparin (porcine) injection 5,000 Units  Every 8 hours      01/15/20 1623     IP VTE HIGH RISK PATIENT  Once      01/15/20 1623     Place sequential compression device  Until discontinued      01/15/20 1623            I personally reviewed chart and imaging studies ,examined patient, and discussed with the patient her illness and treatment including diet and follow-up care. This consult was prolonged and required approximately 50% time for review and 50% time examining patient and writing notes and orders     Thank you for your consult.Steve Ames MD  Cardiology   [facility]

## 2020-01-18 PROBLEM — I50.33 ACUTE ON CHRONIC DIASTOLIC CONGESTIVE HEART FAILURE: Status: RESOLVED | Noted: 2020-01-01 | Resolved: 2020-01-01

## 2020-01-18 PROBLEM — I24.89 DEMAND ISCHEMIA: Status: RESOLVED | Noted: 2020-01-01 | Resolved: 2020-01-01

## 2020-01-18 NOTE — NURSING
/81 in the right arm and 216/83 in the left arm.  FRANSICO Reed NP messaged for orders.  NP request that cardiologist be called for orders. Spoke with cardiologist on call, Dr Tan, who ordered prn hydralazine.  Hydralazine given.

## 2020-01-18 NOTE — PROGRESS NOTES
[facility]  Cardiology  Progress Note    Patient Name: Izabella Fulton  MRN: [id number]  Admission Date: 1/15/2020  Hospital Length of Stay: 3 days  Code Status: Full Code   Attending Physician: John Lloyd MD   Primary Care Physician: Corinne Fletcher NP  Expected Discharge Date:   Principal Problem:Acute on chronic diastolic congestive heart failure    Subjective:     Hospital Course: 86y  F with h/o HFpEF, HTN, CKD, h/o TIA, was admitted for sob. Received diuretics, symptom resolved.    Had thoracentesis on L with 550 mL bloody fluid drained 1/17/20.    Interval History: Reports breathing is much better, no cp, fever or chill. Bp still fluctuants.    Review of Systems negative except mentioned in HPI    Objective:     Vital Signs (Most Recent):  Temp: 96.2 °F (35.7 °C) (01/18/20 1142)  Pulse: 73 (01/18/20 1142)  Resp: 18 (01/18/20 1142)  BP: (!) 142/66 (01/18/20 1142)  SpO2: (!) 94 % (01/18/20 1142) Vital Signs (24h Range):  Temp:  [96.2 °F (35.7 °C)-98.2 °F (36.8 °C)] 96.2 °F (35.7 °C)  Pulse:  [71-82] 73  Resp:  [16-20] 18  SpO2:  [92 %-98 %] 94 %  BP: (142-216)/(66-90) 142/66   Weight: 54.5 kg (120 lb 3.2 oz)  Body mass index is 24.28 kg/m².  SpO2: (!) 94 %  O2 Device (Oxygen Therapy): nasal cannula    Intake/Output Summary (Last 24 hours) at 1/18/2020 1145  Last data filed at 1/17/2020 1800  Gross per 24 hour   Intake 600 ml   Output --   Net 600 ml     Lines/Drains/Airways     Peripheral Intravenous Line                 Peripheral IV - Single Lumen 01/15/20 1331 20 G Left Forearm 2 days               Physical Exam  Gen: Comfort, sitting on bed, in no distress  Skin: normal temperature/tugor, no erythema  Lymph: no lymphadenopathy detected  HEENT: NC/AT  Neck: supple, no JVD/bruit  Chest: no deformity, equal movement b/l  Lung: BS coarse b/l  Heart: RRR, S1/S2 +, no M/G/R  Abd: BS+, S, NT/ND  Ext: no deformity, no pretibial edema  Pulse: b/l radial 2+  Neuro:  AAOx3    Cardiographics  ECG 1/17/20: Sinus rhythm with 1st degree A-V block.  Echocardiogram 1/16/20:   · Concentric left ventricular hypertrophy.  · Normal left ventricular systolic function. The estimated ejection fraction is 65%.  · Grade I (mild) left ventricular diastolic dysfunction consistent with impaired relaxation.  · Mild left atrial enlargement.  · Mild mitral regurgitation.  · Mild tricuspid regurgitation.  · Trivial pericardial effusion.  · No intracardiac thrombi/vegetations    Imaging  Chest x-ray 1/18/20:   1. Findings consistent with congestive heart failure with small bilateral pleural effusions.  2. Retrocardiac density consistent with left lower lobe atelectasis versus infiltrate.  3. No pneumothorax identified.  4. S shaped scoliosis.    Lab Results   Component Value Date    WBC 7.57 01/18/2020    HGB 10.5 (L) 01/18/2020    HCT 33.1 (L) 01/18/2020    MCV 96 01/18/2020     01/18/2020     BMP  Lab Results   Component Value Date     01/18/2020    K 4.2 01/18/2020     01/18/2020    CO2 23 01/18/2020    BUN 50 (H) 01/18/2020    CREATININE 1.9 (H) 01/18/2020    CALCIUM 8.7 01/18/2020    ANIONGAP 10 01/18/2020    ESTGFRAFRICA 27 (A) 01/18/2020    EGFRNONAA 24 (A) 01/18/2020   Results for FABIOLA CHAVEZ (MRN [id number]) as of 1/18/2020 11:48   Ref. Range 1/15/2020 13:29 1/15/2020 17:04 1/15/2020 23:58 1/16/2020 04:21   BNP Latest Ref Range: 0 - 99 pg/mL 1,219 (H)      Troponin I Latest Ref Range: 0- 0.026 ng/mL 0.030 (H) 0.028 (H) 0.030 (H) 0.028 (H)       Assessment:   HFpEF  L pleural effusion s/p thoracentesis 1/17/20  HTN  CKD  H/o TIA  Hypothyroidism     Plan:   Low salt diet and fluid restriction  Increase Coreg from 6.25 to 12.5mg bid; continue clonidine patch  I cover Dr Ames during weekend only.    Tran Tan MD  Cardiology  [facility]

## 2020-01-18 NOTE — PLAN OF CARE
"Plan of care review with pt, pt states "understanding,"  no redness/swelling noted to IV site, denies dyspnea with exertion, less than 3 seconds capillary refills, no edema noted to extremities, sat greater than 90% on room air, will continue to monitor, observe and note any changes, safety maintain    1530-/84, prn hydralazine administered at 1600-/77, 1630-/77, MD made aware, pt denies HA or any body pain at this time    1855-Pt states "understanding," to d/c instructions, follow up visits and new medication administration, telemetry and IV removed, pt tolerated well, pt packed personal belongings per self, denies joint pain/discomfort prior to d/c  "

## 2020-01-18 NOTE — ASSESSMENT & PLAN NOTE
Patient has chronic hypothyroidism. TFTs reviewed-   Lab Results   Component Value Date    TSH 6.425 (H) 01/16/2020   . Will continue chronic levothyroxine and adjust for and clinical changes.

## 2020-01-18 NOTE — ASSESSMENT & PLAN NOTE
Patient is identified as having Diastolic heart failure that is Acute on Chronic. CHF is currently uncontrolled due to volume overload due to: Continued edema of extremities. Latest ECHO shows ejection fraction of 70%. Continue BB and monitor clinical status closely. Monitor on telemetry. Patient is on CHF pathway.  Monitor strict Is&Os and daily weights.  Place on fluid restriction of 1.5 L. Continue to stress to patient importance of self efficacy and  on diet for CHF. Last BNP reviewed- and noted below   Recent Labs   Lab 01/15/20  1329   BNP 1,219*   .  Given prevalence of diastolic heart failure and increase creatinine, will continue to hold diuresis at the moment.  Continue aggressive blood pressure control as noted below-stop ARB secondary to increased creatinine.

## 2020-01-18 NOTE — ASSESSMENT & PLAN NOTE
Pleural effusion mostly on left side, potentially related to CHF exacerbation plus/minus hemothorax from recent rib fractures.  Will follow up chest x-ray in treat underlying disease processes.

## 2020-01-18 NOTE — ASSESSMENT & PLAN NOTE
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 Labs reviewed- BMP with Estimated Creatinine Clearance: 16.3 mL/min (A) (based on SCr of 2.2 mg/dL (H)). according to latest data. Monitor UOP and serial BMP and adjust therapy as needed. Avoid nephrotoxins and renally dose meds for GFR listed above.  Consult with Nephrology and defer to them for further evaluation and management

## 2020-01-18 NOTE — PT/OT/SLP EVAL
Physical Therapy Evaluation    Patient Name:  Izabella Fulton   MRN:  [id number]    Recommendations:     Discharge Recommendations:  home, home with home health, home health PT   Discharge Equipment Recommendations: other (see comments)(patient reports that she has use of rollator (in addition to 's))   Barriers to discharge: None    Assessment:     Izabella Fulton is a 86 y.o. female admitted with a medical diagnosis of Acute on chronic diastolic congestive heart failure.  She presents with the following impairments/functional limitations:  weakness, impaired endurance, impaired functional mobilty, gait instability, impaired balance  .    Rehab Prognosis: Good; patient would benefit from acute skilled PT services to address these deficits and reach maximum level of function.    Recent Surgery: * No surgery found *      Plan:     During this hospitalization, patient to be seen 6 x/week to address the identified rehab impairments via gait training, therapeutic activities, therapeutic exercises and progress toward the following goals:    · Plan of Care Expires:  01/31/20    Subjective     Chief Complaint: haven't been out of bed in 3 days  Patient/Family Comments/goals: to get oob and walk    Living Environment:  1 story house with 0 steps to enter  Prior to admission, patients level of function was independent, + driving.  Equipment used at home:  .  DME owned (not currently used): none.  Upon discharge, patient will have assistance from .    Objective:     Communicated with nurse (Francoise) prior to session.  Patient found HOB elevated with bed alarm  upon PT entry to room.    General Precautions: Standard, fall   Orthopedic Precautions:N/A   Braces: N/A     Functional Mobility:  · Bed Mobility:     · Rolling Right: stand by assistance  · Supine to Sit: stand by assistance  · Transfers:     · Sit to Stand:  stand by assistance with rolling walker and  x 2 reps  · Gait: 225' x 2 with RW with CG/SBA    AM-PAC 6  CLICK MOBILITY  Total Score:19     Patient left seated at eob with call button in reach, bed alarm on, nurse notified and  present.    GOALS:   Multidisciplinary Problems     Physical Therapy Goals        Problem: Physical Therapy Goal    Goal Priority Disciplines Outcome Goal Variances Interventions   Physical Therapy Goal     PT, PT/OT Ongoing, Progressing     Description:  Goals to be met by: 2020     Patient will increase functional independence with mobility by performin). Supine to sit with Modified [place]  2). Sit to stand transfer with Modified [place]  3). Gait  x  > 300 feet with Modified [place] using Rolling Walker.                       History:     Past Medical History:   Diagnosis Date    Anticoagulant long-term use     baby aspirin    Closed fracture of multiple ribs of left side with routine healing 2020    Hypertension     Thyroid disease     TIA (transient ischemic attack)     Ulcerative colitis     Wears hearing aid        Past Surgical History:   Procedure Laterality Date    COLON SURGERY  12    exp lap, perfered colon    EYE SURGERY      left eye cataract       Time Tracking:     PT Received On: 20  PT Start Time: 1010     PT Stop Time: 1025  PT Total Time (min): 15 min     Billable Minutes: Evaluation 15      Raman Melgoza, PT  2020

## 2020-01-18 NOTE — ASSESSMENT & PLAN NOTE
Patient is chronically on statin.will continue for now. Monitor clinically. Last LDL was   Lab Results   Component Value Date    LDLCALC 72 09/19/2019

## 2020-01-18 NOTE — SUBJECTIVE & OBJECTIVE
Interval History:  Patient seen and examined.  Underwent left-sided thoracentesis today with approximately half a L of fluid drained.  Hypoxemia and shortness of breath remained persistent despite adequate diuresis.    Review of Systems   Constitutional: Positive for fatigue.   Respiratory: Positive for cough and shortness of breath.    Cardiovascular: Negative for chest pain and leg swelling.   Gastrointestinal: Negative for abdominal pain and nausea.   Neurological: Negative for weakness.   Psychiatric/Behavioral: Negative for confusion.   All other systems reviewed and are negative.    Objective:     Vital Signs (Most Recent):  Temp: 97.1 °F (36.2 °C) (01/17/20 2210)  Pulse: 73 (01/17/20 2210)  Resp: 18 (01/17/20 2210)  BP: (!) 188/81 (01/17/20 2210)  SpO2: (!) 92 % (01/17/20 2210) Vital Signs (24h Range):  Temp:  [97.1 °F (36.2 °C)-97.8 °F (36.6 °C)] 97.1 °F (36.2 °C)  Pulse:  [69-80] 73  Resp:  [18-20] 18  SpO2:  [92 %-98 %] 92 %  BP: (157-216)/(70-86) 188/81     Weight: 56.2 kg (124 lb)  Body mass index is 25.04 kg/m².    Intake/Output Summary (Last 24 hours) at 1/17/2020 2319  Last data filed at 1/17/2020 1800  Gross per 24 hour   Intake 600 ml   Output --   Net 600 ml      Physical Exam   Constitutional: She is oriented to person, place, and time.   Elderly, frail  female in no acute distress.   Eyes: Pupils are equal, round, and reactive to light. EOM are normal.   Cardiovascular: Normal rate, regular rhythm and intact distal pulses.   No murmur heard.  Pulmonary/Chest: Effort normal and breath sounds normal.   Markedly decreased breath sounds on the left side.  Bibasilar crackles noted.   Abdominal: Soft. She exhibits no distension. There is no tenderness.   Musculoskeletal: She exhibits edema.   Neurological: She is alert and oriented to person, place, and time.   Skin: No rash noted. No pallor.   Nursing note and vitals reviewed.      Significant Labs: All pertinent labs within the past 24 hours  have been reviewed.    Significant Imaging: I have reviewed all pertinent imaging results/findings within the past 24 hours.

## 2020-01-18 NOTE — PROGRESS NOTES
Progress Note  PULMONARY    Admit Date: 1/15/2020   01/18/2020      SUBJECTIVE:   Pt is an 85 yo female admitted with acute on chronic CHF and L>R pleural effusions with hx of a fall recently.  1/17- pt had L thoracentesis w/ 550 mL bloody fluid drained  1/18- no new complaints      PFSH and Allergies reviewed.    OBJECTIVE:     Vitals (Most recent):  Vitals:    01/18/20 0733   BP: (!) 200/87   Pulse: 72   Resp: 16   Temp: 97 °F (36.1 °C)       Vitals (24 hour range):  Temp:  [97 °F (36.1 °C)-98.2 °F (36.8 °C)]   Pulse:  [71-80]   Resp:  [16-20]   BP: (157-216)/(73-90)   SpO2:  [92 %-98 %]       Intake/Output Summary (Last 24 hours) at 1/18/2020 0921  Last data filed at 1/17/2020 1800  Gross per 24 hour   Intake 600 ml   Output --   Net 600 ml          Physical Exam:  The patient's neuro status (alertness,orientation,cognitive function,motor skills,), pharyngeal exam (oral lesions, hygiene, abn dentition,), Neck (jvd,mass,thyroid,nodes in neck and above/below clavicle),RESPIRATORY(symmetry,effort,fremitus,percussion,auscultation),  Cor(rhythm,heart tones including gallops,perfusion,edema)ABD(distention,hepatic&splenomegaly,tenderness,masses), Skin(rash,cyanosis),Psyc(affect,judgement,).  Exam negative except for these pertinent findings:    Diminished breath sounds L lower lung zone  Bandage in place on left back  No edema    Radiographs reviewed: view by direct vision   January 16th with a left effusion and calcified aorta and large heart  Results for orders placed during the hospital encounter of 01/15/20   X-Ray Chest 1 View    Narrative EXAMINATION:  XR CHEST 1 VIEW    CLINICAL HISTORY:  12 hours post thora;    TECHNIQUE:  Single frontal view of the chest was performed.    COMPARISON:  01/17/2020.    FINDINGS:  Pulmonary hyperinflation consistent with COPD.  Mild diffuse prominence of the pulmonary interstitium consistent with interstitial edema.  This appears to have slightly increased over the interval.  There  are small bilateral pleural effusions.  There is retrocardiac density consistent with left lower lobe atelectasis versus infiltrate.  No pneumothorax identified.    Heart size is enlarged.  Mediastinal contours unremarkable.  Trachea midline.    Moderate S shaped scoliosis.      Impression 1. Findings consistent with congestive heart failure with small bilateral pleural effusions.  2. Retrocardiac density consistent with left lower lobe atelectasis versus infiltrate.  3. No pneumothorax identified.  4. S shaped scoliosis.      Electronically signed by: Sami Garcia  Date:    01/18/2020  Time:    08:57   ]    Labs     Recent Labs   Lab 01/18/20  0458   WBC 7.57   HGB 10.5*   HCT 33.1*        Recent Labs   Lab 01/18/20  0458      K 4.2      CO2 23   BUN 50*   CREATININE 1.9*   GLU 81   CALCIUM 8.7   No results for input(s): PH, PCO2, PO2, HCO3 in the last 24 hours.  Microbiology Results (last 7 days)     Procedure Component Value Units Date/Time    Gram stain [[id number]] Collected:  01/17/20 1519    Order Status:  Completed Specimen:  Pleural Fluid Updated:  01/18/20 0308     Gram Stain Result Rare WBC's      No organisms seen    Aerobic culture [[id number]] Collected:  01/17/20 1519    Order Status:  Sent Specimen:  Pleural Fluid Updated:  01/17/20 2222    AFB Culture & Smear [[id number]] Collected:  01/17/20 1519    Order Status:  Sent Specimen:  Pleural Fluid Updated:  01/17/20 2222    Culture, Anaerobic [[id number]] Collected:  01/17/20 1519    Order Status:  Sent Specimen:  Pleural Fluid Updated:  01/17/20 2222    Culture, Body Fluid (Aerobic) w/ GS [[id number]] Collected:  01/17/20 1519    Order Status:  Canceled Specimen:  Pleural Fluid         Results for FABIOLA CHAVEZ (MRN [id number]) as of 1/18/2020 09:24   Ref. Range 1/17/2020 15:20   Fluid Color Unknown Marlene   Fluid Appearance Unknown Cloudy   Body Fluid Type Unknown Thoracentesis Fluid   WBC, Body Fluid Latest Units: /cu mm 391   Segs,  Fluid Latest Units: % 4   Lymphs, Fluid Latest Units: % 72   Monocytes/Macrophages, Fluid Latest Units: % 24   Glucose, Fluid Latest Ref Range: Not established mg/dL 133   Body Fluid Source, Glucose Unknown Pleural Fluid, Left   Body Fluid Source, Total Protein Unknown Pleural Fluid, Left   Body Fluid, Protein Latest Ref Range: Not established g/dL 2.5     TTE 1/16/20  · Concentric left ventricular hypertrophy.  · The mean diastolic gradient across the mitral valve is 2 mmHg at a heart rate of bpm.  · Normal left ventricular systolic function. The estimated ejection fraction is 65%.  · Grade I (mild) left ventricular diastolic dysfunction consistent with impaired relaxation.  · No wall motion abnormalities.  · Mild left atrial enlargement.  · Mild mitral regurgitation.  · Mild tricuspid regurgitation.  · Trivial pericardial effusion.  · No intracardiac thrombi/vegetations    Impression:  Active Hospital Problems    Diagnosis  POA    *Acute on chronic diastolic congestive heart failure [I50.33]  Yes    Calcification of aorta [I70.0]  Yes     Defer to primary control of cholesterol and bp.          Lung nodule [R91.1]  Yes     Seen left lower lung 5/15/19 4 mm - not impressive.      Pleural effusion [J90]  Yes    Demand ischemia [I24.8]  Yes    Hypertensive kidney disease with stage 4 chronic kidney disease [I12.9, N18.4]  Yes    Primary osteoarthritis involving multiple joints [M15.0]  Yes    Essential hypertension [I10]  Yes    Hypothyroid [E03.9]  Yes    Ulcerative colitis [K51.90]  Yes    Hyperlipidemia [E78.5]  Yes      Resolved Hospital Problems    Diagnosis Date Resolved POA    Closed fracture of multiple ribs of left side with routine healing [S22.42XD] 01/17/2020 Not Applicable    Hypertensive emergency [I16.1] 01/17/2020 Yes               Plan:   L transudative pleural effusion likely related to heart failure, improved s/p thoracentesis  Acute on chronic HFpEF    - continue diuresis  - CXR looks  improved  - pleural effusion likely related to heart failure. Studies transudative  - may need PT but otherwise appears stable for discharge       Lindsey Miller MD  Pulmonary & Critical Care Medicine                                      .

## 2020-01-18 NOTE — ASSESSMENT & PLAN NOTE
Chronic, uncontrolled with secondary heart failure..  Latest blood pressure and vitals reviewed-   Temp:  [97.1 °F (36.2 °C)-97.8 °F (36.6 °C)]   Pulse:  [71-80]   Resp:  [18-20]   BP: (157-216)/(73-86)   SpO2:  [92 %-98 %] .   Home meds for hypertension were reviewed and noted below. Hospital anti-hypertensive changes were made as shown below.  Hypertension Medications             amLODIPine (NORVASC) 5 MG tablet Take 1 tablet (5 mg total) by mouth once daily.    hydrALAZINE (APRESOLINE) 50 MG tablet Take 1.5 tablets (75 mg total) by mouth 3 (three) times daily.    metoprolol tartrate (LOPRESSOR) 50 MG tablet Take 50 mg by mouth 2 (two) times daily.     telmisartan (MICARDIS) 40 MG Tab Take 40 mg by mouth once daily.    torsemide (DEMADEX) 10 MG Tab Take 1 tablet (10 mg total) by mouth once daily.      Hospital Medications             carvedilol tablet 6.25 mg 6.25 mg, Oral, 2 times daily    cloNIDine 0.2 mg/24 hr td ptwk 1 patch 1 patch, Transdermal, Every 7 days    hydrALAZINE injection 10 mg 10 mg, Intravenous, Every 6 hours PRN, 1. Consider placing patient on continuous cardiac monitor (obtain order if needed).<BR>2. Monitor BP and HR pre-administration, post-administration, then every 30 minutes x2, then every hour x2.<BR>3. Administer at a rate no faster than 5mg over 1 minute.<BR>    isosorbide mononitrate 24 hr tablet 60 mg 60 mg, Oral, Daily, DO NOT CRUSH OR CHEW; SWALLOW WHOLE.        Will utilize p.r.n. blood pressure medication only if patient's blood pressure greater than  180/110 and she develops symptoms such as worsening chest pain or shortness of breath.

## 2020-01-18 NOTE — CARE UPDATE
01/18/20 0830   Patient Assessment/Suction   Level of Consciousness (AVPU) alert   PRE-TX-O2   O2 Device (Oxygen Therapy) nasal cannula   $ Is the patient on Low Flow Oxygen? Yes   Flow (L/min) 2   Oxygen Concentration (%) 28   SpO2 96 %   Pulse Oximetry Type Intermittent   $ Pulse Oximetry - Multiple Charge Pulse Oximetry - Multiple   Pulse 70   Resp 16

## 2020-01-18 NOTE — PLAN OF CARE
Problem: Physical Therapy Goal  Goal: Physical Therapy Goal  Description  Goals to be met by: 2020     Patient will increase functional independence with mobility by performin). Supine to sit with Modified [place]  2). Sit to stand transfer with Modified [place]  3). Gait  x  > 300 feet with Modified [place] using Rolling Walker.      Outcome: Ongoing, Progressing

## 2020-01-18 NOTE — PROGRESS NOTES
INPATIENT NEPHROLOGY PROGRESS  [facility] NEPHROLOGY    Izabella Fulton  01/18/2020    Reason for consultation:    kaitlynn    Chief Complaint:   Chief Complaint   Patient presents with    Shortness of Breath     x a few days.          History of Present Illness:    Per H and  P    Izabella Fulton is a 86 y.o. female with HTN, CKD stage IV on torsemide twice a week, and prior TIA on daily Aspirin 81 mg  presented to the ED with  onset of worsening SOB with swelling of bilateral  lower extremity worsening over the last 2 days with associated weight gain.  Patient states that she has been doing well in terms of her breathing for the last 2 weeks since her rib fracture on December 24, 2019.  Patient states that she has been on torsemide twice a week as per renal recommendation and has been doing well and has not had any swelling of the leg prior to this.  She sustained multiple left-sided rib fractures and a moderate pleural effusion status post fall less than 3 weeks ago on 12/24.  Patient states that she went to the hospital on 26th of December and decided to leave the hospital despite recommendations to get admitted.  Patient has been getting multiple chest x-rays since her ER visit on the 26th of December and chest x-rays have been stable.  The patient had a repeat CXR obtained 10 days ago on 1/5 revealing no change in the effusion. At which time, she reports having a 8 lbs weight gain and increased SOB. Yesterday, the patient had an OP CXR that revealed no changes in the pleural effusion. She states her SOB worsened significantly in the last 24 hours.  No change in medications in the last 2 weeks.  The patient reports her rib pain is nearly resolved. She denies chest pain palpitations cough fever chills.  No  history of dialysis or taking Lasix, fever, cough, chest pain, or any other symptoms at this time.    1/16  No nausea, chest pain, sob, fever, urinary or bowel complaint, new neurologic symptoms, new joint  pain,    1/17  No nausea, chest pain, sob, fever, urinary or bowel complaint, new neurologic symptoms, new joint pain,  1/18  Renal function improving.  Scr down to 1.9.  UOP not recorded.  BP still running high, has PRN hydralazine orders.  No new complaints.        Plan of Care:       Assessment:    Acute kidney injury--cr labile.   She runs between 1.5 and 2.0.    --no nsaids, coxibs  --keep net diuresis to 1 liter per day  --trend cr.  Cr up today.  Cut lasix to daily    CKDIV  --renal dose medication  For crcl 10-50    Decompensated heart failure  --lasix to daily due to kaitlynn  --pt admits to slipping on salt restriction.  Pt counseled   --thoracentesis ordered    Hypertension  --hydralazine to 100mg tid if bp still high after diuresis  --clonidine added by cardiology                        Thank you for allowing us to participate in this patient's care. We will continue to follow.    Vital Signs:  Temp Readings from Last 3 Encounters:   01/18/20 97 °F (36.1 °C) (Oral)   12/26/19 97.9 °F (36.6 °C)   05/15/19 99.5 °F (37.5 °C)       Pulse Readings from Last 3 Encounters:   01/18/20 72   01/15/20 68   01/03/20 60       BP Readings from Last 3 Encounters:   01/18/20 (!) 200/87   01/15/20 138/76   01/03/20 (!) 170/60       Weight:  Wt Readings from Last 3 Encounters:   01/18/20 54.5 kg (120 lb 3.2 oz)   01/15/20 56.4 kg (124 lb 6.4 oz)   01/03/20 50.8 kg (112 lb)       Past Medical & Surgical History:  Past Medical History:   Diagnosis Date    Anticoagulant long-term use     baby aspirin    Closed fracture of multiple ribs of left side with routine healing 1/16/2020    Hypertension     Thyroid disease     TIA (transient ischemic attack)     Ulcerative colitis     Wears hearing aid        Past Surgical History:   Procedure Laterality Date    COLON SURGERY  05/17/12    exp lap, perfered colon    EYE SURGERY      left eye cataract       Past Social History:  Social History     Socioeconomic History    Marital  status:      Spouse name: Not on file    Number of children: Not on file    Years of education: Not on file    Highest education level: Not on file   Occupational History    Not on file   Social Needs    Financial resource strain: Not on file    Food insecurity:     Worry: Not on file     Inability: Not on file    Transportation needs:     Medical: Not on file     Non-medical: Not on file   Tobacco Use    Smoking status: Never Smoker    Smokeless tobacco: Never Used   Substance and Sexual Activity    Alcohol use: No    Drug use: No    Sexual activity: Not Currently     Partners: Male   Lifestyle    Physical activity:     Days per week: Not on file     Minutes per session: Not on file    Stress: Not on file   Relationships    Social connections:     Talks on phone: Not on file     Gets together: Not on file     Attends Restoration[employer] service: Not on file     Active member of club or organization: Not on file     Attends meetings of clubs or organizations: Not on file     Relationship status: Not on file   Other Topics Concern    Not on file   Social History Narrative    Not on file       Medications:  No current facility-administered medications on file prior to encounter.      Current Outpatient Medications on File Prior to Encounter   Medication Sig Dispense Refill    amLODIPine (NORVASC) 5 MG tablet Take 1 tablet (5 mg total) by mouth once daily. 90 tablet 1    aspirin (ECOTRIN) 81 MG EC tablet Take 81 mg by mouth once daily.        calcitRIOL (ROCALTROL) 0.25 MCG Cap Take 0.25 mcg by mouth twice a week. Sunday and Thursday      calcium-vitamin D 500-125 mg-unit tablet Take 1 tablet by mouth 2 (two) times daily.        DOCOSAHEXANOIC ACID/VIT C/LUT (EYE HEALTH ORAL) Take 1 tablet by mouth 2 (two) times daily.        fish oil-omega-3 fatty acids 300-1,000 mg capsule Take 1 g by mouth once daily.        folic acid (FOLVITE) 800 MCG tablet Take 800 mcg by mouth once daily.         hydrALAZINE (APRESOLINE) 50 MG tablet Take 1.5 tablets (75 mg total) by mouth 3 (three) times daily. (Patient taking differently: Take 50 mg by mouth 2 (two) times daily. Pt takes 100mg twice a day) 270 tablet 1    levothyroxine (SYNTHROID) 100 MCG tablet Take 1 tablet (100 mcg total) by mouth once daily. 90 tablet 1    metoprolol tartrate (LOPRESSOR) 50 MG tablet Take 50 mg by mouth 2 (two) times daily.       pravastatin (PRAVACHOL) 20 MG tablet Take 1 tablet (20 mg total) by mouth once daily. 90 tablet 1    telmisartan (MICARDIS) 40 MG Tab Take 40 mg by mouth once daily.      torsemide (DEMADEX) 10 MG Tab Take 1 tablet (10 mg total) by mouth once daily. (Patient taking differently: Take 10 mg by mouth twice a week. Tuesday and Friday) 30 tablet 0    denosumab (PROLIA) 60 mg/mL Syrg Inject 60 mg into the skin every 6 (six) months.        Scheduled Meds:   aspirin  81 mg Oral Daily    calcium-vitamin D3  1 tablet Oral BID    carvedilol  12.5 mg Oral BID    cloNIDine 0.2 mg/24 hr td ptwk  1 patch Transdermal Q7 Days    folic acid  800 mcg Oral Daily    heparin (porcine)  5,000 Units Subcutaneous Q8H    HYDROcodone-acetaminophen  1 tablet Oral Once    isosorbide mononitrate  60 mg Oral Daily    levothyroxine  100 mcg Oral Before breakfast    pravastatin  20 mg Oral Daily     Continuous Infusions:  PRN Meds:.acetaminophen, hydrALAZINE, magnesium oxide, magnesium oxide, melatonin, ondansetron, potassium chloride 10%, potassium chloride 10%, potassium chloride 10%, potassium, sodium phosphates, potassium, sodium phosphates, potassium, sodium phosphates, sodium chloride 0.9%    Allergies:  Patient has no known allergies.    Past Family History:  Reviewed; refer to Hospitalist Admission Note    Review of Systems:  Review of Systems - All 14 systems reviewed and negative, except as noted in HPI    Physical Exam:    BP (!) 200/87 (BP Location: Left arm, Patient Position: Lying)   Pulse 72   Temp 97 °F (36.1  "°C) (Oral)   Resp 16   Ht 4' 11" (1.499 m)   Wt 54.5 kg (120 lb 3.2 oz)   SpO2 95%   Breastfeeding? No   BMI 24.28 kg/m²     General Appearance:    Alert, cooperative, no distress, appears stated age   Head:    Normocephalic, without obvious abnormality, atraumatic   Eyes:    PER, conjunctiva/corneas clear, EOM's intact in both eyes        Throat:   Lips, mucosa, and tongue normal; teeth and gums normal   Back:     Symmetric, no curvature, ROM normal, no CVA tenderness   Lungs:     Left sided crackels   Chest wall:    No tenderness or deformity   Heart:    Regular rate and rhythm, S1 and S2 normal, no murmur, rub   or gallop   Abdomen:     Soft, non-tender, bowel sounds active all four quadrants,     no masses, no organomegaly   Extremities:   Extremities normal, atraumatic, no cyanosis.  Edema present   Pulses:   2+ and symmetric all extremities   MSK:   Age appropriate muscular atrophy   Skin:   Skin color, texture, turgor normal, no rashes or lesions   Neurologic:   CNII-XII intact, normal strength and sensation       Throughout.  No flap     Results:  Lab Results   Component Value Date     01/18/2020    K 4.2 01/18/2020     01/18/2020    CO2 23 01/18/2020    BUN 50 (H) 01/18/2020    CREATININE 1.9 (H) 01/18/2020    CALCIUM 8.7 01/18/2020    ANIONGAP 10 01/18/2020    ESTGFRAFRICA 27 (A) 01/18/2020    EGFRNONAA 24 (A) 01/18/2020       Lab Results   Component Value Date    CALCIUM 8.7 01/18/2020    PHOS 4.6 (H) 01/17/2020       Recent Labs   Lab 01/18/20  0458   WBC 7.57   RBC 3.45*   HGB 10.5*   HCT 33.1*      MCV 96   MCH 30.4   MCHC 31.7*       I have personally reviewed pertinent radiological imaging and reports.       "

## 2020-01-18 NOTE — SUBJECTIVE & OBJECTIVE
Interval History:  Patient seen and examined.  No acute events overnight.  Patient received thoracentesis with approximately half a L of fluid drained.  Plan of care discussed with Nephrology.    Review of Systems   Constitutional: Positive for fatigue.   Respiratory: Positive for cough and shortness of breath.    Cardiovascular: Negative for chest pain and leg swelling.   Gastrointestinal: Negative for abdominal pain and nausea.   Neurological: Negative for weakness.   Psychiatric/Behavioral: Negative for confusion.   All other systems reviewed and are negative.    Objective:     Vital Signs (Most Recent):  Temp: 97.1 °F (36.2 °C) (01/17/20 2210)  Pulse: 73 (01/17/20 2210)  Resp: 18 (01/17/20 2210)  BP: (!) 188/81 (01/17/20 2210)  SpO2: (!) 92 % (01/17/20 2210) Vital Signs (24h Range):  Temp:  [97.1 °F (36.2 °C)-97.8 °F (36.6 °C)] 97.1 °F (36.2 °C)  Pulse:  [69-80] 73  Resp:  [18-20] 18  SpO2:  [92 %-98 %] 92 %  BP: (157-216)/(70-86) 188/81     Weight: 56.2 kg (124 lb)  Body mass index is 25.04 kg/m².    Intake/Output Summary (Last 24 hours) at 1/17/2020 2317  Last data filed at 1/17/2020 1800  Gross per 24 hour   Intake 600 ml   Output --   Net 600 ml      Physical Exam   Constitutional: She is oriented to person, place, and time. She appears well-developed and well-nourished.   Eyes: Pupils are equal, round, and reactive to light. EOM are normal.   Cardiovascular: Normal rate, regular rhythm and intact distal pulses.   No murmur heard.  Pulmonary/Chest: Effort normal.   Markedly decreased breath sounds on the left side.  Bibasilar crackles.   Abdominal: Soft. She exhibits no distension. There is no tenderness.   Musculoskeletal: She exhibits edema.   Mild edema to the bilateral lower extremities noted.   Neurological: She is alert and oriented to person, place, and time.   Skin: No rash noted. No pallor.   Nursing note and vitals reviewed.      Significant Labs: All pertinent labs within the past 24 hours have  been reviewed.    Significant Imaging: I have reviewed all pertinent imaging results/findings within the past 24 hours.

## 2020-01-18 NOTE — PROGRESS NOTES
[facility] Hospital Medicine  Progress Note    Patient Name: Izabella Fulton  MRN: [id number]  Patient Class: IP- Inpatient   Admission Date: 1/15/2020  Length of Stay: 2 days  Attending Physician: Billy Jones MD  Primary Care Provider: Corinne Fletcher NP        Subjective:     Principal Problem:Acute on chronic diastolic congestive heart failure        HPI:  Izabella Fulton is a 86 y.o. female with HTN, CKD stage IV on torsemide twice a week, and prior TIA on daily Aspirin 81 mg  presented to the ED with  onset of worsening SOB with swelling of bilateral  lower extremity worsening over the last 2 days with associated weight gain.  Patient states that she has been doing well in terms of her breathing for the last 2 weeks since her rib fracture on December 24, 2019.  Patient states that she has been on torsemide twice a week as per renal recommendation and has been doing well and has not had any swelling of the leg prior to this.  She sustained multiple left-sided rib fractures and a moderate pleural effusion status post fall less than 3 weeks ago on 12/24.  Patient states that she went to the hospital on 26th of December and decided to leave the hospital despite recommendations to get admitted.  Patient has been getting multiple chest x-rays since her ER visit on the 26th of December and chest x-rays have been stable.  The patient had a repeat CXR obtained 10 days ago on 1/5 revealing no change in the effusion. At which time, she reports having a 8 lbs weight gain and increased SOB. Yesterday, the patient had an OP CXR that revealed no changes in the pleural effusion. She states her SOB worsened significantly in the last 24 hours.  No change in medications in the last 2 weeks.  The patient reports her rib pain is nearly resolved. She denies chest pain palpitations cough fever chills.  No  history of dialysis or taking Lasix, fever, cough, chest pain, or any other symptoms at this time. No  cardiopulmonary PSHx.      The history is provided by the patient.      Overview/Hospital Course:  Patient presented with insidious onset of worsening SOB with swelling of bilateral  lower extremity worsening over the last 2 days with associated weight gain.  patient was started on Lasix IV twice daily.  Nephrology was consulted patient has been following with up with Dr. Shultz and has been managing her CKD stage 4 and her Lasix dose.  Cardiology has been consulted for worsening of her CHF.  Echo was done currently waiting final report preliminary report shows normal ejection fraction.  Patient also has pleural effusion bilaterally pulmonary has been consulted for possibility of bedside thoracocentesis if needed.  A large pleural effusion was seen on the echo .   patient's respiratory status improved after the Lasix.  Continued to feel short of breath not at her baseline currently on 2 L nasal cannula oxygen    Interval History:  Patient seen and examined.  Underwent left-sided thoracentesis today with approximately half a L of fluid drained.  Hypoxemia and shortness of breath remained persistent despite adequate diuresis.    Review of Systems   Constitutional: Positive for fatigue.   Respiratory: Positive for cough and shortness of breath.    Cardiovascular: Negative for chest pain and leg swelling.   Gastrointestinal: Negative for abdominal pain and nausea.   Neurological: Negative for weakness.   Psychiatric/Behavioral: Negative for confusion.   All other systems reviewed and are negative.    Objective:     Vital Signs (Most Recent):  Temp: 97.1 °F (36.2 °C) (01/17/20 2210)  Pulse: 73 (01/17/20 2210)  Resp: 18 (01/17/20 2210)  BP: (!) 188/81 (01/17/20 2210)  SpO2: (!) 92 % (01/17/20 2210) Vital Signs (24h Range):  Temp:  [97.1 °F (36.2 °C)-97.8 °F (36.6 °C)] 97.1 °F (36.2 °C)  Pulse:  [69-80] 73  Resp:  [18-20] 18  SpO2:  [92 %-98 %] 92 %  BP: (157-216)/(70-86) 188/81     Weight: 56.2 kg (124 lb)  Body mass index  is 25.04 kg/m².    Intake/Output Summary (Last 24 hours) at 1/17/2020 2319  Last data filed at 1/17/2020 1800  Gross per 24 hour   Intake 600 ml   Output --   Net 600 ml      Physical Exam   Constitutional: She is oriented to person, place, and time.   Elderly, frail  female in no acute distress.   Eyes: Pupils are equal, round, and reactive to light. EOM are normal.   Cardiovascular: Normal rate, regular rhythm and intact distal pulses.   No murmur heard.  Pulmonary/Chest: Effort normal and breath sounds normal.   Markedly decreased breath sounds on the left side.  Bibasilar crackles noted.   Abdominal: Soft. She exhibits no distension. There is no tenderness.   Musculoskeletal: She exhibits edema.   Neurological: She is alert and oriented to person, place, and time.   Skin: No rash noted. No pallor.   Nursing note and vitals reviewed.      Significant Labs: All pertinent labs within the past 24 hours have been reviewed.    Significant Imaging: I have reviewed all pertinent imaging results/findings within the past 24 hours.      Assessment/Plan:      * Acute on chronic diastolic congestive heart failure  Patient is identified as having Diastolic heart failure that is Acute on Chronic. CHF is currently uncontrolled due to volume overload due to: Continued edema of extremities. Latest ECHO shows ejection fraction of 70%. Continue BB and monitor clinical status closely. Monitor on telemetry. Patient is on CHF pathway.  Monitor strict Is&Os and daily weights.  Place on fluid restriction of 1.5 L. Continue to stress to patient importance of self efficacy and  on diet for CHF. Last BNP reviewed- and noted below   Recent Labs   Lab 01/15/20  1329   BNP 1,219*   .  Given prevalence of diastolic heart failure and increase creatinine, will continue to hold diuresis at the moment.  Continue aggressive blood pressure control as noted below-stop ARB secondary to increased creatinine.        Pleural  effusion  Pleural effusion mostly on left side, potentially related to CHF exacerbation plus/minus hemothorax from recent rib fractures.  Will follow up chest x-ray in treat underlying disease processes.      Demand ischemia  Troponin remained stable with no evidence of increased or evidence of acute MI.  Continue to monitor and defer to Cardiology for further treatment.        Essential hypertension  Chronic, uncontrolled with secondary heart failure..  Latest blood pressure and vitals reviewed-   Temp:  [97.1 °F (36.2 °C)-97.8 °F (36.6 °C)]   Pulse:  [71-80]   Resp:  [18-20]   BP: (157-216)/(73-86)   SpO2:  [92 %-98 %] .   Home meds for hypertension were reviewed and noted below. Hospital anti-hypertensive changes were made as shown below.  Hypertension Medications             amLODIPine (NORVASC) 5 MG tablet Take 1 tablet (5 mg total) by mouth once daily.    hydrALAZINE (APRESOLINE) 50 MG tablet Take 1.5 tablets (75 mg total) by mouth 3 (three) times daily.    metoprolol tartrate (LOPRESSOR) 50 MG tablet Take 50 mg by mouth 2 (two) times daily.     telmisartan (MICARDIS) 40 MG Tab Take 40 mg by mouth once daily.    torsemide (DEMADEX) 10 MG Tab Take 1 tablet (10 mg total) by mouth once daily.      Hospital Medications             carvedilol tablet 6.25 mg 6.25 mg, Oral, 2 times daily    cloNIDine 0.2 mg/24 hr td ptwk 1 patch 1 patch, Transdermal, Every 7 days    hydrALAZINE injection 10 mg 10 mg, Intravenous, Every 6 hours PRN, 1. Consider placing patient on continuous cardiac monitor (obtain order if needed).<BR>2. Monitor BP and HR pre-administration, post-administration, then every 30 minutes x2, then every hour x2.<BR>3. Administer at a rate no faster than 5mg over 1 minute.<BR>    isosorbide mononitrate 24 hr tablet 60 mg 60 mg, Oral, Daily, DO NOT CRUSH OR CHEW; SWALLOW WHOLE.        Will utilize p.r.n. blood pressure medication only if patient's blood pressure greater than  180/110 and she develops  symptoms such as worsening chest pain or shortness of breath.        Hypertensive kidney disease with stage 4 chronic kidney disease  Patient with FERCHO likely d/t IVVD  Which is currently worsening. Labs reviewed- BMP with Estimated Creatinine Clearance: 16.3 mL/min (A) (based on SCr of 2.2 mg/dL (H)). according to latest data. Monitor UOP and serial BMP and adjust therapy as needed. Avoid nephrotoxins and renally dose meds for GFR listed above.  Consult with Nephrology and defer to them for further evaluation and management      Primary osteoarthritis involving multiple joints   will continue Tylenol for pain related to osteoarthritis    Hyperlipidemia   Patient is chronically on statin.will continue for now. Monitor clinically. Last LDL was   Lab Results   Component Value Date    LDLCALC 72 09/19/2019            Ulcerative colitis  Currently stable has not had a flare for over a year      Hypothyroid  Patient has chronic hypothyroidism. TFTs reviewed-   Lab Results   Component Value Date    TSH 6.425 (H) 01/16/2020   . Will continue chronic levothyroxine and adjust for and clinical changes.          VTE Risk Mitigation (From admission, onward)         Ordered     heparin (porcine) injection 5,000 Units  Every 8 hours      01/15/20 1623     IP VTE HIGH RISK PATIENT  Once      01/15/20 1623     Place sequential compression device  Until discontinued      01/15/20 1623                      Billy Jones MD  Department of Hospital Medicine   [facility]

## 2020-01-18 NOTE — PLAN OF CARE
Awake alert, calls for assist, thoracentesis this afternoon, removed 550 ml. Dressing to left back dry intact.

## 2020-01-18 NOTE — ASSESSMENT & PLAN NOTE
Troponin remained stable with no evidence of increased or evidence of acute MI.  Continue to monitor and defer to Cardiology for further treatment.

## 2020-01-19 NOTE — PROGRESS NOTES
01/19/20 1214   Post-Acute Status   Post-Acute Authorization Home Health/Hospice   Home Health/Hospice Status Set-up Complete   Patient choice form signed by patient/caregiver List with quality metrics by geographic area provided     Pt re-started with [facility]    · 1/19/2020 9:53:43 AM Accepted  Evangelista [email or web address]  · 1/19/2020 8:31:44 AM New: Pt to resume HH with [facility]  Dylan Quezada

## 2020-01-19 NOTE — PROGRESS NOTES
01/19/20 1216   Final Note   Assessment Type Final Discharge Note   Anticipated Discharge Disposition Home-Health   Right Care Referral Info   Post Acute Recommendation Home-care   Referral Type Home Care   Facility Name [facility] [id number]   [place] [address]

## 2020-01-19 NOTE — DISCHARGE SUMMARY
[facility] Hospital Medicine  Discharge Summary      Patient Name: Izabella Fulton  MRN: [id number]  Admission Date: 1/15/2020  Hospital Length of Stay: 3 days  Discharge Date and Time: No discharge date for patient encounter.  Attending Physician: John Lloyd MD   Discharging Provider: John Lloyd MD  Primary Care Provider: Corinne Fletcher NP      HPI:   Izabella Fulton is a 86 y.o. female with HTN, CKD stage IV on torsemide twice a week, and prior TIA on daily Aspirin 81 mg  presented to the ED with  onset of worsening SOB with swelling of bilateral  lower extremity worsening over the last 2 days with associated weight gain.  Patient states that she has been doing well in terms of her breathing for the last 2 weeks since her rib fracture on December 24, 2019.  Patient states that she has been on torsemide twice a week as per renal recommendation and has been doing well and has not had any swelling of the leg prior to this.  She sustained multiple left-sided rib fractures and a moderate pleural effusion status post fall less than 3 weeks ago on 12/24.  Patient states that she went to the hospital on 26th of December and decided to leave the hospital despite recommendations to get admitted.  Patient has been getting multiple chest x-rays since her ER visit on the 26th of December and chest x-rays have been stable.  The patient had a repeat CXR obtained 10 days ago on 1/5 revealing no change in the effusion. At which time, she reports having a 8 lbs weight gain and increased SOB. Yesterday, the patient had an OP CXR that revealed no changes in the pleural effusion. She states her SOB worsened significantly in the last 24 hours.  No change in medications in the last 2 weeks.  The patient reports her rib pain is nearly resolved. She denies chest pain palpitations cough fever chills.  No  history of dialysis or taking Lasix, fever, cough, chest pain, or any other symptoms at this time. No  cardiopulmonary PSHx.      The history is provided by the patient.      * No surgery found *      Hospital Course:   Patient presented with insidious onset of worsening SOB with swelling of bilateral  lower extremity worsening over the prior 2 days with associated weight gain.  Patient was started on Lasix IV twice daily.  Nephrology was consulted. Patient had been following with up with Dr. Shultz, who had been managing her CKD stage 4 and her Lasix dose.  Cardiology was consulted for worsening of her CHF.  Echo was done, showing normal EF.  Patient also had pleural effusion bilaterally. Pulmonary was consulted for possibility of bedside thoracocentesis if needed.  A large pleural effusion was seen on the echo as well.  US guided thoracentesis was performed with 500 cc bloody fluid removed from left hemithorax.   Patient's respiratory status improved after the Lasix and thoracentesis.  Cardiologist and nephrologist made adjustments to the Bp meds.  The IV Lasix was discontinued.  Her BP remained elevated, but she was stable from cardiorespiratory standpoint.  She admitted that her BP was labile at home.  It was nothing new.  She asked to be discharged from the hospital.  Please see med list below.    Physical Exam   Constitutional: She is oriented to person, place, and time.  alert.  No distress.   Eyes: Pupils are equal, round, and reactive to light. EOM are normal.   Cardiovascular: Normal rate, regular rhythm and intact distal pulses.   No murmur heard.  Pulmonary/Chest: Effort normal and breath sounds normal.  no rales.  Abdominal: Soft. She exhibits no distension. There is no tenderness.      Consults:   Consults (From admission, onward)        Status Ordering Provider     Inpatient consult to Cardiology  Once     Provider:  Steve Ames MD    Acknowledged GENEVIEVE POWERS     Inpatient consult to Nephrology  Once     Provider:  David Shultz MD    Acknowledged GENEVIEVE POWERS     Inpatient consult  to Pulmonology  Once     Provider:  Steve Ames MD    Completed GENEVIEVE POWERS     Inpatient consult to Registered Dietitian/Nutritionist  Once     Provider:  (Not yet assigned)    Completed GENEVIEVE POWERS     Inpatient consult to Social Work/Case Management  Once     Provider:  (Not yet assigned)    Completed GENEVIEVE POWERS          No new Assessment & Plan notes have been filed under this hospital service since the last note was generated.  Service: Hospital Medicine    Final Active Diagnoses:    Diagnosis Date Noted POA    Calcification of aorta [I70.0] 01/16/2020 Yes    Lung nodule [R91.1] 01/16/2020 Yes    Pleural effusion [J90] 01/15/2020 Yes    Hypertensive kidney disease with stage 4 chronic kidney disease [I12.9, N18.4] 12/19/2019 Yes    Primary osteoarthritis involving multiple joints [M15.0] 09/11/2018 Yes    Essential hypertension [I10] 08/08/2018 Yes    Hypothyroid [E03.9] 08/08/2018 Yes    Ulcerative colitis [K51.90] 08/08/2018 Yes    Hyperlipidemia [E78.5] 08/08/2018 Yes      Problems Resolved During this Admission:    Diagnosis Date Noted Date Resolved POA    PRINCIPAL PROBLEM:  Acute on chronic diastolic congestive heart failure [I50.33] 01/15/2020 01/18/2020 Yes    Closed fracture of multiple ribs of left side with routine healing [S22.42XD] 01/16/2020 01/17/2020 Not Applicable    Hypertensive emergency [I16.1] 01/15/2020 01/17/2020 Yes    Demand ischemia [I24.8] 01/15/2020 01/18/2020 Yes       Discharged Condition: good    Disposition: [place]Health [place] [facility]    Follow Up:  Follow-up Information     Luis Flores MD In 2 weeks.    Specialty:  Pulmonary Disease  Contact information:  [address]  [address]  [place] [address] [phone]             Corinne Fletcher NP In 1 week.    Specialty:  Family Medicine  Contact information:  [address]  [address]  [place] [address]  [phone]             Steve Ames MD In 2 weeks.    Specialties:   Cardiovascular Disease, Cardiology  Contact information:  [address]  [address]  [place] [address]  [phone]                 Patient Instructions:      X-Ray Chest PA And Lateral   Standing Status: Future Standing Exp. Date: 01/15/21     Order Specific Question Answer Comments   May the Radiologist modify the order per protocol to meet the clinical needs of the patient? Yes      Ambulatory referral to Home Health   Referral Priority: Routine Referral Type: Home Health   Referral Reason: Specialty Services Required   Requested Specialty: Home Health Services   Number of Visits Requested: 1     Diet Cardiac     Activity as tolerated       Significant Diagnostic Studies:   BMP  Lab Results   Component Value Date     01/18/2020    K 4.2 01/18/2020     01/18/2020    CO2 23 01/18/2020    BUN 50 (H) 01/18/2020    CREATININE 1.9 (H) 01/18/2020    CALCIUM 8.7 01/18/2020    ANIONGAP 10 01/18/2020    ESTGFRAFRICA 27 (A) 01/18/2020    EGFRNONAA 24 (A) 01/18/2020     Creatinine   Date Value Ref Range Status   01/18/2020 1.9 (H) 0.5 - 1.4 mg/dL Final   01/17/2020 2.2 (H) 0.5 - 1.4 mg/dL Final   01/16/2020 2.0 (H) 0.5 - 1.4 mg/dL Final   01/15/2020 1.9 (H) 0.5 - 1.4 mg/dL Final   12/26/2019 2.3 (H) 0.5 - 1.4 mg/dL Final   05/26/2012 1.5 (H) 0.2 - 1.4 mg/dl Final   05/25/2012 1.5 (H) 0.2 - 1.4 mg/dl Final   05/24/2012 1.6 (H) 0.2 - 1.4 mg/dl Final   05/23/2012 1.4 0.2 - 1.4 mg/dl Final   05/22/2012 1.5 (H) 0.2 - 1.4 mg/dl Final     Lab Results   Component Value Date    WBC 7.57 01/18/2020    HGB 10.5 (L) 01/18/2020    HCT 33.1 (L) 01/18/2020    MCV 96 01/18/2020     01/18/2020           Pending Diagnostic Studies:     Procedure Component Value Units Date/Time    Cytology, Pulmonary [[id number]] Collected:  01/17/20 1519    Order Status:  Sent Lab Status:  In process Updated:  01/17/20 1608    EKG 12-lead [[id number]] Collected:  01/17/20 0917    Order Status:  Sent Lab Status:  In process Updated:  01/17/20  0937    Narrative:       Test Reason : I25.10,    Vent. Rate : 073 BPM     Atrial Rate : 073 BPM     P-R Int : 216 ms          QRS Dur : 088 ms      QT Int : 410 ms       P-R-T Axes : 051 025 037 degrees     QTc Int : 451 ms    Sinus rhythm with 1st degree A-V block  Otherwise normal ECG  When compared with ECG of 15-TU-2020 14:03,  Nonspecific T wave abnormality has replaced inverted T waves in Lateral  leads    Referred By: [email or web address]   SELF           Confirmed By:     US Guided Thoracentesis [[id number]]     Order Status:  Sent Lab Status:  No result          Medications:  Reconciled Home Medications:      Medication List      START taking these medications    carvedilol 12.5 MG tablet  Commonly known as:  COREG  Take 1 tablet (12.5 mg total) by mouth 2 (two) times daily.        CHANGE how you take these medications    hydrALAZINE 100 MG tablet  Commonly known as:  APRESOLINE  Take 1 tablet (100 mg total) by mouth every 8 (eight) hours.  What changed:    · medication strength  · how much to take  · when to take this     torsemide 10 MG Tab  Commonly known as:  DEMADEX  Take 1 tablet (10 mg total) by mouth every Tues, Fri. Tuesday and Friday  Start taking on:  January 21, 2020  What changed:    · when to take this  · additional instructions        CONTINUE taking these medications    aspirin 81 MG EC tablet  Commonly known as:  ECOTRIN  Take 81 mg by mouth once daily.     calcitRIOL 0.25 MCG Cap  Commonly known as:  ROCALTROL  Take 0.25 mcg by mouth twice a week. Sunday and Thursday     calcium-vitamin D 500-125 mg-unit tablet  Take 1 tablet by mouth 2 (two) times daily.     EYE HEALTH ORAL  Take 1 tablet by mouth 2 (two) times daily.     fish oil-omega-3 fatty acids 300-1,000 mg capsule  Take 1 g by mouth once daily.     folic acid 800 MCG Tab  Commonly known as:  FOLVITE  Take 800 mcg by mouth once daily.     levothyroxine 100 MCG tablet  Commonly known as:  SYNTHROID  Take 1 tablet (100 mcg total) by mouth once  daily.     pravastatin 20 MG tablet  Commonly known as:  PRAVACHOL  Take 1 tablet (20 mg total) by mouth once daily.     Prolia 60 mg/mL Syrg  Generic drug:  denosumab  Inject 60 mg into the skin every 6 (six) months.        STOP taking these medications    amLODIPine 5 MG tablet  Commonly known as:  NORVASC     metoprolol tartrate 50 MG tablet  Commonly known as:  LOPRESSOR     telmisartan 40 MG Tab  Commonly known as:  MICARDIS            Indwelling Lines/Drains at time of discharge:   Lines/Drains/Airways     None                 Time spent on the discharge of patient: 27 minutes  Patient was seen and examined on the date of discharge and determined to be suitable for discharge.         John Lloyd MD  Department of Hospital Medicine  [facility]

## 2020-01-20 NOTE — TELEPHONE ENCOUNTER
----- Message from Xiomara Terry sent at 1/20/2020 12:40 PM CST -----  Contact: Darlin [facility]  Pt just came home from the [facility] and is concerned about some of the medication changes. She would like to schedule an appt to see Corinne to go over the medications and talk about the changes. Please give the patient a call back as soon as possible.   Pt# [phone] or Darlin's# [phone]

## 2020-01-20 NOTE — TELEPHONE ENCOUNTER
Post hosp call: pt is concerned about the med changes that were made at the hosp. Wants to know if she should start the meds that the hosp. Per Corinne cont with the 75mg hydralazine. And start the carvedilol.Pt scheduled with Corinne on Wed

## 2020-01-20 NOTE — TELEPHONE ENCOUNTER
----- Message from Maye Maciel sent at 1/20/2020  3:02 PM CST -----  Patient calling stating that someone with [facility] called a few hours ago and stated that they talked to someone about changing her medications please call the patient back she said she cannot wait till this afternoon for a call back [phone]

## 2020-01-20 NOTE — PROGRESS NOTES
Notify pt no cancer cells seen in fluid. No new recs. She is to f/u here in 2 wks.  Notify analyst suggest rib fractures or renal failure or minimal heart condition likely cause of fluid.

## 2020-01-21 NOTE — PROGRESS NOTES
C3 nurse attempted to contact patient. No answer. The following message was left for the patient to return the call:  Good Afternoon  I am a nurse calling on behalf of [facility] from the [facility].  This is a Transitional Care Call for Izabella Fulton  When you have a moment please contact us at [phone] or [phone] Monday through Friday, between the hours of 8 am to 4 pm. We look forward to speaking with you. On behalf of [facility] have a nice day.    The patient does not have a scheduled HOSFU appointment within 7-14 days post hospital discharge date 01/17/2020 Non [facility] appointment scheduling.

## 2020-01-22 NOTE — TELEPHONE ENCOUNTER
Gave patient appt for 2/11/2020 at 11:00am.      ----- Message from Luis Flores MD sent at 1/20/2020  4:15 PM CST -----  Notify pt no cancer cells seen in fluid. No new recs. She is to f/u here in 2 wks.  Notify analyst suggest rib fractures or renal failure or minimal heart condition likely cause of fluid.

## 2020-01-22 NOTE — TELEPHONE ENCOUNTER
----- Message from Sunil Madrigal sent at 1/22/2020 12:58 PM CST -----  Pt is calling back to give saravanan this medication name   telminsertan dr dowd rx this for pt. Dr dowd has agreed for pt to stop taking this   Pt [phone]

## 2020-01-22 NOTE — PATIENT INSTRUCTIONS
Left- or Right- Side Congestive Heart Failure (CHF)    The heart is a large muscle. It is a pump that circulates blood throughout the body. Blood carries oxygen to all of the organs, including the brain, muscles, and skin. After your body takes the oxygen out of the blood, the blood returns to the heart. The right side of the heart collects the blood from the body and pumps it to the lungs. In the lungs, it gets fresh oxygen and gives up carbon dioxide. The oxygen-rich blood from the lungs then returns to the left side of the heart, where it is pumped back out to the rest of your body, starting the process all over.  Congestive heart failure (CHF) occurs when the heart muscle is weakened. This affects the pumping action of the heart. Heart failure can affect the right side of the heart or the left side. But heart failure may affect not only the right side of the heart or only the left side. Although it may have started on one side, it can and often eventually does affect both sides.  Right-side heart failure  When the right side of the heart is weakened, it cant handle the blood it is getting from the rest of the body. This blood returns to the heart through veins. When too much pressure builds up in the veins, fluid leaks out into the tissues. Gravity then causes that fluid to move to those parts of the body that are the lowest. So one of the first symptoms of right-side CHF can include swelling in the feet and ankles. If the condition gets worse, the swelling can even go up past the knees. Sometimes it gets so severe, the liver can get congested as well.  Left-side heart failure  When the left side of the heart is weakened, it cant handle the blood it gets from the lungs. Pressure then builds up in the veins of the lungs, causing fluid to leak into the lung tissues. This may cause CHF and pulmonary edema. This causes you to feel short of breath, weak, or dizzy. These symptoms are often worse with exertion,  such as when climbing stairs or walking up hills. Lying with your head flat is uncomfortable and can make your breathing worse. This may make sleeping difficult. You may need to use extra pillows to elevate your upper body to sleep well. The same is true when just resting during the daytime.  There are many causes of heart failure including:  · Coronary artery disease  · Past heart attack (also known as acute myocardial infarction, or AMI)  · High blood pressure  · Damaged heart valve  · Diabetes  · Obesity  · Cigarette smoking  · Alcohol abuse  Heart failure is a chronic condition. There is no cure. The purpose of medical treatment is to improve the pumping action of the heart. The main way to do this is to remove excess water from the body. A number of medicines can help reach this goal, improve symptoms, and prevent the heart from becoming weaker. Sometimes, heart failure can become so severe that a device is placed in the heart to help with pumping. Another major goal is to better treat the causes of heart failure, such as diabetes and high blood pressure, by making changes in your lifestyle and maximizing medical control when needed.  Home care  Follow these guidelines when caring for yourself at home:  · Check your weight every day. This is very important because a sudden increase in weight gain could mean worsening heart failure. Keep these things in mind:  ¨ Use the same scale every day.  ¨ Weigh yourself at the same time every day.  ¨ Make sure the scale is on a hard floor surface, not on a rug or carpet.  ¨ Keep a record of your weight every day so your healthcare provider can see it. If you are not given a log sheet for this, keep a separate journal for this purpose.   · Cut back on the amount of salt (sodium) you eat. Follow your healthcare provider's recommendation on how much salt or sodium you should have each day.  ¨ Avoid high-salt foods. These include olives, pickles, smoked meats, salted potato  chips, and most prepared foods.  ¨ Don't add salt to your food at the table. Use only small amounts of salt when cooking.  ¨ Read the labels carefully on food packages to learn how much salt or sodium is in each serving in the package. Remember, a can or package of food may contain more than 1 serving. So if you eat all the food in the package, you may be getting more salt than you think.  · Follow your healthcare provider's recommendations about how much fluid you should have. Be aware that some foods, such as soup, pudding, and juicy fruits like oranges or melons, contain liquid. You'll need to count the liquid in those foods as part of your daily fluid intake. Your provider can help you with this.  · Stop smoking.  · Cut back on how much alcohol you drink.  · Lose weight if you are overweight. The excess weight adds a lot of stress on the workload of the heart.  · Stay active. Talk with your provider about an exercise program that is safe for your heart.  · Keep your feet elevated to reduce swelling. Ask your provider about support hose as a preventive treatment for daytime leg swelling.  Besides taking your medicine as instructed, an important part of treatment is lifestyle changes. These include diet, physical activity, stopping smoking, and weight control.  Improve your diet by including more fresh foods, cutting back on how much sugar and saturated fat you eat, and eating fewer processed foods and less salt.  Follow-up care  Follow up with your healthcare provider, or as advised.  Make sure to keep any appointments that were made for you. These can help better control your congestive heart failure. You will need to follow up with your provider on a routine basis to make sure your heart failure is well managed.  If an X-ray, ECG, or other tests were done, you will be told of any new findings that may affect your care.  Call 911  Call 911 if you:  · Become severely short of breath  · Feel lightheaded, or feel  like you might pass out or faint  · Have chest pain or discomfort that is different than usual, the medicines your doctor told you to use for this don't help, or the pain lasts longer than 10 to 15 minutes  · You suddenly develop a rapid heart rate  When to seek medical advice  The following may be signs that your heart failure is getting worse. Call your healthcare provider right away if any of these happen:  · Sudden weight gain. This means 3 or more pounds in one day, or 5 or more pounds in 1 week.  · Trouble breathing not related to being active  · New or increased swelling of your legs or ankles  · Swelling or pain in your abdomen  · Breathing trouble at night. This means waking up short of breath or needing more pillows to breathe.  · Frequent coughing that doesnt go away  · Feeling much more tired than usual  Date Last Reviewed: 1/4/2016  © 8171-4890 [employer]. [address] [place] [address], [place] [address]. All rights reserved. This information is not intended as a substitute for professional medical care. Always follow your healthcare professional's instructions.

## 2020-01-22 NOTE — PROGRESS NOTES
SUBJECTIVE:    Patient ID: Izabella Fulton is a 86 y.o. female.    Chief Complaint: Hospital Follow Up (no bottles// SW)    HPI    Admit Date: 1/15/20   Discharge Date: 1/18/20  Discharge Facility: Hospital     History of hospital stay: Pt sent to ER from last office here with c/o's of worsening SOB with weight gain and worsening effusion. Pt admitted and treated for CHF with iv diuretics. Underwent thoracentesis of left pleural effusion d/t rib fractures with removal of 500ml fluid. Seen during hospital stay by Dr. Shultz and Dr. Ames, cards. Pt reports was breathing better after thoracentesis. BP high during hospital stay so meds adjusted- discharged with new meds of carvedilol and increased hydralazine dose. Echo during hospital stay shows normal EF with conc LVH and diastolic dysfxn.    How is patient feeling since discharge from the hospital?  Slowly feeling better though still SOB with exertion with leg edema. Admits leg edema much better when she first gets up in the AM but worsens as day progresses. Denies orthopnea or SOB at rest. Pt reports she wasn't sure about all the med changes at discharge so has still been taking hydralazine 75mg TID instead of 100mg TID. Reports she has called Dr. Shultz's office to clarify if he really wanted her to stop the telmisartan per DC instructions. Still has  SN checking on her, has been discharged by  PT.      Medication Reconciliation:  Medications changed/added/deleted. Carvedilol added and hydralazine increased; amlodipine, metoprolol and telmisartan discontinued  New Prescriptions filled after discharge: yes  Discharge summary reviewed:  yes  Pending test results at discharge reviewed:   not applicable  Follow up appointments scheduled:  Yes Dr. Flores 2/11/2020, pt still needs to call and schedule appt with Dr Ames     Follow up labs/tests ordered:   no  Home Health ordered on discharge:   yes  Home Health company name: [facility] [employer]  DME ordered at discharge:    no      Admission on 01/15/2020, Discharged on 01/18/2020   Component Date Value Ref Range Status    WBC 01/15/2020 6.34  3.90 - 12.70 K/uL Final    RBC 01/15/2020 4.03  4.00 - 5.40 M/uL Final    Hemoglobin 01/15/2020 12.6  12.0 - 16.0 g/dL Final    Hematocrit 01/15/2020 38.7  37.0 - 48.5 % Final    Mean Corpuscular Volume 01/15/2020 96  82 - 98 fL Final    Mean Corpuscular Hemoglobin 01/15/2020 31.3* 27.0 - 31.0 pg Final    Mean Corpuscular Hemoglobin Conc 01/15/2020 32.6  32.0 - 36.0 g/dL Final    RDW 01/15/2020 15.3* 11.5 - 14.5 % Final    Platelets 01/15/2020 262  150 - 350 K/uL Final    MPV 01/15/2020 10.2  9.2 - 12.9 fL Final    Gran # (ANC) 01/15/2020 4.9  1.8 - 7.7 K/uL Final    Immature Grans (Abs) 01/15/2020 0.02  0.00 - 0.04 K/uL Final    Lymph # 01/15/2020 0.6* 1.0 - 4.8 K/uL Final    Mono # 01/15/2020 0.8  0.3 - 1.0 K/uL Final    Eos # 01/15/2020 0.0  0.0 - 0.5 K/uL Final    Baso # 01/15/2020 0.03  0.00 - 0.20 K/uL Final    nRBC 01/15/2020 0  0 /100 WBC Final    Gran% 01/15/2020 77.5* 38.0 - 73.0 % Final    Lymph% 01/15/2020 8.8* 18.0 - 48.0 % Final    Mono% 01/15/2020 12.3  4.0 - 15.0 % Final    Eosinophil% 01/15/2020 0.6  0.0 - 8.0 % Final    Basophil% 01/15/2020 0.5  0.0 - 1.9 % Final    Differential Method 01/15/2020 Automated   Final    Sodium 01/15/2020 138  136 - 145 mmol/L Final    Potassium 01/15/2020 4.9  3.5 - 5.1 mmol/L Final    Chloride 01/15/2020 109  95 - 110 mmol/L Final    CO2 01/15/2020 17* 23 - 29 mmol/L Final    Glucose 01/15/2020 110  70 - 110 mg/dL Final    BUN, Bld 01/15/2020 51* 8 - 23 mg/dL Final    Creatinine 01/15/2020 1.9* 0.5 - 1.4 mg/dL Final    Calcium 01/15/2020 9.2  8.7 - 10.5 mg/dL Final    Total Protein 01/15/2020 7.3  6.0 - 8.4 g/dL Final    Albumin 01/15/2020 3.4* 3.5 - 5.2 g/dL Final    Total Bilirubin 01/15/2020 0.6  0.1 - 1.0 mg/dL Final    Alkaline Phosphatase 01/15/2020 86  55 - 135 U/L Final    AST 01/15/2020 43* 10 - 40 U/L  "Final    ALT 01/15/2020 39  10 - 44 U/L Final    Anion Gap 01/15/2020 12  8 - 16 mmol/L Final    eGFR if African American 01/15/2020 27* >60 mL/min/1.73 m^2 Final    eGFR if non African American 01/15/2020 24* >60 mL/min/1.73 m^2 Final    Troponin I 01/15/2020 0.030* 0.000 - 0.026 ng/mL Final    BNP 01/15/2020 1,219* 0 - 99 pg/mL Final    Prothrombin Time 01/15/2020 10.7  9.0 - 12.5 sec Final    INR 01/15/2020 1.0  0.8 - 1.2 Final    Hemoglobin A1C 01/15/2020 5.6  4.0 - 5.6 % Final    Estimated Avg Glucose 01/15/2020 114  68 - 131 mg/dL Final    BSA 01/16/2020 1.53  m2 Final    TDI SEPTAL 01/16/2020 0.04  m/s Final    LV LATERAL E/E' RATIO 01/16/2020 10.38  m/s Final    LV SEPTAL E/E' RATIO 01/16/2020 20.75  m/s Final    AORTIC VALVE CUSP SEPERATION 01/16/2020 1.63  cm Final    TDI LATERAL 01/16/2020 0.08  m/s Final    PV PEAK VELOCITY 01/16/2020 1.00  cm/s Final    LVIDD 01/16/2020 3.49* 3.5 - 6.0 cm Final    IVS 01/16/2020 1.50* 0.6 - 1.1 cm Final    PW 01/16/2020 1.04  0.6 - 1.1 cm Final    Ao root annulus 01/16/2020 2.02  cm Final    LVIDS 01/16/2020 2.26  2.1 - 4.0 cm Final    FS 01/16/2020 35  28 - 44 % Final    LV mass 01/16/2020 147.65  g Final    LA size 01/16/2020 4.26  cm Final    RVDD 01/16/2020 3.33  cm Final    TAPSE 01/16/2020 2.78  cm Final    Left Ventricle Relative Wall Thick* 01/16/2020 0.60  cm Final    AV mean gradient 01/16/2020 10  mmHg Final    AV valve area 01/16/2020 2.34  cm2 Final    AV Velocity Ratio 01/16/2020 0.72   Final    AV index (prosthetic) 01/16/2020 0.75   Final    MV mean gradient 01/16/2020 2  mmHg Final    MV valve area p 1/2 method 01/16/2020 4.15  cm2 Final    E/A ratio 01/16/2020 0.69   Final    Mean e' 01/16/2020 0.06  m/s Final    E wave decelartion time 01/16/2020 260.09  msec Final    IVRT 01/16/2020 0.08  msec Final    MV "A" wave duration 01/16/2020 176.00  msec Final    Pulm vein "A" wave 01/16/2020 125.00  msec Final    " LVOT diameter 01/16/2020 2.00  cm Final    LVOT area 01/16/2020 3.1  cm2 Final    LVOT peak deondre 01/16/2020 1.49  m/s Final    LVOT peak VTI 01/16/2020 32.05  cm Final    Ao peak deondre 01/16/2020 2.08  m/s Final    Ao VTI 01/16/2020 42.92  cm Final    LVOT stroke volume 01/16/2020 100.64  cm3 Final    AV peak gradient 01/16/2020 17  mmHg Final    E/E' ratio 01/16/2020 13.83  m/s Final    MV Peak E Deondre 01/16/2020 0.83  m/s Final    TR Max Deondre 01/16/2020 3.37  m/s Final    MV stenosis pressure 1/2 time 01/16/2020 53  ms Final    MV Peak A Deondre 01/16/2020 1.20  m/s Final    LV Systolic Volume 01/16/2020 17.34  mL Final    LV Systolic Volume Index 01/16/2020 11.5  mL/m2 Final    LV Diastolic Volume 01/16/2020 50.56  mL Final    LV Diastolic Volume Index 01/16/2020 33.60  mL/m2 Final    LV Mass Index 01/16/2020 98  g/m2 Final    Triscuspid Valve Regurgitation Pea* 01/16/2020 45  mmHg Final    Troponin I 01/15/2020 0.028* 0.000 - 0.026 ng/mL Final    Troponin I 01/15/2020 0.030* 0.000 - 0.026 ng/mL Final    Sodium 01/16/2020 140  136 - 145 mmol/L Final    Potassium 01/16/2020 4.5  3.5 - 5.1 mmol/L Final    Chloride 01/16/2020 112* 95 - 110 mmol/L Final    CO2 01/16/2020 20* 23 - 29 mmol/L Final    Glucose 01/16/2020 71  70 - 110 mg/dL Final    BUN, Bld 01/16/2020 56* 8 - 23 mg/dL Final    Creatinine 01/16/2020 2.0* 0.5 - 1.4 mg/dL Final    Calcium 01/16/2020 8.5* 8.7 - 10.5 mg/dL Final    Anion Gap 01/16/2020 8  8 - 16 mmol/L Final    eGFR if African American 01/16/2020 25* >60 mL/min/1.73 m^2 Final    eGFR if non African American 01/16/2020 22* >60 mL/min/1.73 m^2 Final    Magnesium 01/16/2020 2.2  1.6 - 2.6 mg/dL Final    TSH 01/16/2020 6.425* 0.400 - 4.000 uIU/mL Final    Troponin I 01/16/2020 0.028* 0.000 - 0.026 ng/mL Final    Free T4 01/16/2020 1.14  0.71 - 1.51 ng/dL Final    Final Pathologic Diagnosis 01/17/2020    Final                    Value:Thoracentesis fluid:  - Abundant  normal appearing small lymphocytes.  - Blood is present  - Clinical correlation recommended.      Aerobic Bacterial Culture 01/17/2020 No growth   Final    Anaerobic Culture 01/17/2020 Culture in progress   Preliminary    Gram Stain Result 01/17/2020 Rare WBC's   Final    Gram Stain Result 01/17/2020 No organisms seen   Final    AFB Culture & Smear 01/17/2020 Culture in progress   Preliminary    AFB CULTURE STAIN 01/17/2020 No acid fast bacilli seen.   Final    Body Fluid Source, Total Protein 01/17/2020 Pleural Fluid, Left   Final    Body Fluid, Protein 01/17/2020 2.5  Not established g/dL Final    Body Fluid Source, Glucose 01/17/2020 Pleural Fluid, Left   Final    Glucose, Fluid 01/17/2020 133  Not established mg/dL Final    Body Fluid Type 01/17/2020 Thoracentesis Fluid   Final    Fluid Appearance 01/17/2020 Cloudy   Final    Fluid Color 01/17/2020 Marlene   Final    WBC, Body Fluid 01/17/2020 391  /cu mm Final    Segs, Fluid 01/17/2020 4  % Final    Lymphs, Fluid 01/17/2020 72  % Final    Monocytes/Macrophages, Fluid 01/17/2020 24  % Final    Glucose 01/17/2020 90  70 - 110 mg/dL Final    Sodium 01/17/2020 140  136 - 145 mmol/L Final    Potassium 01/17/2020 4.3  3.5 - 5.1 mmol/L Final    Chloride 01/17/2020 109  95 - 110 mmol/L Final    CO2 01/17/2020 22* 23 - 29 mmol/L Final    BUN, Bld 01/17/2020 54* 8 - 23 mg/dL Final    Calcium 01/17/2020 9.1  8.7 - 10.5 mg/dL Final    Creatinine 01/17/2020 2.2* 0.5 - 1.4 mg/dL Final    Albumin 01/17/2020 2.8* 3.5 - 5.2 g/dL Final    Phosphorus 01/17/2020 4.6* 2.7 - 4.5 mg/dL Final    eGFR if African American 01/17/2020 23* >60 mL/min/1.73 m^2 Final    eGFR if non African American 01/17/2020 20* >60 mL/min/1.73 m^2 Final    Anion Gap 01/17/2020 9  8 - 16 mmol/L Final    D-Dimer 01/17/2020 1.48* <0.50 mg/L FEU Final    Sodium 01/18/2020 141  136 - 145 mmol/L Final    Potassium 01/18/2020 4.2  3.5 - 5.1 mmol/L Final    Chloride 01/18/2020 108   95 - 110 mmol/L Final    CO2 01/18/2020 23  23 - 29 mmol/L Final    Glucose 01/18/2020 81  70 - 110 mg/dL Final    BUN, Bld 01/18/2020 50* 8 - 23 mg/dL Final    Creatinine 01/18/2020 1.9* 0.5 - 1.4 mg/dL Final    Calcium 01/18/2020 8.7  8.7 - 10.5 mg/dL Final    Anion Gap 01/18/2020 10  8 - 16 mmol/L Final    eGFR if African American 01/18/2020 27* >60 mL/min/1.73 m^2 Final    eGFR if non African American 01/18/2020 24* >60 mL/min/1.73 m^2 Final    WBC 01/18/2020 7.57  3.90 - 12.70 K/uL Final    RBC 01/18/2020 3.45* 4.00 - 5.40 M/uL Final    Hemoglobin 01/18/2020 10.5* 12.0 - 16.0 g/dL Final    Hematocrit 01/18/2020 33.1* 37.0 - 48.5 % Final    Mean Corpuscular Volume 01/18/2020 96  82 - 98 fL Final    Mean Corpuscular Hemoglobin 01/18/2020 30.4  27.0 - 31.0 pg Final    Mean Corpuscular Hemoglobin Conc 01/18/2020 31.7* 32.0 - 36.0 g/dL Final    RDW 01/18/2020 15.1* 11.5 - 14.5 % Final    Platelets 01/18/2020 235  150 - 350 K/uL Final    MPV 01/18/2020 10.3  9.2 - 12.9 fL Final    Gran # (ANC) 01/18/2020 5.2  1.8 - 7.7 K/uL Final    Immature Grans (Abs) 01/18/2020 0.03  0.00 - 0.04 K/uL Final    Lymph # 01/18/2020 0.9* 1.0 - 4.8 K/uL Final    Mono # 01/18/2020 1.0  0.3 - 1.0 K/uL Final    Eos # 01/18/2020 0.4  0.0 - 0.5 K/uL Final    Baso # 01/18/2020 0.04  0.00 - 0.20 K/uL Final    nRBC 01/18/2020 0  0 /100 WBC Final    Gran% 01/18/2020 69.2  38.0 - 73.0 % Final    Lymph% 01/18/2020 11.5* 18.0 - 48.0 % Final    Mono% 01/18/2020 13.6  4.0 - 15.0 % Final    Eosinophil% 01/18/2020 4.8  0.0 - 8.0 % Final    Basophil% 01/18/2020 0.5  0.0 - 1.9 % Final    Differential Method 01/18/2020 Automated   Final   Lab Visit on 01/09/2020   Component Date Value Ref Range Status    WBC 01/09/2020 9.47  3.90 - 12.70 K/uL Final    RBC 01/09/2020 3.81* 4.00 - 5.40 M/uL Final    Hemoglobin 01/09/2020 11.8* 12.0 - 16.0 g/dL Final    Hematocrit 01/09/2020 36.7* 37.0 - 48.5 % Final    Mean Corpuscular  Volume 01/09/2020 96  82 - 98 fL Final    Mean Corpuscular Hemoglobin 01/09/2020 31.0  27.0 - 31.0 pg Final    Mean Corpuscular Hemoglobin Conc 01/09/2020 32.2  32.0 - 36.0 g/dL Final    RDW 01/09/2020 15.4* 11.5 - 14.5 % Final    Platelets 01/09/2020 297  150 - 350 K/uL Final    MPV 01/09/2020 10.1  9.2 - 12.9 fL Final    Immature Granulocytes 01/09/2020 0.4  0.0 - 0.5 % Final    Gran # (ANC) 01/09/2020 7.4  1.8 - 7.7 K/uL Final    Immature Grans (Abs) 01/09/2020 0.04  0.00 - 0.04 K/uL Final    Lymph # 01/09/2020 0.8* 1.0 - 4.8 K/uL Final    Mono # 01/09/2020 0.9  0.3 - 1.0 K/uL Final    Eos # 01/09/2020 0.2  0.0 - 0.5 K/uL Final    Baso # 01/09/2020 0.05  0.00 - 0.20 K/uL Final    nRBC 01/09/2020 0  0 /100 WBC Final    Gran% 01/09/2020 78.3* 38.0 - 73.0 % Final    Lymph% 01/09/2020 8.8* 18.0 - 48.0 % Final    Mono% 01/09/2020 9.6  4.0 - 15.0 % Final    Eosinophil% 01/09/2020 2.4  0.0 - 8.0 % Final    Basophil% 01/09/2020 0.5  0.0 - 1.9 % Final    Differential Method 01/09/2020 Automated   Final   Lab Visit on 01/02/2020   Component Date Value Ref Range Status    WBC 01/02/2020 7.17  3.90 - 12.70 K/uL Final    RBC 01/02/2020 3.91* 4.00 - 5.40 M/uL Final    Hemoglobin 01/02/2020 12.0  12.0 - 16.0 g/dL Final    Hematocrit 01/02/2020 37.9  37.0 - 48.5 % Final    Mean Corpuscular Volume 01/02/2020 97  82 - 98 fL Final    Mean Corpuscular Hemoglobin 01/02/2020 30.7  27.0 - 31.0 pg Final    Mean Corpuscular Hemoglobin Conc 01/02/2020 31.7* 32.0 - 36.0 g/dL Final    RDW 01/02/2020 14.9* 11.5 - 14.5 % Final    Platelets 01/02/2020 322  150 - 350 K/uL Final    MPV 01/02/2020 10.2  9.2 - 12.9 fL Final    Immature Granulocytes 01/02/2020 0.4  0.0 - 0.5 % Final    Gran # (ANC) 01/02/2020 5.1  1.8 - 7.7 K/uL Final    Immature Grans (Abs) 01/02/2020 0.03  0.00 - 0.04 K/uL Final    Lymph # 01/02/2020 1.0  1.0 - 4.8 K/uL Final    Mono # 01/02/2020 0.9  0.3 - 1.0 K/uL Final    Eos # 01/02/2020 0.1   0.0 - 0.5 K/uL Final    Baso # 01/02/2020 0.05  0.00 - 0.20 K/uL Final    nRBC 01/02/2020 0  0 /100 WBC Final    Gran% 01/02/2020 70.6  38.0 - 73.0 % Final    Lymph% 01/02/2020 14.2* 18.0 - 48.0 % Final    Mono% 01/02/2020 12.1  4.0 - 15.0 % Final    Eosinophil% 01/02/2020 2.0  0.0 - 8.0 % Final    Basophil% 01/02/2020 0.7  0.0 - 1.9 % Final    Differential Method 01/02/2020 Automated   Final   Admission on 12/26/2019, Discharged on 12/26/2019   Component Date Value Ref Range Status    WBC 12/26/2019 9.06  3.90 - 12.70 K/uL Final    RBC 12/26/2019 3.87* 4.00 - 5.40 M/uL Final    Hemoglobin 12/26/2019 12.3  12.0 - 16.0 g/dL Final    Hematocrit 12/26/2019 37.4  37.0 - 48.5 % Final    Mean Corpuscular Volume 12/26/2019 97  82 - 98 fL Final    Mean Corpuscular Hemoglobin 12/26/2019 31.8* 27.0 - 31.0 pg Final    Mean Corpuscular Hemoglobin Conc 12/26/2019 32.9  32.0 - 36.0 g/dL Final    RDW 12/26/2019 14.6* 11.5 - 14.5 % Final    Platelets 12/26/2019 203  150 - 350 K/uL Final    MPV 12/26/2019 10.6  9.2 - 12.9 fL Final    Gran # (ANC) 12/26/2019 6.9  1.8 - 7.7 K/uL Final    Immature Grans (Abs) 12/26/2019 0.05* 0.00 - 0.04 K/uL Final    Lymph # 12/26/2019 0.7* 1.0 - 4.8 K/uL Final    Mono # 12/26/2019 1.3* 0.3 - 1.0 K/uL Final    Eos # 12/26/2019 0.1  0.0 - 0.5 K/uL Final    Baso # 12/26/2019 0.03  0.00 - 0.20 K/uL Final    nRBC 12/26/2019 0  0 /100 WBC Final    Gran% 12/26/2019 75.8* 38.0 - 73.0 % Final    Lymph% 12/26/2019 8.2* 18.0 - 48.0 % Final    Mono% 12/26/2019 14.5  4.0 - 15.0 % Final    Eosinophil% 12/26/2019 0.6  0.0 - 8.0 % Final    Basophil% 12/26/2019 0.3  0.0 - 1.9 % Final    Differential Method 12/26/2019 Automated   Final    Sodium 12/26/2019 140  136 - 145 mmol/L Final    Potassium 12/26/2019 4.3  3.5 - 5.1 mmol/L Final    Chloride 12/26/2019 107  95 - 110 mmol/L Final    CO2 12/26/2019 21* 23 - 29 mmol/L Final    Glucose 12/26/2019 114* 70 - 110 mg/dL Final    BUN,  Bld 12/26/2019 53* 8 - 23 mg/dL Final    Creatinine 12/26/2019 2.3* 0.5 - 1.4 mg/dL Final    Calcium 12/26/2019 9.3  8.7 - 10.5 mg/dL Final    Total Protein 12/26/2019 6.8  6.0 - 8.4 g/dL Final    Albumin 12/26/2019 3.2* 3.5 - 5.2 g/dL Final    Total Bilirubin 12/26/2019 0.3  0.1 - 1.0 mg/dL Final    Alkaline Phosphatase 12/26/2019 51* 55 - 135 U/L Final    AST 12/26/2019 39  10 - 40 U/L Final    ALT 12/26/2019 35  10 - 44 U/L Final    Anion Gap 12/26/2019 12  8 - 16 mmol/L Final    eGFR if  12/26/2019 22* >60 mL/min/1.73 m^2 Final    eGFR if non African American 12/26/2019 19* >60 mL/min/1.73 m^2 Final    Lipase 12/26/2019 30  4 - 60 U/L Final    ABO 12/26/2019 A   Final    Rh Type 12/26/2019 POS   Final    Indirect Ward 12/26/2019 NEG   Final   Orders Only on 09/19/2019   Component Date Value Ref Range Status    Cholesterol 09/19/2019 154  <200 mg/dL Final    HDL 09/19/2019 68  >50 mg/dL Final    Triglycerides 09/19/2019 60  <150 mg/dL Final    LDL Cholesterol 09/19/2019 72  mg/dL (calc) Final    Hdl/Cholesterol Ratio 09/19/2019 2.3  <5.0 (calc) Final    Non HDL Chol. (LDL+VLDL) 09/19/2019 86  <130 mg/dL (calc) Final    Creatinine, Random Ur 09/19/2019 67  20 - 275 mg/dL Final    Microalb, Ur 09/19/2019 13.1  See Note: mg/dL Final    Microalb Creat Ratio 09/19/2019 196* <30 mcg/mg creat Final    Glucose 09/19/2019 96  65 - 99 mg/dL Final    BUN, Bld 09/19/2019 56* 7 - 25 mg/dL Final    Creatinine 09/19/2019 1.85* 0.60 - 0.88 mg/dL Final    eGFR if non African American 09/19/2019 24* > OR = 60 mL/min/1.73m2 Final    eGFR if African American 09/19/2019 28* > OR = 60 mL/min/1.73m2 Final    BUN/Creatinine Ratio 09/19/2019 30* 6 - 22 (calc) Final    Sodium 09/19/2019 142  135 - 146 mmol/L Final    Potassium 09/19/2019 4.2  3.5 - 5.3 mmol/L Final    Chloride 09/19/2019 106  98 - 110 mmol/L Final    CO2 09/19/2019 24  20 - 32 mmol/L Final    Calcium 09/19/2019 9.3  8.6  - 10.4 mg/dL Final    Total Protein 09/19/2019 7.1  6.1 - 8.1 g/dL Final    Albumin 09/19/2019 3.9  3.6 - 5.1 g/dL Final    Globulin, Total 09/19/2019 3.2  1.9 - 3.7 g/dL (calc) Final    Albumin/Globulin Ratio 09/19/2019 1.2  1.0 - 2.5 (calc) Final    Total Bilirubin 09/19/2019 0.5  0.2 - 1.2 mg/dL Final    Alkaline Phosphatase 09/19/2019 57  33 - 130 U/L Final    AST 09/19/2019 21  10 - 35 U/L Final    ALT 09/19/2019 14  6 - 29 U/L Final    Color, UA 09/19/2019 YELLOW  YELLOW Final    Appearance, UA 09/19/2019 CLEAR  CLEAR Final    Specific Gravity, UA 09/19/2019 1.016  1.001 - 1.035 Final    pH, UA 09/19/2019 6.5  5.0 - 8.0 Final    Glucose, UA 09/19/2019 NEGATIVE  NEGATIVE Final    Bilirubin, UA 09/19/2019 NEGATIVE  NEGATIVE Final    Ketones, UA 09/19/2019 NEGATIVE  NEGATIVE Final    Occult Blood UA 09/19/2019 NEGATIVE  NEGATIVE Final    Protein, UA 09/19/2019 1+* NEGATIVE Final    Nitrite, UA 09/19/2019 NEGATIVE  NEGATIVE Final    Leukocytes, UA 09/19/2019 3+* NEGATIVE Final    WBC Casts, UA 09/19/2019 10-20* < OR = 5 /HPF Final    RBC Casts, UA 09/19/2019 NONE SEEN  < OR = 2 /HPF Final    Squam Epithel, UA 09/19/2019 0-5  < OR = 5 /HPF Final    Bacteria, UA 09/19/2019 MODERATE* NONE SEEN /HPF Final    Hyaline Casts, UA 09/19/2019 NONE SEEN  NONE SEEN /LPF Final    Reflexive Urine Culture 09/19/2019 CULTURE INDICATED - RESULTS TO FOLLOW   Final    WBC 09/19/2019 6.4  3.8 - 10.8 Thousand/uL Final    RBC 09/19/2019 3.73* 3.80 - 5.10 Million/uL Final    Hemoglobin 09/19/2019 11.8  11.7 - 15.5 g/dL Final    Hematocrit 09/19/2019 36.6  35.0 - 45.0 % Final    Mean Corpuscular Volume 09/19/2019 98.1  80.0 - 100.0 fL Final    Mean Corpuscular Hemoglobin 09/19/2019 31.6  27.0 - 33.0 pg Final    Mean Corpuscular Hemoglobin Conc 09/19/2019 32.2  32.0 - 36.0 g/dL Final    RDW 09/19/2019 13.2  11.0 - 15.0 % Final    Platelets 09/19/2019 268  140 - 400 Thousand/uL Final    MPV 09/19/2019  11.2  7.5 - 12.5 fL Final    Neutrophils Absolute 09/19/2019 4,314  1,500 - 7,800 cells/uL Final    Lymph # 09/19/2019 986  850 - 3,900 cells/uL Final    Mono # 09/19/2019 870  200 - 950 cells/uL Final    Eos # 09/19/2019 179  15 - 500 cells/uL Final    Baso # 09/19/2019 51  0 - 200 cells/uL Final    Neutrophils Relative 09/19/2019 67.4  % Final    Lymph% 09/19/2019 15.4  % Final    Mono% 09/19/2019 13.6  % Final    Eosinophil% 09/19/2019 2.8  % Final    Basophil% 09/19/2019 0.8  % Final    TSH 09/19/2019 0.90  0.40 - 4.50 mIU/L Final    Urine Culture, Routine 09/19/2019    Final       Past Medical History:   Diagnosis Date    Anticoagulant long-term use     baby aspirin    Closed fracture of multiple ribs of left side with routine healing 1/16/2020    Hypertension     Thyroid disease     TIA (transient ischemic attack)     Ulcerative colitis     Wears hearing aid      Past Surgical History:   Procedure Laterality Date    COLON SURGERY  05/17/12    exp lap, perfered colon    EYE SURGERY      left eye cataract     Family History   Problem Relation Age of Onset    Alzheimer's disease Mother     Heart disease Father        Marital Status:   Alcohol History:  reports that she does not drink alcohol.  Tobacco History:  reports that she has never smoked. She has never used smokeless tobacco.  Drug History:  reports that she does not use drugs.    Review of patient's allergies indicates:  No Known Allergies    Current Outpatient Medications:     aspirin (ECOTRIN) 81 MG EC tablet, Take 81 mg by mouth once daily.  , Disp: , Rfl:     calcitRIOL (ROCALTROL) 0.25 MCG Cap, Take 0.25 mcg by mouth twice a week. Sunday and Thursday, Disp: , Rfl:     calcium-vitamin D 500-125 mg-unit tablet, Take 1 tablet by mouth 2 (two) times daily.  , Disp: , Rfl:     carvedilol (COREG) 12.5 MG tablet, Take 1 tablet (12.5 mg total) by mouth 2 (two) times daily., Disp: 60 tablet, Rfl: 2    denosumab (PROLIA) 60  "mg/mL Syrg, Inject 60 mg into the skin every 6 (six) months. , Disp: , Rfl:     DOCOSAHEXANOIC ACID/VIT C/LUT (EYE HEALTH ORAL), Take 1 tablet by mouth 2 (two) times daily.  , Disp: , Rfl:     fish oil-omega-3 fatty acids 300-1,000 mg capsule, Take 1 g by mouth once daily.  , Disp: , Rfl:     folic acid (FOLVITE) 800 MCG tablet, Take 800 mcg by mouth once daily.  , Disp: , Rfl:     hydrALAZINE (APRESOLINE) 100 MG tablet, Take 1 tablet (100 mg total) by mouth every 8 (eight) hours., Disp: 90 tablet, Rfl: 2    levothyroxine (SYNTHROID) 100 MCG tablet, Take 1 tablet (100 mcg total) by mouth once daily., Disp: 90 tablet, Rfl: 1    pravastatin (PRAVACHOL) 20 MG tablet, Take 1 tablet (20 mg total) by mouth once daily., Disp: 90 tablet, Rfl: 1    [START ON 1/24/2020] torsemide (DEMADEX) 10 MG Tab, Take 1 tablet (10 mg total) by mouth every Tues, Fri. Take extra dose on Thursday and Saturday this week then resume Tuesday and Friday dosing, Disp: 8 tablet, Rfl: 2    Review of Systems   Constitutional: Positive for fatigue and unexpected weight change (weight down 6lbs since last visit here). Negative for chills and fever.   Respiratory: Positive for shortness of breath. Negative for cough and wheezing.    Cardiovascular: Positive for leg swelling. Negative for chest pain and palpitations.   Gastrointestinal: Negative for abdominal pain, constipation and diarrhea.   Genitourinary: Negative for dysuria and hematuria.   Skin: Negative for rash.   Neurological: Negative for dizziness and headaches.        Objective:      Vitals:    01/22/20 1035 01/22/20 1039 01/22/20 1107   BP: (!) 180/72 (!) 200/80 (!) 176/72   Pulse: 64     Weight: 53.5 kg (118 lb)     Height: 4' 9" (1.448 m)       Physical Exam   Constitutional: She is oriented to person, place, and time. She appears well-developed and well-nourished.   HENT:   Head: Normocephalic and atraumatic.   Mouth/Throat: Mucous membranes are normal. No posterior oropharyngeal " erythema.   Neck: Neck supple. Carotid bruit is not present.   Cardiovascular: Normal rate and regular rhythm. Exam reveals no gallop and no friction rub.   No murmur heard.  Pulmonary/Chest: Effort normal. No respiratory distress. She has no wheezes. She has no rales.   Slightly decreased left base otherwise clear   Abdominal: Soft. She exhibits no distension. There is no tenderness.   Musculoskeletal: She exhibits edema (2+pitting edema bilat lower legs from prox calves to feet, compression socks on).   Lymphadenopathy:     She has no cervical adenopathy.   Neurological: She is alert and oriented to person, place, and time. She has normal strength. Gait normal.   Skin: Skin is warm and dry. No rash noted.   Psychiatric: She has a normal mood and affect.   Nursing note and vitals reviewed.      Assessment:       1. Acute diastolic congestive heart failure    2. Pleural effusion, left    3. Hypertensive heart disease with acute diastolic congestive heart failure    4. CKD (chronic kidney disease) stage 4, GFR 15-29 ml/min    5. Hyperparathyroidism due to renal insufficiency         Plan:       Acute diastolic congestive heart failure  -     torsemide (DEMADEX) 10 MG Tab; Take 1 tablet (10 mg total) by mouth every Tues, Fri. Take extra dose on Thursday and Saturday this week then resume Tuesday and Friday dosing  Dispense: 8 tablet; Refill: 2  -pt normally on torsemide 2 week per Dr. Shultz, given continued edema and SOB recommend adding extra dose this week. Also advised to begin daily weight monitoring and instructed to call for weight gain >3lbs/overnight or >5lbs in a week. Low salt diet. Cautioned to call me for any worsening in SOB symptoms    Pleural effusion, left  -improved s/p thoracentesis; pt has f/u appt scheduled with Dr. Flores    Hypertensive heart disease with acute diastolic congestive heart failure  -BP elevated today. Advised to increase hydralazine to 100mg TID as per DC instructions. Advised to  monitor BP and if BP remains >150/90 then will need further increase in either hydralazine or carvedilol    CKD (chronic kidney disease) stage 4, GFR 15-29 ml/min  -stable on hospital labs, f/u with Dr. Shultz    Hyperparathyroidism due to renal insufficiency  -stable, managed by Dr. Shultz    Follow up in about 2 months (around 3/22/2020).

## 2020-01-26 PROBLEM — J96.20 ACUTE ON CHRONIC RESPIRATORY FAILURE: Status: ACTIVE | Noted: 2020-01-01

## 2020-01-27 PROBLEM — R79.89 ELEVATED TROPONIN: Status: ACTIVE | Noted: 2020-01-01

## 2020-01-27 PROBLEM — D64.9 SYMPTOMATIC ANEMIA: Status: ACTIVE | Noted: 2020-01-01

## 2020-01-27 PROBLEM — E46 HYPOALBUMINEMIA DUE TO PROTEIN-CALORIE MALNUTRITION: Status: ACTIVE | Noted: 2020-01-01

## 2020-01-27 PROBLEM — E87.5 HYPERKALEMIA: Status: ACTIVE | Noted: 2020-01-01

## 2020-01-27 PROBLEM — E87.20 METABOLIC ACIDOSIS: Status: ACTIVE | Noted: 2020-01-01

## 2020-01-27 PROBLEM — R79.89 ELEVATED LFTS: Status: ACTIVE | Noted: 2020-01-01

## 2020-01-27 PROBLEM — E88.09 HYPOALBUMINEMIA DUE TO PROTEIN-CALORIE MALNUTRITION: Status: ACTIVE | Noted: 2020-01-01

## 2020-01-27 NOTE — EICU
New admit    87 y/o F history of HFpEF, hypertension, hypothyroidism, dyslipidemia, CKD presented with shortness of breath with /107.  BNP >1000, Trop 0.037. CXR with pulmonary edema. She was placed on BiPap, started on nitro drip and given furosemide 60 mg IV without UO of approximately 400.    Camera assessment:  Patient appears comfortable on BiPa  /75  HR 44  O2 96%    · Hypertensive emergency, flash pulmonary edema. BP control, afterload reduction

## 2020-01-27 NOTE — CARE UPDATE
This note also relates to the following rows which could not be included:  BP - Cannot attach notes to unvalidated device data       01/27/20 0346   Patient Assessment/Suction   Level of Consciousness (AVPU) alert   Respiratory Effort Unlabored   Expansion/Accessory Muscles/Retractions no use of accessory muscles   PRE-TX-O2   O2 Device (Oxygen Therapy) BiPAP   Oxygen Concentration (%) 35   SpO2 99 %   Pulse Oximetry Type Continuous   Pulse (!) 47   Resp 17   Ready to Wean/Extubation Screen   FIO2<=50 (chart decimal) 0.35   Preset CPAP/BiPAP Settings   Mode Of Delivery BiPAP   $ Initial CPAP/BiPAP Setup? No   $ Is patient using? Yes   Size of Mask Medium/Large   Sized Appropriately? Yes   Equipment Type V60   Airway Device Type medium full face mask   Humidifier not applicable   Ipap 10   EPAP (cm H2O) 5   Pressure Support (cm H2O) 5   Set Rate (Breaths/Min) 14   ITime (sec) 1   Rise Time (sec) 0.2   Patient CPAP/BiPAP Settings   RR Total (Breaths/Min) 17   Tidal Volume (mL) 877   VE Minute Ventilation (L/min) 12.8 L/min   Peak Inspiratory Pressure (cm H2O) 10   TiTOT (%) 33   Total Leak (L/Min) 26   Patient Trigger - ST Mode Only (%) 36   CPAP/BiPAP Alarms   High Pressure (cm H2O) 20   Low Pressure (cm H2O) 10   Low Pressure Delay (Sec) 20   Minute Ventilation (L/Min) 2   High RR (breaths/min) 40   Low RR (breaths/min) 10

## 2020-01-27 NOTE — ED PROVIDER NOTES
Encounter Date: 1/26/2020    SCRIBE #1 NOTE: Julianne ERNANDEZ, am scribing for, and in the presence of, Oliverio Bennett MD.       History     Chief Complaint   Patient presents with    Shortness of Breath       Time seen by provider: 9:00 PM on 01/26/2020    Izabella Fulton is a 86 y.o. female with Hx of HTN, acute diastolic CHF, and left pleural effusion who presents to the ED with c/o shortness of breath which has progressively worsened for 3 days and has significantly worsened over the past 3 hours. On daily Aspirin 81mg for prior TIA. No Hx of dialysis. The patient denies abdominal pain, chest pain, fever, cough, or any other symptoms at this time. No cardiopulmonary PSHx.     The history is provided by the spouse and the patient.     Review of patient's allergies indicates:  No Known Allergies  Past Medical History:   Diagnosis Date    Anticoagulant long-term use     baby aspirin    Closed fracture of multiple ribs of left side with routine healing 1/16/2020    Hypertension     Thyroid disease     TIA (transient ischemic attack)     Ulcerative colitis     Wears hearing aid      Past Surgical History:   Procedure Laterality Date    COLON SURGERY  05/17/12    exp lap, perfered colon    EYE SURGERY      left eye cataract     Family History   Problem Relation Age of Onset    Alzheimer's disease Mother     Heart disease Father      Social History     Tobacco Use    Smoking status: Never Smoker    Smokeless tobacco: Never Used   Substance Use Topics    Alcohol use: No    Drug use: No     Review of Systems   Constitutional: Negative for activity change, appetite change, chills, fatigue and fever.   Eyes: Negative for visual disturbance.   Respiratory: Positive for shortness of breath. Negative for apnea.    Cardiovascular: Negative for chest pain and palpitations.   Gastrointestinal: Negative for abdominal distention and abdominal pain.   Genitourinary: Negative for difficulty urinating.    Musculoskeletal: Negative for neck pain.   Skin: Negative for pallor and rash.   Neurological: Negative for syncope and headaches.   Hematological: Bruises/bleeds easily.   Psychiatric/Behavioral: Negative for confusion.       Physical Exam     Initial Vitals   BP Pulse Resp Temp SpO2   01/26/20 2108 01/26/20 2054 01/26/20 2054 01/26/20 2054 01/26/20 2054   (!) 237/107 68 (!) 24 98.8 °F (37.1 °C) (!) 92 %      MAP       --                Physical Exam    Nursing note and vitals reviewed.  Constitutional: She appears well-developed and well-nourished.  Non-toxic appearance.   HENT:   Head: Normocephalic and atraumatic.   Eyes: EOM are normal. Pupils are equal, round, and reactive to light.   Neck: Normal range of motion. Neck supple. No neck rigidity. No JVD present.   Cardiovascular: Normal rate, regular rhythm, normal heart sounds and intact distal pulses. Exam reveals no gallop and no friction rub.    No murmur heard.  Pulmonary/Chest: Tachypnea noted. She is in respiratory distress. She has wheezes. She has rales.   Expiratory wheezing with severe labored breathing. Diffuse rales. Speaks in 2-3 word sentences. Severe respiratory distress.   Abdominal: Soft. Bowel sounds are normal. She exhibits no distension. There is no tenderness. There is no rebound and no guarding. A hernia is present. Hernia confirmed positive in the ventral area.   Reducible ventral hernia.   Musculoskeletal: Normal range of motion. She exhibits edema.   3+ bilateral lower extremity edema.   Neurological: She is alert and oriented to person, place, and time. She has normal reflexes. No cranial nerve deficit or sensory deficit. She exhibits normal muscle tone. Coordination normal. GCS eye subscore is 4. GCS verbal subscore is 5. GCS motor subscore is 6.   Skin: Skin is warm and dry.   Psychiatric: She has a normal mood and affect. Her speech is normal and behavior is normal. She is not actively hallucinating.         ED Course    Procedures  Labs Reviewed   CBC W/ AUTO DIFFERENTIAL - Abnormal; Notable for the following components:       Result Value    RBC 3.89 (*)     Mean Corpuscular Hemoglobin 31.1 (*)     RDW 15.5 (*)     Immature Grans (Abs) 0.07 (*)     Lymph # 0.5 (*)     Gran% 83.6 (*)     Lymph% 6.3 (*)     All other components within normal limits   COMPREHENSIVE METABOLIC PANEL - Abnormal; Notable for the following components:    Potassium 5.2 (*)     CO2 17 (*)     Glucose 136 (*)     BUN, Bld 80 (*)     Creatinine 2.7 (*)     Albumin 3.2 (*)     AST 71 (*)     ALT 77 (*)     eGFR if  18 (*)     eGFR if non  15 (*)     All other components within normal limits   TROPONIN I - Abnormal; Notable for the following components:    Troponin I 0.037 (*)     All other components within normal limits   B-TYPE NATRIURETIC PEPTIDE - Abnormal; Notable for the following components:    BNP 1,152 (*)     All other components within normal limits   PROTIME-INR     EKG Readings: (Independently Interpreted)   Conduction: 1st Degree AV Block.   Sinus rhythm at rate of 64 bpm with normal axis. Non-specific ST segment changes.          Imaging Results          X-Ray Chest AP Portable (Final result)  Result time 01/26/20 21:22:17    Final result by Tim Tsang MD (01/26/20 21:22:17)                 Impression:      Cardiomegaly with worsening fluid overload state, most consistent with decompensated CHF.      Electronically signed by: Tim Tsang MD  Date:    01/26/2020  Time:    21:22             Narrative:    EXAMINATION:  XR CHEST AP PORTABLE    CLINICAL HISTORY:  sob;    TECHNIQUE:  Single frontal view of the chest was performed.    COMPARISON:  01/18/2020.    FINDINGS:  Monitoring EKG leads are present.  The trachea is unremarkable.  There is stable enlargement of the cardiomediastinal silhouette.  There is worsening bilateral pleural effusions.  There is no evidence of a pneumothorax.  There is no evidence of  pneumomediastinum.  There are some perihilar and bibasilar airspace opacities.  There is worsening pulmonary edema.    There are degenerative changes in the osseous structures.  There are chronic right-sided rib deformities.                                 Medical Decision Making:   History:   Old Medical Records: I decided to obtain old medical records.  Initial Assessment:   86-year-old woman presents emergency department for evaluation of shortness of breath. She is found to be extremely hypertensive with severe respiratory distress and rales on pulmonary examination. She has significant peripheral edema. She was emergently placed on BiPAP and started on IV antihypertensive medications and Lasix for hypertensive emergency with acute decompensated heart failure.  She denies any chest pain. She was stabilized in the emergency department.  Discussed with Hospital Medicine who agrees to admit the patient to the intensive care unit for further evaluation and treatment.    Independently Interpreted Test(s):   I have ordered and independently interpreted EKG Reading(s) - see prior notes  Clinical Tests:   Lab Tests: Ordered and Reviewed  Radiological Study: Ordered and Reviewed  Medical Tests: Ordered and Reviewed              Attending Attestation:         Attending Critical Care:   Critical Care Times:   Direct Patient Care (initial evaluation, reassessments, and time considering the case)................................................................10 minutes.   Additional History from reviewing old medical records or taking additional history from the family, EMS, PCP, etc.......................5 minutes.   Ordering, Reviewing, and Interpreting Diagnostic Studies...............................................................................................................10 minutes.    Documentation..................................................................................................................................................................................5 minutes.   Consultation with other Physicians. .................................................................................................................................................5 minutes.   Consultation with the patient's family directly relating to the patient's condition, care, and DNR status (when patient unable)......5 minutes.   Other..................................................................................................................................................................................................5 minutes.   ==============================================================  · Total Critical Care Time - exclusive of procedural time: 45 minutes.  ==============================================================       I, Donald Navarrete, personally performed the services described in this documentation. All medical record entries made by the scribe were at my direction and in my presence.  I have reviewed the chart and agree that the record reflects my personal performance and is accurate and complete. Olivreio Bennett MD.  2:11 AM 01/27/2020       DISCLAIMER: This note was prepared with [employer] voice recognition transcription software. Garbled syntax, mangled pronouns, and other bizarre constructions may be attributed to that software system.                        Clinical Impression:       ICD-10-CM ICD-9-CM   1. Acute on chronic respiratory failure, unspecified whether with hypoxia or hypercapnia J96.20 518.84   2. SOB (shortness of breath) R06.02 786.05         Disposition:   Disposition: Admitted                     Oliverio Bennett MD  01/27/20 0211

## 2020-01-27 NOTE — SUBJECTIVE & OBJECTIVE
Interval History:  Patient is in intensive care unit.  Patient is receiving hemodialysis at present with 2 units of packed red blood cells.  Patient confirms noncompliance with hemodialysis treatments secondary to transportation issues.  Patient is breathing better compared to yesterday.  Patient denies any chest pain, palpitations or any abdominal complaints.    Review of Systems   Unable to perform ROS: Acuity of condition     Objective:     Vital Signs (Most Recent):  Temp: 97.5 °F (36.4 °C) (01/27/20 1130)  Pulse: (!) 49 (01/27/20 1338)  Resp: 17 (01/27/20 1338)  BP: (!) 97/50 (01/27/20 1330)  SpO2: 98 % (01/27/20 1338) Vital Signs (24h Range):  Temp:  [96.8 °F (36 °C)-98.8 °F (37.1 °C)] 97.5 °F (36.4 °C)  Pulse:  [44-70] 49  Resp:  [12-32] 17  SpO2:  [88 %-100 %] 98 %  BP: ()/() 97/50     Weight: 54.3 kg (119 lb 11.4 oz)  Body mass index is 25.9 kg/m².    Intake/Output Summary (Last 24 hours) at 1/27/2020 1455  Last data filed at 1/27/2020 1338  Gross per 24 hour   Intake 458.35 ml   Output 649 ml   Net -190.65 ml      Physical Exam   Constitutional: She is oriented to person, place, and time. She appears well-developed and well-nourished. No distress.   HENT:   Head: Normocephalic and atraumatic.   Eyes: Pupils are equal, round, and reactive to light. EOM are normal. Right eye exhibits no discharge. Left eye exhibits no discharge.   Neck: Normal range of motion. JVD present.   Cardiovascular: Regular rhythm, normal heart sounds and intact distal pulses.   No murmur heard.  Bradycardia with a HR of 45 bpm noted on bedside continuous cardiac monitoring.     Pulmonary/Chest: Effort normal and breath sounds normal. No respiratory distress. She has no wheezes. She has no rales. She exhibits no tenderness.   Bipap therapy. Course breath sounds noted throughout all lung fields upon auscultation, no wheezing noted. Patient with no labored respirations upon my initial exam.     Abdominal: Soft. Bowel sounds  are normal. She exhibits no distension. There is no tenderness. There is no rebound and no guarding.   Genitourinary:   Genitourinary Comments: Exam Deferred   [place] in place     Musculoskeletal: Normal range of motion. She exhibits edema. She exhibits no tenderness or deformity.   Plus four pitting edema noted to bilateral lower extremities, discoloration noted to bilateral lower extremities.   Neurological: She is alert and oriented to person, place, and time. No cranial nerve deficit or sensory deficit.   Skin: Skin is warm and dry. Capillary refill takes 2 to 3 seconds. No rash noted. She is not diaphoretic. No erythema.   Generalized bruising noted throughout bilateral upper extremities   Psychiatric: She has a normal mood and affect. Her behavior is normal. Judgment and thought content normal.   Nursing note and vitals reviewed.      Significant Labs:   CBC:   Recent Labs   Lab 01/26/20 2120 01/27/20  0351   WBC 7.12 6.19   HGB 12.1 9.6*   HCT 37.7 30.4*    186     CMP:   Recent Labs   Lab 01/26/20 2120 01/27/20  0351    140   K 5.2* 5.2*    110   CO2 17* 19*   * 103   BUN 80* 84*   CREATININE 2.7* 2.7*   CALCIUM 9.9 9.2   PROT 7.2 5.6*   ALBUMIN 3.2* 2.6*   BILITOT 0.5 0.4   ALKPHOS 93 79   AST 71* 48*   ALT 77* 58*   ANIONGAP 13 11   EGFRNONAA 15* 15*     Microbiology Results (last 7 days)     ** No results found for the last 168 hours. **          Significant Imaging:   Echocardiogram (January 16, 2020):  · Concentric left ventricular hypertrophy.  · The mean diastolic gradient across the mitral valve is 2 mmHg at a heart rate of bpm.  · Normal left ventricular systolic function. The estimated ejection fraction is 65%.  · Grade I (mild) left ventricular diastolic dysfunction consistent with impaired relaxation.  · No wall motion abnormalities.  · Mild left atrial enlargement.  · Mild mitral regurgitation.  · Mild tricuspid regurgitation.  · Trivial pericardial effusion.  · No  intracardiac thrombi/vegetations.    Right lower extremity venous Doppler:  No evidence of deep venous thrombosis in the right lower extremity.    Chest x-ray:  Cardiomegaly with worsening fluid overload state, most consistent with decompensated CHF.

## 2020-01-27 NOTE — HPI
Izabella Fulton is an 86-year-old female with a past medical history of hypertension, hypothyroidism, hyperlipidemia, and congestive heart failure who presents to the emergency room tonight with reports of increasing shortness of breath.  Patient was recently discharged from the hospital approximately 1 week ago in which she was admitted for a CHF exacerbation.  Unsure if patient was diagnosed with congestive heart failure during that hospitalization or prior.  Patient's  states that patient's breathing never did improve since discharge home and she has since persisted with a nonproductive cough.  Patient was discharged in told to set up a cardiology and pulmonology appointment, have not been to either appointments yet.  Patient also reportedly not eating, daily p.o. intake consist of a boost twice a day.  Information for HPI obtained from patient's , Don, and patient's family friend who is at bedside during my initial interview and physical exam.  Upon arrival to the emergency room patient noted to be in acute respiratory distress requiring BiPAP therapy in association with a CHF exacerbation with an elevated BNP.  Patient also noted to have hypertensive emergency upon admission.  Patient was initiated on IV Lasix in emergency room and IV nitroglycerin drip.  Patient admitted to ICU on January 26, 2020 at approximately 11:30 p.m..  Patient reportedly resides at with her  utilizes a walker for ambulatory assist device, denies the use of supplemental oxygen dependence while at home.

## 2020-01-27 NOTE — ASSESSMENT & PLAN NOTE
Transfuse 2 units of packed red blood cells with hemodialysis treatment.  Follow CBC and transfuse as needed.  Patient would benefit with erythromycin subcutaneous injection therapy as per Nephrology team.

## 2020-01-27 NOTE — PROGRESS NOTES
[facility] Hospital Medicine  Progress Note    Patient Name: Izabella Fulton  MRN: [id number]  Patient Class: IP- Inpatient   Admission Date: 1/26/2020  Length of Stay: 1 days  Attending Physician: Rachel Chavis MD  Primary Care Provider: Corinne Fletcher NP        Subjective:     Principal Problem:Acute diastolic congestive heart failure        HPI:  Izabella Fulton is an 86-year-old female with a past medical history of hypertension, hypothyroidism, hyperlipidemia, and congestive heart failure who presents to the emergency room tonight with reports of increasing shortness of breath.  Patient was recently discharged from the hospital approximately 1 week ago in which she was admitted for a CHF exacerbation.  Unsure if patient was diagnosed with congestive heart failure during that hospitalization or prior.  Patient's  states that patient's breathing never did improve since discharge home and she has since persisted with a nonproductive cough.  Patient was discharged in told to set up a cardiology and pulmonology appointment, have not been to either appointments yet.  Patient also reportedly not eating, daily p.o. intake consist of a boost twice a day.  Information for HPI obtained from patient's , Don, and patient's family friend who is at bedside during my initial interview and physical exam.  Upon arrival to the emergency room patient noted to be in acute respiratory distress requiring BiPAP therapy in association with a CHF exacerbation with an elevated BNP.  Patient also noted to have hypertensive emergency upon admission.  Patient was initiated on IV Lasix in emergency room and IV nitroglycerin drip.  Patient admitted to ICU on January 26, 2020 at approximately 11:30 p.m..  Patient reportedly resides at with her  utilizes a walker for ambulatory assist device, denies the use of supplemental oxygen dependence while at home.    Overview/Hospital Course:  No notes on  file    Interval History:  Patient is in intensive care unit.  Blood pressure is improving with use of intravenous Tridil infusion.  Patient appears lethargic and frail.  Patient reports improving shortness of breath.  No chest pain, cough or wheezing reported.  Patient is afebrile.    Review of Systems   Unable to perform ROS: Acuity of condition     Objective:     Vital Signs (Most Recent):  Temp: 97.5 °F (36.4 °C) (01/27/20 1130)  Pulse: (!) 49 (01/27/20 1338)  Resp: 17 (01/27/20 1338)  BP: (!) 97/50 (01/27/20 1330)  SpO2: 98 % (01/27/20 1338) Vital Signs (24h Range):  Temp:  [96.8 °F (36 °C)-98.8 °F (37.1 °C)] 97.5 °F (36.4 °C)  Pulse:  [44-70] 49  Resp:  [12-32] 17  SpO2:  [88 %-100 %] 98 %  BP: ()/() 97/50     Weight: 54.3 kg (119 lb 11.4 oz)  Body mass index is 25.9 kg/m².    Intake/Output Summary (Last 24 hours) at 1/27/2020 1455  Last data filed at 1/27/2020 1338  Gross per 24 hour   Intake 458.35 ml   Output 649 ml   Net -190.65 ml      Physical Exam   Constitutional: She is oriented to person, place, and time. She appears well-developed and well-nourished. No distress.   HENT:   Head: Normocephalic and atraumatic.   Eyes: Pupils are equal, round, and reactive to light. EOM are normal. Right eye exhibits no discharge. Left eye exhibits no discharge.   Neck: Normal range of motion. JVD present.   Cardiovascular: Regular rhythm, normal heart sounds and intact distal pulses.   No murmur heard.  Bradycardia with a HR of 45 bpm noted on bedside continuous cardiac monitoring.     Pulmonary/Chest: Effort normal and breath sounds normal. No respiratory distress. She has no wheezes. She has no rales. She exhibits no tenderness.   Bipap therapy. Course breath sounds noted throughout all lung fields upon auscultation, no wheezing noted. Patient with no labored respirations upon my initial exam.     Abdominal: Soft. Bowel sounds are normal. She exhibits no distension. There is no tenderness. There is no  rebound and no guarding.   Genitourinary:   Genitourinary Comments: Exam Deferred   [place] in place     Musculoskeletal: Normal range of motion. She exhibits edema. She exhibits no tenderness or deformity.   Plus four pitting edema noted to bilateral lower extremities, discoloration noted to bilateral lower extremities.   Neurological: She is alert and oriented to person, place, and time. No cranial nerve deficit or sensory deficit.   Skin: Skin is warm and dry. Capillary refill takes 2 to 3 seconds. No rash noted. She is not diaphoretic. No erythema.   Generalized bruising noted throughout bilateral upper extremities   Psychiatric: She has a normal mood and affect. Her behavior is normal. Judgment and thought content normal.   Nursing note and vitals reviewed.      Significant Labs:   CBC:   Recent Labs   Lab 01/26/20 2120 01/27/20  0351   WBC 7.12 6.19   HGB 12.1 9.6*   HCT 37.7 30.4*    186     CMP:   Recent Labs   Lab 01/26/20 2120 01/27/20  0351    140   K 5.2* 5.2*    110   CO2 17* 19*   * 103   BUN 80* 84*   CREATININE 2.7* 2.7*   CALCIUM 9.9 9.2   PROT 7.2 5.6*   ALBUMIN 3.2* 2.6*   BILITOT 0.5 0.4   ALKPHOS 93 79   AST 71* 48*   ALT 77* 58*   ANIONGAP 13 11   EGFRNONAA 15* 15*     Microbiology Results (last 7 days)     ** No results found for the last 168 hours. **          Significant Imaging:   Echocardiogram (January 16, 2020):  · Concentric left ventricular hypertrophy.  · The mean diastolic gradient across the mitral valve is 2 mmHg at a heart rate of bpm.  · Normal left ventricular systolic function. The estimated ejection fraction is 65%.  · Grade I (mild) left ventricular diastolic dysfunction consistent with impaired relaxation.  · No wall motion abnormalities.  · Mild left atrial enlargement.  · Mild mitral regurgitation.  · Mild tricuspid regurgitation.  · Trivial pericardial effusion.  · No intracardiac thrombi/vegetations.    Right lower extremity venous Doppler:  No  evidence of deep venous thrombosis in the right lower extremity.    Chest x-ray:  Cardiomegaly with worsening fluid overload state, most consistent with decompensated CHF.        Assessment/Plan:      * Acute diastolic congestive heart failure  CHF currently uncontrolled due to volume overload due to: Continued edema of extremities and JVD and Pulmonary edema. Latest ECHO shows ejection fraction of 65%. Continue Lasix and BB and monitor clinical status closely. Monitor on telemetry. Patient is off CHF pathway due to criteria of pathway.  Monitor strict Is&Os and daily weights. Continue to stress to patient importance of self efficacy and  on diet for CHF. Last BNP reviewed- and noted below   Recent Labs   Lab 01/26/20 2120   BNP 1,152*   .  Recent echocardiogram reviewed - follow Cardiology and Nephrology recommendations.        Elevated troponin  Likely in the setting of acute CHF exacerbation, patient denies CP on admit - trend troponin - tele monitoring.        Elevated LFTs  Noted, trend with CMP.        Metabolic acidosis  Noted, trend with CMP.        Hypoalbuminemia due to protein-calorie malnutrition  Dietary consult.        Hyperkalemia  Tele - trend with CMP.        Acute on chronic respiratory failure  Bipap therapy - tele monitoring - ICU admission.        Hypertensive emergency  Nitroglycerin gtt initiated in ER - goal SBP of 180 - do not decreased MAP by > 25% in first 2-6 hours.  Tele monitoring.        CKD (chronic kidney disease) stage 4, GFR 15-29 ml/min  Creatine stable for now. BMP reviewed- noted Estimated Creatinine Clearance: 12.8 mL/min (A) (based on SCr of 2.7 mg/dL (H)). according to latest data. Monitor UOP and serial BMP and adjust therapy as needed. Renally dose meds.  Case discussed with Dr. Araiza, answered all questions.            Hyperlipidemia  Chronic, controlled. Will continue home medication.    Lab Results   Component Value Date    CHOL 154 09/19/2019     Lab Results    Component Value Date    HDL 68 09/19/2019     Lab Results   Component Value Date    LDLCALC 72 09/19/2019     Lab Results   Component Value Date    TRIG 60 09/19/2019     Lab Results   Component Value Date    CHOLHDL 2.3 09/19/2019               Hypothyroid  Lab Results   Component Value Date    TSH 6.425 (H) 01/16/2020       Chronic, controlled.  Will continue home medication.    Essential hypertension  Chronic, uncontrolled.  Latest blood pressure and vitals reviewed-   Temp:  [96.8 °F (36 °C)-98.8 °F (37.1 °C)]   Pulse:  [44-70]   Resp:  [12-32]   BP: ()/()   SpO2:  [88 %-100 %] .   Home meds for hypertension were reviewed and noted below. Hospital anti-hypertensive changes were made as shown below.  Hypertension Medications             carvedilol (COREG) 12.5 MG tablet Take 1 tablet (12.5 mg total) by mouth 2 (two) times daily.    hydrALAZINE (APRESOLINE) 100 MG tablet Take 1 tablet (100 mg total) by mouth every 8 (eight) hours.    torsemide (DEMADEX) 10 MG Tab Take 1 tablet (10 mg total) by mouth every Tues, Fri. Take extra dose on Thursday and Saturday this week then resume Tuesday and Friday dosing      Hospital Medications             carvedilol tablet 12.5 mg 12.5 mg, Oral, 2 times daily    enalaprilat injection 0.625 mg 0.625 mg, Intravenous, ED 1 Time    furosemide injection 40 mg 40 mg, Intravenous, Once    furosemide injection 60 mg (Completed) 60 mg, Intravenous, ED 1 Time    hydrALAZINE tablet 100 mg 100 mg, Oral, Every 8 hours    nitroGLYCERIN 50 mg in dextrose 5 % 250 mL infusion (TITRATING) 40 mcg/min (40 mcg/min), Intravenous, Continuous @ 12 mL/hr, Use non-sorb tubing.    nitroGLYCERIN 50 mg in dextrose 5 % 250 mL infusion (TITRATING) 5 mcg/min (5 mcg/min), Intravenous, Continuous @ 1.5 mL/hr, Use non-sorb tubing.        Will treat with PRN antihypertensives, as needed, to maintain BP less than 180/110 or if patient becomes symptomatic.    VTE Risk Mitigation (From admission,  onward)         Ordered     heparin (porcine) injection 5,000 Units  Every 8 hours      01/27/20 0212     IP VTE HIGH RISK PATIENT  Once      01/27/20 0212     Place ESTUARDO hose  Until discontinued      01/27/20 0212     Place sequential compression device  Until discontinued      01/27/20 0212                Critical care time spent on the evaluation and treatment of severe organ dysfunction, review of pertinent labs and imaging studies, discussions with consulting providers and discussions with patient/family: 35 minutes.      Rachel Chavis MD  Department of Hospital Medicine   [facility]

## 2020-01-27 NOTE — RESPIRATORY THERAPY
01/26/20 2108   Patient Assessment/Suction   Level of Consciousness (AVPU) alert   Respiratory Effort Severe;Labored   All Lung Fields Breath Sounds crackles, coarse   NOEL Breath Sounds crackles, coarse   LLL Breath Sounds crackles, coarse   RUL Breath Sounds crackles, coarse   RML Breath Sounds crackles, coarse   RLL Breath Sounds crackles, coarse   PRE-TX-O2   O2 Device (Oxygen Therapy) BiPAP   $ Is the patient on Low Flow Oxygen? Yes   Oxygen Concentration (%) 50   SpO2 (!) 88 %   Pulse Oximetry Type Continuous   $ Pulse Oximetry - Multiple Charge Pulse Oximetry - Multiple   Pulse 70   BP (!) 237/107   Ready to Wean/Extubation Screen   FIO2<=50 (chart decimal) 0.5   Preset CPAP/BiPAP Settings   Mode Of Delivery BiPAP   $ CPAP/BiPAP Daily Charge BiPAP/CPAP Daily   $ Initial CPAP/BiPAP Setup? Yes   $ Is patient using? Yes   Size of Mask Medium/Large   Sized Appropriately? Yes   Equipment Type V60   Airway Device Type medium nasal mask   Ipap 10   EPAP (cm H2O) 5   Pressure Support (cm H2O) 5   Set Rate (Breaths/Min) 14   ITime (sec) 1   Rise Time (sec) 0.2   Patient CPAP/BiPAP Settings   Timed Inspiration (Sec) 1   IPAP Rise Time (sec) 0.2   RR Total (Breaths/Min) 20   Tidal Volume (mL) 496   VE Minute Ventilation (L/min) 10.3 L/min   Peak Inspiratory Pressure (cm H2O) 10   TiTOT (%) 28   Total Leak (L/Min) 0   Patient Trigger - ST Mode Only (%) 87   CPAP/BiPAP Alarms   High Pressure (cm H2O) 20   Low Pressure (cm H2O) 10   Minute Ventilation (L/Min) 5   High RR (breaths/min) 40   Low RR (breaths/min) 10

## 2020-01-27 NOTE — ASSESSMENT & PLAN NOTE
Chronic, controlled. Will continue home medication.    Lab Results   Component Value Date    CHOL 154 09/19/2019     Lab Results   Component Value Date    HDL 68 09/19/2019     Lab Results   Component Value Date    LDLCALC 72 09/19/2019     Lab Results   Component Value Date    TRIG 60 09/19/2019     Lab Results   Component Value Date    CHOLHDL 2.3 09/19/2019

## 2020-01-27 NOTE — NURSING
Spoke with Prashant with Dr. Garcia's office for consult and also made Sr. Garcia aware of consult.

## 2020-01-27 NOTE — ASSESSMENT & PLAN NOTE
Chronic, uncontrolled.  Latest blood pressure and vitals reviewed-   Temp:  [96.8 °F (36 °C)-98.8 °F (37.1 °C)]   Pulse:  [44-70]   Resp:  [12-32]   BP: ()/()   SpO2:  [88 %-100 %] .   Home meds for hypertension were reviewed and noted below. Hospital anti-hypertensive changes were made as shown below.  Hypertension Medications             carvedilol (COREG) 12.5 MG tablet Take 1 tablet (12.5 mg total) by mouth 2 (two) times daily.    hydrALAZINE (APRESOLINE) 100 MG tablet Take 1 tablet (100 mg total) by mouth every 8 (eight) hours.    torsemide (DEMADEX) 10 MG Tab Take 1 tablet (10 mg total) by mouth every Tues, Fri. Take extra dose on Thursday and Saturday this week then resume Tuesday and Friday dosing      Hospital Medications             carvedilol tablet 12.5 mg 12.5 mg, Oral, 2 times daily    enalaprilat injection 0.625 mg 0.625 mg, Intravenous, ED 1 Time    furosemide injection 40 mg 40 mg, Intravenous, Once    furosemide injection 60 mg (Completed) 60 mg, Intravenous, ED 1 Time    hydrALAZINE tablet 100 mg 100 mg, Oral, Every 8 hours    nitroGLYCERIN 50 mg in dextrose 5 % 250 mL infusion (TITRATING) 40 mcg/min (40 mcg/min), Intravenous, Continuous @ 12 mL/hr, Use non-sorb tubing.    nitroGLYCERIN 50 mg in dextrose 5 % 250 mL infusion (TITRATING) 5 mcg/min (5 mcg/min), Intravenous, Continuous @ 1.5 mL/hr, Use non-sorb tubing.        Will treat with PRN antihypertensives, as needed, to maintain BP less than 180/110 or if patient becomes symptomatic.

## 2020-01-27 NOTE — CONSULTS
Nephrology Consult Note        Patient Name: Izabella Fulton  MRN: [id number]    Patient Class: IP- Inpatient   Admission Date: 1/26/2020  Length of Stay: 1 days  Date of Service: 1/27/2020    Attending Physician: Rachel Chavis MD  Primary Care Provider: Corinne Fletcher NP    Reason for Consult: kaitlynn/ckd3/anemia/htn/chf    SUBJECTIVE:     HPI: 86F with hypertension, hypothyroidism, congestive heart failure  presents with increasing shortness of breath. She was recently discharged from the hospital approximately 1 week ago after admission for CHF exacerbation. Patient's  states that patient's breathing never did improve since discharge home and she has since persisted with a nonproductive cough. Patient was discharged in told to set up a cardiology and pulmonology appointment, have not been to either appointments yet. She was not eating, daily p.o. intake consist of a boost twice a day.    Upon arrival to the emergency room patient noted to be in acute respiratory distress requiring BiPAP therapy in association with a CHF exacerbation with an elevated BNP. Patient also noted to have hypertensive emergency and was treated with IV Lasix and IV nitroglycerin drip.    Past Medical History:   Diagnosis Date    Anticoagulant long-term use     baby aspirin    Closed fracture of multiple ribs of left side with routine healing 1/16/2020    Hypertension     Thyroid disease     TIA (transient ischemic attack)     Ulcerative colitis     Wears hearing aid      Past Surgical History:   Procedure Laterality Date    COLON SURGERY  05/17/12    exp lap, perfered colon    EYE SURGERY      left eye cataract     Family History   Problem Relation Age of Onset    Alzheimer's disease Mother     Heart disease Father      Social History     Tobacco Use    Smoking status: Never Smoker    Smokeless tobacco: Never Used   Substance Use Topics    Alcohol use: No    Drug use: No       Review of patient's allergies indicates:  No  Known Allergies    Outpatient meds:  No current facility-administered medications on file prior to encounter.      Current Outpatient Medications on File Prior to Encounter   Medication Sig Dispense Refill    aspirin (ECOTRIN) 81 MG EC tablet Take 81 mg by mouth once daily.        calcitRIOL (ROCALTROL) 0.25 MCG Cap Take 0.25 mcg by mouth twice a week. Sunday and Thursday      calcium-vitamin D 500-125 mg-unit tablet Take 1 tablet by mouth 2 (two) times daily.        carvedilol (COREG) 12.5 MG tablet Take 1 tablet (12.5 mg total) by mouth 2 (two) times daily. 60 tablet 2    denosumab (PROLIA) 60 mg/mL Syrg Inject 60 mg into the skin every 6 (six) months.       DOCOSAHEXANOIC ACID/VIT C/LUT (EYE HEALTH ORAL) Take 1 tablet by mouth 2 (two) times daily.        fish oil-omega-3 fatty acids 300-1,000 mg capsule Take 1 g by mouth once daily.        folic acid (FOLVITE) 800 MCG tablet Take 800 mcg by mouth once daily.        hydrALAZINE (APRESOLINE) 100 MG tablet Take 1 tablet (100 mg total) by mouth every 8 (eight) hours. 90 tablet 2    levothyroxine (SYNTHROID) 100 MCG tablet Take 1 tablet (100 mcg total) by mouth once daily. 90 tablet 1    pravastatin (PRAVACHOL) 20 MG tablet Take 1 tablet (20 mg total) by mouth once daily. 90 tablet 1    torsemide (DEMADEX) 10 MG Tab Take 1 tablet (10 mg total) by mouth every Tues, Fri. Take extra dose on Thursday and Saturday this week then resume Tuesday and Friday dosing 8 tablet 2       Scheduled meds:   aspirin  81 mg Oral Daily    calcitRIOL  0.25 mcg Oral Twice Weekly    calcium-vitamin D3  1 tablet Oral BID    carvedilol  12.5 mg Oral BID    folic acid  1 mg Oral Daily    heparin (porcine)  5,000 Units Subcutaneous Q8H    hydrALAZINE  100 mg Oral Q8H    levothyroxine  100 mcg Oral Before breakfast    omega 3-dha-epa-fish oil  1 capsule Oral Daily    pantoprazole  40 mg Oral Daily    pravastatin  20 mg Oral Daily       Infusions:   nitroGLYCERIN 20  mcg/min (01/27/20 1057)       PRN meds:  calcium gluconate IVPB, calcium gluconate IVPB, calcium gluconate IVPB, magnesium sulfate IVPB, magnesium sulfate IVPB, ondansetron, potassium chloride in water **AND** potassium chloride in water **AND** potassium chloride in water, sodium chloride 0.9%, sodium phosphate IVPB, sodium phosphate IVPB, sodium phosphate IVPB    Review of Systems:  Review of Systems   Constitutional: Negative for chills, fever, malaise/fatigue and weight loss.   HENT: Negative for hearing loss and nosebleeds.    Eyes: Negative for blurred vision, double vision and photophobia.   Respiratory: Positive for shortness of breath. Negative for cough and wheezing.    Cardiovascular: Positive for leg swelling. Negative for chest pain and palpitations.   Gastrointestinal: Negative for abdominal pain, constipation, diarrhea, heartburn, nausea and vomiting.   Genitourinary: Negative for dysuria, frequency and urgency.   Musculoskeletal: Negative for falls, joint pain and myalgias.   Skin: Negative for itching and rash.   Neurological: Negative for dizziness, speech change, focal weakness, loss of consciousness and headaches.   Endo/Heme/Allergies: Does not bruise/bleed easily.   Psychiatric/Behavioral: Negative for depression and substance abuse. The patient is not nervous/anxious.        OBJECTIVE:     Vital Signs and IO (Last 24H):  Temp:  [96.8 °F (36 °C)-98.8 °F (37.1 °C)]   Pulse:  [44-70]   Resp:  [12-32]   BP: ()/()   SpO2:  [88 %-100 %]   I/O last 3 completed shifts:  In: 47.1 [I.V.:47.1]  Out: 400 [Urine:400]    Wt Readings from Last 5 Encounters:   01/27/20 54.3 kg (119 lb 11.4 oz)   01/22/20 53.5 kg (118 lb)   01/18/20 54.5 kg (120 lb 3.2 oz)   01/15/20 56.4 kg (124 lb 6.4 oz)   01/03/20 50.8 kg (112 lb)         Physical Exam:  Physical Exam   Constitutional: She is oriented to person, place, and time. She appears well-developed and well-nourished.   HENT:   Head: Normocephalic and  atraumatic.   Mouth/Throat: Oropharynx is clear and moist.   Eyes: Pupils are equal, round, and reactive to light. EOM are normal. No scleral icterus.   Neck: Neck supple.   Cardiovascular: Normal rate and regular rhythm.   Pulmonary/Chest: Effort normal. No stridor. No respiratory distress.   Abdominal: Soft. She exhibits no distension.   Musculoskeletal: Normal range of motion. She exhibits edema. She exhibits no deformity.   Neurological: She is alert and oriented to person, place, and time. No cranial nerve deficit.   Skin: Skin is warm and dry. No rash noted. She is not diaphoretic. No erythema.   Psychiatric: She has a normal mood and affect. Her behavior is normal.       Body mass index is 25.9 kg/m².    Laboratory:  Recent Labs   Lab 01/26/20 2120 01/27/20  0351    140   K 5.2* 5.2*    110   CO2 17* 19*   BUN 80* 84*   CREATININE 2.7* 2.7*   ESTGFRAFRICA 18* 18*   EGFRNONAA 15* 15*   * 103       Recent Labs   Lab 01/26/20 2120 01/27/20  0351   CALCIUM 9.9 9.2   ALBUMIN 3.2* 2.6*   PHOS  --  5.8*   MG  --  2.3             No results for input(s): POCTGLUCOSE in the last 168 hours.    Recent Labs   Lab 08/08/18  2330 01/15/20  1329   Hemoglobin A1C 5.5 5.6       Recent Labs   Lab 01/26/20 2120 01/27/20  0351   WBC 7.12 6.19   HGB 12.1 9.6*   HCT 37.7 30.4*    186   MCV 97 98   MCHC 32.1 31.6*   MONO 8.0  0.6 7.1  0.4       Recent Labs   Lab 01/26/20 2120 01/27/20  0351   BILITOT 0.5 0.4   PROT 7.2 5.6*   ALBUMIN 3.2* 2.6*   ALKPHOS 93 79   ALT 77* 58*   AST 71* 48*       Recent Labs   Lab 08/08/18  1650 05/14/19  2212 09/19/19  0842   Color, UA Yellow Yellow YELLOW   Appearance, UA Clear Clear CLEAR   pH, UA 6.0 7.0 6.5   Specific Gravity, UA <=1.005 A 1.010 1.016   Protein, UA Negative 2+ A 1+ A   Glucose, UA Negative Negative  --    Ketones, UA Negative Negative NEGATIVE   Urobilinogen, UA Negative Negative  --    Bilirubin (UA) Negative Negative  --    Occult Blood UA  Negative Negative NEGATIVE   Nitrite, UA Negative Negative NEGATIVE   RBC, UA 1 2  --    WBC, UA 2 1  --    Bacteria, UA  --   --  MODERATE A   Bacteria Few A Rare  --    Hyaline Casts, UA 1 0 NONE SEEN             Microbiology Results (last 7 days)     ** No results found for the last 168 hours. **          ASSESSMENT/PLAN:     Active Hospital Problems    Diagnosis  POA    *Acute diastolic congestive heart failure [I50.31]  Yes    Hyperkalemia [E87.5]  Yes    Hypoalbuminemia due to protein-calorie malnutrition [E46]  Yes    Metabolic acidosis [E87.2]  Yes    Elevated LFTs [R94.5]  Yes    Elevated troponin [R79.89]  Yes    Acute on chronic respiratory failure [J96.20]  Yes    Hypertensive emergency [I16.1]  Yes    CKD (chronic kidney disease) stage 4, GFR 15-29 ml/min [N18.4]  Yes    Essential hypertension [I10]  Yes    Hypothyroid [E03.9]  Yes    Hyperlipidemia [E78.5]  Yes      Resolved Hospital Problems   No resolved problems to display.     FERCHO  CKD stage 3-4  CHF exacerbation  No NSAIDs, ACEI/ARB, IV contrast or other nephrotoxins.  Keep MAP > 60, SBP > 100.  Dose meds for GFR < 30 ml/min.  Renal diet - low K, low phos.  Agree with BP control and diuretics.  Cards consult.    Anemia of CKD  Hgb and HCT are acceptable. Monitor.  Will provide JONNY and/or IV iron PRN.    HTN  BP seem controlled.   Tolerate asymptomatic HTN up to -160.  Continue home meds.  Low sodium diet.    Thank you for allowing us to participate in the care of your patient!   We will follow the patient and provide recommendations as needed.    Alexei Araiza MD    [facility]  [address] [place] [address]  [place] [id number]    [phone] - tel  [phone] - fax    1/27/2020 1:58 PM

## 2020-01-27 NOTE — CONSULTS
"  [facility]  Adult Nutrition  Consult Note    SUMMARY    Intervention: to be determined     Recommendation:  1) advance PO diet to renal, cardiac diet   2) Change supplement to [place] BID   3) Weigh pt weekly or as needed   4) Nutrition education and handouts given   5) Check iron B12 and folate    Goals: 1) PO intakes > 50% of meals and supplements at f/u   Nutrition Goal Status: new  Communication of RD Recs: reviewed with RN(POC, sticky note, second sign )    Reason for Assessment    Reason For Assessment: consult  Diagnosis: (acute CHF)  Relevant Medical History: CKD4, TIA, ulcerative colitis, HTN, HLD, CHF  Interdisciplinary Rounds: attended    General Information Comments: 87 y/o female admitted with acute CHF. Educated by this service 1 week ago on diet for CHF. Reviewed renal, cardiac low sodium diet with pt and . NFPE done 20, mild-moderate wasting seen. PTA claims to have been eatnig well.     Nutrition Discharge Planning: to be determined- renal, cardiac diet + [place] BID    Nutrition Risk Screen    Nutrition Risk Screen: no indicators present    Nutrition/Diet History    Patient Reported Diet/Restrictions/Preferences: heart healthy  Typical Food/Fluid Intake: avoids sodium at home. but had been eating a lot of canned soup lately  Spiritual, Cultural Beliefs, Yazdanism Practices, Values that Affect Care: no  Food Allergies: NKFA  Factors Affecting Nutritional Intake: NPO    Anthropometrics    Temp: 97.5 °F (36.4 °C)  Height Method: Measured(20)  Height: 4' 9"  Height (inches): 57 in  Weight Method: Bed Scale  Weight: 54.3 kg (119 lb 11.4 oz)  Weight (lb): 119.71 lb  Ideal Body Weight (IBW), Female: 85 lb  % Ideal Body Weight, Female (lb): 140.84 %  BMI (Calculated): 25.9  BMI Grade: 25 - 29.9 - overweight  Usual Body Weight (UBW), k.8 kg(1/3/20)  % Usual Body Weight: 107.11  % Weight Change From Usual Weight: 6.89 %       Lab/Procedures/Meds    Pertinent Labs " Reviewed: reviewed  BMP  Lab Results   Component Value Date     01/27/2020    K 5.2 (H) 01/27/2020     01/27/2020    CO2 19 (L) 01/27/2020    BUN 84 (H) 01/27/2020    CREATININE 2.7 (H) 01/27/2020    CALCIUM 9.2 01/27/2020    ANIONGAP 11 01/27/2020    ESTGFRAFRICA 18 (A) 01/27/2020    EGFRNONAA 15 (A) 01/27/2020     Lab Results   Component Value Date    CALCIUM 9.2 01/27/2020    PHOS 5.8 (H) 01/27/2020     Lab Results   Component Value Date    ALBUMIN 2.6 (L) 01/27/2020     No results found for: IRON, TIBC, FERRITIN, SATURATEDIRO  No results found for: ARAAELOZ06  No results found for: FOLATE    Pertinent Medications Reviewed: reviewed  Pertinent Medications Comments: Mg sulfate, zofran, KCl, NaPhos, calcitriol, Ca, Vitamin D, folic acid, statin, omega 3    Estimated/Assessed Needs    Weight Used For Calorie Calculations: 50.8 kg (111 lb 15.9 oz)(estimated dry weight)  Energy Calorie Requirements (kcal): 25-30 kcal/kg ( CHF/CKD IV) = 9163-0918 kcal  Energy Need Method: Kcal/kg  Protein Requirements: 1.0 g protein/kg ( wasting and age vs. CKD IV)=  51 g protein  Weight Used For Protein Calculations: 50.8 kg (111 lb 15.9 oz)(estimated dry weight)  Fluid Requirements (mL): 1500 ml or per MD  Estimated Fluid Requirement Method: RDA Method  CHO Requirement: N/A      Nutrition Prescription Ordered    Current Diet Order: NPO  Nutrition Order Comments: x 1 day    Evaluation of Received Nutrient/Fluid Intake    Energy Calories Required: not meeting needs  Protein Required: not meeting needs  Fluid Required: not meeting needs  Tolerance: other (see comments)(NPO)  % Intake of Estimated Energy Needs: 0%  % Meal Intake: 0%    Nutrition Risk    Level of Risk/Frequency of Follow-up: moderate 2 x weekly    Assessment and Plan    Hypoalbuminemia due to protein-calorie malnutrition  Contributing Nutrition Diagnosis  Moderate chronic condition related malnutrition    Related to (etiology):   Increased needs d/t  CKD    Signs and Symptoms (as evidenced by):   1) mild edema  2) Mild-moderate fat/muscle wasting as charted below    Interventions/Recommendations (treatment strategy):  Above    Nutrition Diagnosis Status:   New           Monitor and Evaluation    Food and Nutrient Intake: energy intake, food and beverage intake  Food and Nutrient Adminstration: diet order  Knowledge/Beliefs/Attitudes: food and nutrition knowledge/skill  Anthropometric Measurements: weight  Biochemical Data, Medical Tests and Procedures: electrolyte and renal panel  Nutrition-Focused Physical Findings: overall appearance     Malnutrition Assessment     Skin (Micronutrient): (WDL)  Teeth (Micronutrient): (denies chewing trouble)   Micronutrient Evaluation: suspected deficiency, suspected toxicity  Micronutrient Evaluation Comments: check iron, B12, folate/ elevated K and Phos       Orbital Region (Subcutaneous Fat Loss): moderate depletion  Upper Arm Region (Subcutaneous Fat Loss): moderate depletion  Thoracic and Lumbar Region: mild depletion   Scientology Region (Muscle Loss): moderate depletion  Clavicle Bone Region (Muscle Loss): moderate depletion  Clavicle and Acromion Bone Region (Muscle Loss): moderate depletion  Scapular Bone Region (Muscle Loss): mild depletion  Dorsal Hand (Muscle Loss): mild depletion  Patellar Region (Muscle Loss): mild depletion  Anterior Thigh Region (Muscle Loss): mild depletion  Posterior Calf Region (Muscle Loss): mild depletion   Edema (Fluid Accumulation): 3-->moderate   Subcutaneous Fat Loss (Final Summary): moderate protein-calorie malnutrition  Muscle Loss Evaluation (Final Summary): moderate protein-calorie malnutrition         Nutrition Follow-Up    RD Follow-up?: Yes

## 2020-01-27 NOTE — ASSESSMENT & PLAN NOTE
Chronic, uncontrolled.  Latest blood pressure and vitals reviewed-   Temp:  [98.8 °F (37.1 °C)]   Pulse:  [46-70]   Resp:  [24]   BP: (130-237)/()   SpO2:  [88 %-100 %] .   Home meds for hypertension were reviewed and noted below. Hospital anti-hypertensive changes were made as shown below.  Hypertension Medications             carvedilol (COREG) 12.5 MG tablet Take 1 tablet (12.5 mg total) by mouth 2 (two) times daily.    hydrALAZINE (APRESOLINE) 100 MG tablet Take 1 tablet (100 mg total) by mouth every 8 (eight) hours.    torsemide (DEMADEX) 10 MG Tab Take 1 tablet (10 mg total) by mouth every Tues, Fri. Take extra dose on Thursday and Saturday this week then resume Tuesday and Friday dosing      Hospital Medications             carvedilol tablet 12.5 mg 12.5 mg, Oral, 2 times daily    enalaprilat injection 0.625 mg 0.625 mg, Intravenous, ED 1 Time    furosemide injection 40 mg 40 mg, Intravenous, Once    furosemide injection 60 mg (Completed) 60 mg, Intravenous, ED 1 Time    hydrALAZINE tablet 100 mg 100 mg, Oral, Every 8 hours    nitroGLYCERIN 50 mg in dextrose 5 % 250 mL infusion (TITRATING) 40 mcg/min (40 mcg/min), Intravenous, Continuous @ 12 mL/hr, Use non-sorb tubing.    nitroGLYCERIN 50 mg in dextrose 5 % 250 mL infusion (TITRATING) 5 mcg/min (5 mcg/min), Intravenous, Continuous @ 1.5 mL/hr, Use non-sorb tubing.        Will treat with PRN antihypertensives, as needed, to maintain BP less than 180/110 or if patient becomes symptomatic.

## 2020-01-27 NOTE — ASSESSMENT & PLAN NOTE
CHF currently uncontrolled due to volume overload due to: Continued edema of extremities and JVD and Pulmonary edema. Latest ECHO shows ejection fraction of 65%. Continue Lasix and BB and monitor clinical status closely. Monitor on telemetry. Patient is off CHF pathway due to criteria of pathway.  Monitor strict Is&Os and daily weights. Continue to stress to patient importance of self efficacy and  on diet for CHF. Last BNP reviewed- and noted below   Recent Labs   Lab 01/26/20  2120   BNP 1,152*   .  Recent echocardiogram reviewed - follow Cardiology and Nephrology recommendations.

## 2020-01-27 NOTE — H&P
[facility] Hospital Medicine  History & Physical    Patient Name: Izabella Fulton  MRN: [id number]  Admission Date: 1/26/2020  Attending Physician: Rachel Chavis MD   Primary Care Provider: Corinne Fletcher NP         Patient information was obtained from spouse/SO, relative(s), past medical records and ER records.     Subjective:     Principal Problem:Acute diastolic congestive heart failure    Chief Complaint:   Chief Complaint   Patient presents with    Shortness of Breath        HPI: Izabella Fulton is an 86-year-old female with a past medical history of hypertension, hypothyroidism, hyperlipidemia, and congestive heart failure who presents to the emergency room tonight with reports of increasing shortness of breath.  Patient was recently discharged from the hospital approximately 1 week ago in which she was admitted for a CHF exacerbation.  Unsure if patient was diagnosed with congestive heart failure during that hospitalization or prior.  Patient's  states that patient's breathing never did improve since discharge home and she has since persisted with a nonproductive cough.  Patient was discharged in told to set up a cardiology and pulmonology appointment, have not been to either appointments yet.  Patient also reportedly not eating, daily p.o. intake consist of a boost twice a day.  Information for HPI obtained from patient's , Don, and patient's family friend who is at bedside during my initial interview and physical exam.  Upon arrival to the emergency room patient noted to be in acute respiratory distress requiring BiPAP therapy in association with a CHF exacerbation with an elevated BNP.  Patient also noted to have hypertensive emergency upon admission.  Patient was initiated on IV Lasix in emergency room and IV nitroglycerin drip.  Patient admitted to ICU on January 26, 2020 at approximately 11:30 p.m..  Patient reportedly resides at with her  utilizes a walker for  ambulatory assist device, denies the use of supplemental oxygen dependence while at home.    Past Medical History:   Diagnosis Date    Anticoagulant long-term use     baby aspirin    Closed fracture of multiple ribs of left side with routine healing 1/16/2020    Hypertension     Thyroid disease     TIA (transient ischemic attack)     Ulcerative colitis     Wears hearing aid        Past Surgical History:   Procedure Laterality Date    COLON SURGERY  05/17/12    exp lap, perfered colon    EYE SURGERY      left eye cataract       Review of patient's allergies indicates:  No Known Allergies    No current facility-administered medications on file prior to encounter.      Current Outpatient Medications on File Prior to Encounter   Medication Sig    aspirin (ECOTRIN) 81 MG EC tablet Take 81 mg by mouth once daily.      calcitRIOL (ROCALTROL) 0.25 MCG Cap Take 0.25 mcg by mouth twice a week. Sunday and Thursday    calcium-vitamin D 500-125 mg-unit tablet Take 1 tablet by mouth 2 (two) times daily.      carvedilol (COREG) 12.5 MG tablet Take 1 tablet (12.5 mg total) by mouth 2 (two) times daily.    denosumab (PROLIA) 60 mg/mL Syrg Inject 60 mg into the skin every 6 (six) months.     DOCOSAHEXANOIC ACID/VIT C/LUT (EYE HEALTH ORAL) Take 1 tablet by mouth 2 (two) times daily.      fish oil-omega-3 fatty acids 300-1,000 mg capsule Take 1 g by mouth once daily.      folic acid (FOLVITE) 800 MCG tablet Take 800 mcg by mouth once daily.      hydrALAZINE (APRESOLINE) 100 MG tablet Take 1 tablet (100 mg total) by mouth every 8 (eight) hours.    levothyroxine (SYNTHROID) 100 MCG tablet Take 1 tablet (100 mcg total) by mouth once daily.    pravastatin (PRAVACHOL) 20 MG tablet Take 1 tablet (20 mg total) by mouth once daily.    torsemide (DEMADEX) 10 MG Tab Take 1 tablet (10 mg total) by mouth every Tues, Fri. Take extra dose on Thursday and Saturday this week then resume Tuesday and Friday dosing     Family History      Problem Relation (Age of Onset)    Alzheimer's disease Mother    Heart disease Father        Tobacco Use    Smoking status: Never Smoker    Smokeless tobacco: Never Used   Substance and Sexual Activity    Alcohol use: No    Drug use: No    Sexual activity: Not Currently     Partners: Male     Review of Systems   Unable to perform ROS: Acuity of condition     Objective:     Vital Signs (Most Recent):  Temp: 98.8 °F (37.1 °C) (01/26/20 2054)  Pulse: (!) 49 (01/27/20 0115)  Resp: (!) 24 (01/26/20 2054)  BP: (!) 178/80 (01/27/20 0115)  SpO2: 97 % (01/27/20 0115) Vital Signs (24h Range):  Temp:  [98.8 °F (37.1 °C)] 98.8 °F (37.1 °C)  Pulse:  [46-70] 49  Resp:  [24] 24  SpO2:  [88 %-100 %] 97 %  BP: (130-237)/() 178/80     Weight: 53.5 kg (118 lb)  Body mass index is 25.53 kg/m².    Physical Exam   Constitutional: She is oriented to person, place, and time. She appears well-developed and well-nourished. No distress.   HENT:   Head: Normocephalic and atraumatic.   Eyes: Pupils are equal, round, and reactive to light. EOM are normal. Right eye exhibits no discharge. Left eye exhibits no discharge.   Neck: Normal range of motion. JVD present.   Cardiovascular: Regular rhythm, normal heart sounds and intact distal pulses.   No murmur heard.  Bradycardia with a HR of 45 bpm noted on bedside continuous cardiac monitoring.     Pulmonary/Chest: Effort normal and breath sounds normal. No respiratory distress. She has no wheezes. She has no rales. She exhibits no tenderness.   Bipap therapy. Course breath sounds noted throughout all lung fields upon auscultation, no wheezing noted. Patient with no labored respirations upon my initial exam.     Abdominal: Soft. Bowel sounds are normal. She exhibits no distension. There is no tenderness. There is no rebound and no guarding.   Genitourinary:   Genitourinary Comments: Exam Deferred   [place] in place     Musculoskeletal: Normal range of motion. She exhibits edema. She  exhibits no tenderness or deformity.   Plus four pitting edema noted to bilateral lower extremities, discoloration noted to bilateral lower extremities.   Neurological: She is alert and oriented to person, place, and time. No cranial nerve deficit or sensory deficit.   Skin: Skin is warm and dry. Capillary refill takes 2 to 3 seconds. No rash noted. She is not diaphoretic. No erythema.   Generalized bruising noted throughout bilateral upper extremities   Psychiatric: She has a normal mood and affect. Her behavior is normal. Judgment and thought content normal.   Nursing note and vitals reviewed.        CRANIAL NERVES     CN III, IV, VI   Pupils are equal, round, and reactive to light.  Extraocular motions are normal.        Significant Labs:   ABGs: No results for input(s): PH, PCO2, HCO3, POCSATURATED, BE, TOTALHB, COHB, METHB, O2HB, POCFIO2 in the last 48 hours.  BMP:   Recent Labs   Lab 01/26/20 2120   *      K 5.2*      CO2 17*   BUN 80*   CREATININE 2.7*   CALCIUM 9.9     CBC:   Recent Labs   Lab 01/26/20 2120   WBC 7.12   HGB 12.1   HCT 37.7        CMP:   Recent Labs   Lab 01/26/20 2120      K 5.2*      CO2 17*   *   BUN 80*   CREATININE 2.7*   CALCIUM 9.9   PROT 7.2   ALBUMIN 3.2*   BILITOT 0.5   ALKPHOS 93   AST 71*   ALT 77*   ANIONGAP 13   EGFRNONAA 15*     Troponin:   Recent Labs   Lab 01/26/20 2120   TROPONINI 0.037*     INR  1.1    BNP  1,152      Significant Imaging: I have reviewed all pertinent imaging results/findings within the past 24 hours.     Chest Xray:  Impression       Cardiomegaly with worsening fluid overload state, most consistent with decompensated CHF.     EKG performed on January 26, 2020, impression as below:  Sinus rhythm with 1st degree A-V block  Nonspecific ST and T wave abnormality  Abnormal ECG  When compared with ECG of 17-JAN-2020 09:17,  T wave amplitude has increased in Anterior leads    Assessment/Plan:     * Acute diastolic  congestive heart failure  CHF currently uncontrolled due to volume overload due to: Continued edema of extremities and JVD and Pulmonary edema. Latest ECHO shows ejection fraction of 65%. Continue Lasix and BB and monitor clinical status closely. Monitor on telemetry. Patient is off CHF pathway due to criteria of pathway.  Monitor strict Is&Os and daily weights.  NPO. Continue to stress to patient importance of self efficacy and  on diet for CHF. Last BNP reviewed- and noted below   Recent Labs   Lab 01/26/20  2120   BNP 1,152*   .  Recent echocardiogram reviewed - cardiology consult. Patient was given 60mg IV lasix in ER. Monitor electrolytes - tele monitoring.          Elevated troponin  Likely in the setting of acute CHF exacerbation, patient denies CP on admit - trend troponin - tele monitoring.        Elevated LFTs  Noted, trend with CMP.        Metabolic acidosis  Noted, trend with CMP.        Hypoalbuminemia due to protein-calorie malnutrition  Dietary consult.        Hyperkalemia  Tele - trend with CMP.        Acute on chronic respiratory failure  Bipap therapy - tele monitoring - ICU admission.        Hypertensive emergency  Nitroglycerin gtt initiated in ER - goal SBP of 180 - do not decreased MAP by > 25% in first 2-6 hours.  Tele monitoring.        CKD (chronic kidney disease) stage 4, GFR 15-29 ml/min  Creatine stable for now. BMP reviewed- noted Estimated Creatinine Clearance: 12.6 mL/min (A) (based on SCr of 2.7 mg/dL (H)). according to latest data. Monitor UOP and serial BMP and adjust therapy as needed. Renally dose meds.            Hyperlipidemia  Chronic, controlled. Will continue home medication.    Lab Results   Component Value Date    CHOL 154 09/19/2019     Lab Results   Component Value Date    HDL 68 09/19/2019     Lab Results   Component Value Date    LDLCALC 72 09/19/2019     Lab Results   Component Value Date    TRIG 60 09/19/2019     Lab Results   Component Value Date    CHOLHDL  2.3 09/19/2019               Hypothyroid  Lab Results   Component Value Date    TSH 6.425 (H) 01/16/2020       Chronic, controlled.  Will continue home medication.    Essential hypertension  Chronic, uncontrolled.  Latest blood pressure and vitals reviewed-   Temp:  [98.8 °F (37.1 °C)]   Pulse:  [46-70]   Resp:  [24]   BP: (130-237)/()   SpO2:  [88 %-100 %] .   Home meds for hypertension were reviewed and noted below. Hospital anti-hypertensive changes were made as shown below.  Hypertension Medications             carvedilol (COREG) 12.5 MG tablet Take 1 tablet (12.5 mg total) by mouth 2 (two) times daily.    hydrALAZINE (APRESOLINE) 100 MG tablet Take 1 tablet (100 mg total) by mouth every 8 (eight) hours.    torsemide (DEMADEX) 10 MG Tab Take 1 tablet (10 mg total) by mouth every Tues, Fri. Take extra dose on Thursday and Saturday this week then resume Tuesday and Friday dosing      Hospital Medications             carvedilol tablet 12.5 mg 12.5 mg, Oral, 2 times daily    enalaprilat injection 0.625 mg 0.625 mg, Intravenous, ED 1 Time    furosemide injection 40 mg 40 mg, Intravenous, Once    furosemide injection 60 mg (Completed) 60 mg, Intravenous, ED 1 Time    hydrALAZINE tablet 100 mg 100 mg, Oral, Every 8 hours    nitroGLYCERIN 50 mg in dextrose 5 % 250 mL infusion (TITRATING) 40 mcg/min (40 mcg/min), Intravenous, Continuous @ 12 mL/hr, Use non-sorb tubing.    nitroGLYCERIN 50 mg in dextrose 5 % 250 mL infusion (TITRATING) 5 mcg/min (5 mcg/min), Intravenous, Continuous @ 1.5 mL/hr, Use non-sorb tubing.        Will treat with PRN antihypertensives, as needed, to maintain BP less than 180/110 or if patient becomes symptomatic.            VTE Risk Mitigation (From admission, onward)         Ordered     heparin (porcine) injection 5,000 Units  Every 8 hours      01/27/20 0212     IP VTE HIGH RISK PATIENT  Once      01/27/20 0212     Place ESTUARDO hose  Until discontinued      01/27/20 0212     Place sequential  compression device  Until discontinued      01/27/20 0212              Critical care time spent on the evaluation and treatment of severe organ dysfunction, review of pertinent labs and imaging studies, discussions with consulting providers and discussions with patient/family: 85 minutes.     FULL CODE status verified upon admission.   I personally discussed this case with eICU, Dr. Morse, she verbalizes understanding and acceptance of patient case, agreeable to plan of care.      Ina Mcclain NP  Department of Hospital Medicine   [facility]

## 2020-01-27 NOTE — NURSING
C/o bilateral heel and ankle pain, TEDs and SCDs removed, prophylactic drsg placed on heels and heels floated off of bed with pillow. Patient reports pain is better now.

## 2020-01-27 NOTE — ASSESSMENT & PLAN NOTE
Creatine stable for now. BMP reviewed- noted Estimated Creatinine Clearance: 12.6 mL/min (A) (based on SCr of 2.7 mg/dL (H)). according to latest data. Monitor UOP and serial BMP and adjust therapy as needed. Renally dose meds.

## 2020-01-27 NOTE — ASSESSMENT & PLAN NOTE
Contributing Nutrition Diagnosis  Moderate chronic condition related malnutrition    Related to (etiology):   Increased needs d/t CKD    Signs and Symptoms (as evidenced by):   1) mild edema  2) Mild-moderate fat/muscle wasting as charted below    Interventions/Recommendations (treatment strategy):  Above    Nutrition Diagnosis Status:   New

## 2020-01-27 NOTE — ASSESSMENT & PLAN NOTE
Lab Results   Component Value Date    TSH 6.425 (H) 01/16/2020       Chronic, controlled.  Will continue home medication.

## 2020-01-27 NOTE — ASSESSMENT & PLAN NOTE
CHF currently uncontrolled due to volume overload due to: Continued edema of extremities and JVD and Pulmonary edema. Latest ECHO shows ejection fraction of 65%. Continue Lasix and BB and monitor clinical status closely. Monitor on telemetry. Patient is off CHF pathway due to criteria of pathway.  Monitor strict Is&Os and daily weights.  NPO. Continue to stress to patient importance of self efficacy and  on diet for CHF. Last BNP reviewed- and noted below   Recent Labs   Lab 01/26/20 2120   BNP 1,152*   .  Recent echocardiogram reviewed - cardiology consult. Patient was given 60mg IV lasix in ER. Monitor electrolytes - tele monitoring.

## 2020-01-27 NOTE — ASSESSMENT & PLAN NOTE
Likely in the setting of acute CHF exacerbation, patient denies CP on admit - trend troponin - tele monitoring.

## 2020-01-27 NOTE — ASSESSMENT & PLAN NOTE
Creatine stable for now. BMP reviewed- noted Estimated Creatinine Clearance: 12.8 mL/min (A) (based on SCr of 2.7 mg/dL (H)). according to latest data. Monitor UOP and serial BMP and adjust therapy as needed. Renally dose meds.

## 2020-01-27 NOTE — PLAN OF CARE
Intervention: to be determined      Recommendation:  1) advance PO diet to renal, cardiac diet   2) Change supplement to [place] BID   3) Weigh pt weekly or as needed   4) Nutrition education and handouts given   5) Check iron B12 and folate     Goals: 1) PO intakes > 50% of meals and supplements at f/u   Nutrition Goal Status: new  Communication of RD Recs: reviewed with RN(POC, sticky note, second sign )

## 2020-01-27 NOTE — PLAN OF CARE
01/27/20 0729   Patient Assessment/Suction   Respiratory Effort Unlabored   Expansion/Accessory Muscles/Retractions expansion symmetric;no retractions   All Lung Fields Breath Sounds Anterior:;Lateral:;equal bilaterally;crackles, coarse   Rhythm/Pattern, Respiratory assisted mechanically  (BiPAP)   Cough Frequency no cough   PRE-TX-O2   O2 Device (Oxygen Therapy) BiPAP   $ Is the patient on Low Flow Oxygen? Yes   Oxygen Concentration (%) 35   SpO2 98 %   Pulse Oximetry Type Continuous   Pulse (!) 49   Resp 12   BP (!) 173/78   Wound Care   $ Wound Care Tech Time 15 min   Area of Concern Bridge of nose   Skin Color/Characteristics without discoloration   Skin Temperature warm   Ready to Wean/Extubation Screen   FIO2<=50 (chart decimal) 0.35   Preset CPAP/BiPAP Settings   Mode Of Delivery BiPAP S/T   $ CPAP/BiPAP Daily Charge BiPAP/CPAP Daily   $ Initial CPAP/BiPAP Setup? No   $ Is patient using? Yes   Size of Mask Medium/Large   Sized Appropriately? Yes   Equipment Type V60   Airway Device Type medium full face mask   Humidifier not applicable   Ipap 10   EPAP (cm H2O) 5   Pressure Support (cm H2O) 5   Set Rate (Breaths/Min) 14   ITime (sec) 1   Rise Time (sec) 2   Patient CPAP/BiPAP Settings   Timed Inspiration (Sec) 1   IPAP Rise Time (sec) 2   RR Total (Breaths/Min) 16   Tidal Volume (mL) 389   VE Minute Ventilation (L/min) 6.2 L/min   Peak Inspiratory Pressure (cm H2O) 10   TiTOT (%) 31   Total Leak (L/Min) 0   Patient Trigger - ST Mode Only (%) 48   CPAP/BiPAP Alarms   High Pressure (cm H2O) 20   Low Pressure Delay (Sec) 20   Minute Ventilation (L/Min) 2   High RR (breaths/min) 40   Low RR (breaths/min) 10

## 2020-01-27 NOTE — PLAN OF CARE
Shift goals discussed with patient and spouse with time given for questions and answers. Resting well on BiPap, tolerated well. Safety maintained, Nitroglycerin drip titrated to MAP as ordered

## 2020-01-28 NOTE — PLAN OF CARE
Feeling better today. NTG drip off since last shift MN, per report. OOB with PT tp chair, transferred to wheel chair with minimal assistance. Dr. Araiza wants harris catheter to remain in for at lease two more days to monitor urine output. Dr. Garcia aware of report of bradycardia in 30s last PM and has reviewed rhythm per tele monitor. BP remains high, cardiology aware and has adjusted BP meds. Transferred to PCU via wheel chair with portable tele # [id number] which has been verified reading with monitor room. Personal belongings with patient upon transfer including clothes, purse and a bag of medications that belong to patient's spouse. Wound care to RLE done and new picture uploaded to chart. Safety maintained. Bedside report to ANDI Zuñiga.

## 2020-01-28 NOTE — PT/OT/SLP EVAL
Physical Therapy Evaluation    Patient Name:  Izabella Fulton   MRN:  [id number]    Recommendations:     Discharge Recommendations:  home health PT /SNF  Discharge Equipment Recommendations: none   Barriers to discharge: Decreased caregiver support    Assessment:     Izabella Fulton is a 86 y.o. female admitted with a medical diagnosis of Acute diastolic congestive heart failure.  She presents with the following impairments/functional limitations:  weakness, impaired endurance, impaired self care skills, impaired functional mobilty, impaired cardiopulmonary response to activity, edema, gait instability . Pt seen at ICU, presents with gross weakness but tolerating standing and few steps to chair. Pt to benefit from continued therapies home or SNF depending on progress. Spouse requiring assistance also.    Rehab Prognosis: Fair; patient would benefit from acute skilled PT services to address these deficits and reach maximum level of function.    Recent Surgery: * No surgery found *      Plan:     During this hospitalization, patient to be seen 6 x/week to address the identified rehab impairments via gait training, therapeutic activities, therapeutic exercises and progress toward the following goals:    · Plan of Care Expires:  02/28/20    Subjective   Family at bedside who stated pt's spouse is also not well and requiring assistance at home   Pt stated was ambulatory and driving prior to this admission  Chief Complaint: weakness, not hungry  Patient/Family Comments/goals: get well  Pain/Comfort:  · Pain Rating 1: 0/10    Patients cultural, spiritual, Yazidi conflicts given the current situation:      Living Environment:  Home with spouse  Prior to admission, patients level of function was ambulatory.  Equipment used at home: none.  DME owned (not currently used): rolling walker.  Upon discharge, patient will have assistance from friends.    Objective:     Communicated with nurse Fuentes prior to session.  Patient found  HOB elevated with telemetry, blood pressure cuff, harris catheter, peripheral IV, pulse ox (continuous), oxygen  upon PT entry to room.    General Precautions: Standard, fall, respiratory, anti-coagulation medicine   Orthopedic Precautions:N/A   Braces: N/A     Exams:  · Postural Exam:  Patient presented with the following abnormalities:    · -       Rounded shoulders  · -       Forward head  · RLE ROM: WFL  · RLE Strength: Deficits: 3+/5  · LLE ROM: WFL  · LLE Strength: Deficits: 3+/5    Functional Mobility:  · Bed Mobility:     · Rolling Left:  minimum assistance  · Scooting: minimum assistance  · Supine to Sit: minimum assistance  · Transfers:     · Sit to Stand:  minimum assistance with rolling walker  · Bed to Chair: minimum assistance with  rolling walker  using  Stand Pivot  · Gait: few steps to chair min assist/ forward head posture      Therapeutic Activities and Exercises:   OOB to chair with set up assist for lunch  Pt with multiple medical equipments and requiring extra time for safe management    AM-PAC 6 CLICK MOBILITY  Total Score:16     Patient left up in chair with all lines intact, call button in reach, nurse jeffrey notified and family present.    GOALS:   Multidisciplinary Problems     Physical Therapy Goals        Problem: Physical Therapy Goal    Goal Priority Disciplines Outcome Goal Variances Interventions   Physical Therapy Goal     PT, PT/OT Ongoing, Progressing     Description:  Goals to be met by: 2020     Patient will increase functional independence with mobility by performin. Supine to sit with MInimal Assistance  2. Sit to stand transfer with Minimal Assistance  3. Bed to chair transfer with Minimal Assistance using Rolling Walker  4. Gait  x 250 feet with Minimal Assistance using Rolling Walker.   5. Lower extremity exercise program x20 reps                     History:     Past Medical History:   Diagnosis Date    Anticoagulant long-term use     baby aspirin    Closed  fracture of multiple ribs of left side with routine healing 1/16/2020    Hypertension     Thyroid disease     TIA (transient ischemic attack)     Ulcerative colitis     Wears hearing aid        Past Surgical History:   Procedure Laterality Date    COLON SURGERY  05/17/12    exp lap, perfered colon    EYE SURGERY      left eye cataract       Time Tracking:     PT Received On: 01/28/20  PT Start Time: 1138     PT Stop Time: 1203  PT Total Time (min): 25 min     Billable Minutes: Evaluation 10 and Therapeutic Activity 15      Suni Guaman, PT  01/28/2020

## 2020-01-28 NOTE — PROGRESS NOTES
[facility] Medicine  Progress Note    Patient Name: Izabella Fulton  MRN: [id number]  Patient Class: IP- Inpatient   Admission Date: 1/26/2020  Length of Stay: 2 days  Attending Physician: Rachel Chavis MD  Primary Care Provider: Corinne Fletcher NP        Subjective:     Principal Problem:Acute diastolic congestive heart failure        HPI:  Izabella Fulton is an 86-year-old female with a past medical history of hypertension, hypothyroidism, hyperlipidemia, and congestive heart failure who presents to the emergency room tonight with reports of increasing shortness of breath.  Patient was recently discharged from the hospital approximately 1 week ago in which she was admitted for a CHF exacerbation.  Unsure if patient was diagnosed with congestive heart failure during that hospitalization or prior.  Patient's  states that patient's breathing never did improve since discharge home and she has since persisted with a nonproductive cough.  Patient was discharged in told to set up a cardiology and pulmonology appointment, have not been to either appointments yet.  Patient also reportedly not eating, daily p.o. intake consist of a boost twice a day.  Information for HPI obtained from patient's , Don, and patient's family friend who is at bedside during my initial interview and physical exam.  Upon arrival to the emergency room patient noted to be in acute respiratory distress requiring BiPAP therapy in association with a CHF exacerbation with an elevated BNP.  Patient also noted to have hypertensive emergency upon admission.  Patient was initiated on IV Lasix in emergency room and IV nitroglycerin drip.  Patient admitted to ICU on January 26, 2020 at approximately 11:30 p.m..  Patient reportedly resides at with her  utilizes a walker for ambulatory assist device, denies the use of supplemental oxygen dependence while at home.    Overview/Hospital Course:  No notes on  file    Interval History:  In intensive care unit, feeling better.  Patient is off intravenous Tridil infusion.   at bedside and a family friend who is a registered nurse is also present at bedside.  Patient is breathing better.  Patient denies any chest pain, palpitations or any abdominal complaints.    Review of Systems   Unable to perform ROS: Acuity of condition     Objective:     Vital Signs (Most Recent):  Temp: 97.7 °F (36.5 °C) (01/28/20 0415)  Pulse: 61 (01/28/20 0720)  Resp: 18 (01/28/20 0720)  BP: (!) 196/84 (01/28/20 0715)  SpO2: 96 % (01/28/20 0720) Vital Signs (24h Range):  Temp:  [97.5 °F (36.4 °C)-98.5 °F (36.9 °C)] 97.7 °F (36.5 °C)  Pulse:  [43-61] 61  Resp:  [11-32] 18  SpO2:  [94 %-99 %] 96 %  BP: ()/() 196/84     Weight: 58.7 kg (129 lb 6.6 oz)  Body mass index is 28 kg/m².    Intake/Output Summary (Last 24 hours) at 1/28/2020 0803  Last data filed at 1/28/2020 0500  Gross per 24 hour   Intake 733.55 ml   Output 430 ml   Net 303.55 ml      Physical Exam   Constitutional: She is oriented to person, place, and time. She appears well-developed and well-nourished. No distress.   HENT:   Head: Normocephalic and atraumatic.   Eyes: Pupils are equal, round, and reactive to light. EOM are normal. Right eye exhibits no discharge. Left eye exhibits no discharge.   Neck: Normal range of motion. JVD present.   Cardiovascular: Regular rhythm, normal heart sounds and intact distal pulses.   No murmur heard.  Bradycardia with a HR of 45 bpm noted on bedside continuous cardiac monitoring.     Pulmonary/Chest: Effort normal and breath sounds normal. No respiratory distress. She has no wheezes. She has no rales. She exhibits no tenderness.   Bipap therapy. Course breath sounds noted throughout all lung fields upon auscultation, no wheezing noted. Patient with no labored respirations upon my initial exam.     Abdominal: Soft. Bowel sounds are normal. She exhibits no distension. There is no  tenderness. There is no rebound and no guarding.   Genitourinary:   Genitourinary Comments: Exam Deferred   [place] in place     Musculoskeletal: Normal range of motion. She exhibits edema. She exhibits no tenderness or deformity.   1+ bilateral lower extremity pitting edema.   Neurological: She is alert and oriented to person, place, and time. No cranial nerve deficit or sensory deficit.   Skin: Skin is warm and dry. Capillary refill takes 2 to 3 seconds. No rash noted. She is not diaphoretic. No erythema.   Generalized bruising noted throughout bilateral upper extremities   Psychiatric: She has a normal mood and affect. Her behavior is normal. Judgment and thought content normal.   Nursing note and vitals reviewed.      Significant Labs:   CBC:   Recent Labs   Lab 01/26/20 2120 01/27/20 0351 01/28/20 0315   WBC 7.12 6.19 4.80   HGB 12.1 9.6* 9.9*   HCT 37.7 30.4* 31.4*    186 193     CMP:   Recent Labs   Lab 01/26/20 2120 01/27/20 0351 01/28/20 0315    140 139   K 5.2* 5.2* 5.1    110 109   CO2 17* 19* 20*   * 103 79   BUN 80* 84* 88*   CREATININE 2.7* 2.7* 3.0*   CALCIUM 9.9 9.2 9.5   PROT 7.2 5.6* 5.8*   ALBUMIN 3.2* 2.6* 2.6*   BILITOT 0.5 0.4 0.3   ALKPHOS 93 79 77   AST 71* 48* 44*   ALT 77* 58* 61*   ANIONGAP 13 11 10   EGFRNONAA 15* 15* 14*     Microbiology Results (last 7 days)     ** No results found for the last 168 hours. **          Significant Imaging:   Echocardiogram (January 16, 2020):  · Concentric left ventricular hypertrophy.  · The mean diastolic gradient across the mitral valve is 2 mmHg at a heart rate of bpm.  · Normal left ventricular systolic function. The estimated ejection fraction is 65%.  · Grade I (mild) left ventricular diastolic dysfunction consistent with impaired relaxation.  · No wall motion abnormalities.  · Mild left atrial enlargement.  · Mild mitral regurgitation.  · Mild tricuspid regurgitation.  · Trivial pericardial effusion.  · No  intracardiac thrombi/vegetations.    Right lower extremity venous Doppler:  No evidence of deep venous thrombosis in the right lower extremity.    Chest x-ray:  Cardiomegaly with worsening fluid overload state, most consistent with decompensated CHF.        Assessment/Plan:      * Acute diastolic congestive heart failure  CHF currently uncontrolled due to volume overload due to: Continued edema of extremities and JVD and Pulmonary edema. Latest ECHO shows ejection fraction of 65%. Continue Lasix and BB and monitor clinical status closely. Monitor on telemetry. Patient is off CHF pathway due to criteria of pathway.  Monitor strict Is&Os and daily weights. Continue to stress to patient importance of self efficacy and  on diet for CHF. Last BNP reviewed- and noted below   Recent Labs   Lab 01/26/20 2120   BNP 1,152*   .  Recent echocardiogram reviewed - follow Cardiology and Nephrology recommendations.            Symptomatic anemia  Transfuse 2 units of packed red blood cells with hemodialysis treatment.  Follow CBC and transfuse as needed.  Patient would benefit with erythromycin subcutaneous injection therapy as per Nephrology team.      Elevated troponin  Likely in the setting of acute CHF exacerbation, patient denies CP on admit - trend troponin - tele monitoring.        Elevated LFTs  Noted, trend with CMP.        Metabolic acidosis  Noted, trend with CMP.        Hypoalbuminemia due to protein-calorie malnutrition  Dietary consult.        Hyperkalemia  Tele - trend with CMP.        Acute on chronic respiratory failure  Bipap therapy - tele monitoring - ICU admission.        Hypertensive emergency  Nitroglycerin gtt initiated in ER - goal SBP of 180 - do not decreased MAP by > 25% in first 2-6 hours.  Tele monitoring.        CKD (chronic kidney disease) stage 4, GFR 15-29 ml/min  Creatine stable for now. BMP reviewed- noted Estimated Creatinine Clearance: 12.8 mL/min (A) (based on SCr of 2.7 mg/dL (H)).  according to latest data. Monitor UOP and serial BMP and adjust therapy as needed. Renally dose meds.            Hyperlipidemia  Chronic, controlled. Will continue home medication.    Lab Results   Component Value Date    CHOL 154 09/19/2019     Lab Results   Component Value Date    HDL 68 09/19/2019     Lab Results   Component Value Date    LDLCALC 72 09/19/2019     Lab Results   Component Value Date    TRIG 60 09/19/2019     Lab Results   Component Value Date    CHOLHDL 2.3 09/19/2019               Hypothyroid  Lab Results   Component Value Date    TSH 6.425 (H) 01/16/2020       Chronic, controlled.  Will continue home medication.    Essential hypertension  Chronic, uncontrolled.  Latest blood pressure and vitals reviewed-   Temp:  [96.8 °F (36 °C)-98.8 °F (37.1 °C)]   Pulse:  [44-70]   Resp:  [12-32]   BP: ()/()   SpO2:  [88 %-100 %] .   Home meds for hypertension were reviewed and noted below. Hospital anti-hypertensive changes were made as shown below.  Hypertension Medications             carvedilol (COREG) 12.5 MG tablet Take 1 tablet (12.5 mg total) by mouth 2 (two) times daily.    hydrALAZINE (APRESOLINE) 100 MG tablet Take 1 tablet (100 mg total) by mouth every 8 (eight) hours.    torsemide (DEMADEX) 10 MG Tab Take 1 tablet (10 mg total) by mouth every Tues, Fri. Take extra dose on Thursday and Saturday this week then resume Tuesday and Friday dosing      Hospital Medications             carvedilol tablet 12.5 mg 12.5 mg, Oral, 2 times daily    enalaprilat injection 0.625 mg 0.625 mg, Intravenous, ED 1 Time    furosemide injection 40 mg 40 mg, Intravenous, Once    furosemide injection 60 mg (Completed) 60 mg, Intravenous, ED 1 Time    hydrALAZINE tablet 100 mg 100 mg, Oral, Every 8 hours    nitroGLYCERIN 50 mg in dextrose 5 % 250 mL infusion (TITRATING) 40 mcg/min (40 mcg/min), Intravenous, Continuous @ 12 mL/hr, Use non-sorb tubing.    nitroGLYCERIN 50 mg in dextrose 5 % 250 mL infusion  (TITRATING) 5 mcg/min (5 mcg/min), Intravenous, Continuous @ 1.5 mL/hr, Use non-sorb tubing.        Will treat with PRN antihypertensives, as needed, to maintain BP less than 180/110 or if patient becomes symptomatic.           Transferred to medicine telemetry floor.  Discussed with family friend who thinks patient and her  likely need to live in nursing home.  Patient does not think she is ready to go to nursing home.  Patient will likely require skilled nursing facility placement.  Continue PT/OT.  Discussed with  and . Time spent in care of the patient, counseling and coordination of care (Greater than 50% spent in direct face to face contact): 35 min.    VTE Risk Mitigation (From admission, onward)         Ordered     heparin (porcine) injection 5,000 Units  Every 8 hours      01/27/20 0212     IP VTE HIGH RISK PATIENT  Once      01/27/20 0212     Place ESTUARDO hose  Until discontinued      01/27/20 0212     Place sequential compression device  Until discontinued      01/27/20 0212                Rachel Chavis MD  Department of Hospital Medicine   [facility]

## 2020-01-28 NOTE — CONSULTS
HISTORY OF PRESENT ILLNESS:  This 86-year-old female was admitted from the   Emergency Room with symptoms of increasing shortness of breath for the past few   days.  The patient was apparently admitted to another hospital with similar   complaints about a week ago and was released.  She denies any fever or chills.    No symptoms of chest pain were reported.  There is no known history of coronary   artery disease.  The patient has hypertension.  She has not seen a cardiologist   in the past.    PAST MEDICAL HISTORY:  History of TIA, ulcerative colitis, and colon surgery.    FAMILY HISTORY:  Positive for heart disease.    SOCIAL HISTORY:  The patient does not smoke or drink.    ALLERGIES:  Not known.    HOME MEDICATIONS:  Include aspirin, calcium supplement, carvedilol 12.5 mg   b.i.d., hydralazine 100 mg every 8 hours, levothyroxine, pravastatin, and   torsemide 10 mg a day.    PHYSICAL EXAMINATION:  GENERAL:  Shows average built female patient.  She is in no acute distress.  VITAL SIGNS:  Temperature 98, pulse 80 per minute and regular, blood pressure   130/80, BP currently is 140/80.  In the Emergency Room upon arrival, it was   recorded as 237/107.    LABORATORY DATA:  WBC is 6.1, hemoglobin 9.6, hematocrit 30.4, and platelet   count is 186.  PT is 11.  Sodium 140, potassium 5.2, chloride 110, CO2 19, BUN   84, creatinine 2.7 and BNP is 1.52.  Troponin is 0.037 and 0.034.  Liver enzymes   are slightly elevated.    Chest x-ray shows cardiomegaly, bilateral basilar atelectasis, findings   compatible with CHF.    IMPRESSION:  1.  Exacerbation of congestive heart failure with underlying hypertension,   hypertensive heart disease and probably diastolic dysfunction.  2.  Hypertension.  3.  Kidney disease.    The patient has normal LVEF on echocardiogram done recently.  She does not have   any other significant abnormality on the echocardiogram and to explain her   symptoms except for diastolic dysfunction.  Continue  with current management.    We will follow with you.      /RANDOLPH  dd: 01/27/2020 14:22:29 (CST)  td: 01/27/2020 22:21:41 (CST)  Doc ID   #[id number]  Job ID #[id number]    CC: Rachel Garcia M.D.

## 2020-01-28 NOTE — PLAN OF CARE
Nitroglycerin still infusing at 10 mcg/min. Very poor appetite. Patient and spouse have no family, but do have friends from [employer] who help them with transportation and other needs when possible. Cardiology and Nephrology consulted today and both have rounded. Dr. SEVILLA aware of low urine output as discussed at bedside. Patient unable to tolerate TEDs/MD Maria Luz aware. US of RLE done at bedside. Wound care to RLE done. Safety maintained.

## 2020-01-28 NOTE — PLAN OF CARE
"Cm completed the assessment with pt and spouse at bedside.  Pt is a readmit form 1/15/20.  Pt lives with spouse and is independent in care.  PCP is Corinne Fletcher NP.  Insurance verified as PHN.   Pt denies diabetes, dialysis and coumadin.  Disposition:  Pt can benefit with resuming hh with [facility].  Pt signed the pt's choice disclosure form.  Spouse at bedside, pt verbalized her spouse needs care too.Cm asked if they have family, pt said, "no."  Cm recommended nursing home placement for her and her spouse and pt said,  "Oh no, we are not going in the nursing home!"  Cm will continue to follow-up.       01/27/20 1530   Discharge Assessment   Assessment Type Discharge Planning Assessment   Confirmed/corrected address and phone number on facesheet? Yes   Assessment information obtained from? Patient   Communicated expected length of stay with patient/caregiver no   Prior to hospitilization cognitive status: Alert/Oriented   Prior to hospitalization functional status: Assistive Equipment   Current cognitive status: Alert/Oriented   Current Functional Status: Assistive Equipment   Facility Arrived From: home   Lives With spouse   Able to Return to Prior Arrangements yes   Is patient able to care for self after discharge? Unable to determine at this time (comments)   Who are your caregiver(s) and their phone number(s)? spouse- Don Donaldo - [phone]   Patient's perception of discharge disposition home or selfcare;home health   Readmission Within the Last 30 Days current reason for admission unrelated to previous admission   If yes, most recent facility name: [facility]   Patient currently being followed by outpatient case management? Yes   If yes, name of outpatient case management following: insurance company assigned oupatient case management   Patient currently receives any other outside agency services? Yes   Name and contact number of agency or person providing outside services [facility]   Is it the patient/care " giver preference to resume care with the current outside agency? Yes   Equipment Currently Used at Home rollator   Do you have any problems affording any of your prescribed medications? No   Is the patient taking medications as prescribed? yes   Does the patient have transportation home? Yes   Transportation Anticipated family or friend will provide   Does the patient receive services at the [facility]? No   Discharge Plan A Home Health;Home with family   Discharge Plan B New Nursing Home placement - detention care facility   Patient/Family in Agreement with Plan yes   Readmission Questionnaire   At the time of your discharge, did someone talk to you about what your health problems were? Yes   At the time of discharge, did someone talk to you about what to watch out for regarding worsening of your health problem? Yes   At the time of discharge, did someone talk to you about what to do if you experienced worsening of your health problem? Yes   At the time of discharge, did someone talk to you about which medication to take when you left the hospital and which ones to stop taking? Yes   At the time of discharge, did someone talk to you about when and where to follow up with a doctor after you left the hospital? Yes   What do you believe caused you to be sick enough to be re-admitted? SOB   How often do you need to have someone help you when you read instructions, pamphlets, or other written material from your doctor or pharmacy? Rarely   Do you have problems taking your medications as prescribed? No   Do you have any problems affording any of  your prescribed medications? No   Do you have problems obtaining/receiving your medications? No   Does the patient have transportation to healthcare appointments? Yes   Living Arrangements house   Does the patient have family/friends to help with healtcare needs after discharge? yes  (sometimes)   Does your caregiver provide all the help you need? No  (some, per pt he needs  help himself)   Are you currently feeling confused? No   Are you currently having problems thinking? No   Are you currently having memory problems? No   Have you felt down, depressed, or hopeless? 0   Have you felt little interest or pleasure in doing things? 0   In the last 7 days, my sleep quality was: fair

## 2020-01-28 NOTE — CARE UPDATE
01/28/20 0720   Patient Assessment/Suction   Level of Consciousness (AVPU) alert   Respiratory Effort Unlabored   Expansion/Accessory Muscles/Retractions expansion symmetric;no retractions   All Lung Fields Breath Sounds Anterior:;Lateral:;diminished   Rhythm/Pattern, Respiratory unlabored   PRE-TX-O2   O2 Device (Oxygen Therapy) nasal cannula   $ Is the patient on Low Flow Oxygen? Yes   Flow (L/min) 2   SpO2 96 %   Pulse Oximetry Type Continuous   Pulse 61   Resp 18   Wound Care   $ Wound Care Tech Time 15 min   Area of Concern Bridge of nose   Skin Color/Characteristics without discoloration   Skin Temperature warm   Preset CPAP/BiPAP Settings   Mode Of Delivery Standby

## 2020-01-28 NOTE — PLAN OF CARE
Problem: Occupational Therapy Goal  Goal: Occupational Therapy Goal  Description  Goals to be met by: 2/14/20     Patient will increase functional independence with ADLs by performing:    LE Dressing with Modified [place].  Grooming while standing at sink with Modified [place].  Toileting from toilet with Modified [place] for hygiene and clothing management.   Toilet transfer to toilet with Modified [place] with AD as needed.     Outcome: Ongoing, Progressing  OT POC initiated and established in collaboration with patient.

## 2020-01-28 NOTE — PLAN OF CARE
Pt remained free of falls or injuries this shift.  Pt given full bath and tolerated well.  Nitro off at around midnight with little issue, coreg held for low BP in the 30s around 2100.  Pt AAOx3 and wore BiPAP throughout the night.  UO remains low, Nephrology aware.  Will continue to monitor closely.

## 2020-01-28 NOTE — PT/OT/SLP EVAL
Occupational Therapy   Evaluation    Name: Izabella Fulton  MRN: [id number]  Admitting Diagnosis:  Acute diastolic congestive heart failure      Recommendations:     Discharge Recommendations: home, home health OT  Discharge Equipment Recommendations:  none, other (see comments)(pt reports that she owns but does not use a bedside commode and a shower chair; both are in the garage)  Barriers to discharge:  Decreased caregiver support, Other (Comment)(patient is primary caregiver for her spouse as he has cancer; he is able to do most self care without assistance but patient never leaves him alone because she is afraid he will fall)    Assessment:     Izabella Fulton is a 86 y.o. female with a medical diagnosis of Acute diastolic congestive heart failure.  She presents with overall performing sit<>stand with Min (A) and R side stepping with handheld assistance with Min (A); patient performing grooming with bed in chair position with Set-up and LB dressing with Mod (A). Performance deficits affecting function: weakness, impaired endurance, impaired self care skills, impaired functional mobilty, gait instability, impaired balance, decreased upper extremity function, decreased lower extremity function, decreased safety awareness, edema, impaired cardiopulmonary response to activity. Patient is highly motivated as she cares for her spouse and was independent and driving prior to admit; however, patient does report she has been using a rollator since her discharge last week.     Rehab Prognosis: Good; patient would benefit from acute skilled OT services to address these deficits and reach maximum level of function.       Plan:     Patient to be seen 3 x/week to address the above listed problems via self-care/home management, therapeutic activities, therapeutic exercises  · Plan of Care Expires: 02/14/20  · Plan of Care Reviewed with: patient    Subjective     Chief Complaint: Patient complains of mild SOB and otherwise, no  complaints  Patient/Family Comments/goals: Patient would like to be able to get home to her spouse as soon as she can because she does not want him to be alone.     Occupational Profile:  Living Environment: Patient lives in single story home with spouse for whom she is primary caregiver as he has cancer and does need 24/7 Supervision for safety; per OTR inquiry, patient reports that she and her  do not have any family nearby  Previous level of function: Independent to Mod (I) as patient reports that since her discharge from the hospital last week she has been using a rollator until she felt she had re-gained all of her strength  Roles and Routines: Primary caregiver for spouse; drives   Equipment Used at Home:  none, other (see comments)(although patient does report that since discharging from hospital last week she has been using a rollator)  Assistance upon Discharge: None; pt's spouse is unable to provide physical assistance for spouse because of his own health and per patient report, she and her  do not have any family     Pain/Comfort:  · Pain Rating 1: 0/10    Patients cultural, spiritual, Judaism conflicts given the current situation:      Objective:     Communicated with: NurseZara prior to session.  Patient found HOB elevated with bed alarm, oxygen, peripheral IV, telemetry, harris catheter upon OT entry to room.    General Precautions: Standard, fall, respiratory, anti-coagulation medicine   Orthopedic Precautions:N/A   Braces: N/A     Occupational Performance:    Bed Mobility:    · Patient completed Supine to Sit with stand by assistance  · Patient completed Sit to Supine with stand by assistance    Functional Mobility/Transfers:  · Patient completed Sit <> Stand Transfer with contact guard assistance and minimum assistance  with  hand-held assist   · Functional Mobility: Patient performing R side stepping x 4-5 with Min (A)/CGA; no LOB; however, patient with noted increase in  shortness of breath upon completion     Activities of Daily Living:  · Grooming: stand by assistance with bed in chair position  · Lower Body Dressing: moderate assistance EOB  · Toileting: patient has harris catheter at this time      Cognitive/Visual Perceptual:  Cognitive/Psychosocial Skills:     -       Oriented to: Person, Place and Situation   -       Follows Commands/attention:Follows multistep  commands  -       Communication: clear/fluent  -       Safety awareness/insight to disability: intact     Physical Exam:  Balance:    -       Static/dynamic sit balance is NORMAL; static standing balance is FAIR to FAIR+  Postural examination/scapula alignment:    -       Rounded shoulders  Skin integrity: Visible skin intact  Edema:  B UE/LE with LE > UE  Dominant hand:    -       Right  Upper Extremity Strength:    -       Right Upper Extremity: WFL  -       Left Upper Extremity: WFL  Fine Motor Coordination:    -       Intact  Gross motor coordination:   WFL    AMPAC 6 Click ADL:  AMPAC Total Score: 15    Treatment & Education:  - OTR providing education/instruction regarding OT role/POC, safety awareness and fall prevention, use of call button, calling for all OOB assistance, energy conservation techniques - pt will require reinforcement of all education/instruction provided  Education:    Patient left HOB elevated with all lines intact, call button in reach and nurse notified    GOALS:   Multidisciplinary Problems     Occupational Therapy Goals        Problem: Occupational Therapy Goal    Goal Priority Disciplines Outcome Interventions   Occupational Therapy Goal     OT, PT/OT Ongoing, Progressing    Description:  Goals to be met by: 2/14/20     Patient will increase functional independence with ADLs by performing:    LE Dressing with Modified [place].  Grooming while standing at sink with Modified [place].  Toileting from toilet with Modified [place] for hygiene and clothing management.   Toilet  transfer to toilet with Modified [place] with AD as needed.                      History:     Past Medical History:   Diagnosis Date    Anticoagulant long-term use     baby aspirin    Closed fracture of multiple ribs of left side with routine healing 1/16/2020    Hypertension     Thyroid disease     TIA (transient ischemic attack)     Ulcerative colitis     Wears hearing aid        Past Surgical History:   Procedure Laterality Date    COLON SURGERY  05/17/12    exp lap, perfered colon    EYE SURGERY      left eye cataract       Time Tracking:     OT Date of Treatment: 01/28/20  OT Start Time: 1440  OT Stop Time: 1458  OT Total Time (min): 18 min    Billable Minutes:Evaluation 18    ELÍAS Anderson, LOTR  1/28/2020

## 2020-01-28 NOTE — PROGRESS NOTES
Progress Note  Cardiology    Admit Date: 1/26/2020   LOS: 2 days     Follow-up For: cardiology    Scheduled Meds:   amLODIPine  5 mg Oral Daily    aspirin  81 mg Oral Daily    calcitRIOL  0.25 mcg Oral Twice Weekly    calcium-vitamin D3  1 tablet Oral BID    carvedilol  12.5 mg Oral BID    docusate sodium  100 mg Oral BID    folic acid  1 mg Oral Daily    heparin (porcine)  5,000 Units Subcutaneous Q8H    hydrALAZINE  100 mg Oral Q8H    levothyroxine  100 mcg Oral Before breakfast    omega 3-dha-epa-fish oil  1 capsule Oral Daily    pantoprazole  40 mg Oral Daily    pravastatin  20 mg Oral Daily     Continuous Infusions:   nitroGLYCERIN Stopped (01/28/20 0030)     PRN Meds:calcium gluconate IVPB, calcium gluconate IVPB, calcium gluconate IVPB, magnesium sulfate IVPB, magnesium sulfate IVPB, ondansetron, polyethylene glycol, potassium chloride in water **AND** potassium chloride in water **AND** potassium chloride in water, sodium chloride 0.9%, sodium phosphate IVPB, sodium phosphate IVPB, sodium phosphate IVPB    Review of patient's allergies indicates:  No Known Allergies    SUBJECTIVE:     Interval History: Patient has no complaint of  CP/SOB. Pt much better,Up in chair...        OBJECTIVE:     Vital Signs (Most Recent)  Temp: 98.6 °F (37 °C) (01/28/20 1222)  Pulse: 60 (01/28/20 1215)  Resp: (!) 31 (01/28/20 1215)  BP: (!) 174/77 (01/28/20 1200)  SpO2: (!) 94 % (01/28/20 1215)    Vital Signs Range (Last 24H):  Temp:  [97.5 °F (36.4 °C)-98.6 °F (37 °C)]   Pulse:  [43-66]   Resp:  [11-32]   BP: ()/()   SpO2:  [93 %-99 %]       Physical Exam:  Neck: no carotid bruit and no JVD  Lungs: diminished breath sounds bibasilar  Heart: regular rate and rhythm    Recent Results (from the past 24 hour(s))   Troponin-I    Collection Time: 01/27/20  2:06 PM   Result Value Ref Range    Troponin I 0.033 (H) 0.000 - 0.026 ng/mL   Troponin-I    Collection Time: 01/27/20  8:00 PM   Result Value Ref Range     Troponin I 0.026 0.000 - 0.026 ng/mL   Comprehensive metabolic panel    Collection Time: 01/28/20  3:15 AM   Result Value Ref Range    Sodium 139 136 - 145 mmol/L    Potassium 5.1 3.5 - 5.1 mmol/L    Chloride 109 95 - 110 mmol/L    CO2 20 (L) 23 - 29 mmol/L    Glucose 79 70 - 110 mg/dL    BUN, Bld 88 (H) 8 - 23 mg/dL    Creatinine 3.0 (H) 0.5 - 1.4 mg/dL    Calcium 9.5 8.7 - 10.5 mg/dL    Total Protein 5.8 (L) 6.0 - 8.4 g/dL    Albumin 2.6 (L) 3.5 - 5.2 g/dL    Total Bilirubin 0.3 0.1 - 1.0 mg/dL    Alkaline Phosphatase 77 55 - 135 U/L    AST 44 (H) 10 - 40 U/L    ALT 61 (H) 10 - 44 U/L    Anion Gap 10 8 - 16 mmol/L    eGFR if African American 16 (A) >60 mL/min/1.73 m^2    eGFR if non African American 14 (A) >60 mL/min/1.73 m^2   Phosphorus    Collection Time: 01/28/20  3:15 AM   Result Value Ref Range    Phosphorus 6.3 (H) 2.7 - 4.5 mg/dL   Magnesium    Collection Time: 01/28/20  3:15 AM   Result Value Ref Range    Magnesium 2.4 1.6 - 2.6 mg/dL   CBC auto differential    Collection Time: 01/28/20  3:15 AM   Result Value Ref Range    WBC 4.80 3.90 - 12.70 K/uL    RBC 3.18 (L) 4.00 - 5.40 M/uL    Hemoglobin 9.9 (L) 12.0 - 16.0 g/dL    Hematocrit 31.4 (L) 37.0 - 48.5 %    Mean Corpuscular Volume 99 (H) 82 - 98 fL    Mean Corpuscular Hemoglobin 31.1 (H) 27.0 - 31.0 pg    Mean Corpuscular Hemoglobin Conc 31.5 (L) 32.0 - 36.0 g/dL    RDW 15.5 (H) 11.5 - 14.5 %    Platelets 193 150 - 350 K/uL    MPV 10.5 9.2 - 12.9 fL    Gran # (ANC) 3.5 1.8 - 7.7 K/uL    Immature Grans (Abs) 0.02 0.00 - 0.04 K/uL    Lymph # 0.5 (L) 1.0 - 4.8 K/uL    Mono # 0.7 0.3 - 1.0 K/uL    Eos # 0.1 0.0 - 0.5 K/uL    Baso # 0.02 0.00 - 0.20 K/uL    nRBC 0 0 /100 WBC    Gran% 72.7 38.0 - 73.0 %    Lymph% 9.6 (L) 18.0 - 48.0 %    Mono% 14.2 4.0 - 15.0 %    Eosinophil% 2.7 0.0 - 8.0 %    Basophil% 0.4 0.0 - 1.9 %    Differential Method Automated        Diagnostic Results:  Labs: Reviewed    ASSESSMENT/PLAN:     Improved. High BP  Plan: Continue  Current.Increase Amplodipine

## 2020-01-28 NOTE — PLAN OF CARE
Problem: Physical Therapy Goal  Goal: Physical Therapy Goal  Description  Goals to be met by: 2020     Patient will increase functional independence with mobility by performin. Supine to sit with MInimal Assistance  2. Sit to stand transfer with Minimal Assistance  3. Bed to chair transfer with Minimal Assistance using Rolling Walker  4. Gait  x 250 feet with Minimal Assistance using Rolling Walker.   5. Lower extremity exercise program x20 reps    Outcome: Ongoing, Progressing   PT eval and treat at ICU. OOB to chair min assist taking few steps with RW.  HHPT

## 2020-01-28 NOTE — CONSULTS
Nephrology Consult Progress Note        Patient Name: Izabella Fulton  MRN: [id number]    Patient Class: IP- Inpatient   Admission Date: 1/26/2020  Length of Stay: 2 days  Date of Service: 1/28/2020    Attending Physician: Rachel Chavis MD  Primary Care Provider: Corinne Fletcher NP    Reason for Consult: kaitlynn/ckd3/anemia/htn/chf    SUBJECTIVE:     HPI: 86F with hypertension, hypothyroidism, congestive heart failure  presents with increasing shortness of breath. She was recently discharged from the hospital approximately 1 week ago after admission for CHF exacerbation. Patient's  states that patient's breathing never did improve since discharge home and she has since persisted with a nonproductive cough. Patient was discharged in told to set up a cardiology and pulmonology appointment, have not been to either appointments yet. She was not eating, daily p.o. intake consist of a boost twice a day.    Upon arrival to the emergency room patient noted to be in acute respiratory distress requiring BiPAP therapy in association with a CHF exacerbation with an elevated BNP. Patient also noted to have hypertensive emergency and was treated with IV Lasix and IV nitroglycerin drip.    1/28 VSS, no new complains.    Past Medical History:   Diagnosis Date    Anticoagulant long-term use     baby aspirin    Closed fracture of multiple ribs of left side with routine healing 1/16/2020    Hypertension     Thyroid disease     TIA (transient ischemic attack)     Ulcerative colitis     Wears hearing aid      Past Surgical History:   Procedure Laterality Date    COLON SURGERY  05/17/12    exp lap, perfered colon    EYE SURGERY      left eye cataract     Family History   Problem Relation Age of Onset    Alzheimer's disease Mother     Heart disease Father      Social History     Tobacco Use    Smoking status: Never Smoker    Smokeless tobacco: Never Used   Substance Use Topics    Alcohol use: No    Drug use: No       Review  of patient's allergies indicates:  No Known Allergies    Outpatient meds:  No current facility-administered medications on file prior to encounter.      Current Outpatient Medications on File Prior to Encounter   Medication Sig Dispense Refill    aspirin (ECOTRIN) 81 MG EC tablet Take 81 mg by mouth once daily.        calcitRIOL (ROCALTROL) 0.25 MCG Cap Take 0.25 mcg by mouth twice a week. Sunday and Thursday      calcium-vitamin D 500-125 mg-unit tablet Take 1 tablet by mouth 2 (two) times daily.        carvedilol (COREG) 12.5 MG tablet Take 1 tablet (12.5 mg total) by mouth 2 (two) times daily. 60 tablet 2    denosumab (PROLIA) 60 mg/mL Syrg Inject 60 mg into the skin every 6 (six) months.       DOCOSAHEXANOIC ACID/VIT C/LUT (EYE HEALTH ORAL) Take 1 tablet by mouth 2 (two) times daily.        fish oil-omega-3 fatty acids 300-1,000 mg capsule Take 1 g by mouth once daily.        folic acid (FOLVITE) 800 MCG tablet Take 800 mcg by mouth once daily.        hydrALAZINE (APRESOLINE) 100 MG tablet Take 1 tablet (100 mg total) by mouth every 8 (eight) hours. 90 tablet 2    levothyroxine (SYNTHROID) 100 MCG tablet Take 1 tablet (100 mcg total) by mouth once daily. 90 tablet 1    pravastatin (PRAVACHOL) 20 MG tablet Take 1 tablet (20 mg total) by mouth once daily. 90 tablet 1    torsemide (DEMADEX) 10 MG Tab Take 1 tablet (10 mg total) by mouth every Tues, Fri. Take extra dose on Thursday and Saturday this week then resume Tuesday and Friday dosing 8 tablet 2       Scheduled meds:   amLODIPine  5 mg Oral Daily    aspirin  81 mg Oral Daily    calcitRIOL  0.25 mcg Oral Twice Weekly    calcium-vitamin D3  1 tablet Oral BID    carvedilol  12.5 mg Oral BID    docusate sodium  100 mg Oral BID    folic acid  1 mg Oral Daily    heparin (porcine)  5,000 Units Subcutaneous Q8H    hydrALAZINE  100 mg Oral Q8H    levothyroxine  100 mcg Oral Before breakfast    omega 3-dha-epa-fish oil  1 capsule Oral Daily     pantoprazole  40 mg Oral Daily    pravastatin  20 mg Oral Daily       Infusions:   nitroGLYCERIN Stopped (01/28/20 0030)       PRN meds:  calcium gluconate IVPB, calcium gluconate IVPB, calcium gluconate IVPB, magnesium sulfate IVPB, magnesium sulfate IVPB, ondansetron, polyethylene glycol, potassium chloride in water **AND** potassium chloride in water **AND** potassium chloride in water, sodium chloride 0.9%, sodium phosphate IVPB, sodium phosphate IVPB, sodium phosphate IVPB    Review of Systems:  ROS    OBJECTIVE:     Vital Signs and IO (Last 24H):  Temp:  [97.5 °F (36.4 °C)-98.5 °F (36.9 °C)]   Pulse:  [43-66]   Resp:  [11-32]   BP: ()/()   SpO2:  [94 %-99 %]   I/O last 3 completed shifts:  In: 792.7 [P.O.:680; I.V.:112.7]  Out: 940 [Urine:940]    Wt Readings from Last 5 Encounters:   01/28/20 58.7 kg (129 lb 6.6 oz)   01/22/20 53.5 kg (118 lb)   01/18/20 54.5 kg (120 lb 3.2 oz)   01/15/20 56.4 kg (124 lb 6.4 oz)   01/03/20 50.8 kg (112 lb)         Physical Exam:  Physical Exam   Constitutional: She is oriented to person, place, and time. She appears well-developed and well-nourished.   HENT:   Head: Normocephalic and atraumatic.   Mouth/Throat: Oropharynx is clear and moist.   Eyes: Pupils are equal, round, and reactive to light. EOM are normal. No scleral icterus.   Neck: Neck supple.   Cardiovascular: Normal rate and regular rhythm.   Pulmonary/Chest: Effort normal. No stridor. No respiratory distress.   Abdominal: Soft. She exhibits no distension.   Musculoskeletal: Normal range of motion. She exhibits edema. She exhibits no deformity.   Neurological: She is alert and oriented to person, place, and time. No cranial nerve deficit.   Skin: Skin is warm and dry. No rash noted. She is not diaphoretic. No erythema.   Psychiatric: She has a normal mood and affect. Her behavior is normal.       Body mass index is 28 kg/m².    Laboratory:  Recent Labs   Lab 01/26/20 2120 01/27/20  0351 01/28/20  0310     140 139   K 5.2* 5.2* 5.1    110 109   CO2 17* 19* 20*   BUN 80* 84* 88*   CREATININE 2.7* 2.7* 3.0*   ESTGFRAFRICA 18* 18* 16*   EGFRNONAA 15* 15* 14*   * 103 79       Recent Labs   Lab 01/26/20 2120 01/27/20  0351 01/28/20  0315   CALCIUM 9.9 9.2 9.5   ALBUMIN 3.2* 2.6* 2.6*   PHOS  --  5.8* 6.3*   MG  --  2.3 2.4             No results for input(s): POCTGLUCOSE in the last 168 hours.    Recent Labs   Lab 08/08/18  2330 01/15/20  1329   Hemoglobin A1C 5.5 5.6       Recent Labs   Lab 01/26/20 2120 01/27/20 0351 01/28/20  0315   WBC 7.12 6.19 4.80   HGB 12.1 9.6* 9.9*   HCT 37.7 30.4* 31.4*    186 193   MCV 97 98 99*   MCHC 32.1 31.6* 31.5*   MONO 8.0  0.6 7.1  0.4 14.2  0.7       Recent Labs   Lab 01/26/20 2120 01/27/20 0351 01/28/20  0315   BILITOT 0.5 0.4 0.3   PROT 7.2 5.6* 5.8*   ALBUMIN 3.2* 2.6* 2.6*   ALKPHOS 93 79 77   ALT 77* 58* 61*   AST 71* 48* 44*       Recent Labs   Lab 08/08/18  1650 05/14/19  2212 09/19/19  0842   Color, UA Yellow Yellow YELLOW   Appearance, UA Clear Clear CLEAR   pH, UA 6.0 7.0 6.5   Specific Gravity, UA <=1.005 A 1.010 1.016   Protein, UA Negative 2+ A 1+ A   Glucose, UA Negative Negative  --    Ketones, UA Negative Negative NEGATIVE   Urobilinogen, UA Negative Negative  --    Bilirubin (UA) Negative Negative  --    Occult Blood UA Negative Negative NEGATIVE   Nitrite, UA Negative Negative NEGATIVE   RBC, UA 1 2  --    WBC, UA 2 1  --    Bacteria, UA  --   --  MODERATE A   Bacteria Few A Rare  --    Hyaline Casts, UA 1 0 NONE SEEN             Microbiology Results (last 7 days)     ** No results found for the last 168 hours. **          ASSESSMENT/PLAN:     Active Hospital Problems    Diagnosis  POA    *Acute diastolic congestive heart failure [I50.31]  Yes    Hyperkalemia [E87.5]  Yes    Hypoalbuminemia due to protein-calorie malnutrition [E46]  Yes    Metabolic acidosis [E87.2]  Yes    Elevated LFTs [R94.5]  Yes    Elevated troponin  [R79.89]  Yes    Symptomatic anemia [D64.9]  Yes    Acute on chronic respiratory failure [J96.20]  Yes    Hypertensive emergency [I16.1]  Yes    CKD (chronic kidney disease) stage 4, GFR 15-29 ml/min [N18.4]  Yes    Essential hypertension [I10]  Yes    Hypothyroid [E03.9]  Yes    Hyperlipidemia [E78.5]  Yes      Resolved Hospital Problems   No resolved problems to display.     FERCOH, not better  CKD stage 3-4  CHF exacerbation  No NSAIDs, ACEI/ARB, IV contrast or other nephrotoxins.  Keep MAP > 60, SBP > 100.  Dose meds for GFR < 30 ml/min.  Renal diet - low K, low phos.  Agree with BP control and diuretics.  Cards consult apprecaited.    Anemia of CKD  Hgb and HCT are acceptable. Monitor.  Will provide JONNY and/or IV iron PRN.    HTN  BP seem controlled.   Tolerate asymptomatic HTN up to -160.  Continue home meds.  Low sodium diet.    Thank you for allowing us to participate in the care of your patient!   We will follow the patient and provide recommendations as needed.    Alexei Araiza MD    [facility]  [address] [place] [address]  [place] [id number]    [phone] - tel  [phone] - fax    1/28/2020 1:58 PM

## 2020-01-28 NOTE — CARE UPDATE
01/27/20 1916   Patient Assessment/Suction   Level of Consciousness (AVPU) alert   Respiratory Effort Unlabored   Expansion/Accessory Muscles/Retractions no use of accessory muscles   All Lung Fields Breath Sounds clear;diminished   PRE-TX-O2   O2 Device (Oxygen Therapy) BiPAP   Oxygen Concentration (%) 35   SpO2 98 %   Pulse Oximetry Type Continuous   Pulse (!) 55   Resp 13   /66   Ready to Wean/Extubation Screen   FIO2<=50 (chart decimal) 0.35   Preset CPAP/BiPAP Settings   Mode Of Delivery BiPAP   $ CPAP/BiPAP Daily Charge BiPAP/CPAP Daily   $ Initial CPAP/BiPAP Setup? No   $ Is patient using? Yes   Size of Mask Medium/Large   Sized Appropriately? Yes   Equipment Type V60   Airway Device Type medium full face mask   Ipap 10   EPAP (cm H2O) 5   Pressure Support (cm H2O) 5   Set Rate (Breaths/Min) 14   ITime (sec) 1   Rise Time (sec) 0.2   Patient CPAP/BiPAP Settings   RR Total (Breaths/Min) 15   Tidal Volume (mL) 369   VE Minute Ventilation (L/min) 6.4 L/min   Peak Inspiratory Pressure (cm H2O) 10   TiTOT (%) 26   Total Leak (L/Min) 0   Patient Trigger - ST Mode Only (%) 44   CPAP/BiPAP Alarms   High Pressure (cm H2O) 20   Low Pressure (cm H2O) 10   Low Pressure Delay (Sec) 20   Minute Ventilation (L/Min) 2   High RR (breaths/min) 40   Low RR (breaths/min) 10

## 2020-01-28 NOTE — SUBJECTIVE & OBJECTIVE
Interval History:  In intensive care unit, feeling better.  Patient is off intravenous Tridil infusion.   at bedside and a family friend who is a registered nurse is also present at bedside.  Patient is breathing better.  Patient denies any chest pain, palpitations or any abdominal complaints.    Review of Systems   Unable to perform ROS: Acuity of condition     Objective:     Vital Signs (Most Recent):  Temp: 97.7 °F (36.5 °C) (01/28/20 0415)  Pulse: 61 (01/28/20 0720)  Resp: 18 (01/28/20 0720)  BP: (!) 196/84 (01/28/20 0715)  SpO2: 96 % (01/28/20 0720) Vital Signs (24h Range):  Temp:  [97.5 °F (36.4 °C)-98.5 °F (36.9 °C)] 97.7 °F (36.5 °C)  Pulse:  [43-61] 61  Resp:  [11-32] 18  SpO2:  [94 %-99 %] 96 %  BP: ()/() 196/84     Weight: 58.7 kg (129 lb 6.6 oz)  Body mass index is 28 kg/m².    Intake/Output Summary (Last 24 hours) at 1/28/2020 0803  Last data filed at 1/28/2020 0500  Gross per 24 hour   Intake 733.55 ml   Output 430 ml   Net 303.55 ml      Physical Exam   Constitutional: She is oriented to person, place, and time. She appears well-developed and well-nourished. No distress.   HENT:   Head: Normocephalic and atraumatic.   Eyes: Pupils are equal, round, and reactive to light. EOM are normal. Right eye exhibits no discharge. Left eye exhibits no discharge.   Neck: Normal range of motion. JVD present.   Cardiovascular: Regular rhythm, normal heart sounds and intact distal pulses.   No murmur heard.  Bradycardia with a HR of 45 bpm noted on bedside continuous cardiac monitoring.     Pulmonary/Chest: Effort normal and breath sounds normal. No respiratory distress. She has no wheezes. She has no rales. She exhibits no tenderness.   Bipap therapy. Course breath sounds noted throughout all lung fields upon auscultation, no wheezing noted. Patient with no labored respirations upon my initial exam.     Abdominal: Soft. Bowel sounds are normal. She exhibits no distension. There is no tenderness.  There is no rebound and no guarding.   Genitourinary:   Genitourinary Comments: Exam Deferred   [place] in place     Musculoskeletal: Normal range of motion. She exhibits edema. She exhibits no tenderness or deformity.   1+ bilateral lower extremity pitting edema.   Neurological: She is alert and oriented to person, place, and time. No cranial nerve deficit or sensory deficit.   Skin: Skin is warm and dry. Capillary refill takes 2 to 3 seconds. No rash noted. She is not diaphoretic. No erythema.   Generalized bruising noted throughout bilateral upper extremities   Psychiatric: She has a normal mood and affect. Her behavior is normal. Judgment and thought content normal.   Nursing note and vitals reviewed.      Significant Labs:   CBC:   Recent Labs   Lab 01/26/20 2120 01/27/20 0351 01/28/20 0315   WBC 7.12 6.19 4.80   HGB 12.1 9.6* 9.9*   HCT 37.7 30.4* 31.4*    186 193     CMP:   Recent Labs   Lab 01/26/20 2120 01/27/20 0351 01/28/20 0315    140 139   K 5.2* 5.2* 5.1    110 109   CO2 17* 19* 20*   * 103 79   BUN 80* 84* 88*   CREATININE 2.7* 2.7* 3.0*   CALCIUM 9.9 9.2 9.5   PROT 7.2 5.6* 5.8*   ALBUMIN 3.2* 2.6* 2.6*   BILITOT 0.5 0.4 0.3   ALKPHOS 93 79 77   AST 71* 48* 44*   ALT 77* 58* 61*   ANIONGAP 13 11 10   EGFRNONAA 15* 15* 14*     Microbiology Results (last 7 days)     ** No results found for the last 168 hours. **          Significant Imaging:   Echocardiogram (January 16, 2020):  · Concentric left ventricular hypertrophy.  · The mean diastolic gradient across the mitral valve is 2 mmHg at a heart rate of bpm.  · Normal left ventricular systolic function. The estimated ejection fraction is 65%.  · Grade I (mild) left ventricular diastolic dysfunction consistent with impaired relaxation.  · No wall motion abnormalities.  · Mild left atrial enlargement.  · Mild mitral regurgitation.  · Mild tricuspid regurgitation.  · Trivial pericardial effusion.  · No intracardiac  thrombi/vegetations.    Right lower extremity venous Doppler:  No evidence of deep venous thrombosis in the right lower extremity.    Chest x-ray:  Cardiomegaly with worsening fluid overload state, most consistent with decompensated CHF.

## 2020-01-28 NOTE — RESPIRATORY THERAPY
01/28/20 0703   PRE-TX-O2   O2 Device (Oxygen Therapy) nasal cannula   $ Is the patient on Low Flow Oxygen? Yes   Flow (L/min) 2   SpO2 97 %   Pulse Oximetry Type Continuous   $ Pulse Oximetry - Multiple Charge Pulse Oximetry - Multiple

## 2020-01-28 NOTE — NURSING
"Patient states "when I left the hospital they're the ones who changed my medications". Patient voices concern for amlodipine that was stopped by hospital doctor on last admission. Will address with MD this am.  "

## 2020-01-29 NOTE — CARE UPDATE
01/29/20 0829   Patient Assessment/Suction   Level of Consciousness (AVPU) alert   Respiratory Effort Unlabored;Normal   PRE-TX-O2   O2 Device (Oxygen Therapy) nasal cannula   $ Is the patient on Low Flow Oxygen? Yes   Flow (L/min) 2   SpO2 (!) 94 %   Pulse Oximetry Type Intermittent   $ Pulse Oximetry - Multiple Charge Pulse Oximetry - Multiple   Pulse 67   Resp 18   Wound Care   $ Wound Care Tech Time 15 min   Area of Concern Bridge of nose   Skin Color/Characteristics without discoloration   Preset CPAP/BiPAP Settings   Mode Of Delivery Standby

## 2020-01-29 NOTE — PROGRESS NOTES
Progress Note  Cardiology    Admit Date: 1/26/2020   LOS: 3 days     Follow-up For:  cardiology    Scheduled Meds:   amLODIPine  10 mg Oral Daily    aspirin  81 mg Oral Daily    calcitRIOL  0.25 mcg Oral Twice Weekly    calcium-vitamin D3  1 tablet Oral BID    carvedilol  12.5 mg Oral BID    docusate sodium  100 mg Oral BID    folic acid  1 mg Oral Daily    heparin (porcine)  5,000 Units Subcutaneous Q8H    hydrALAZINE  100 mg Oral Q8H    levothyroxine  100 mcg Oral Before breakfast    omega 3-dha-epa-fish oil  1 capsule Oral Daily    pantoprazole  40 mg Oral Daily    pravastatin  20 mg Oral Daily    torsemide  20 mg Oral Daily     Continuous Infusions:  PRN Meds:hydrALAZINE, ondansetron, polyethylene glycol, sodium chloride 0.9%    Review of patient's allergies indicates:  No Known Allergies    SUBJECTIVE:     Interval History: Patient has no complaint..        OBJECTIVE:     Vital Signs (Most Recent)  Temp: 98.6 °F (37 °C) (01/29/20 1147)  Pulse: 60 (01/29/20 1147)  Resp: 18 (01/29/20 1147)  BP: (!) 183/77 (01/29/20 1147)  SpO2: (!) 94 % (01/29/20 1147)    Vital Signs Range (Last 24H):  Temp:  [94.8 °F (34.9 °C)-98.6 °F (37 °C)]   Pulse:  [60-67]   Resp:  [16-19]   BP: (150-192)/(65-79)   SpO2:  [94 %-95 %]       Physical Exam:  Neck: no carotid bruit and no JVD  Lungs: diminished breath sounds bibasilar  Heart: regular rate and rhythm    Recent Results (from the past 24 hour(s))   Comprehensive metabolic panel    Collection Time: 01/29/20  5:07 AM   Result Value Ref Range    Sodium 138 136 - 145 mmol/L    Potassium 4.8 3.5 - 5.1 mmol/L    Chloride 106 95 - 110 mmol/L    CO2 20 (L) 23 - 29 mmol/L    Glucose 86 70 - 110 mg/dL    BUN, Bld 90 (H) 8 - 23 mg/dL    Creatinine 3.3 (H) 0.5 - 1.4 mg/dL    Calcium 9.9 8.7 - 10.5 mg/dL    Total Protein 6.2 6.0 - 8.4 g/dL    Albumin 2.7 (L) 3.5 - 5.2 g/dL    Total Bilirubin 0.4 0.1 - 1.0 mg/dL    Alkaline Phosphatase 77 55 - 135 U/L    AST 32 10 - 40 U/L    ALT  48 (H) 10 - 44 U/L    Anion Gap 12 8 - 16 mmol/L    eGFR if African American 14 (A) >60 mL/min/1.73 m^2    eGFR if non African American 12 (A) >60 mL/min/1.73 m^2   Phosphorus    Collection Time: 01/29/20  5:07 AM   Result Value Ref Range    Phosphorus 5.9 (H) 2.7 - 4.5 mg/dL   Magnesium    Collection Time: 01/29/20  5:07 AM   Result Value Ref Range    Magnesium 2.5 1.6 - 2.6 mg/dL   CBC auto differential    Collection Time: 01/29/20  5:07 AM   Result Value Ref Range    WBC 7.03 3.90 - 12.70 K/uL    RBC 3.58 (L) 4.00 - 5.40 M/uL    Hemoglobin 11.4 (L) 12.0 - 16.0 g/dL    Hematocrit 34.6 (L) 37.0 - 48.5 %    Mean Corpuscular Volume 97 82 - 98 fL    Mean Corpuscular Hemoglobin 31.8 (H) 27.0 - 31.0 pg    Mean Corpuscular Hemoglobin Conc 32.9 32.0 - 36.0 g/dL    RDW 15.6 (H) 11.5 - 14.5 %    Platelets 213 150 - 350 K/uL    MPV 10.7 9.2 - 12.9 fL    Gran # (ANC) 5.1 1.8 - 7.7 K/uL    Immature Grans (Abs) 0.07 (H) 0.00 - 0.04 K/uL    Lymph # 0.7 (L) 1.0 - 4.8 K/uL    Mono # 1.0 0.3 - 1.0 K/uL    Eos # 0.2 0.0 - 0.5 K/uL    Baso # 0.03 0.00 - 0.20 K/uL    nRBC 0 0 /100 WBC    Gran% 72.5 38.0 - 73.0 %    Lymph% 10.1 (L) 18.0 - 48.0 %    Mono% 13.9 4.0 - 15.0 %    Eosinophil% 2.1 0.0 - 8.0 %    Basophil% 0.4 0.0 - 1.9 %    Differential Method Automated        Diagnostic Results:  Labs: Reviewed    ASSESSMENT/PLAN:   Stable .BPStill high  Plan: Continue Current.

## 2020-01-29 NOTE — PLAN OF CARE
Intervention: fat/sodium/potassium/phosphorus modified diet, nutrition supplement therapy-commercial beverage and nutrition education     Recommendation:  1) Continue renal, cardiac diet - consider appetite stimulant  2) Change supplement to Novasource renal 1 x daily and  Boost Glucose control Chocolate 1 x daily   3) Weigh pt weekly or as needed   4) Nutrition education and handouts given   5) Check iron B12 and folate If needed     Goals: 1) PO intakes > 50% of meals and supplements at f/u   Nutrition Goal Status: Not met-continues  Communication of RD Recs: (POC, sticky note)

## 2020-01-29 NOTE — PHYSICIAN QUERY
PT Name: Izabella Fulton  MR #: [id number]    Physician Query Form - Nutrition Clarification     CDS/: Marcela Wright               Contact information: [email or web address]      This form is a permanent document in the medical record.     Query Date: January 29, 2020    By submitting this query, we are merely seeking further clarification of documentation.. Please utilize your independent clinical judgment when addressing the question(s) below.    The Medical record contains the following:   Indicators  Supporting Clinical Findings Location in Medical Record    % of Estimated Energy Intake over a time frame from p.o., TF, or TPN      Weight Status over a time frame     x Subcutaneous Fat and/or Muscle Loss 2) Mild-moderate fat/muscle wasting as charted below RD note 1/29   x Fluid Accumulation or Edema 1) mild edema RD note 1/29    Reduced  Strength     x Wt / BMI / Usual Body Weight Weight: 58.7 kg 1/28/20   Weight (lb): 129 lb ( 10 lb wt gain since admit)  BMI (Calculated): 25.9 RD note 1/29    Delayed Wound Healing / Failure to Thrive     x Acute or Chronic Illness Diagnosis: (acute CHF)  Relevant Medical History: CKD4, TIA, ulcerative colitis, HTN, HLD, CHF RD note 1/29    Medication     x Treatment Recommendation:  1) Continue renal, cardiac diet - consider appetite stimulant  2) Change supplement to Novasource renal 1 x daily and  Boost Glucose control Chocolate 1 x daily   3) Weigh pt weekly or as needed   4) Nutrition education and handouts given   5) Check iron B12 and folate If needed RD note 1/29   x Other Hypoalbuminemia due to protein-calorie malnutrition  Contributing Nutrition Diagnosis  Moderate chronic condition related malnutrition RD note 1/29     AND / ASPEN Clinical Characteristics (October 2011)  A minimum of two characteristics is recommended for diagnosing either moderate or severe malnutrition   Mild Malnutrition Moderate Malnutrition Severe Malnutrition   Energy Intake from p.o., TF  or TPN. < 75% intake of estimated energy needs for less than 7 days < 75% intake of estimated energy needs for greater than 7 days < 50% intake of estimated energy needs for > 5 days   Weight Loss 1-2% in 1 month  5% in 3 months  7.5% in 6 months  10% in 1 year 1-2 % in 1 week  5% in 1 month  7.5% in 3 months  10% in 6 months  20% in 1 year > 2% in 1 week  > 5% in 1 month  > 7.5% in 3 months  > 10% in 6 months  > 20% in 1 year   Physical Findings     None *Mild subcutaneous fat and/or muscle loss  *Mild fluid accumulation  *Stage II decubitus  *Surgical wound or non-healing wound *Mod/severe subcutaneous fat and/or muscle loss  *Mod/severe fluid accumulation  *Stage III or IV decubitus  *Non-healing surgical wound     Provider, please specify diagnosis or diagnoses associated with above clinical findings.    [x  ] Moderate Protein-Calorie Malnutrition   [  ] Other Nutritional Diagnosis (please specify):    [  ] Other:    [  ] Clinically Undetermined       Please document in your progress notes daily for the duration of treatment until resolved and include in your discharge summary.

## 2020-01-29 NOTE — PHYSICIAN QUERY
PT Name: Izabella Fulton  MR #: [id number]    Physician Query Form - Respiratory Condition Clarification      CDS/: Marcela Wright               Contact information: [email or web address]      This form is a permanent document in the medical record.    Query Date: January 29, 2020    By submitting this query, we are merely seeking further clarification of documentation. Please utilize your independent clinical judgment when addressing the question(s) below.    The Medical record contains the following   Indicators   Supporting Clinical Findings Location in Medical Record   x   SOB, DAY, Wheezing, Productive Cough, Use of Accessory Muscles, etc. Bipap therapy. Course breath sounds noted throughout all lung fields upon auscultation, no wheezing noted. Patient with no labored respirations upon my initial exam. H&P   x   Acute/Chronic Illness * Acute diastolic congestive heart failure  · Hypertensive emergency, flash pulmonary edema. BP control, afterload reduction HM note 1/28  EICU note 1/27      Radiology Findings     x   Respiratory Distress or Failure Acute on chronic respiratory failure  Bipap therapy - tele monitoring - ICU admission.       Hypoxia or Hypercapnia        RR         ABGs         O2 sat     x   BiPAP/Intubation  01/27/20 0346  Patient Assessment/Suction  Level of Consciousness (AVPU) alert  Respiratory Effort Unlabored  Expansion/Accessory Muscles/Retractions no use of accessory muscles  PRE-TX-O2  O2 Device (Oxygen Therapy) BiPAP  Oxygen Concentration (%) 35  SpO2 99 %  Pulse Oximetry Type Continuous  Pulse (!) 47  Resp 17  Ready to Wean/Extubation Screen  FIO2<=50 (chart decimal) 0.35  Preset CPAP/BiPAP Settings  Mode Of Delivery BiPAP  $ Initial CPAP/BiPAP Setup? No  $ Is patient using? Yes  Size of Mask Medium/Large  Sized Appropriately? Yes  Equipment Type V60  Airway Device Type medium full face mask  Humidifier not applicable  Ipap 10  EPAP (cm H2O) 5  Pressure Support (cm H2O) 5  Set Rate  (Breaths/Min) 14  ITime (sec) 1  Rise Time (sec) 0.2  Patient CPAP/BiPAP Settings  RR Total (Breaths/Min) 17  Tidal Volume (mL) 877  VE Minute Ventilation (L/min) 12.8 L/min  Peak Inspiratory Pressure (cm H2O) 10  TiTOT (%) 33  Total Leak (L/Min) 26  Patient Trigger - ST Mode Only (%) 36  CPAP/BiPAP Alarms  High Pressure (cm H2O) 20  Low Pressure (cm H2O) 10  Low Pressure Delay (Sec) 20  Minute Ventilation (L/Min) 2  High RR (breaths/min) 40  Low RR (breaths/min) 10     Respiratory therapy note 1/27      Supplemental O2        Home O2, Oxygen Dependence        Treatment        Other       Respiratory failure can be acute, chronic or both. It is generally further specified as hypoxic, hypercapnic or both. Lastly, it is important to identify an etiology, if known or suspected.   References::  https://www.acphospitalist.org/archives/2013/10/coding.htm [email or web address]/acute-respiratory-failure-know     The clinical guidelines noted below are only system guidelines, and do not replace the providers clinical judgment.    Provider, please specify diagnosis or diagnoses associated with above clinical findings.   [  x ] Acute and (on) Chronic Respiratory Failure with Hypoxia - pO2 >10 mmHg below baseline OR SpO2 < 91% on usual home O2 OR O2 > 2L/min over baseline home O2    [   ] Acute and (on) Chronic Respiratory Failure with Hypercapnia - pCO2 >10 mmHg over baseline and pH < 7.35   [   ] Acute and (on) Chronic Respiratory Failure with Hypoxia and Hypercapnia - Hypoxic: pO2 >10 mmHg below baseline OR SpO2 < 91% on usual home O2 OR O2 > 2L/min over baseline home O2 and Hypercapnic:  pCO2 >10 mmHg over baseline and pH < 7.35    [   ] Other Respiratory Diagnosis (please specify): _________________________________   [   ]  Clinically Undetermined       Please document in your progress notes daily for the duration of treatment until resolved and include in your discharge summary.

## 2020-01-29 NOTE — PT/OT/SLP PROGRESS
Physical Therapy Treatment    Patient Name:  Izabella Fulton   MRN:  [id number]    Recommendations:     Discharge Recommendations:  home health PT   Discharge Equipment Recommendations: none   Barriers to discharge: None    Assessment:     Izabella Fulton is a 86 y.o. female admitted with a medical diagnosis of Acute diastolic congestive heart failure.  She presents with the following impairments/functional limitations:  weakness, impaired endurance, impaired self care skills, gait instability, decreased lower extremity function, impaired cardiopulmonary response to activity . Tolerated treatment. Amb'd with rw and Min A for safety wi th O2 attached.     Rehab Prognosis: Fair; patient would benefit from acute skilled PT services to address these deficits and reach maximum level of function.    Recent Surgery: * No surgery found *      Plan:     During this hospitalization, patient to be seen 6 x/week to address the identified rehab impairments via gait training, therapeutic activities, therapeutic exercises and progress toward the following goals:    · Plan of Care Expires:  02/28/20    Subjective     Chief Complaint: none stated.  Patient/Family Comments/goals: none stated  Pain/Comfort:  · Pain Rating 1: 0/10      Objective:     Communicated with nurse Infante prior to session.  Patient found supine with harris catheter, telemetry, oxygen upon PT entry to room.     General Precautions: Standard, fall, respiratory   Orthopedic Precautions:N/A   Braces:       Functional Mobility:  · Bed Mobility:     · Rolling Right: contact guard assistance  · Supine to Sit: minimum assistance  · Transfers:     · Sit to Stand:  minimum assistance with rolling walker  · Gait: 50' with rw and Min A with O2 attached.      AM-PAC 6 CLICK MOBILITY          Therapeutic Activities and Exercises:   Transferred to sitting EOB with Min A. Extra time for upright sitting balance and to scoot froward to feet flat on floor.   Stood with rw and Min A.    Amb'd slowly in hallway with rw and Min A with chair follow.    Patient left up in chair with all lines intact, call button in reach, chair alarm on and nurse Arelis notified..    GOALS:   Multidisciplinary Problems     Physical Therapy Goals        Problem: Physical Therapy Goal    Goal Priority Disciplines Outcome Goal Variances Interventions   Physical Therapy Goal     PT, PT/OT Ongoing, Progressing     Description:  Goals to be met by: 2020     Patient will increase functional independence with mobility by performin. Supine to sit with MInimal Assistance  2. Sit to stand transfer with Minimal Assistance  3. Bed to chair transfer with Minimal Assistance using Rolling Walker  4. Gait  x 250 feet with Minimal Assistance using Rolling Walker.   5. Lower extremity exercise program x20 reps                     Time Tracking:     PT Received On: 20  PT Start Time: 930     PT Stop Time: 946  PT Total Time (min): 16 min     Billable Minutes: Gait Training 8min and Therapeutic Activity 8min    Treatment Type: Treatment  PT/PTA: PTA     PTA Visit Number: 1     Tessy Rodriguez PTA  2020

## 2020-01-29 NOTE — SUBJECTIVE & OBJECTIVE
Interval History:  Patient moved to medicine telemetry floor yesterday from intensive care unit.  Continues to have elevated blood pressure with worsening renal functions. Patient is breathing better.  Patient denies any chest pain, palpitations or any abdominal complaints.    Review of Systems   Unable to perform ROS: Acuity of condition     Objective:     Vital Signs (Most Recent):  Temp: 96.4 °F (35.8 °C) (01/29/20 0756)  Pulse: 67 (01/29/20 0829)  Resp: 18 (01/29/20 0829)  BP: (!) 192/79 (01/29/20 0756)  SpO2: (!) 94 % (01/29/20 0829) Vital Signs (24h Range):  Temp:  [94.8 °F (34.9 °C)-98.6 °F (37 °C)] 96.4 °F (35.8 °C)  Pulse:  [60-67] 67  Resp:  [16-31] 18  SpO2:  [93 %-95 %] 94 %  BP: (150-192)/(65-79) 192/79     Weight: 58.7 kg (129 lb 6.6 oz)  Body mass index is 28 kg/m².    Intake/Output Summary (Last 24 hours) at 1/29/2020 1047  Last data filed at 1/28/2020 1339  Gross per 24 hour   Intake 600 ml   Output 40 ml   Net 560 ml      Physical Exam   Constitutional: She is oriented to person, place, and time. She appears well-developed and well-nourished. No distress.   HENT:   Head: Normocephalic and atraumatic.   Eyes: Pupils are equal, round, and reactive to light. EOM are normal. Right eye exhibits no discharge. Left eye exhibits no discharge.   Neck: Normal range of motion. JVD present.   Cardiovascular: Regular rhythm, normal heart sounds and intact distal pulses.   No murmur heard.  Bradycardia with a HR of 45 bpm noted on bedside continuous cardiac monitoring.     Pulmonary/Chest: Effort normal and breath sounds normal. No respiratory distress. She has no wheezes. She has no rales. She exhibits no tenderness.   Bipap therapy. Course breath sounds noted throughout all lung fields upon auscultation, no wheezing noted. Patient with no labored respirations upon my initial exam.     Abdominal: Soft. Bowel sounds are normal. She exhibits no distension. There is no tenderness. There is no rebound and no  guarding.   Genitourinary:   Genitourinary Comments: Exam Deferred   [place] in place     Musculoskeletal: Normal range of motion. She exhibits edema. She exhibits no tenderness or deformity.   1+ bilateral lower extremity pitting edema.   Neurological: She is alert and oriented to person, place, and time. No cranial nerve deficit or sensory deficit.   Skin: Skin is warm and dry. Capillary refill takes 2 to 3 seconds. No rash noted. She is not diaphoretic. No erythema.   Generalized bruising noted throughout bilateral upper extremities   Psychiatric: She has a normal mood and affect. Her behavior is normal. Judgment and thought content normal.   Nursing note and vitals reviewed.      Significant Labs:   CBC:   Recent Labs   Lab 01/28/20 0315 01/29/20  0507   WBC 4.80 7.03   HGB 9.9* 11.4*   HCT 31.4* 34.6*    213     CMP:   Recent Labs   Lab 01/28/20 0315 01/29/20  0507    138   K 5.1 4.8    106   CO2 20* 20*   GLU 79 86   BUN 88* 90*   CREATININE 3.0* 3.3*   CALCIUM 9.5 9.9   PROT 5.8* 6.2   ALBUMIN 2.6* 2.7*   BILITOT 0.3 0.4   ALKPHOS 77 77   AST 44* 32   ALT 61* 48*   ANIONGAP 10 12   EGFRNONAA 14* 12*     Microbiology Results (last 7 days)     ** No results found for the last 168 hours. **          Significant Imaging:   Echocardiogram (January 16, 2020):  · Concentric left ventricular hypertrophy.  · The mean diastolic gradient across the mitral valve is 2 mmHg at a heart rate of bpm.  · Normal left ventricular systolic function. The estimated ejection fraction is 65%.  · Grade I (mild) left ventricular diastolic dysfunction consistent with impaired relaxation.  · No wall motion abnormalities.  · Mild left atrial enlargement.  · Mild mitral regurgitation.  · Mild tricuspid regurgitation.  · Trivial pericardial effusion.  · No intracardiac thrombi/vegetations.    Right lower extremity venous Doppler:  No evidence of deep venous thrombosis in the right lower extremity.    Chest  x-ray:  Cardiomegaly with worsening fluid overload state, most consistent with decompensated CHF.

## 2020-01-29 NOTE — ASSESSMENT & PLAN NOTE
Contributing Nutrition Diagnosis  Moderate chronic condition related malnutrition    Related to (etiology):   Increased needs d/t CKD    Signs and Symptoms (as evidenced by):   1) mild edema  2) Mild-moderate fat/muscle wasting as charted below  Interventions/Recommendations (treatment strategy):  Above    Nutrition Diagnosis Status:   Continues

## 2020-01-29 NOTE — PLAN OF CARE
Problem: Adult Inpatient Plan of Care  Goal: Optimal Comfort and Wellbeing  Outcome: Ongoing, Progressing     Problem: Adult Inpatient Plan of Care  Goal: Optimal Comfort and Wellbeing  Outcome: Ongoing, Progressing   Therapeutic activity : bed mobility, transfers, gait with rw and Min A with O2 attached.

## 2020-01-29 NOTE — PROGRESS NOTES
[facility]  Adult Nutrition  Progress Note    SUMMARY      Intervention: fat/sodium/potassium/phosphorus modified diet, nutrition supplement therapy-commercial beverage and nutrition education     Recommendation:  1) Continue renal, cardiac diet - consider appetite stimulant  2) Change supplement to Novasource renal 1 x daily and  Boost Glucose control Chocolate 1 x daily   3) Weigh pt weekly or as needed   4) Nutrition education and handouts given   5) Check iron B12 and folate If needed     Goals: 1) PO intakes > 50% of meals and supplements at f/u   Nutrition Goal Status: Not met-continues  Communication of RD Recs: (POC, sticky note)     Reason for Assessment     Reason For Assessment: Follow up  Diagnosis: (acute CHF)  Relevant Medical History: CKD4, TIA, ulcerative colitis, HTN, HLD, CHF  Interdisciplinary Rounds: attended     General Information Comments: 85 y/o female admitted with acute CHF. Educated by this service 1 week ago on diet for CHF. Reviewed renal, cardiac low sodium diet with pt and . NFPE done 1/27/20, mild-moderate wasting seen. PTA claims to have been eatnig well.   1/29/20 Pt now on HD. PO intakes variable, stating she likes Suplena, would prefer a chocolate supplement, but has no appetite. Brought pt a boost glucose control ( low K and Phos) for breakfast as she was only able to take 2-3 bites.      Nutrition Discharge Planning: to be determined- renal, cardiac diet + Novasource renal BID     Nutrition Risk Screen     Nutrition Risk Screen: no indicators present     Nutrition/Diet History     Patient Reported Diet/Restrictions/Preferences: heart healthy  Typical Food/Fluid Intake: avoids sodium at home. but had been eating a lot of canned soup lately  Spiritual, Cultural Beliefs, Caodaism Practices, Values that Affect Care: no  Food Allergies: NKFA  Factors Affecting Nutritional Intake: decreased appetite     Anthropometrics    Height Method:  "Measured(20)  Height: 4' 9"  Height (inches): 57 in  Weight Method: Bed Scale  Weight: 58.7 kg 20   Weight (lb): 129 lb ( 10 lb wt gain since admit)  Ideal Body Weight (IBW), Female: 85 lb  % Ideal Body Weight, Female (lb): 140.84 %  BMI (Calculated): 25.9  BMI Grade: 25 - 29.9 - overweight  Usual Body Weight (UBW), k.8 kg(1/3/20)  54.3 kg admission     Lab/Procedures/Meds     Pertinent Labs Reviewed: reviewed  No results found for: [id number]  No results found for: FOLATE  No results found for: IRON, TIBC, FERRITIN, SATURATEDIRO  BMP  Lab Results   Component Value Date     2020    K 4.8 2020     2020    CO2 20 (L) 2020    BUN 90 (H) 2020    CREATININE 3.3 (H) 2020    CALCIUM 9.9 2020    ANIONGAP 12 2020    ESTGFRAFRICA 14 (A) 2020    EGFRNONAA 12 (A) 2020     Lab Results   Component Value Date    CALCIUM 9.9 2020    PHOS 5.9 (H) 2020     Lab Results   Component Value Date    ALBUMIN 2.7 (L) 2020        Pertinent Medications Reviewed: reviewed  Pertinent Medications Comments: polyethylene glycol, Ca, VitamiN D, Folic acid, docusate, omega 3    Estimated/Assessed Needs   admission  Weight Used For Calorie Calculations: 50.8 kg (111 lb 15.9 oz)(estimated dry weight)  Energy Calorie Requirements (kcal): 25-30 kcal/kg ( CHF/CKD IV) = 5567-4031 kcal  Energy Need Method: Kcal/kg  Protein Requirements: 1.0 g protein/kg ( wasting and age vs. CKD IV)=  51 g protein  Weight Used For Protein Calculations: 50.8 kg (111 lb 15.9 oz)(estimated dry weight)  Fluid Requirements (mL): 1500 ml or per MD  Estimated Fluid Requirement Method: RDA Method  CHO Requirement: N/A        Nutrition Prescription Ordered     Current Diet Order: Cardiac, renal  Suplena BID     Evaluation of Received Nutrient/Fluid Intake     Energy Calories Required: not meeting needs  Protein Required: not meeting needs  Fluid Required: not meeting " needs  Tolerance: other (see comments)(NPO)  % Intake of Estimated Energy Needs: 0-100% with suplena ( most 0%)  % Meal Intake:0-75%     Nutrition Risk     Level of Risk/Frequency of Follow-up: moderate 2 x weekly     Assessment and Plan     Hypoalbuminemia due to protein-calorie malnutrition  Contributing Nutrition Diagnosis  Moderate chronic condition related malnutrition    Related to (etiology):   Increased needs d/t CKD    Signs and Symptoms (as evidenced by):   1) mild edema  2) Mild-moderate fat/muscle wasting as charted below  Interventions/Recommendations (treatment strategy):  Above    Nutrition Diagnosis Status:   Continues      Monitor and Evaluation     Food and Nutrient Intake: energy intake, food and beverage intake  Food and Nutrient Adminstration: diet order  Knowledge/Beliefs/Attitudes: food and nutrition knowledge/skill  Anthropometric Measurements: weight  Biochemical Data, Medical Tests and Procedures: electrolyte and renal panel  Nutrition-Focused Physical Findings: overall appearance      Malnutrition Assessment  Skin (Micronutrient): (WDL) Adam = 17, calf wound  Teeth (Micronutrient): (denies chewing trouble)   Micronutrient Evaluation: suspected deficiency, suspected toxicity  Micronutrient Evaluation Comments: check iron, B12, folate/ elevated K and Phos       Orbital Region (Subcutaneous Fat Loss): moderate depletion  Upper Arm Region (Subcutaneous Fat Loss): moderate depletion  Thoracic and Lumbar Region: mild depletion   [place] Region (Muscle Loss): moderate depletion  Clavicle Bone Region (Muscle Loss): moderate depletion  Clavicle and Acromion Bone Region (Muscle Loss): moderate depletion  Scapular Bone Region (Muscle Loss): mild depletion  Dorsal Hand (Muscle Loss): mild depletion  Patellar Region (Muscle Loss): mild depletion  Anterior Thigh Region (Muscle Loss): mild depletion  Posterior Calf Region (Muscle Loss): mild depletion   Edema (Fluid Accumulation): 3-->moderate    Subcutaneous Fat Loss (Final Summary): moderate protein-calorie malnutrition  Muscle Loss Evaluation (Final Summary): moderate protein-calorie malnutrition       Nutrition Follow-Up     RD Follow-up?: Yes

## 2020-01-29 NOTE — PROGRESS NOTES
[facility] Medicine  Progress Note    Patient Name: Izabella Fulton  MRN: [id number]  Patient Class: IP- Inpatient   Admission Date: 1/26/2020  Length of Stay: 3 days  Attending Physician: Rachel Chavis MD  Primary Care Provider: Corinne Fletcher NP        Subjective:     Principal Problem:Acute diastolic congestive heart failure        HPI:  Izabella Fulton is an 86-year-old female with a past medical history of hypertension, hypothyroidism, hyperlipidemia, and congestive heart failure who presents to the emergency room tonight with reports of increasing shortness of breath.  Patient was recently discharged from the hospital approximately 1 week ago in which she was admitted for a CHF exacerbation.  Unsure if patient was diagnosed with congestive heart failure during that hospitalization or prior.  Patient's  states that patient's breathing never did improve since discharge home and she has since persisted with a nonproductive cough.  Patient was discharged in told to set up a cardiology and pulmonology appointment, have not been to either appointments yet.  Patient also reportedly not eating, daily p.o. intake consist of a boost twice a day.  Information for HPI obtained from patient's , Don, and patient's family friend who is at bedside during my initial interview and physical exam.  Upon arrival to the emergency room patient noted to be in acute respiratory distress requiring BiPAP therapy in association with a CHF exacerbation with an elevated BNP.  Patient also noted to have hypertensive emergency upon admission.  Patient was initiated on IV Lasix in emergency room and IV nitroglycerin drip.  Patient admitted to ICU on January 26, 2020 at approximately 11:30 p.m..  Patient reportedly resides at with her  utilizes a walker for ambulatory assist device, denies the use of supplemental oxygen dependence while at home.    Overview/Hospital Course:  No notes on  file    Interval History:  Patient moved to medicine telemetry floor yesterday from intensive care unit.  Continues to have elevated blood pressure with worsening renal functions. Patient is breathing better.  Patient denies any chest pain, palpitations or any abdominal complaints.    Review of Systems   Unable to perform ROS: Acuity of condition     Objective:     Vital Signs (Most Recent):  Temp: 96.4 °F (35.8 °C) (01/29/20 0756)  Pulse: 67 (01/29/20 0829)  Resp: 18 (01/29/20 0829)  BP: (!) 192/79 (01/29/20 0756)  SpO2: (!) 94 % (01/29/20 0829) Vital Signs (24h Range):  Temp:  [94.8 °F (34.9 °C)-98.6 °F (37 °C)] 96.4 °F (35.8 °C)  Pulse:  [60-67] 67  Resp:  [16-31] 18  SpO2:  [93 %-95 %] 94 %  BP: (150-192)/(65-79) 192/79     Weight: 58.7 kg (129 lb 6.6 oz)  Body mass index is 28 kg/m².    Intake/Output Summary (Last 24 hours) at 1/29/2020 1047  Last data filed at 1/28/2020 1339  Gross per 24 hour   Intake 600 ml   Output 40 ml   Net 560 ml      Physical Exam   Constitutional: She is oriented to person, place, and time. She appears well-developed and well-nourished. No distress.   HENT:   Head: Normocephalic and atraumatic.   Eyes: Pupils are equal, round, and reactive to light. EOM are normal. Right eye exhibits no discharge. Left eye exhibits no discharge.   Neck: Normal range of motion. JVD present.   Cardiovascular: Regular rhythm, normal heart sounds and intact distal pulses.   No murmur heard.  Bradycardia with a HR of 45 bpm noted on bedside continuous cardiac monitoring.     Pulmonary/Chest: Effort normal and breath sounds normal. No respiratory distress. She has no wheezes. She has no rales. She exhibits no tenderness.   Bipap therapy. Course breath sounds noted throughout all lung fields upon auscultation, no wheezing noted. Patient with no labored respirations upon my initial exam.     Abdominal: Soft. Bowel sounds are normal. She exhibits no distension. There is no tenderness. There is no rebound and  no guarding.   Genitourinary:   Genitourinary Comments: Exam Deferred   [place] in place     Musculoskeletal: Normal range of motion. She exhibits edema. She exhibits no tenderness or deformity.   1+ bilateral lower extremity pitting edema.   Neurological: She is alert and oriented to person, place, and time. No cranial nerve deficit or sensory deficit.   Skin: Skin is warm and dry. Capillary refill takes 2 to 3 seconds. No rash noted. She is not diaphoretic. No erythema.   Generalized bruising noted throughout bilateral upper extremities   Psychiatric: She has a normal mood and affect. Her behavior is normal. Judgment and thought content normal.   Nursing note and vitals reviewed.      Significant Labs:   CBC:   Recent Labs   Lab 01/28/20 0315 01/29/20  0507   WBC 4.80 7.03   HGB 9.9* 11.4*   HCT 31.4* 34.6*    213     CMP:   Recent Labs   Lab 01/28/20 0315 01/29/20  0507    138   K 5.1 4.8    106   CO2 20* 20*   GLU 79 86   BUN 88* 90*   CREATININE 3.0* 3.3*   CALCIUM 9.5 9.9   PROT 5.8* 6.2   ALBUMIN 2.6* 2.7*   BILITOT 0.3 0.4   ALKPHOS 77 77   AST 44* 32   ALT 61* 48*   ANIONGAP 10 12   EGFRNONAA 14* 12*     Microbiology Results (last 7 days)     ** No results found for the last 168 hours. **          Significant Imaging:   Echocardiogram (January 16, 2020):  · Concentric left ventricular hypertrophy.  · The mean diastolic gradient across the mitral valve is 2 mmHg at a heart rate of bpm.  · Normal left ventricular systolic function. The estimated ejection fraction is 65%.  · Grade I (mild) left ventricular diastolic dysfunction consistent with impaired relaxation.  · No wall motion abnormalities.  · Mild left atrial enlargement.  · Mild mitral regurgitation.  · Mild tricuspid regurgitation.  · Trivial pericardial effusion.  · No intracardiac thrombi/vegetations.    Right lower extremity venous Doppler:  No evidence of deep venous thrombosis in the right lower extremity.    Chest  x-ray:  Cardiomegaly with worsening fluid overload state, most consistent with decompensated CHF.        Assessment/Plan:      * Acute diastolic congestive heart failure  CHF currently uncontrolled due to volume overload due to: Continued edema of extremities and JVD and Pulmonary edema. Latest ECHO shows ejection fraction of 65%. Continue Lasix and BB and monitor clinical status closely. Monitor on telemetry. Patient is off CHF pathway due to criteria of pathway.  Monitor strict Is&Os and daily weights. Continue to stress to patient importance of self efficacy and  on diet for CHF. Last BNP reviewed- and noted below   Recent Labs   Lab 01/26/20 2120   BNP 1,152*   .  Recent echocardiogram reviewed - follow Cardiology and Nephrology recommendations.            Symptomatic anemia  Transfuse 2 units of packed red blood cells with hemodialysis treatment.  Follow CBC and transfuse as needed.  Patient would benefit with erythromycin subcutaneous injection therapy as per Nephrology team.      Elevated troponin  Likely in the setting of acute CHF exacerbation, patient denies CP on admit - trend troponin - tele monitoring.        Elevated LFTs  Noted, trend with CMP.        Metabolic acidosis  Noted, trend with CMP.        Hypoalbuminemia due to protein-calorie malnutrition  Dietary consult.        Hyperkalemia  Tele - trend with CMP.        Acute on chronic respiratory failure  Bipap therapy - tele monitoring - ICU admission.        Hypertensive emergency  Nitroglycerin gtt initiated in ER - goal SBP of 180 - do not decreased MAP by > 25% in first 2-6 hours.  Tele monitoring.        CKD (chronic kidney disease) stage 4, GFR 15-29 ml/min  Creatine stable for now. BMP reviewed- noted Estimated Creatinine Clearance: 12.8 mL/min (A) (based on SCr of 2.7 mg/dL (H)). according to latest data. Monitor UOP and serial BMP and adjust therapy as needed. Renally dose meds.            Hyperlipidemia  Chronic, controlled. Will  continue home medication.    Lab Results   Component Value Date    CHOL 154 09/19/2019     Lab Results   Component Value Date    HDL 68 09/19/2019     Lab Results   Component Value Date    LDLCALC 72 09/19/2019     Lab Results   Component Value Date    TRIG 60 09/19/2019     Lab Results   Component Value Date    CHOLHDL 2.3 09/19/2019               Hypothyroid  Lab Results   Component Value Date    TSH 6.425 (H) 01/16/2020       Chronic, controlled.  Will continue home medication.    Essential hypertension  Chronic, uncontrolled.  Latest blood pressure and vitals reviewed-   Temp:  [96.8 °F (36 °C)-98.8 °F (37.1 °C)]   Pulse:  [44-70]   Resp:  [12-32]   BP: ()/()   SpO2:  [88 %-100 %] .   Home meds for hypertension were reviewed and noted below. Hospital anti-hypertensive changes were made as shown below.  Hypertension Medications             carvedilol (COREG) 12.5 MG tablet Take 1 tablet (12.5 mg total) by mouth 2 (two) times daily.    hydrALAZINE (APRESOLINE) 100 MG tablet Take 1 tablet (100 mg total) by mouth every 8 (eight) hours.    torsemide (DEMADEX) 10 MG Tab Take 1 tablet (10 mg total) by mouth every Tues, Fri. Take extra dose on Thursday and Saturday this week then resume Tuesday and Friday dosing      Hospital Medications             carvedilol tablet 12.5 mg 12.5 mg, Oral, 2 times daily    enalaprilat injection 0.625 mg 0.625 mg, Intravenous, ED 1 Time    furosemide injection 40 mg 40 mg, Intravenous, Once    furosemide injection 60 mg (Completed) 60 mg, Intravenous, ED 1 Time    hydrALAZINE tablet 100 mg 100 mg, Oral, Every 8 hours    nitroGLYCERIN 50 mg in dextrose 5 % 250 mL infusion (TITRATING) 40 mcg/min (40 mcg/min), Intravenous, Continuous @ 12 mL/hr, Use non-sorb tubing.    nitroGLYCERIN 50 mg in dextrose 5 % 250 mL infusion (TITRATING) 5 mcg/min (5 mcg/min), Intravenous, Continuous @ 1.5 mL/hr, Use non-sorb tubing.        Will treat with PRN antihypertensives, as needed, to maintain  BP less than 180/110 or if patient becomes symptomatic.            VTE Risk Mitigation (From admission, onward)         Ordered     heparin (porcine) injection 5,000 Units  Every 8 hours      01/27/20 0212     IP VTE HIGH RISK PATIENT  Once      01/27/20 0212     Place ESTUARDO hose  Until discontinued      01/27/20 0212     Place sequential compression device  Until discontinued      01/27/20 0212                      Rachel Chavis MD  Department of Hospital Medicine   [facility]

## 2020-01-29 NOTE — CONSULTS
Nephrology Consult Progress Note        Patient Name: Izabella Fulton  MRN: [id number]    Patient Class: IP- Inpatient   Admission Date: 1/26/2020  Length of Stay: 3 days  Date of Service: 1/29/2020    Attending Physician: Rachel Chavis MD  Primary Care Provider: Corinne Fletcher NP    Reason for Consult: kaitlynn/ckd3/anemia/htn/chf    SUBJECTIVE:     HPI: 86F with hypertension, hypothyroidism, congestive heart failure  presents with increasing shortness of breath. She was recently discharged from the hospital approximately 1 week ago after admission for CHF exacerbation. Patient's  states that patient's breathing never did improve since discharge home and she has since persisted with a nonproductive cough. Patient was discharged in told to set up a cardiology and pulmonology appointment, have not been to either appointments yet. She was not eating, daily p.o. intake consist of a boost twice a day.    Upon arrival to the emergency room patient noted to be in acute respiratory distress requiring BiPAP therapy in association with a CHF exacerbation with an elevated BNP. Patient also noted to have hypertensive emergency and was treated with IV Lasix and IV nitroglycerin drip.    1/28 VSS, no new complains.  1/29 VSS, no new complains.    Past Medical History:   Diagnosis Date    Anticoagulant long-term use     baby aspirin    Closed fracture of multiple ribs of left side with routine healing 1/16/2020    Hypertension     Thyroid disease     TIA (transient ischemic attack)     Ulcerative colitis     Wears hearing aid      Past Surgical History:   Procedure Laterality Date    COLON SURGERY  05/17/12    exp lap, perfered colon    EYE SURGERY      left eye cataract     Family History   Problem Relation Age of Onset    Alzheimer's disease Mother     Heart disease Father      Social History     Tobacco Use    Smoking status: Never Smoker    Smokeless tobacco: Never Used   Substance Use Topics    Alcohol use: No     Drug use: No       Review of patient's allergies indicates:  No Known Allergies    Outpatient meds:  No current facility-administered medications on file prior to encounter.      Current Outpatient Medications on File Prior to Encounter   Medication Sig Dispense Refill    aspirin (ECOTRIN) 81 MG EC tablet Take 81 mg by mouth once daily.        calcitRIOL (ROCALTROL) 0.25 MCG Cap Take 0.25 mcg by mouth twice a week. Sunday and Thursday      calcium-vitamin D 500-125 mg-unit tablet Take 1 tablet by mouth 2 (two) times daily.        carvedilol (COREG) 12.5 MG tablet Take 1 tablet (12.5 mg total) by mouth 2 (two) times daily. 60 tablet 2    denosumab (PROLIA) 60 mg/mL Syrg Inject 60 mg into the skin every 6 (six) months.       DOCOSAHEXANOIC ACID/VIT C/LUT (EYE HEALTH ORAL) Take 1 tablet by mouth 2 (two) times daily.        fish oil-omega-3 fatty acids 300-1,000 mg capsule Take 1 g by mouth once daily.        folic acid (FOLVITE) 800 MCG tablet Take 800 mcg by mouth once daily.        hydrALAZINE (APRESOLINE) 100 MG tablet Take 1 tablet (100 mg total) by mouth every 8 (eight) hours. 90 tablet 2    levothyroxine (SYNTHROID) 100 MCG tablet Take 1 tablet (100 mcg total) by mouth once daily. 90 tablet 1    pravastatin (PRAVACHOL) 20 MG tablet Take 1 tablet (20 mg total) by mouth once daily. 90 tablet 1    torsemide (DEMADEX) 10 MG Tab Take 1 tablet (10 mg total) by mouth every Tues, Fri. Take extra dose on Thursday and Saturday this week then resume Tuesday and Friday dosing 8 tablet 2       Scheduled meds:   amLODIPine  10 mg Oral Daily    aspirin  81 mg Oral Daily    calcitRIOL  0.25 mcg Oral Twice Weekly    calcium-vitamin D3  1 tablet Oral BID    carvedilol  12.5 mg Oral BID    docusate sodium  100 mg Oral BID    folic acid  1 mg Oral Daily    heparin (porcine)  5,000 Units Subcutaneous Q8H    hydrALAZINE  100 mg Oral Q8H    levothyroxine  100 mcg Oral Before breakfast    omega 3-dha-epa-fish  oil  1 capsule Oral Daily    pantoprazole  40 mg Oral Daily    pravastatin  20 mg Oral Daily       Infusions:      PRN meds:  ondansetron, polyethylene glycol, sodium chloride 0.9%    Review of Systems:  ROS    OBJECTIVE:     Vital Signs and IO (Last 24H):  Temp:  [94.8 °F (34.9 °C)-98.6 °F (37 °C)]   Pulse:  [58-67]   Resp:  [13-31]   BP: (150-192)/(65-79)   SpO2:  [93 %-95 %]   I/O last 3 completed shifts:  In: 860.3 [P.O.:840; I.V.:20.3]  Out: 360 [Urine:360]    Wt Readings from Last 5 Encounters:   01/28/20 58.7 kg (129 lb 6.6 oz)   01/22/20 53.5 kg (118 lb)   01/18/20 54.5 kg (120 lb 3.2 oz)   01/15/20 56.4 kg (124 lb 6.4 oz)   01/03/20 50.8 kg (112 lb)         Physical Exam:  Physical Exam   Constitutional: She is oriented to person, place, and time. She appears well-developed and well-nourished.   HENT:   Head: Normocephalic and atraumatic.   Mouth/Throat: Oropharynx is clear and moist.   Eyes: Pupils are equal, round, and reactive to light. EOM are normal. No scleral icterus.   Neck: Neck supple.   Cardiovascular: Normal rate and regular rhythm.   Pulmonary/Chest: Effort normal. No stridor. No respiratory distress.   Abdominal: Soft. She exhibits no distension.   Musculoskeletal: Normal range of motion. She exhibits edema. She exhibits no deformity.   Neurological: She is alert and oriented to person, place, and time. No cranial nerve deficit.   Skin: Skin is warm and dry. No rash noted. She is not diaphoretic. No erythema.   Psychiatric: She has a normal mood and affect. Her behavior is normal.       Body mass index is 28 kg/m².    Laboratory:  Recent Labs   Lab 01/27/20  0351 01/28/20  0315 01/29/20  0507    139 138   K 5.2* 5.1 4.8    109 106   CO2 19* 20* 20*   BUN 84* 88* 90*   CREATININE 2.7* 3.0* 3.3*   ESTGFRAFRICA 18* 16* 14*   EGFRNONAA 15* 14* 12*    79 86       Recent Labs   Lab 01/27/20  0351 01/28/20  0315 01/29/20  0507   CALCIUM 9.2 9.5 9.9   ALBUMIN 2.6* 2.6* 2.7*   PHOS  5.8* 6.3* 5.9*   MG 2.3 2.4 2.5             No results for input(s): POCTGLUCOSE in the last 168 hours.    Recent Labs   Lab 08/08/18  2330 01/15/20  1329   Hemoglobin A1C 5.5 5.6       Recent Labs   Lab 01/27/20  0351 01/28/20  0315 01/29/20  0507   WBC 6.19 4.80 7.03   HGB 9.6* 9.9* 11.4*   HCT 30.4* 31.4* 34.6*    193 213   MCV 98 99* 97   MCHC 31.6* 31.5* 32.9   MONO 7.1  0.4 14.2  0.7 13.9  1.0       Recent Labs   Lab 01/27/20  0351 01/28/20  0315 01/29/20  0507   BILITOT 0.4 0.3 0.4   PROT 5.6* 5.8* 6.2   ALBUMIN 2.6* 2.6* 2.7*   ALKPHOS 79 77 77   ALT 58* 61* 48*   AST 48* 44* 32       Recent Labs   Lab 08/08/18  1650 05/14/19  2212 09/19/19  0842   Color, UA Yellow Yellow YELLOW   Appearance, UA Clear Clear CLEAR   pH, UA 6.0 7.0 6.5   Specific Gravity, UA <=1.005 A 1.010 1.016   Protein, UA Negative 2+ A 1+ A   Glucose, UA Negative Negative  --    Ketones, UA Negative Negative NEGATIVE   Urobilinogen, UA Negative Negative  --    Bilirubin (UA) Negative Negative  --    Occult Blood UA Negative Negative NEGATIVE   Nitrite, UA Negative Negative NEGATIVE   RBC, UA 1 2  --    WBC, UA 2 1  --    Bacteria, UA  --   --  MODERATE A   Bacteria Few A Rare  --    Hyaline Casts, UA 1 0 NONE SEEN             Microbiology Results (last 7 days)     ** No results found for the last 168 hours. **          ASSESSMENT/PLAN:     Active Hospital Problems    Diagnosis  POA    *Acute diastolic congestive heart failure [I50.31]  Yes    Hyperkalemia [E87.5]  Yes    Hypoalbuminemia due to protein-calorie malnutrition [E46]  Yes    Metabolic acidosis [E87.2]  Yes    Elevated LFTs [R94.5]  Yes    Elevated troponin [R79.89]  Yes    Symptomatic anemia [D64.9]  Yes    Acute on chronic respiratory failure [J96.20]  Yes    Hypertensive emergency [I16.1]  Yes    CKD (chronic kidney disease) stage 4, GFR 15-29 ml/min [N18.4]  Yes    Essential hypertension [I10]  Yes    Hypothyroid [E03.9]  Yes    Hyperlipidemia [E78.5]   Yes      Resolved Hospital Problems   No resolved problems to display.     FERCHO, sCr not better  CKD stage 3-4  CHF exacerbation  No NSAIDs, ACEI/ARB, IV contrast or other nephrotoxins.  Keep MAP > 60, SBP > 100.  Dose meds for GFR < 30 ml/min.  Renal diet - low K, low phos.  Agree with BP control and diuretics.  Cards consult apprecaited.    Anemia of CKD  Hgb and HCT are acceptable. Monitor.  Will provide JONNY and/or IV iron PRN.    HTN  BP seem controlled.   Tolerate asymptomatic HTN up to -160.  Continue home meds.  Low sodium diet.    Thank you for allowing us to participate in the care of your patient!   We will follow the patient and provide recommendations as needed.    Alexei Araiza MD    [facility]  [address] [place] [address]  [place] [address]    [phone] - tel  [phone] - fax    1/29/2020 1:58 PM

## 2020-01-29 NOTE — PLAN OF CARE
Patient alert and oriented, able to make needs known, takes pills whole, tele on and functioning, Iv intact , o2 on continious to keep sats up, dressing to right leg intact, safety and neuro intact

## 2020-01-29 NOTE — PT/OT/SLP PROGRESS
Occupational Therapy   Treatment    Name: Izabella Fulton  MRN: [id number]  Admitting Diagnosis:  Acute diastolic congestive heart failure       Recommendations:     Discharge Recommendations: nursing facility, skilled  Discharge Equipment Recommendations:  none, other (see comments)(pt reports that she owns but does not use a bedside commode and a shower chair; both are in the garage)  Barriers to discharge:  Decreased caregiver support    Assessment:     Izabella Fulton is a 86 y.o. female with a medical diagnosis of Acute diastolic congestive heart failure.  She presents with decreased body conditioning/work endurance.  Performance deficits affecting function are weakness, impaired cardiopulmonary response to activity, impaired self care skills.     Rehab Prognosis:  Fair; patient would benefit from acute skilled OT services to address these deficits and reach maximum level of function.       Plan:     Patient to be seen 3 x/week to address the above listed problems via self-care/home management, therapeutic activities, therapeutic exercises, wheelchair management/training  · Plan of Care Expires: 02/14/20  · Plan of Care Reviewed with: patient    Subjective     Pain/Comfort:  · Pain Rating 1: 0/10    Objective:     Communicated with: nursing (Arelis) prior to/post session.  Patient found supine with peripheral IV, telemetry, oxygen, harris catheter upon OT entry to room. Patient was repositioned higher up in bed and HOB elevated significantly (40 degrees) during session, left up after session.    General Precautions: Standard, fall   Orthopedic Precautions:N/A   Braces: N/A     Occupational Performance:  Pulse OX and pulse check x2, start/end of session(start 94%-pulse was 60 / end 94%-pulse 58)  No SOB or breathing distress observed during session.    Bed Mobility:    · Patient completed Scooting/Bridging with supervision and stand by assistance  · Patient completed Supine to Sit with supervision and stand by  assistance     Activities of Daily Living:  · Grooming: supervision with HOB elevated.    Treatment & Education:  Patient completed light grooming first followed by light BUE exercising.  Educated patient on gentle BUE exercising in all planes using yellow(easy) thera-band, she completed 35 resp total with a couple of short rest periods needed during exercising.    Patient left HOB elevated with all lines intact, call button in reach, bed alarm on, nursin(Arelis) notified and Nurse/physician/ presentEducation:      GOALS:   Multidisciplinary Problems     Occupational Therapy Goals        Problem: Occupational Therapy Goal    Goal Priority Disciplines Outcome Interventions   Occupational Therapy Goal     OT, PT/OT Ongoing, Progressing    Description:  Goals to be met by: 2/14/20     Patient will increase functional independence with ADLs by performing:    LE Dressing with Modified [place].  Grooming while standing at sink with Modified [place].  Toileting from toilet with Modified [place] for hygiene and clothing management.   Toilet transfer to toilet with Modified [place] with AD as needed.                      Time Tracking:     OT Date of Treatment: 01/29/20  OT Start Time: 1305  OT Stop Time: 1334  OT Total Time (min): 29 min    Billable Minutes:Self Care/Home Management 10  Therapeutic Activity 09  Therapeutic Exercise 10    IDA Archer  1/29/2020

## 2020-01-30 NOTE — SUBJECTIVE & OBJECTIVE
Interval History:  Patient states, I am not having a good day.  Patient feels more lethargic and exhausted.  Patient's renal functions continued to deteriorate.  Patient feels unwell.  Blood pressure continues to stay high.  Patient is breathing better.  Patient denies any chest pain, palpitations or any abdominal complaints.    Review of Systems   Unable to perform ROS: Acuity of condition     Objective:     Vital Signs (Most Recent):  Temp: 96.4 °F (35.8 °C) (01/30/20 0457)  Pulse: 63 (01/30/20 1535)  Resp: 20 (01/30/20 1535)  BP: (!) 155/67 (01/30/20 1535)  SpO2: 95 % (01/30/20 1535) Vital Signs (24h Range):  Temp:  [96.4 °F (35.8 °C)-98.1 °F (36.7 °C)] 96.4 °F (35.8 °C)  Pulse:  [52-63] 63  Resp:  [17-20] 20  SpO2:  [92 %-99 %] 95 %  BP: (146-174)/(65-74) 155/67     Weight: 58.7 kg (129 lb 6.6 oz)  Body mass index is 28 kg/m².    Intake/Output Summary (Last 24 hours) at 1/30/2020 1549  Last data filed at 1/30/2020 0700  Gross per 24 hour   Intake 430 ml   Output 600 ml   Net -170 ml      Physical Exam   Constitutional: She is oriented to person, place, and time. She appears well-developed and well-nourished. No distress.   HENT:   Head: Normocephalic and atraumatic.   Eyes: Pupils are equal, round, and reactive to light. EOM are normal. Right eye exhibits no discharge. Left eye exhibits no discharge.   Neck: Normal range of motion. JVD present.   Cardiovascular: Regular rhythm, normal heart sounds and intact distal pulses.   No murmur heard.  Bradycardia with a HR of 45 bpm noted on bedside continuous cardiac monitoring.     Pulmonary/Chest: Effort normal and breath sounds normal. No respiratory distress. She has no wheezes. She has no rales. She exhibits no tenderness.   Bipap therapy. Course breath sounds noted throughout all lung fields upon auscultation, no wheezing noted. Patient with no labored respirations upon my initial exam.     Abdominal: Soft. Bowel sounds are normal. She exhibits no distension.  There is no tenderness. There is no rebound and no guarding.   Genitourinary:   Genitourinary Comments: Exam Deferred   De [place] in place     Musculoskeletal: Normal range of motion. She exhibits edema. She exhibits no tenderness or deformity.   1+ bilateral lower extremity pitting edema.   Neurological: She is alert and oriented to person, place, and time. No cranial nerve deficit or sensory deficit.   Skin: Skin is warm and dry. Capillary refill takes 2 to 3 seconds. No rash noted. She is not diaphoretic. No erythema.   Generalized bruising noted throughout bilateral upper extremities   Psychiatric: She has a normal mood and affect. Her behavior is normal. Judgment and thought content normal.   Nursing note and vitals reviewed.      Significant Labs:   CBC:   Recent Labs   Lab 01/29/20  0507 01/30/20  0430   WBC 7.03 6.97   HGB 11.4* 10.2*   HCT 34.6* 31.4*    187     CMP:   Recent Labs   Lab 01/29/20 0507 01/30/20  0430    138   K 4.8 4.8    108   CO2 20* 19*   GLU 86 90   BUN 90* 93*   CREATININE 3.3* 3.6*   CALCIUM 9.9 9.7   PROT 6.2 5.8*   ALBUMIN 2.7* 2.4*   BILITOT 0.4 0.4   ALKPHOS 77 72   AST 32 26   ALT 48* 38   ANIONGAP 12 11   EGFRNONAA 12* 11*     Microbiology Results (last 7 days)     ** No results found for the last 168 hours. **          Significant Imaging:   Echocardiogram (January 16, 2020):  · Concentric left ventricular hypertrophy.  · The mean diastolic gradient across the mitral valve is 2 mmHg at a heart rate of bpm.  · Normal left ventricular systolic function. The estimated ejection fraction is 65%.  · Grade I (mild) left ventricular diastolic dysfunction consistent with impaired relaxation.  · No wall motion abnormalities.  · Mild left atrial enlargement.  · Mild mitral regurgitation.  · Mild tricuspid regurgitation.  · Trivial pericardial effusion.  · No intracardiac thrombi/vegetations.    Right lower extremity venous Doppler:  No evidence of deep venous thrombosis in  the right lower extremity.    Chest x-ray:  Cardiomegaly with worsening fluid overload state, most consistent with decompensated CHF.

## 2020-01-30 NOTE — CONSULTS
Nephrology Consult Progress Note        Patient Name: Izabella Fulton  MRN: [id number]    Patient Class: IP- Inpatient   Admission Date: 1/26/2020  Length of Stay: 4 days  Date of Service: 1/30/2020    Attending Physician: Rachel Chavis MD  Primary Care Provider: Corinne Fletcher NP    Reason for Consult: kaitlynn/ckd3/anemia/htn/chf    SUBJECTIVE:     HPI: 86F with hypertension, hypothyroidism, congestive heart failure  presents with increasing shortness of breath. She was recently discharged from the hospital approximately 1 week ago after admission for CHF exacerbation. Patient's  states that patient's breathing never did improve since discharge home and she has since persisted with a nonproductive cough. Patient was discharged in told to set up a cardiology and pulmonology appointment, have not been to either appointments yet. She was not eating, daily p.o. intake consist of a boost twice a day.    Upon arrival to the emergency room patient noted to be in acute respiratory distress requiring BiPAP therapy in association with a CHF exacerbation with an elevated BNP. Patient also noted to have hypertensive emergency and was treated with IV Lasix and IV nitroglycerin drip.    1/28 VSS, no new complains.  1/29 VSS, no new complains.  1/30 VSS, no new complains, UO is unimpressive despite torsemide 20 daily, started 1/29. sCr is up too.    Past Medical History:   Diagnosis Date    Anticoagulant long-term use     baby aspirin    Closed fracture of multiple ribs of left side with routine healing 1/16/2020    Hypertension     Thyroid disease     TIA (transient ischemic attack)     Ulcerative colitis     Wears hearing aid      Past Surgical History:   Procedure Laterality Date    COLON SURGERY  05/17/12    exp lap, perfered colon    EYE SURGERY      left eye cataract     Family History   Problem Relation Age of Onset    Alzheimer's disease Mother     Heart disease Father      Social History     Tobacco Use     Smoking status: Never Smoker    Smokeless tobacco: Never Used   Substance Use Topics    Alcohol use: No    Drug use: No       Review of patient's allergies indicates:  No Known Allergies    Outpatient meds:  No current facility-administered medications on file prior to encounter.      Current Outpatient Medications on File Prior to Encounter   Medication Sig Dispense Refill    aspirin (ECOTRIN) 81 MG EC tablet Take 81 mg by mouth once daily.        calcitRIOL (ROCALTROL) 0.25 MCG Cap Take 0.25 mcg by mouth twice a week. Sunday and Thursday      calcium-vitamin D 500-125 mg-unit tablet Take 1 tablet by mouth 2 (two) times daily.        carvedilol (COREG) 12.5 MG tablet Take 1 tablet (12.5 mg total) by mouth 2 (two) times daily. 60 tablet 2    denosumab (PROLIA) 60 mg/mL Syrg Inject 60 mg into the skin every 6 (six) months.       DOCOSAHEXANOIC ACID/VIT C/LUT (EYE HEALTH ORAL) Take 1 tablet by mouth 2 (two) times daily.        fish oil-omega-3 fatty acids 300-1,000 mg capsule Take 1 g by mouth once daily.        folic acid (FOLVITE) 800 MCG tablet Take 800 mcg by mouth once daily.        hydrALAZINE (APRESOLINE) 100 MG tablet Take 1 tablet (100 mg total) by mouth every 8 (eight) hours. 90 tablet 2    levothyroxine (SYNTHROID) 100 MCG tablet Take 1 tablet (100 mcg total) by mouth once daily. 90 tablet 1    pravastatin (PRAVACHOL) 20 MG tablet Take 1 tablet (20 mg total) by mouth once daily. 90 tablet 1    torsemide (DEMADEX) 10 MG Tab Take 1 tablet (10 mg total) by mouth every Tues, Fri. Take extra dose on Thursday and Saturday this week then resume Tuesday and Friday dosing 8 tablet 2       Scheduled meds:   amLODIPine  10 mg Oral Daily    aspirin  81 mg Oral Daily    calcitRIOL  0.25 mcg Oral Twice Weekly    calcium-vitamin D3  1 tablet Oral BID    carvedilol  12.5 mg Oral BID    docusate sodium  100 mg Oral BID    folic acid  1 mg Oral Daily    heparin (porcine)  5,000 Units Subcutaneous Q8H     hydrALAZINE  100 mg Oral Q8H    levothyroxine  100 mcg Oral Before breakfast    omega 3-dha-epa-fish oil  1 capsule Oral Daily    ondansetron  4 mg Oral Once    pantoprazole  40 mg Oral Daily    pravastatin  20 mg Oral Daily    torsemide  20 mg Oral Daily       Infusions:      PRN meds:  hydrALAZINE, ondansetron, polyethylene glycol, sodium chloride 0.9%    Review of Systems:  ROS    OBJECTIVE:     Vital Signs and IO (Last 24H):  Temp:  [96.4 °F (35.8 °C)-98.1 °F (36.7 °C)]   Pulse:  [52-61]   Resp:  [17-20]   BP: (146-174)/(65-74)   SpO2:  [92 %-99 %]   I/O last 3 completed shifts:  In: 430 [P.O.:430]  Out: 600 [Urine:600]    Wt Readings from Last 5 Encounters:   01/28/20 58.7 kg (129 lb 6.6 oz)   01/22/20 53.5 kg (118 lb)   01/18/20 54.5 kg (120 lb 3.2 oz)   01/15/20 56.4 kg (124 lb 6.4 oz)   01/03/20 50.8 kg (112 lb)         Physical Exam:  Physical Exam   Constitutional: She is oriented to person, place, and time. She appears well-developed and well-nourished.   HENT:   Head: Normocephalic and atraumatic.   Mouth/Throat: Oropharynx is clear and moist.   Eyes: Pupils are equal, round, and reactive to light. EOM are normal. No scleral icterus.   Neck: Neck supple.   Cardiovascular: Normal rate and regular rhythm.   Pulmonary/Chest: Effort normal. No stridor. No respiratory distress.   Abdominal: Soft. She exhibits no distension.   Musculoskeletal: Normal range of motion. She exhibits edema. She exhibits no deformity.   Neurological: She is alert and oriented to person, place, and time. No cranial nerve deficit.   Skin: Skin is warm and dry. No rash noted. She is not diaphoretic. No erythema.   Psychiatric: She has a normal mood and affect. Her behavior is normal.       Body mass index is 28 kg/m².    Laboratory:  Recent Labs   Lab 01/28/20  0315 01/29/20  0507 01/30/20  0430    138 138   K 5.1 4.8 4.8    106 108   CO2 20* 20* 19*   BUN 88* 90* 93*   CREATININE 3.0* 3.3* 3.6*   ESTGFRAFRICA  16* 14* 13*   EGFRNONAA 14* 12* 11*   GLU 79 86 90       Recent Labs   Lab 01/28/20  0315 01/29/20  0507 01/30/20  0430   CALCIUM 9.5 9.9 9.7   ALBUMIN 2.6* 2.7* 2.4*   PHOS 6.3* 5.9* 6.2*   MG 2.4 2.5 2.6             Recent Labs   Lab 01/30/20  1133   POCTGLUCOSE 191*       Recent Labs   Lab 08/08/18  2330 01/15/20  1329   Hemoglobin A1C 5.5 5.6       Recent Labs   Lab 01/28/20 0315 01/29/20  0507 01/30/20  0430   WBC 4.80 7.03 6.97   HGB 9.9* 11.4* 10.2*   HCT 31.4* 34.6* 31.4*    213 187   MCV 99* 97 97   MCHC 31.5* 32.9 32.5   MONO 14.2  0.7 13.9  1.0 13.6  1.0       Recent Labs   Lab 01/28/20 0315 01/29/20  0507 01/30/20  0430   BILITOT 0.3 0.4 0.4   PROT 5.8* 6.2 5.8*   ALBUMIN 2.6* 2.7* 2.4*   ALKPHOS 77 77 72   ALT 61* 48* 38   AST 44* 32 26       Recent Labs   Lab 08/08/18  1650 05/14/19  2212 09/19/19  0842   Color, UA Yellow Yellow YELLOW   Appearance, UA Clear Clear CLEAR   pH, UA 6.0 7.0 6.5   Specific Gravity, UA <=1.005 A 1.010 1.016   Protein, UA Negative 2+ A 1+ A   Glucose, UA Negative Negative  --    Ketones, UA Negative Negative NEGATIVE   Urobilinogen, UA Negative Negative  --    Bilirubin (UA) Negative Negative  --    Occult Blood UA Negative Negative NEGATIVE   Nitrite, UA Negative Negative NEGATIVE   RBC, UA 1 2  --    WBC, UA 2 1  --    Bacteria, UA  --   --  MODERATE A   Bacteria Few A Rare  --    Hyaline Casts, UA 1 0 NONE SEEN             Microbiology Results (last 7 days)     ** No results found for the last 168 hours. **          ASSESSMENT/PLAN:     Active Hospital Problems    Diagnosis  POA    *Acute diastolic congestive heart failure [I50.31]  Yes    Hyperkalemia [E87.5]  Yes    Hypoalbuminemia due to protein-calorie malnutrition [E46]  Yes    Metabolic acidosis [E87.2]  Yes    Elevated LFTs [R94.5]  Yes    Elevated troponin [R79.89]  Yes    Symptomatic anemia [D64.9]  Yes    Acute on chronic respiratory failure [J96.20]  Yes    Hypertensive emergency [I16.1]   Yes    CKD (chronic kidney disease) stage 4, GFR 15-29 ml/min [N18.4]  Yes    Essential hypertension [I10]  Yes    Hypothyroid [E03.9]  Yes    Hyperlipidemia [E78.5]  Yes      Resolved Hospital Problems   No resolved problems to display.     FERCHO, sCr not better.  CKD stage 3-4.  CHF exacerbation.  No NSAIDs, ACEI/ARB, IV contrast or other nephrotoxins.  Keep MAP > 60, SBP > 100.  Dose meds for GFR < 30 ml/min.  Renal diet - low K, low phos.  Continue diuretics, re-evaluate sCr in AM.  Cards consult apprecaited.    Anemia of CKD  Hgb and HCT are acceptable. Monitor.  Will provide JONNY and/or IV iron PRN.    HTN  BP seem controlled.   Tolerate asymptomatic HTN up to -160.  Continue home meds.  Low sodium diet.    Thank you for allowing us to participate in the care of your patient!   We will follow the patient and provide recommendations as needed.    Alexei Araiza MD    [facility]  [address] [place] [address], [place] [address]    [phone] - tel  [phone] - fax    1/30/2020 1:58 PM

## 2020-01-30 NOTE — PROGRESS NOTES
[facility] Medicine  Progress Note    Patient Name: Izabella Fulton  MRN: [id number]  Patient Class: IP- Inpatient   Admission Date: 1/26/2020  Length of Stay: 4 days  Attending Physician: Rachel Chavis MD  Primary Care Provider: Corinne Fletcher NP        Subjective:     Principal Problem:Acute diastolic congestive heart failure    HPI:  Izabella Fulton is an 86-year-old female with a past medical history of hypertension, hypothyroidism, hyperlipidemia, and congestive heart failure who presents to the emergency room tonight with reports of increasing shortness of breath.  Patient was recently discharged from the hospital approximately 1 week ago in which she was admitted for a CHF exacerbation.  Unsure if patient was diagnosed with congestive heart failure during that hospitalization or prior.  Patient's  states that patient's breathing never did improve since discharge home and she has since persisted with a nonproductive cough.  Patient was discharged in told to set up a cardiology and pulmonology appointment, have not been to either appointments yet.  Patient also reportedly not eating, daily p.o. intake consist of a boost twice a day.  Information for HPI obtained from patient's , Don, and patient's family friend who is at bedside during my initial interview and physical exam.  Upon arrival to the emergency room patient noted to be in acute respiratory distress requiring BiPAP therapy in association with a CHF exacerbation with an elevated BNP.  Patient also noted to have hypertensive emergency upon admission.  Patient was initiated on IV Lasix in emergency room and IV nitroglycerin drip.  Patient admitted to ICU on January 26, 2020 at approximately 11:30 p.m..  Patient reportedly resides at with her  utilizes a walker for ambulatory assist device, denies the use of supplemental oxygen dependence while at home.    Overview/Hospital Course:  No notes on  file    Interval History:  Patient states, I am not having a good day.  Patient feels more lethargic and exhausted.  Patient's renal functions continued to deteriorate.  Patient feels unwell.  Blood pressure continues to stay high.  Patient is breathing better.  Patient denies any chest pain, palpitations or any abdominal complaints.    Review of Systems   Unable to perform ROS: Acuity of condition     Objective:     Vital Signs (Most Recent):  Temp: 96.4 °F (35.8 °C) (01/30/20 0457)  Pulse: 63 (01/30/20 1535)  Resp: 20 (01/30/20 1535)  BP: (!) 155/67 (01/30/20 1535)  SpO2: 95 % (01/30/20 1535) Vital Signs (24h Range):  Temp:  [96.4 °F (35.8 °C)-98.1 °F (36.7 °C)] 96.4 °F (35.8 °C)  Pulse:  [52-63] 63  Resp:  [17-20] 20  SpO2:  [92 %-99 %] 95 %  BP: (146-174)/(65-74) 155/67     Weight: 58.7 kg (129 lb 6.6 oz)  Body mass index is 28 kg/m².    Intake/Output Summary (Last 24 hours) at 1/30/2020 1549  Last data filed at 1/30/2020 0700  Gross per 24 hour   Intake 430 ml   Output 600 ml   Net -170 ml      Physical Exam   Constitutional: She is oriented to person, place, and time. She appears well-developed and well-nourished. No distress.   HENT:   Head: Normocephalic and atraumatic.   Eyes: Pupils are equal, round, and reactive to light. EOM are normal. Right eye exhibits no discharge. Left eye exhibits no discharge.   Neck: Normal range of motion. JVD present.   Cardiovascular: Regular rhythm, normal heart sounds and intact distal pulses.   No murmur heard.  Bradycardia with a HR of 45 bpm noted on bedside continuous cardiac monitoring.     Pulmonary/Chest: Effort normal and breath sounds normal. No respiratory distress. She has no wheezes. She has no rales. She exhibits no tenderness.   Bipap therapy. Course breath sounds noted throughout all lung fields upon auscultation, no wheezing noted. Patient with no labored respirations upon my initial exam.     Abdominal: Soft. Bowel sounds are normal. She exhibits no  distension. There is no tenderness. There is no rebound and no guarding.   Genitourinary:   Genitourinary Comments: Exam Deferred   D Eluna in place     Musculoskeletal: Normal range of motion. She exhibits edema. She exhibits no tenderness or deformity.   1+ bilateral lower extremity pitting edema.   Neurological: She is alert and oriented to person, place, and time. No cranial nerve deficit or sensory deficit.   Skin: Skin is warm and dry. Capillary refill takes 2 to 3 seconds. No rash noted. She is not diaphoretic. No erythema.   Generalized bruising noted throughout bilateral upper extremities   Psychiatric: She has a normal mood and affect. Her behavior is normal. Judgment and thought content normal.   Nursing note and vitals reviewed.      Significant Labs:   CBC:   Recent Labs   Lab 01/29/20  0507 01/30/20  0430   WBC 7.03 6.97   HGB 11.4* 10.2*   HCT 34.6* 31.4*    187     CMP:   Recent Labs   Lab 01/29/20 0507 01/30/20  0430    138   K 4.8 4.8    108   CO2 20* 19*   GLU 86 90   BUN 90* 93*   CREATININE 3.3* 3.6*   CALCIUM 9.9 9.7   PROT 6.2 5.8*   ALBUMIN 2.7* 2.4*   BILITOT 0.4 0.4   ALKPHOS 77 72   AST 32 26   ALT 48* 38   ANIONGAP 12 11   EGFRNONAA 12* 11*     Microbiology Results (last 7 days)     ** No results found for the last 168 hours. **          Significant Imaging:   Echocardiogram (January 16, 2020):  · Concentric left ventricular hypertrophy.  · The mean diastolic gradient across the mitral valve is 2 mmHg at a heart rate of bpm.  · Normal left ventricular systolic function. The estimated ejection fraction is 65%.  · Grade I (mild) left ventricular diastolic dysfunction consistent with impaired relaxation.  · No wall motion abnormalities.  · Mild left atrial enlargement.  · Mild mitral regurgitation.  · Mild tricuspid regurgitation.  · Trivial pericardial effusion.  · No intracardiac thrombi/vegetations.    Right lower extremity venous Doppler:  No evidence of deep venous  thrombosis in the right lower extremity.    Chest x-ray:  Cardiomegaly with worsening fluid overload state, most consistent with decompensated CHF.        Assessment/Plan:      * Acute diastolic congestive heart failure  CHF currently uncontrolled due to volume overload due to: Continued edema of extremities and JVD and Pulmonary edema. Latest ECHO shows ejection fraction of 65%. Continue Lasix and BB and monitor clinical status closely. Monitor on telemetry. Patient is off CHF pathway due to criteria of pathway.  Monitor strict Is&Os and daily weights. Continue to stress to patient importance of self efficacy and  on diet for CHF. Last BNP reviewed- and noted below   Recent Labs   Lab 01/26/20 2120   BNP 1,152*   .  Recent echocardiogram reviewed - follow Cardiology and Nephrology recommendations.      Case discussed with  and Dr. Araiza.  Patient will be given a trial of high-dose diuretic therapy for determination of any improvement in renal function versus future need for hemodialysis.        Symptomatic anemia  Transfuse 2 units of packed red blood cells with hemodialysis treatment.  Follow CBC and transfuse as needed.  Patient would benefit with erythromycin subcutaneous injection therapy as per Nephrology team.      Elevated troponin  Likely in the setting of acute CHF exacerbation, patient denies CP on admit - trend troponin - tele monitoring.        Elevated LFTs  Noted, trend with CMP.        Metabolic acidosis  Noted, trend with CMP.        Hypoalbuminemia due to protein-calorie malnutrition  Dietary consult.        Hyperkalemia  Tele - trend with CMP.        Acute on chronic respiratory failure  Bipap therapy - tele monitoring - ICU admission.        Hypertensive emergency  Chronic problem. Will continue chronic medications and monitor for any changes, adjusting as needed.  Tele monitoring.        CKD (chronic kidney disease) stage 4, GFR 15-29 ml/min  Creatine stable for now. BMP  reviewed- noted Estimated Creatinine Clearance: 12.8 mL/min (A) (based on SCr of 2.7 mg/dL (H)). according to latest data. Monitor UOP and serial BMP and adjust therapy as needed. Renally dose meds.            Hyperlipidemia  Chronic, controlled. Will continue home medication.    Lab Results   Component Value Date    CHOL 154 09/19/2019     Lab Results   Component Value Date    HDL 68 09/19/2019     Lab Results   Component Value Date    LDLCALC 72 09/19/2019     Lab Results   Component Value Date    TRIG 60 09/19/2019     Lab Results   Component Value Date    CHOLHDL 2.3 09/19/2019               Hypothyroid  Lab Results   Component Value Date    TSH 6.425 (H) 01/16/2020       Chronic, controlled.  Will continue home medication.    Essential hypertension  Chronic, uncontrolled.  Latest blood pressure and vitals reviewed-   Temp:  [96.8 °F (36 °C)-98.8 °F (37.1 °C)]   Pulse:  [44-70]   Resp:  [12-32]   BP: ()/()   SpO2:  [88 %-100 %] .   Home meds for hypertension were reviewed and noted below. Hospital anti-hypertensive changes were made as shown below.  Hypertension Medications             carvedilol (COREG) 12.5 MG tablet Take 1 tablet (12.5 mg total) by mouth 2 (two) times daily.    hydrALAZINE (APRESOLINE) 100 MG tablet Take 1 tablet (100 mg total) by mouth every 8 (eight) hours.    torsemide (DEMADEX) 10 MG Tab Take 1 tablet (10 mg total) by mouth every Tues, Fri. Take extra dose on Thursday and Saturday this week then resume Tuesday and Friday dosing      Hospital Medications             carvedilol tablet 12.5 mg 12.5 mg, Oral, 2 times daily    enalaprilat injection 0.625 mg 0.625 mg, Intravenous, ED 1 Time    furosemide injection 40 mg 40 mg, Intravenous, Once    furosemide injection 60 mg (Completed) 60 mg, Intravenous, ED 1 Time    hydrALAZINE tablet 100 mg 100 mg, Oral, Every 8 hours    nitroGLYCERIN 50 mg in dextrose 5 % 250 mL infusion (TITRATING) 40 mcg/min (40 mcg/min), Intravenous,  Continuous @ 12 mL/hr, Use non-sorb tubing.    nitroGLYCERIN 50 mg in dextrose 5 % 250 mL infusion (TITRATING) 5 mcg/min (5 mcg/min), Intravenous, Continuous @ 1.5 mL/hr, Use non-sorb tubing.        Will treat with PRN antihypertensives, as needed, to maintain BP less than 180/110 or if patient becomes symptomatic.            VTE Risk Mitigation (From admission, onward)         Ordered     heparin (porcine) injection 5,000 Units  Every 8 hours      01/27/20 0212     IP VTE HIGH RISK PATIENT  Once      01/27/20 0212     Place ESTUARDO hose  Until discontinued      01/27/20 0212     Place sequential compression device  Until discontinued      01/27/20 0212              Rachel Chavis MD  Department of Hospital Medicine   [facility]    Addendum 3:00 p.m.:  Case discussed with Dr. Araiza who is in favor of starting Lasix 80 mg t.i.d. for now.  Tomorrow he will decide whether patient would benefit with Bumex/Lasix infusion versus need for hemodialysis therapy.

## 2020-01-30 NOTE — ASSESSMENT & PLAN NOTE
Chronic problem. Will continue chronic medications and monitor for any changes, adjusting as needed.  Tele monitoring.

## 2020-01-30 NOTE — PT/OT/SLP PROGRESS
Physical Therapy      Patient Name:  Izabella Fulton   MRN:  [id number]    Patient not seen today secondary to Other (Comment)(pt reports not feeling well in A.M. Nurse Arelis held treatment in P.M.). Will follow-up 1/31/20.    KARYN Hinds

## 2020-01-30 NOTE — PLAN OF CARE
01/29/20 1940   Patient Assessment/Suction   Level of Consciousness (AVPU) alert   Respiratory Effort Unlabored   PRE-TX-O2   O2 Device (Oxygen Therapy) nasal cannula   Flow (L/min) 2   Oxygen Concentration (%) 28   SpO2 97 %   Pulse Oximetry Type Intermittent   $ Pulse Oximetry - Multiple Charge Pulse Oximetry - Multiple   Ready to Wean/Extubation Screen   FIO2<=50 (chart decimal) 0.28   Preset CPAP/BiPAP Settings   Mode Of Delivery Standby

## 2020-01-30 NOTE — PLAN OF CARE
Patient alert and oriented, takes pills whole, patient uses o2 to keep sats up, mild edema noted to lower legs, harris cath intact with yellow urine flowing thru it, safety and neuro intact

## 2020-01-30 NOTE — PROGRESS NOTES
Patient complains of  Nausea and vomiting this is an.  No chest pain or shortness of breath reported.  Mild jugular venous distention on exam .   cardiac exam regular rhythm without gallop or rub heard   lungs clear   abdomen soft   the to 3+ pedal edema  Blood pressure 140 -170 over 65-71     BUN 93 and creatinine  3.6 CO2 19.   impression congestive heart failure diastolic dysfunction    Chronic kidney disease with fluid retention  .    Recommendation nephrology consult.  Trial of IV Lasix  Drip versus dialysis discussed with Dr. Chavis.

## 2020-01-30 NOTE — ASSESSMENT & PLAN NOTE
CHF currently uncontrolled due to volume overload due to: Continued edema of extremities and JVD and Pulmonary edema. Latest ECHO shows ejection fraction of 65%. Continue Lasix and BB and monitor clinical status closely. Monitor on telemetry. Patient is off CHF pathway due to criteria of pathway.  Monitor strict Is&Os and daily weights. Continue to stress to patient importance of self efficacy and  on diet for CHF. Last BNP reviewed- and noted below   Recent Labs   Lab 01/26/20 2120   BNP 1,152*   .  Recent echocardiogram reviewed - follow Cardiology and Nephrology recommendations.      Case discussed with  and Dr. Araiza.  Patient will be given a trial of high-dose diuretic therapy for determination of any improvement in renal function versus future need for hemodialysis.

## 2020-01-31 NOTE — PLAN OF CARE
Problem: Physical Therapy Goal  Goal: Physical Therapy Goal  Description  Goals to be met by: 2020     Patient will increase functional independence with mobility by performin. Supine to sit with MInimal Assistance  2. Sit to stand transfer with Minimal Assistance  3. Bed to chair transfer with Minimal Assistance using Rolling Walker  4. Gait  x 250 feet with Minimal Assistance using Rolling Walker.   5. Lower extremity exercise program x20 reps    Outcome: Ongoing, Progressing   Therapeutic activity: bed mobility, transfers, and gait with rw, Min A and O2 attached.

## 2020-01-31 NOTE — PLAN OF CARE
Plan of care reviewed with pt. GISELA throughout shift. PRN pain medications provided as ordered. Continuous IV Bumex 2ml/hr maintained. Cardiac renal diet. Continue to Monitor Labs. VSS. Continuous telemetry monitor maintained. Safety maintained. Will continue to monitor. No complaints at this time.

## 2020-01-31 NOTE — PLAN OF CARE
POC reviewed with pt and spouse,monitor vitals and lab results,continue on lasix and monitor clinical status,monitor on telemetry,strict I&Os,weigh daily,turn and reposition,harris to gravity,luis alfredo care performed,pt free from falls and injury,safety maintained throughout shift,bed low with wheels locked,call light in reach,will continue to monitor.

## 2020-01-31 NOTE — PT/OT/SLP PROGRESS
"Occupational Therapy   Treatment    Name: Izabella Fulton  MRN: [id number]  Admitting Diagnosis:  Acute diastolic congestive heart failure       Recommendations:     Discharge Recommendations: nursing facility, skilled, nursing facility, basic  Discharge Equipment Recommendations:  3-in-1 commode, walker, standard, wheelchair  Barriers to discharge:  Decreased caregiver support    Assessment:     Izabella Fulton is a 86 y.o. female with a medical diagnosis of Acute diastolic congestive heart failure.  She presents with a state of being deconditioned secondary to ongoing health issues. Performance deficits affecting function are weakness, impaired cardiopulmonary response to activity, impaired self care skills, decreased upper extremity function, decreased lower extremity function, impaired functional mobilty.     Rehab Prognosis:  Fair; patient would benefit from acute skilled OT services to address these deficits and reach maximum level of function.       Plan:     Patient to be seen 3 x/week to address the above listed problems via self-care/home management, therapeutic activities, therapeutic exercises, wheelchair management/training  · Plan of Care Expires: 02/14/20  · Plan of Care Reviewed with: patient, spouse    Subjective     Pain/Comfort:  · Pain Rating 1: 0/10    Objective:     Communicated with: nursing (Christina) prior to session.  Patient found up in chair with peripheral IV, harris catheter, telemetry, oxygen upon OT entry to room.  in room at her side.    General Precautions: Standard, fall   Orthopedic Precautions:N/A   Braces: N/A     Occupational Performance:     Functional Mobility/Transfers:  Patient completed Sit <> Stand Transfer not done today secondary to patient requesting to skip secondary to increased fatigue.  · Functional Mobility: not completed this session.    Activities of Daily Living:  Grooming: attempted to initiate grooming this session, but patient denied the need.  "I already " "did my grooming."     Treatment & Education:  Re-educated patient on gentle BUE exercising in all planes, she was only able to complete 25 reps of bilat horizontal pulls requiring  2 short rest breaks/max verbal coaching to finish.      Patient left up in chair with all lines intact, call button in reach, chair alarm on, nursing notified and  presentEducation:      GOALS:   Multidisciplinary Problems     Occupational Therapy Goals        Problem: Occupational Therapy Goal    Goal Priority Disciplines Outcome Interventions   Occupational Therapy Goal     OT, PT/OT Ongoing, Progressing    Description:  Goals to be met by: 2/14/20     Patient will increase functional independence with ADLs by performing:    LE Dressing with Modified [place].  Grooming while standing at sink with Modified [place].  Toileting from toilet with Modified [place] for hygiene and clothing management.   Toilet transfer to toilet with Modified [place] with AD as needed.                      Time Tracking:     OT Date of Treatment: 01/31/20  OT Start Time: 1400  OT Stop Time: 1408  OT Total Time (min): 8 min    Billable Minutes:Therapeutic Exercise 8    IDA Archer  1/31/2020    "

## 2020-01-31 NOTE — PROGRESS NOTES
Progress Note  Cardiology    Admit Date: 1/26/2020   LOS: 5 days     Follow-up For:  cardiology    Scheduled Meds:   amLODIPine  10 mg Oral Daily    aspirin  81 mg Oral Daily    calcitRIOL  0.25 mcg Oral Twice Weekly    calcium-vitamin D3  1 tablet Oral BID    carvediloL  12.5 mg Oral BID    docusate sodium  100 mg Oral BID    folic acid  1 mg Oral Daily    heparin (porcine)  5,000 Units Subcutaneous Q8H    hydrALAZINE  100 mg Oral Q8H    levalbuterol  0.63 mg Nebulization Q6H    levothyroxine  100 mcg Oral Before breakfast    metOLazone  2.5 mg Oral Daily    omega 3-dha-epa-fish oil  1 capsule Oral Daily    pantoprazole  40 mg Oral Daily    pravastatin  20 mg Oral Daily     Continuous Infusions:   bumetanide (BUMEX)0.25 mg/mL infusion 0.5 mg/hr (01/31/20 1353)     PRN Meds:benzonatate, hydrALAZINE, ondansetron, polyethylene glycol, sodium chloride 0.9%    Review of patient's allergies indicates:  No Known Allergies    SUBJECTIVE:     Interval History: Patient has no complaint of  Chest pain.Some S    OBJECTIVE:     Vital Signs (Most Recent)  Temp: 97.1 °F (36.2 °C) (01/31/20 0753)  Pulse: 65 (01/31/20 1506)  Resp: 18 (01/31/20 1506)  BP: (!) 164/72 (01/31/20 1506)  SpO2: (!) 94 % (01/31/20 1506)    Vital Signs Range (Last 24H):  Temp:  [97 °F (36.1 °C)-97.3 °F (36.3 °C)]   Pulse:  [58-65]   Resp:  [16-20]   BP: (135-197)/(67-81)   SpO2:  [92 %-94 %]       Physical Exam:  Neck: no carotid bruit and no JVD  Lungs: diminished breath sounds bibasilar  Heart: regular rate and rhythm  Extremities: Positive for: edema 2+ bilaterally    Recent Results (from the past 24 hour(s))   Comprehensive metabolic panel    Collection Time: 01/31/20  4:48 AM   Result Value Ref Range    Sodium 138 136 - 145 mmol/L    Potassium 4.7 3.5 - 5.1 mmol/L    Chloride 107 95 - 110 mmol/L    CO2 21 (L) 23 - 29 mmol/L    Glucose 84 70 - 110 mg/dL    BUN, Bld 100 (H) 8 - 23 mg/dL    Creatinine 3.8 (H) 0.5 - 1.4 mg/dL    Calcium 9.7  8.7 - 10.5 mg/dL    Total Protein 6.0 6.0 - 8.4 g/dL    Albumin 2.5 (L) 3.5 - 5.2 g/dL    Total Bilirubin 0.4 0.1 - 1.0 mg/dL    Alkaline Phosphatase 71 55 - 135 U/L    AST 28 10 - 40 U/L    ALT 37 10 - 44 U/L    Anion Gap 10 8 - 16 mmol/L    eGFR if African American 12 (A) >60 mL/min/1.73 m^2    eGFR if non African American 10 (A) >60 mL/min/1.73 m^2   Phosphorus    Collection Time: 01/31/20  4:48 AM   Result Value Ref Range    Phosphorus 6.4 (H) 2.7 - 4.5 mg/dL   Magnesium    Collection Time: 01/31/20  4:48 AM   Result Value Ref Range    Magnesium 2.5 1.6 - 2.6 mg/dL   CBC auto differential    Collection Time: 01/31/20  4:48 AM   Result Value Ref Range    WBC 7.07 3.90 - 12.70 K/uL    RBC 3.22 (L) 4.00 - 5.40 M/uL    Hemoglobin 10.2 (L) 12.0 - 16.0 g/dL    Hematocrit 31.6 (L) 37.0 - 48.5 %    Mean Corpuscular Volume 98 82 - 98 fL    Mean Corpuscular Hemoglobin 31.7 (H) 27.0 - 31.0 pg    Mean Corpuscular Hemoglobin Conc 32.3 32.0 - 36.0 g/dL    RDW 15.5 (H) 11.5 - 14.5 %    Platelets 177 150 - 350 K/uL    MPV 11.2 9.2 - 12.9 fL    Gran # (ANC) 5.2 1.8 - 7.7 K/uL    Immature Grans (Abs) 0.04 0.00 - 0.04 K/uL    Lymph # 0.6 (L) 1.0 - 4.8 K/uL    Mono # 1.0 0.3 - 1.0 K/uL    Eos # 0.2 0.0 - 0.5 K/uL    Baso # 0.02 0.00 - 0.20 K/uL    nRBC 0 0 /100 WBC    Gran% 73.6 (H) 38.0 - 73.0 %    Lymph% 8.5 (L) 18.0 - 48.0 %    Mono% 14.0 4.0 - 15.0 %    Eosinophil% 3.0 0.0 - 8.0 %    Basophil% 0.3 0.0 - 1.9 %    Differential Method Automated        Diagnostic Results:  Labs: Reviewed    ASSESSMENT/PLAN:     CHF,CKD Now on IV Bumex drip    Plan: Continue Current.

## 2020-01-31 NOTE — PROGRESS NOTES
[facility] Hospital Medicine  Progress Note    Patient Name: Izabella Fulton  MRN: [id number]  Patient Class: IP- Inpatient   Admission Date: 1/26/2020  Length of Stay: 5 days  Attending Physician: Rachel Chavis MD  Primary Care Provider: Corinne Fletcher NP        Subjective:     Principal Problem:Acute diastolic congestive heart failure    HPI:  Izabella Fulton is an 86-year-old female with a past medical history of hypertension, hypothyroidism, hyperlipidemia, and congestive heart failure who presents to the emergency room tonight with reports of increasing shortness of breath.  Patient was recently discharged from the hospital approximately 1 week ago in which she was admitted for a CHF exacerbation.  Unsure if patient was diagnosed with congestive heart failure during that hospitalization or prior.  Patient's  states that patient's breathing never did improve since discharge home and she has since persisted with a nonproductive cough.  Patient was discharged in told to set up a cardiology and pulmonology appointment, have not been to either appointments yet.  Patient also reportedly not eating, daily p.o. intake consist of a boost twice a day.  Information for HPI obtained from patient's , Don, and patient's family friend who is at bedside during my initial interview and physical exam.  Upon arrival to the emergency room patient noted to be in acute respiratory distress requiring BiPAP therapy in association with a CHF exacerbation with an elevated BNP.  Patient also noted to have hypertensive emergency upon admission.  Patient was initiated on IV Lasix in emergency room and IV nitroglycerin drip.  Patient admitted to ICU on January 26, 2020 at approximately 11:30 p.m..  Patient reportedly resides at with her  utilizes a walker for ambulatory assist device, denies the use of supplemental oxygen dependence while at home.    Overview/Hospital Course:  No notes on  file    Interval History:  Patient continues to not feel well and reporting sense of shortness of breath.  Renal functions continued to decline.  Blood pressure control has improved.  Patient is on high-dose Lasix therapy since yesterday as per Nephrology recommendations.  Patient denies any chest pain, palpitations or any abdominal complaints.    Review of Systems   Unable to perform ROS: Acuity of condition     Objective:     Vital Signs (Most Recent):  Temp: 97.1 °F (36.2 °C) (01/31/20 0753)  Pulse: 64 (01/31/20 0720)  Resp: 18 (01/31/20 0720)  BP: 135/67 (01/31/20 0720)  SpO2: (!) 94 % (01/31/20 0753) Vital Signs (24h Range):  Temp:  [97 °F (36.1 °C)-97.3 °F (36.3 °C)] 97.1 °F (36.2 °C)  Pulse:  [59-65] 64  Resp:  [16-20] 18  SpO2:  [92 %-95 %] 94 %  BP: (135-197)/(65-81) 135/67     Weight: 57.5 kg (126 lb 12.2 oz)  Body mass index is 27.43 kg/m².    Intake/Output Summary (Last 24 hours) at 1/31/2020 1003  Last data filed at 1/31/2020 0600  Gross per 24 hour   Intake 240 ml   Output 825 ml   Net -585 ml      Physical Exam   Constitutional: She is oriented to person, place, and time. She appears well-developed and well-nourished. No distress.   HENT:   Head: Normocephalic and atraumatic.   Eyes: Pupils are equal, round, and reactive to light. EOM are normal. Right eye exhibits no discharge. Left eye exhibits no discharge.   Neck: Normal range of motion. JVD present.   Cardiovascular: Regular rhythm, normal heart sounds and intact distal pulses.   No murmur heard.  Bradycardia with a HR of 45 bpm noted on bedside continuous cardiac monitoring.     Pulmonary/Chest: Effort normal and breath sounds normal. No respiratory distress. She has no wheezes. She has no rales. She exhibits no tenderness.   Bipap therapy. Course breath sounds noted throughout all lung fields upon auscultation, no wheezing noted. Patient with no labored respirations upon my initial exam.     Abdominal: Soft. Bowel sounds are normal. She  exhibits no distension. There is no tenderness. There is no rebound and no guarding.   Genitourinary:   Genitourinary Comments: Exam Deferred   [place] in place     Musculoskeletal: Normal range of motion. She exhibits edema. She exhibits no tenderness or deformity.   1+ bilateral lower extremity pitting edema.   Neurological: She is alert and oriented to person, place, and time. No cranial nerve deficit or sensory deficit.   Skin: Skin is warm and dry. Capillary refill takes 2 to 3 seconds. No rash noted. She is not diaphoretic. No erythema.   Generalized bruising noted throughout bilateral upper extremities   Psychiatric: She has a normal mood and affect. Her behavior is normal. Judgment and thought content normal.   Nursing note and vitals reviewed.      Significant Labs:   CBC:   Recent Labs   Lab 01/30/20 0430 01/31/20  0448   WBC 6.97 7.07   HGB 10.2* 10.2*   HCT 31.4* 31.6*    177     CMP:   Recent Labs   Lab 01/30/20 0430 01/31/20 0448    138   K 4.8 4.7    107   CO2 19* 21*   GLU 90 84   BUN 93* 100*   CREATININE 3.6* 3.8*   CALCIUM 9.7 9.7   PROT 5.8* 6.0   ALBUMIN 2.4* 2.5*   BILITOT 0.4 0.4   ALKPHOS 72 71   AST 26 28   ALT 38 37   ANIONGAP 11 10   EGFRNONAA 11* 10*     Microbiology Results (last 7 days)     ** No results found for the last 168 hours. **          Significant Imaging:   Echocardiogram (January 16, 2020):  · Concentric left ventricular hypertrophy.  · The mean diastolic gradient across the mitral valve is 2 mmHg at a heart rate of bpm.  · Normal left ventricular systolic function. The estimated ejection fraction is 65%.  · Grade I (mild) left ventricular diastolic dysfunction consistent with impaired relaxation.  · No wall motion abnormalities.  · Mild left atrial enlargement.  · Mild mitral regurgitation.  · Mild tricuspid regurgitation.  · Trivial pericardial effusion.  · No intracardiac thrombi/vegetations.    Right lower extremity venous Doppler:  No evidence of  deep venous thrombosis in the right lower extremity.    Chest x-ray:  Cardiomegaly with worsening fluid overload state, most consistent with decompensated CHF.        Assessment/Plan:      * Acute diastolic congestive heart failure  CHF currently uncontrolled due to volume overload due to: Continued edema of extremities and JVD and Pulmonary edema. Latest ECHO shows ejection fraction of 65%. Continue Lasix and BB and monitor clinical status closely. Monitor on telemetry. Patient is off CHF pathway due to criteria of pathway.  Monitor strict Is&Os and daily weights. Continue to stress to patient importance of self efficacy and  on diet for CHF. Last BNP reviewed- and noted below   Recent Labs   Lab 01/26/20 2120   BNP 1,152*   .  Recent echocardiogram reviewed - follow Cardiology and Nephrology recommendations.      Symptomatic anemia  Status post 2 units of packed red blood cells.  Follow CBC and transfuse as needed.  Patient to receive Epogen as outpatient by Nephrology team.      Elevated troponin  Likely in the setting of acute CHF exacerbation, patient denies CP on admit - trend troponin - tele monitoring.        Elevated LFTs  Noted, trend with CMP.        Metabolic acidosis  Noted, trend with CMP.        Hypoalbuminemia due to protein-calorie malnutrition  Dietary consult.        Hyperkalemia  Tele - trend with CMP.        Acute on chronic respiratory failure  Bipap therapy - tele monitoring - ICU admission.        Hypertensive emergency  Chronic problem. Will continue chronic medications and monitor for any changes, adjusting as needed.  Tele monitoring.        Acute kidney injury on CKD (chronic kidney disease) stage 4, GFR 15-29 ml/min  Case discussed with Dr. Araiza again.  Presently patient is receiving Lasix 80 mg IV q.8 hours.  He is planning to advance to Lasix infusion 10 milligrams/hour.  Hopefully patient's renal functions show improvement and improvement in urine output.  Patient is also  hoping she does not need hemodialysis but if needed she will agree for it.    Hyperlipidemia  Chronic, controlled. Will continue home medication.    Lab Results   Component Value Date    CHOL 154 09/19/2019     Lab Results   Component Value Date    HDL 68 09/19/2019     Lab Results   Component Value Date    LDLCALC 72 09/19/2019     Lab Results   Component Value Date    TRIG 60 09/19/2019     Lab Results   Component Value Date    CHOLHDL 2.3 09/19/2019               Hypothyroid  Lab Results   Component Value Date    TSH 6.425 (H) 01/16/2020       Chronic, controlled.  Will continue home medication.    Essential hypertension  Chronic, uncontrolled.  Latest blood pressure and vitals reviewed-   Temp:  [96.8 °F (36 °C)-98.8 °F (37.1 °C)]   Pulse:  [44-70]   Resp:  [12-32]   BP: ()/()   SpO2:  [88 %-100 %] .   Home meds for hypertension were reviewed and noted below. Hospital anti-hypertensive changes were made as shown below.  Hypertension Medications             carvedilol (COREG) 12.5 MG tablet Take 1 tablet (12.5 mg total) by mouth 2 (two) times daily.    hydrALAZINE (APRESOLINE) 100 MG tablet Take 1 tablet (100 mg total) by mouth every 8 (eight) hours.    torsemide (DEMADEX) 10 MG Tab Take 1 tablet (10 mg total) by mouth every Tues, Fri. Take extra dose on Thursday and Saturday this week then resume Tuesday and Friday dosing      Hospital Medications             carvedilol tablet 12.5 mg 12.5 mg, Oral, 2 times daily    enalaprilat injection 0.625 mg 0.625 mg, Intravenous, ED 1 Time    furosemide injection 40 mg 40 mg, Intravenous, Once    furosemide injection 60 mg (Completed) 60 mg, Intravenous, ED 1 Time    hydrALAZINE tablet 100 mg 100 mg, Oral, Every 8 hours    nitroGLYCERIN 50 mg in dextrose 5 % 250 mL infusion (TITRATING) 40 mcg/min (40 mcg/min), Intravenous, Continuous @ 12 mL/hr, Use non-sorb tubing.    nitroGLYCERIN 50 mg in dextrose 5 % 250 mL infusion (TITRATING) 5 mcg/min (5 mcg/min),  Intravenous, Continuous @ 1.5 mL/hr, Use non-sorb tubing.        Will treat with PRN antihypertensives, as needed, to maintain BP less than 180/110 or if patient becomes symptomatic.      VTE Risk Mitigation (From admission, onward)         Ordered     heparin (porcine) injection 5,000 Units  Every 8 hours      01/27/20 0212     IP VTE HIGH RISK PATIENT  Once      01/27/20 0212     Place ESTUARDO hose  Until discontinued      01/27/20 0212     Place sequential compression device  Until discontinued      01/27/20 0212                Rachel Chavis MD  Department of Hospital Medicine   [facility]

## 2020-01-31 NOTE — PT/OT/SLP PROGRESS
Physical Therapy Treatment    Patient Name:  Izabella Fulton   MRN:  [id number]    Recommendations:     Discharge Recommendations:  home health PT   Discharge Equipment Recommendations: none   Barriers to discharge: None    Assessment:     Izabella Fulton is a 86 y.o. female admitted with a medical diagnosis of Acute diastolic congestive heart failure.  She presents with the following impairments/functional limitations:  weakness, impaired endurance, impaired self care skills, impaired functional mobilty, gait instability, impaired cardiopulmonary response to activity. Tolerated treatment. Ambulated 60' with rw, Min A and O2 @ 3L with chair follow. Reports SOB following ambulation.     Rehab Prognosis: Fair; patient would benefit from acute skilled PT services to address these deficits and reach maximum level of function.    Recent Surgery: * No surgery found *      Plan:     During this hospitalization, patient to be seen 6 x/week to address the identified rehab impairments via gait training, therapeutic activities, therapeutic exercises and progress toward the following goals:    · Plan of Care Expires:  02/28/20    Subjective     Chief Complaint: SOB following ambulation  Patient/Family Comments/goals: none stated   Pain/Comfort:  · Pain Rating 1: 0/10      Objective:     Communicated with nurse Vasquez prior to session.  Patient found HOB elevated with telemetry, harris catheter, bed alarm, oxygen upon PT entry to room.     General Precautions: Standard, fall   Orthopedic Precautions:N/A   Braces:       Functional Mobility:  · Bed Mobility:     · Supine to Sit: contact guard assistance  · Transfers:     · Sit to Stand:  minimum assistance with rolling walker  · Gait: 60' with rw, Min A and O2 @ 3L      AM-PAC 6 CLICK MOBILITY          Therapeutic Activities and Exercises:  Transferred to EOB with CGA  Sit to stand with rw and Min A  Ambulated 60' with rw, Min A and O2 attached and chair follow  Returned to room in  recliner due to SOB      Patient left up in chair with all lines intact, call button in reach, chair alarm on, nurse Christina notified and spouse  present..    GOALS:   Multidisciplinary Problems     Physical Therapy Goals        Problem: Physical Therapy Goal    Goal Priority Disciplines Outcome Goal Variances Interventions   Physical Therapy Goal     PT, PT/OT Ongoing, Progressing     Description:  Goals to be met by: 2020     Patient will increase functional independence with mobility by performin. Supine to sit with MInimal Assistance  2. Sit to stand transfer with Minimal Assistance  3. Bed to chair transfer with Minimal Assistance using Rolling Walker  4. Gait  x 250 feet with Minimal Assistance using Rolling Walker.   5. Lower extremity exercise program x20 reps                     Time Tracking:     PT Received On: 20  PT Start Time: 1000     PT Stop Time: 1020  PT Total Time (min): 20 min     Billable Minutes: Gait Training 10min and Therapeutic Activity 10min    Treatment Type: Treatment  PT/PTA: PTA     PTA Visit Number: 2     D[facility] Portillo [facility]  2020

## 2020-01-31 NOTE — CONSULTS
Nephrology Consult Progress Note        Patient Name: Izabella Fulton  MRN: [id number]    Patient Class: IP- Inpatient   Admission Date: 1/26/2020  Length of Stay: 5 days  Date of Service: 1/31/2020    Attending Physician: Rachel Chavis MD  Primary Care Provider: Corinne Fletcher NP    Reason for Consult: kaitlynn/ckd3/anemia/htn/chf    SUBJECTIVE:     HPI: 86F with hypertension, hypothyroidism, congestive heart failure  presents with increasing shortness of breath. She was recently discharged from the hospital approximately 1 week ago after admission for CHF exacerbation. Patient's  states that patient's breathing never did improve since discharge home and she has since persisted with a nonproductive cough. Patient was discharged in told to set up a cardiology and pulmonology appointment, have not been to either appointments yet. She was not eating, daily p.o. intake consist of a boost twice a day.    Upon arrival to the emergency room patient noted to be in acute respiratory distress requiring BiPAP therapy in association with a CHF exacerbation with an elevated BNP. Patient also noted to have hypertensive emergency and was treated with IV Lasix and IV nitroglycerin drip.    1/28 VSS, no new complains.  1/29 VSS, no new complains.  1/30 VSS, no new complains, UO is unimpressive despite torsemide 20 daily, started 1/29. sCr is up too.  1/31 VSS, no new complains, UO is unimpressive despite escalation from torsemide 20 daily, started 1/29 to Lasix 80mg IV TID. Will escalate to Bumex drip + metolazone... D/w Dr. Chavis.    Past Medical History:   Diagnosis Date    Anticoagulant long-term use     baby aspirin    Closed fracture of multiple ribs of left side with routine healing 1/16/2020    Hypertension     Thyroid disease     TIA (transient ischemic attack)     Ulcerative colitis     Wears hearing aid      Past Surgical History:   Procedure Laterality Date    COLON SURGERY  05/17/12    exp lap, perfered colon     EYE SURGERY      left eye cataract     Family History   Problem Relation Age of Onset    Alzheimer's disease Mother     Heart disease Father      Social History     Tobacco Use    Smoking status: Never Smoker    Smokeless tobacco: Never Used   Substance Use Topics    Alcohol use: No    Drug use: No       Review of patient's allergies indicates:  No Known Allergies    Outpatient meds:  No current facility-administered medications on file prior to encounter.      Current Outpatient Medications on File Prior to Encounter   Medication Sig Dispense Refill    aspirin (ECOTRIN) 81 MG EC tablet Take 81 mg by mouth once daily.        calcitRIOL (ROCALTROL) 0.25 MCG Cap Take 0.25 mcg by mouth twice a week. Sunday and Thursday      calcium-vitamin D 500-125 mg-unit tablet Take 1 tablet by mouth 2 (two) times daily.        carvedilol (COREG) 12.5 MG tablet Take 1 tablet (12.5 mg total) by mouth 2 (two) times daily. 60 tablet 2    denosumab (PROLIA) 60 mg/mL Syrg Inject 60 mg into the skin every 6 (six) months.       DOCOSAHEXANOIC ACID/VIT C/LUT (EYE HEALTH ORAL) Take 1 tablet by mouth 2 (two) times daily.        fish oil-omega-3 fatty acids 300-1,000 mg capsule Take 1 g by mouth once daily.        folic acid (FOLVITE) 800 MCG tablet Take 800 mcg by mouth once daily.        hydrALAZINE (APRESOLINE) 100 MG tablet Take 1 tablet (100 mg total) by mouth every 8 (eight) hours. 90 tablet 2    levothyroxine (SYNTHROID) 100 MCG tablet Take 1 tablet (100 mcg total) by mouth once daily. 90 tablet 1    pravastatin (PRAVACHOL) 20 MG tablet Take 1 tablet (20 mg total) by mouth once daily. 90 tablet 1    torsemide (DEMADEX) 10 MG Tab Take 1 tablet (10 mg total) by mouth every Tues, Fri. Take extra dose on Thursday and Saturday this week then resume Tuesday and Friday dosing 8 tablet 2       Scheduled meds:   amLODIPine  10 mg Oral Daily    aspirin  81 mg Oral Daily    calcitRIOL  0.25 mcg Oral Twice Weekly     calcium-vitamin D3  1 tablet Oral BID    carvediloL  12.5 mg Oral BID    docusate sodium  100 mg Oral BID    folic acid  1 mg Oral Daily    heparin (porcine)  5,000 Units Subcutaneous Q8H    hydrALAZINE  100 mg Oral Q8H    levalbuterol  0.63 mg Nebulization Q6H    levothyroxine  100 mcg Oral Before breakfast    metOLazone  2.5 mg Oral Daily    omega 3-dha-epa-fish oil  1 capsule Oral Daily    pantoprazole  40 mg Oral Daily    pravastatin  20 mg Oral Daily       Infusions:   bumetanide (BUMEX)0.25 mg/mL infusion         PRN meds:  hydrALAZINE, ondansetron, polyethylene glycol, sodium chloride 0.9%    Review of Systems:  ROS    OBJECTIVE:     Vital Signs and IO (Last 24H):  Temp:  [97 °F (36.1 °C)-97.3 °F (36.3 °C)]   Pulse:  [58-65]   Resp:  [16-20]   BP: (135-197)/(67-81)   SpO2:  [92 %-95 %]   I/O last 3 completed shifts:  In: 360 [P.O.:360]  Out: 1125 [Urine:1125]    Wt Readings from Last 5 Encounters:   01/31/20 57.5 kg (126 lb 12.2 oz)   01/22/20 53.5 kg (118 lb)   01/18/20 54.5 kg (120 lb 3.2 oz)   01/15/20 56.4 kg (124 lb 6.4 oz)   01/03/20 50.8 kg (112 lb)         Physical Exam:  Physical Exam   Constitutional: She is oriented to person, place, and time. She appears well-developed and well-nourished.   HENT:   Head: Normocephalic and atraumatic.   Mouth/Throat: Oropharynx is clear and moist.   Eyes: Pupils are equal, round, and reactive to light. EOM are normal. No scleral icterus.   Neck: Neck supple.   Cardiovascular: Normal rate and regular rhythm.   Pulmonary/Chest: Effort normal. No stridor. No respiratory distress.   Abdominal: Soft. She exhibits no distension.   Musculoskeletal: Normal range of motion. She exhibits edema. She exhibits no deformity.   Neurological: She is alert and oriented to person, place, and time. No cranial nerve deficit.   Skin: Skin is warm and dry. No rash noted. She is not diaphoretic. No erythema.   Psychiatric: She has a normal mood and affect. Her behavior is  normal.       Body mass index is 27.43 kg/m².    Laboratory:  Recent Labs   Lab 01/29/20  0507 01/30/20  0430 01/31/20  0448    138 138   K 4.8 4.8 4.7    108 107   CO2 20* 19* 21*   BUN 90* 93* 100*   CREATININE 3.3* 3.6* 3.8*   ESTGFRAFRICA 14* 13* 12*   EGFRNONAA 12* 11* 10*   GLU 86 90 84       Recent Labs   Lab 01/29/20  0507 01/30/20  0430 01/31/20  0448   CALCIUM 9.9 9.7 9.7   ALBUMIN 2.7* 2.4* 2.5*   PHOS 5.9* 6.2* 6.4*   MG 2.5 2.6 2.5             Recent Labs   Lab 01/30/20  1133   POCTGLUCOSE 191*       Recent Labs   Lab 08/08/18  2330 01/15/20  1329   Hemoglobin A1C 5.5 5.6       Recent Labs   Lab 01/29/20  0507 01/30/20  0430 01/31/20  0448   WBC 7.03 6.97 7.07   HGB 11.4* 10.2* 10.2*   HCT 34.6* 31.4* 31.6*    187 177   MCV 97 97 98   MCHC 32.9 32.5 32.3   MONO 13.9  1.0 13.6  1.0 14.0  1.0       Recent Labs   Lab 01/29/20  0507 01/30/20  0430 01/31/20  0448   BILITOT 0.4 0.4 0.4   PROT 6.2 5.8* 6.0   ALBUMIN 2.7* 2.4* 2.5*   ALKPHOS 77 72 71   ALT 48* 38 37   AST 32 26 28       Recent Labs   Lab 08/08/18  1650 05/14/19  2212 09/19/19  0842   Color, UA Yellow Yellow YELLOW   Appearance, UA Clear Clear CLEAR   pH, UA 6.0 7.0 6.5   Specific Gravity, UA <=1.005 A 1.010 1.016   Protein, UA Negative 2+ A 1+ A   Glucose, UA Negative Negative  --    Ketones, UA Negative Negative NEGATIVE   Urobilinogen, UA Negative Negative  --    Bilirubin (UA) Negative Negative  --    Occult Blood UA Negative Negative NEGATIVE   Nitrite, UA Negative Negative NEGATIVE   RBC, UA 1 2  --    WBC, UA 2 1  --    Bacteria, UA  --   --  MODERATE A   Bacteria Few A Rare  --    Hyaline Casts, UA 1 0 NONE SEEN             Microbiology Results (last 7 days)     ** No results found for the last 168 hours. **          ASSESSMENT/PLAN:     Active Hospital Problems    Diagnosis  POA    *Acute diastolic congestive heart failure [I50.31]  Yes    Hyperkalemia [E87.5]  Yes    Hypoalbuminemia due to protein-calorie  malnutrition [E46]  Yes    Metabolic acidosis [E87.2]  Yes    Elevated LFTs [R94.5]  Yes    Elevated troponin [R79.89]  Yes    Symptomatic anemia [D64.9]  Yes    Acute on chronic respiratory failure [J96.20]  Yes    Hypertensive emergency [I16.1]  Yes    CKD (chronic kidney disease) stage 4, GFR 15-29 ml/min [N18.4]  Yes    Essential hypertension [I10]  Yes    Hypothyroid [E03.9]  Yes    Hyperlipidemia [E78.5]  Yes      Resolved Hospital Problems   No resolved problems to display.     FERCHO, sCr not better.  CKD stage 3-4.  CHF exacerbation with volume overload.  No NSAIDs, ACEI/ARB, IV contrast or other nephrotoxins.  Keep MAP > 60, SBP > 100.  Dose meds for GFR < 30 ml/min.  Renal diet - low K, low phos. Restrict oral liquids.  Continue diuretics, escalate as above.  If not better by Monday, may need to consider dialysis.  Cards consult apprecaited.    Anemia of CKD  Hgb and HCT are acceptable. Monitor.  Will provide JONNY and/or IV iron PRN.    HTN  BP seem controlled. But remains hypertensive, continue attempts to diurese.  Tolerate asymptomatic HTN up to -160.  Continue home meds.  Low sodium diet.    Thank you for allowing us to participate in the care of your patient!   We will follow the patient and provide recommendations as needed.    Alexei Araiza MD    [facility]  [address] [place] [address]  [place] [address]    [phone] - tel  [phone] - fax    1/31/2020 1:58 PM

## 2020-01-31 NOTE — SUBJECTIVE & OBJECTIVE
Interval History:  Patient continues to not feel well and reporting sense of shortness of breath.  Renal functions continued to decline.  Blood pressure control has improved.  Patient is on high-dose Lasix therapy since yesterday as per Nephrology recommendations.  Patient denies any chest pain, palpitations or any abdominal complaints.    Review of Systems   Unable to perform ROS: Acuity of condition     Objective:     Vital Signs (Most Recent):  Temp: 97.1 °F (36.2 °C) (01/31/20 0753)  Pulse: 64 (01/31/20 0720)  Resp: 18 (01/31/20 0720)  BP: 135/67 (01/31/20 0720)  SpO2: (!) 94 % (01/31/20 0753) Vital Signs (24h Range):  Temp:  [97 °F (36.1 °C)-97.3 °F (36.3 °C)] 97.1 °F (36.2 °C)  Pulse:  [59-65] 64  Resp:  [16-20] 18  SpO2:  [92 %-95 %] 94 %  BP: (135-197)/(65-81) 135/67     Weight: 57.5 kg (126 lb 12.2 oz)  Body mass index is 27.43 kg/m².    Intake/Output Summary (Last 24 hours) at 1/31/2020 1003  Last data filed at 1/31/2020 0600  Gross per 24 hour   Intake 240 ml   Output 825 ml   Net -585 ml      Physical Exam   Constitutional: She is oriented to person, place, and time. She appears well-developed and well-nourished. No distress.   HENT:   Head: Normocephalic and atraumatic.   Eyes: Pupils are equal, round, and reactive to light. EOM are normal. Right eye exhibits no discharge. Left eye exhibits no discharge.   Neck: Normal range of motion. JVD present.   Cardiovascular: Regular rhythm, normal heart sounds and intact distal pulses.   No murmur heard.  Bradycardia with a HR of 45 bpm noted on bedside continuous cardiac monitoring.     Pulmonary/Chest: Effort normal and breath sounds normal. No respiratory distress. She has no wheezes. She has no rales. She exhibits no tenderness.   Bipap therapy. Course breath sounds noted throughout all lung fields upon auscultation, no wheezing noted. Patient with no labored respirations upon my initial exam.     Abdominal: Soft. Bowel sounds are normal. She exhibits no  distension. There is no tenderness. There is no rebound and no guarding.   Genitourinary:   Genitourinary Comments: Exam Deferred   De Luna in place     Musculoskeletal: Normal range of motion. She exhibits edema. She exhibits no tenderness or deformity.   1+ bilateral lower extremity pitting edema.   Neurological: She is alert and oriented to person, place, and time. No cranial nerve deficit or sensory deficit.   Skin: Skin is warm and dry. Capillary refill takes 2 to 3 seconds. No rash noted. She is not diaphoretic. No erythema.   Generalized bruising noted throughout bilateral upper extremities   Psychiatric: She has a normal mood and affect. Her behavior is normal. Judgment and thought content normal.   Nursing note and vitals reviewed.      Significant Labs:   CBC:   Recent Labs   Lab 01/30/20 0430 01/31/20  0448   WBC 6.97 7.07   HGB 10.2* 10.2*   HCT 31.4* 31.6*    177     CMP:   Recent Labs   Lab 01/30/20 0430 01/31/20 0448    138   K 4.8 4.7    107   CO2 19* 21*   GLU 90 84   BUN 93* 100*   CREATININE 3.6* 3.8*   CALCIUM 9.7 9.7   PROT 5.8* 6.0   ALBUMIN 2.4* 2.5*   BILITOT 0.4 0.4   ALKPHOS 72 71   AST 26 28   ALT 38 37   ANIONGAP 11 10   EGFRNONAA 11* 10*     Microbiology Results (last 7 days)     ** No results found for the last 168 hours. **          Significant Imaging:   Echocardiogram (January 16, 2020):  · Concentric left ventricular hypertrophy.  · The mean diastolic gradient across the mitral valve is 2 mmHg at a heart rate of bpm.  · Normal left ventricular systolic function. The estimated ejection fraction is 65%.  · Grade I (mild) left ventricular diastolic dysfunction consistent with impaired relaxation.  · No wall motion abnormalities.  · Mild left atrial enlargement.  · Mild mitral regurgitation.  · Mild tricuspid regurgitation.  · Trivial pericardial effusion.  · No intracardiac thrombi/vegetations.    Right lower extremity venous Doppler:  No evidence of deep venous  thrombosis in the right lower extremity.    Chest x-ray:  Cardiomegaly with worsening fluid overload state, most consistent with decompensated CHF.

## 2020-02-01 PROBLEM — N18.6 ESRD (END STAGE RENAL DISEASE): Chronic | Status: ACTIVE | Noted: 2019-01-01

## 2020-02-01 NOTE — SUBJECTIVE & OBJECTIVE
Interval History:  Patient continues to not feel well and reporting sense of shortness of breath.  Renal functions continued to decline.  Blood pressure control has improved.  Patient is on high-dose Lasix therapy since yesterday as per Nephrology recommendations but increasingly becoming hypoxic due to volume overload. Nephrology would like to pursue dialysis today. Patient's  consented to ETT intubation if hypoxia worsens. Nephrology discussed HD.     Review of Systems   Unable to perform ROS: Acuity of condition     Objective:     Vital Signs (Most Recent):  Temp: 97.5 °F (36.4 °C) (02/01/20 0926)  Pulse: 60 (02/01/20 1135)  Resp: 18 (02/01/20 1135)  BP: (!) 164/83 (02/01/20 1135)  SpO2: (!) 93 % (02/01/20 1135) Vital Signs (24h Range):  Temp:  [96.2 °F (35.7 °C)-97.5 °F (36.4 °C)] 97.5 °F (36.4 °C)  Pulse:  [] 60  Resp:  [16-22] 18  SpO2:  [89 %-94 %] 93 %  BP: (113-189)/(57-83) 164/83     Weight: 58.1 kg (128 lb 1.4 oz)  Body mass index is 27.72 kg/m².    Intake/Output Summary (Last 24 hours) at 2/1/2020 1155  Last data filed at 2/1/2020 0600  Gross per 24 hour   Intake 480 ml   Output 1150 ml   Net -670 ml      Physical Exam   Constitutional: She appears well-developed and well-nourished. She appears lethargic. No distress.   HENT:   Head: Normocephalic and atraumatic.   Bipap on face   Eyes: Pupils are equal, round, and reactive to light. EOM are normal. Right eye exhibits no discharge. Left eye exhibits no discharge.   Neck: Normal range of motion. JVD present.   Cardiovascular: Regular rhythm, normal heart sounds and intact distal pulses.   No murmur heard.  Bradycardia with a HR of 45 bpm noted on bedside continuous cardiac monitoring.     Pulmonary/Chest: Accessory muscle usage present. Tachypnea noted. No respiratory distress. She has no wheezes. She has rales. She exhibits no tenderness.   Bipap therapy. Course breath sounds noted throughout all lung fields upon auscultation, no wheezing  noted. Patient with no labored respirations upon my initial exam.     Abdominal: Soft. Bowel sounds are normal. She exhibits no distension. There is no tenderness. There is no rebound and no guarding.   Genitourinary:   Genitourinary Comments: Exam Deferred   De Luna in place     Musculoskeletal: Normal range of motion. She exhibits edema. She exhibits no tenderness or deformity.   1+ bilateral lower extremity pitting edema.   Neurological: She appears lethargic. No cranial nerve deficit or sensory deficit.   Skin: Skin is warm and dry. Capillary refill takes 2 to 3 seconds. No rash noted. She is not diaphoretic. No erythema.   Generalized bruising noted throughout bilateral upper extremities   Psychiatric: Her behavior is normal. Judgment and thought content normal.   Nursing note and vitals reviewed.      Significant Labs:   CBC:   Recent Labs   Lab 01/31/20 0448 02/01/20  0506   WBC 7.07 8.53   HGB 10.2* 9.6*   HCT 31.6* 29.5*    169     CMP:   Recent Labs   Lab 01/31/20 0448 02/01/20  0506    141   K 4.7 4.5    104   CO2 21* 23   GLU 84 92   * 107*   CREATININE 3.8* 4.3*   CALCIUM 9.7 9.8   PROT 6.0 6.2   ALBUMIN 2.5* 2.6*   BILITOT 0.4 0.4   ALKPHOS 71 69   AST 28 27   ALT 37 36   ANIONGAP 10 14   EGFRNONAA 10* 9*     Microbiology Results (last 7 days)     ** No results found for the last 168 hours. **          Significant Imaging:   Echocardiogram (January 16, 2020):  · Concentric left ventricular hypertrophy.  · The mean diastolic gradient across the mitral valve is 2 mmHg at a heart rate of bpm.  · Normal left ventricular systolic function. The estimated ejection fraction is 65%.  · Grade I (mild) left ventricular diastolic dysfunction consistent with impaired relaxation.  · No wall motion abnormalities.  · Mild left atrial enlargement.  · Mild mitral regurgitation.  · Mild tricuspid regurgitation.  · Trivial pericardial effusion.  · No intracardiac thrombi/vegetations.    Right  lower extremity venous Doppler:  No evidence of deep venous thrombosis in the right lower extremity.    Chest x-ray:  Cardiomegaly with worsening fluid overload state, most consistent with decompensated CHF.

## 2020-02-01 NOTE — PROGRESS NOTES
Patient complains of shortness of breath intermittently and O2 sats were down she was put on BiPAP with improvement and patient does not like that.  Under blood pressure is still fluctuating.  She is on IV Lasix drip and the urine output is not great.  BUN creatinine rising.  Nephrology has been consulted and had considering line placement for dialysis.  On examination the patient is comfortable at this time there is mild jugular venous distention  Cardiac exam regular rhythm heart sounds distant  Lungs coarse breath sounds abdomen soft. 2+ pretibial edema.  Patient is not responding well to diuretics and hopefully will improve with dialysis.

## 2020-02-01 NOTE — ASSESSMENT & PLAN NOTE
Lab Results   Component Value Date    TSH 6.425 (H) 01/16/2020       Chronic, controlled.  Will continue home medication per NGT

## 2020-02-01 NOTE — RESPIRATORY THERAPY
Pt transferred to [facility] from [facility] without complications transferred on 100% NRBM then placed backl on BIPAP with previous settings. Pt and family discussing continuing care options

## 2020-02-01 NOTE — PROGRESS NOTES
Progress Note  Nephrology    Admit Date: 1/26/2020   LOS: 6 days     SUBJECTIVE:     CC:  FERCHO on CKD3, anemia    Past medical, surgical and social history reviewed    HPI: 86F with hypertension, hypothyroidism, congestive heart failure  presents with increasing shortness of breath. She was recently discharged from the hospital approximately 1 week ago after admission for CHF exacerbation. Patient's  states that patient's breathing never did improve since discharge home and she has since persisted with a nonproductive cough. Patient was discharged in told to set up a cardiology and pulmonology appointment, have not been to either appointments yet. She was not eating, daily p.o. intake consist of a boost twice a day.     Upon arrival to the emergency room patient noted to be in acute respiratory distress requiring BiPAP therapy in association with a CHF exacerbation with an elevated BNP. Patient also noted to have hypertensive emergency and was treated with IV Lasix and IV nitroglycerin drip.     1/28 VSS, no new complains.  1/29 VSS, no new complains.  1/30 VSS, no new complains, UO is unimpressive despite torsemide 20 daily, started 1/29. sCr is up too.  1/31 VSS, no new complains, UO is unimpressive despite escalation from torsemide 20 daily, started 1/29 to Lasix 80mg IV TID. Will escalate to Bumex drip + metolazone... D/w Dr. Chavis.   2/1  Renal function worsening.  UOP 1.1L despite aggressive diuresis attempts.  Pt is on BIPAP, d/w bedside nurse.  She reports pt w/lethargy, says stat ABG was ordered per primary team.  Spouse at bedside reports some transient confusion.  ?uremia vs poor oxygenation.  D/w Dr. Chacon, will order line placement for dialysis.  Spoke to pt's spouse at length, he is aware that she may need dialysis, states that Mrs. Fulton was also made aware of this, and they are agreeable.  Discussed risks/benefits, explained that she would need a special type of line that a surgeon would come  and put in.  Answered questions, provided support.  He voices understanding of all.    Assessment/plan:    FERCHO, sCr not better.  CKD stage 3-4.  CHF exacerbation with volume overload.  No NSAIDs, ACEI/ARB, IV contrast or other nephrotoxins.  Keep MAP > 60, SBP > 100.  Dose meds for GFR < 30 ml/min.  Renal diet - low K, low phos. Restrict oral liquids.  Continue diuretics, escalate as above.  Cards consult apprecaited.     Anemia of CKD  Hgb and HCT are acceptable. Monitor.  Will provide JONNY and/or IV iron PRN.     HTN  BP seem controlled. But remains hypertensive, continue attempts to diurese.  Tolerate asymptomatic HTN up to -160.  Continue home meds.  Low sodium diet.     Thank you for allowing us to participate in the care of your patient!   We will follow the patient and provide recommendations as needed.    Review of Systems:  Unable to obtain 2/2 pt acuity, bipap    OBJECTIVE:     Vital Signs (Most Recent)  Temp: 96.2 °F (35.7 °C) (20)  Pulse: 105 (20)  Resp: (!) 22 (20)  BP: (!) 163/77 (20)  SpO2: (!) 93 % (20)    Vital Signs Range (Last 24H):  Temp:  [96.2 °F (35.7 °C)]   Pulse:  []   Resp:  [16-22]   BP: (113-189)/(57-81)   SpO2:  [89 %-94 %]     Temp (24hrs), Av.2 °F (35.7 °C), Min:96.2 °F (35.7 °C), Max:96.2 °F (35.7 °C)    Systolic (24hrs), Av , Min:113 , Max:189     Diastolic (24hrs), Av, Min:57, Max:81      I & O (Last 24H):    Intake/Output Summary (Last 24 hours) at 2020 0925  Last data filed at 2020 0600  Gross per 24 hour   Intake 480 ml   Output 1150 ml   Net -670 ml     Physical Exam:  General appearance: pale  Eyes:  wnl  Neck: supple  Lungs:  on bipap, diminished breath sounds  Heart: regular rate and rhythm, S1, S2 normal, no murmur, click, rub or gallop  Abdomen: abd soft, +bs  Extremities: edema  Skin: Skin color, texture, turgor normal. No rashes or lesions  Neurologic: Mental status: sleeping, arouses  briefly, goes right back to sleep    Laboratory:  CBC:  Recent Labs   Lab 02/01/20  0506   WBC 8.53   RBC 3.01*   HGB 9.6*   HCT 29.5*      MCV 98   MCH 31.9*   MCHC 32.5     BMP:  Recent Labs   Lab 02/01/20  0506      K 4.5      CO2 23   *   CREATININE 4.3*   CALCIUM 9.8      CMP:   Recent Labs   Lab 02/01/20  0506   GLU 92   CALCIUM 9.8   ALBUMIN 2.6*   PROT 6.2      K 4.5   CO2 23      *   CREATININE 4.3*   ALKPHOS 69   ALT 36   AST 27   BILITOT 0.4     All other lab data reviewed and negative unless otherwise specified   Diagnostic Results:  Labs: Reviewed    ASSESSMENT/PLAN:     Active Hospital Problems    Diagnosis  POA    *Acute diastolic congestive heart failure [I50.31]  Yes    Hyperkalemia [E87.5]  Yes    Hypoalbuminemia due to protein-calorie malnutrition [E46]  Yes    Metabolic acidosis [E87.2]  Yes    Elevated LFTs [R94.5]  Yes    Elevated troponin [R79.89]  Yes    Symptomatic anemia [D64.9]  Yes    Acute on chronic respiratory failure [J96.20]  Yes    Hypertensive emergency [I16.1]  Yes    CKD (chronic kidney disease) stage 4, GFR 15-29 ml/min [N18.4]  Yes    Essential hypertension [I10]  Yes    Hypothyroid [E03.9]  Yes    Hyperlipidemia [E78.5]  Yes      Resolved Hospital Problems   No resolved problems to display.

## 2020-02-01 NOTE — NURSING
Called unit again and spoke with ANDI Macdonald. Still no ETA on surgeon/line placement at this time.

## 2020-02-01 NOTE — NURSING TRANSFER
Nursing Transfer Note      2/1/2020     Transfer to room 512 from 206  Transfer via bed, portable O2 100% NRB in use. RN and resp therapist in attendance.    Transfer with clothes and shoes    Transported by bed  Medicines: Bumex drip continued

## 2020-02-01 NOTE — PROGRESS NOTES
[facility] Hospital Medicine  Progress Note    Patient Name: Izabella Fulton  MRN: [id number]  Patient Class: IP- Inpatient   Admission Date: 1/26/2020  Length of Stay: 6 days  Attending Physician: John Oliver MD  Primary Care Provider: Corinne Fletcher NP        Subjective:     Principal Problem:Acute diastolic congestive heart failure        HPI:  Izabella Fulton is an 86-year-old female with a past medical history of hypertension, hypothyroidism, hyperlipidemia, and congestive heart failure who presents to the emergency room tonight with reports of increasing shortness of breath.  Patient was recently discharged from the hospital approximately 1 week ago in which she was admitted for a CHF exacerbation.  Unsure if patient was diagnosed with congestive heart failure during that hospitalization or prior.  Patient's  states that patient's breathing never did improve since discharge home and she has since persisted with a nonproductive cough.  Patient was discharged in told to set up a cardiology and pulmonology appointment, have not been to either appointments yet.  Patient also reportedly not eating, daily p.o. intake consist of a boost twice a day.  Information for HPI obtained from patient's , Don, and patient's family friend who is at bedside during my initial interview and physical exam.  Upon arrival to the emergency room patient noted to be in acute respiratory distress requiring BiPAP therapy in association with a CHF exacerbation with an elevated BNP.  Patient also noted to have hypertensive emergency upon admission.  Patient was initiated on IV Lasix in emergency room and IV nitroglycerin drip.  Patient admitted to ICU on January 26, 2020 at approximately 11:30 p.m..  Patient reportedly resides at with her  utilizes a walker for ambulatory assist device, denies the use of supplemental oxygen dependence while at home.    Overview/Hospital Course:  No notes on  file    Interval History:  Patient continues to not feel well and reporting sense of shortness of breath.  Renal functions continued to decline.  Blood pressure control has improved.  Patient is on high-dose Lasix therapy since yesterday as per Nephrology recommendations but increasingly becoming hypoxic due to volume overload. Nephrology would like to pursue dialysis today. Patient's  consented to ETT intubation if hypoxia worsens. Nephrology discussed HD.     Review of Systems   Unable to perform ROS: Acuity of condition     Objective:     Vital Signs (Most Recent):  Temp: 97.5 °F (36.4 °C) (02/01/20 0926)  Pulse: 60 (02/01/20 1135)  Resp: 18 (02/01/20 1135)  BP: (!) 164/83 (02/01/20 1135)  SpO2: (!) 93 % (02/01/20 1135) Vital Signs (24h Range):  Temp:  [96.2 °F (35.7 °C)-97.5 °F (36.4 °C)] 97.5 °F (36.4 °C)  Pulse:  [] 60  Resp:  [16-22] 18  SpO2:  [89 %-94 %] 93 %  BP: (113-189)/(57-83) 164/83     Weight: 58.1 kg (128 lb 1.4 oz)  Body mass index is 27.72 kg/m².    Intake/Output Summary (Last 24 hours) at 2/1/2020 1155  Last data filed at 2/1/2020 0600  Gross per 24 hour   Intake 480 ml   Output 1150 ml   Net -670 ml      Physical Exam   Constitutional: She appears well-developed and well-nourished. She appears lethargic. No distress.   HENT:   Head: Normocephalic and atraumatic.   Bipap on face   Eyes: Pupils are equal, round, and reactive to light. EOM are normal. Right eye exhibits no discharge. Left eye exhibits no discharge.   Neck: Normal range of motion. JVD present.   Cardiovascular: Regular rhythm, normal heart sounds and intact distal pulses.   No murmur heard.  Bradycardia with a HR of 45 bpm noted on bedside continuous cardiac monitoring.     Pulmonary/Chest: Accessory muscle usage present. Tachypnea noted. No respiratory distress. She has no wheezes. She has rales. She exhibits no tenderness.   Bipap therapy. Course breath sounds noted throughout all lung fields upon auscultation, no  wheezing noted. Patient with no labored respirations upon my initial exam.     Abdominal: Soft. Bowel sounds are normal. She exhibits no distension. There is no tenderness. There is no rebound and no guarding.   Genitourinary:   Genitourinary Comments: Exam Deferred   De Luna in place     Musculoskeletal: Normal range of motion. She exhibits edema. She exhibits no tenderness or deformity.   1+ bilateral lower extremity pitting edema.   Neurological: She appears lethargic. No cranial nerve deficit or sensory deficit.   Skin: Skin is warm and dry. Capillary refill takes 2 to 3 seconds. No rash noted. She is not diaphoretic. No erythema.   Generalized bruising noted throughout bilateral upper extremities   Psychiatric: Her behavior is normal. Judgment and thought content normal.   Nursing note and vitals reviewed.      Significant Labs:   CBC:   Recent Labs   Lab 01/31/20 0448 02/01/20  0506   WBC 7.07 8.53   HGB 10.2* 9.6*   HCT 31.6* 29.5*    169     CMP:   Recent Labs   Lab 01/31/20 0448 02/01/20  0506    141   K 4.7 4.5    104   CO2 21* 23   GLU 84 92   * 107*   CREATININE 3.8* 4.3*   CALCIUM 9.7 9.8   PROT 6.0 6.2   ALBUMIN 2.5* 2.6*   BILITOT 0.4 0.4   ALKPHOS 71 69   AST 28 27   ALT 37 36   ANIONGAP 10 14   EGFRNONAA 10* 9*     Microbiology Results (last 7 days)     ** No results found for the last 168 hours. **          Significant Imaging:   Echocardiogram (January 16, 2020):  · Concentric left ventricular hypertrophy.  · The mean diastolic gradient across the mitral valve is 2 mmHg at a heart rate of bpm.  · Normal left ventricular systolic function. The estimated ejection fraction is 65%.  · Grade I (mild) left ventricular diastolic dysfunction consistent with impaired relaxation.  · No wall motion abnormalities.  · Mild left atrial enlargement.  · Mild mitral regurgitation.  · Mild tricuspid regurgitation.  · Trivial pericardial effusion.  · No intracardiac  thrombi/vegetations.    Right lower extremity venous Doppler:  No evidence of deep venous thrombosis in the right lower extremity.    Chest x-ray:  Cardiomegaly with worsening fluid overload state, most consistent with decompensated CHF.        Assessment/Plan:      * Acute diastolic congestive heart failure  CHF currently uncontrolled due to volume overload due to: Continued edema of extremities and JVD and Pulmonary edema. Latest ECHO shows ejection fraction of 65%. Stop Lasix and continue BB and monitor clinical status closely. Monitor on telemetry. Patient is off CHF pathway due to criteria of pathway.  Monitor strict Is&Os and daily weights. Continue to stress to patient importance of self efficacy and  on diet for CHF. Last BNP reviewed- and noted below   Recent Labs   Lab 01/26/20 2120   BNP 1,152*   .  Recent echocardiogram reviewed - follow Cardiology and Nephrology recommendations.      Starting HD per nephrology. Stop Bumex and Metolazone.         Acute on chronic respiratory failure  Acute respiratory failure due to hypervolemia related to ESRD. Failed Bumex diuresis. Patient appears fatigued. I discussed intubation and ventilation with the patient and her  who both consent to the procedure. Ventilation will likely be temporary and removed after dialysis.   - Stat ABG shows mild hypoxia to 77% of 50%FIO2 Bipap.   - CXR c/w diffuse pulmonary edema b/l  - Transfer to ICU for intubation and ventilation   - Dialysis per nephrology  - Consult pulmonology for vent mgmt      ESRD (end stage renal disease)  BMP reviewed- noted Estimated Creatinine Clearance: 8.6 mL/min (A) (based on SCr of 4.3 mg/dL (H)). according to latest data. Monitor UOP. Nephrology consulted and recommending dialysis for hypervolemia.   - HD orders per nephrology   - Appreciate recommendations and assistance             Symptomatic anemia  Transfuse 2 units of packed red blood cells with hemodialysis treatment.  Follow CBC  and transfuse as needed.  Patient would benefit with erythromycin subcutaneous injection therapy as per Nephrology team.      Elevated troponin  Likely in the setting of acute CHF exacerbation, patient denies CP on admit - trend troponin - tele monitoring.        Elevated LFTs  Noted, trend with CMP.        Metabolic acidosis  Noted, trend with CMP.        Hypoalbuminemia due to protein-calorie malnutrition  Dietary consult.        Hyperkalemia  Tele - trend with CMP.        Hypertensive emergency  Chronic problem. Will continue chronic medications and monitor for any changes, adjusting as needed.  Tele monitoring.        Hyperlipidemia  Chronic, controlled. Will continue home medication.    Lab Results   Component Value Date    CHOL 154 09/19/2019     Lab Results   Component Value Date    HDL 68 09/19/2019     Lab Results   Component Value Date    LDLCALC 72 09/19/2019     Lab Results   Component Value Date    TRIG 60 09/19/2019     Lab Results   Component Value Date    CHOLHDL 2.3 09/19/2019               Hypothyroid  Lab Results   Component Value Date    TSH 6.425 (H) 01/16/2020       Chronic, controlled.  Will continue home medication per NGT    Essential hypertension  Chronic, uncontrolled.  Latest blood pressure and vitals reviewed-   Temp:  [96.2 °F (35.7 °C)-97.5 °F (36.4 °C)]   Pulse:  []   Resp:  [16-22]   BP: (113-189)/(57-83)   SpO2:  [89 %-94 %] .   Home meds for hypertension were reviewed and noted below. Hospital anti-hypertensive changes were made as shown below.  Hypertension Medications             carvedilol (COREG) 12.5 MG tablet Take 1 tablet (12.5 mg total) by mouth 2 (two) times daily.    hydrALAZINE (APRESOLINE) 100 MG tablet Take 1 tablet (100 mg total) by mouth every 8 (eight) hours.    torsemide (DEMADEX) 10 MG Tab Take 1 tablet (10 mg total) by mouth every Tues, Fri. Take extra dose on Thursday and Saturday this week then resume Tuesday and Friday dosing      Hospital Medications              carvedilol tablet 12.5 mg 12.5 mg, Oral, 2 times daily    enalaprilat injection 0.625 mg 0.625 mg, Intravenous, ED 1 Time    furosemide injection 40 mg 40 mg, Intravenous, Once    furosemide injection 60 mg (Completed) 60 mg, Intravenous, ED 1 Time    hydrALAZINE tablet 100 mg 100 mg, Oral, Every 8 hours    nitroGLYCERIN 50 mg in dextrose 5 % 250 mL infusion (TITRATING) 40 mcg/min (40 mcg/min), Intravenous, Continuous @ 12 mL/hr, Use non-sorb tubing.    nitroGLYCERIN 50 mg in dextrose 5 % 250 mL infusion (TITRATING) 5 mcg/min (5 mcg/min), Intravenous, Continuous @ 1.5 mL/hr, Use non-sorb tubing.        Will treat with PRN antihypertensives, as needed, to maintain BP less than 180/110 or if patient becomes symptomatic.          VTE Risk Mitigation (From admission, onward)         Ordered     heparin (porcine) injection 5,000 Units  Every 8 hours      01/27/20 0212     IP VTE HIGH RISK PATIENT  Once      01/27/20 0212     Place ESTUARDO hose  Until discontinued      01/27/20 0212     Place sequential compression device  Until discontinued      01/27/20 0212                      John Oliver MD  Department of Hospital Medicine   [facility]

## 2020-02-01 NOTE — NURSING
Spoke with Renae RN, at [phone] and she states that they are still waiting for  to come and place line for HD tx, and are unaware of ETA.    Direct number left with nurse so she can call me when line is placed for dialysis tx.

## 2020-02-01 NOTE — ASSESSMENT & PLAN NOTE
Acute respiratory failure due to hypervolemia related to ESRD. Failed Bumex diuresis. Patient appears fatigued. I discussed intubation and ventilation with the patient and her  who both consent to the procedure. Ventilation will likely be temporary and removed after dialysis.   - Stat ABG shows mild hypoxia to 77% of 50%FIO2 Bipap.   - CXR c/w diffuse pulmonary edema b/l  - Transfer to ICU for intubation and ventilation   - Dialysis per nephrology  - Consult pulmonology for vent mgmt

## 2020-02-01 NOTE — NURSING
at bedside, he explained to the patient about intubation and dialysis,  at bedside. At first, patient shook her head no, she did not want dialysis, but after explaining that she may feel better after dialysis, patient agreed to moving to ICU, being intubated and dialysis.

## 2020-02-01 NOTE — PT/OT/SLP PROGRESS
Physical Therapy      Patient Name:  Izabella Fulton   MRN:  [id number]    Patient not seen today secondary to Other (Comment)(Hold today 2* RN advising no OOB today 2* placed in BIPAP. ). Will follow-up tomorrow.    Alana Melendez, PTA

## 2020-02-01 NOTE — ASSESSMENT & PLAN NOTE
BMP reviewed- noted Estimated Creatinine Clearance: 8.6 mL/min (A) (based on SCr of 4.3 mg/dL (H)). according to latest data. Monitor UOP. Nephrology consulted and recommending dialysis for hypervolemia.   - HD orders per nephrology   - Appreciate recommendations and assistance

## 2020-02-01 NOTE — PLAN OF CARE
POC reviewed and updated with pt,IV bumex 2mL/hr maintained,vitals and labs monitored throughout shift, monitor on telemetry,strict I&Os,weigh daily,turn and reposition,harris to gravity,luis alfredo care performed,pt free from falls and injury,safety maintained throughout shift,bed low with wheels locked,call light in reach,will continue to monitor

## 2020-02-01 NOTE — PLAN OF CARE
Removed BiPAP and placed on 5l NC to administer medications, patient lethargic, SOB and anxious. O2 sats 85% on 5 L. Attempted to place patient on Venti mask, but she refused and pushed me away, she stated she was tired and just wants to go to sleep.  Advised patient she needed to go back on the BiPAP. Placed back on BiPAP 10/5 @ 50%, O2 sats 88%.  at bedside. Notified charge nurse, MD notified. ABG's and cxr ordered.

## 2020-02-01 NOTE — SIGNIFICANT EVENT
Results for FABIOLA CHAVEZ (MRN [id number]) as of 2/1/2020 11:06   Ref. Range 2/1/2020 10:42   POC PH Latest Ref Range: 7.35 - 7.45  7.377   POC PCO2 Latest Ref Range: 35 - 45 mmHg 41.2   POC PO2 Latest Ref Range: 80 - 100 mmHg 77 (L)   POC BE Latest Ref Range: -2 to 2 mmol/L -1   POC HCO3 Latest Ref Range: 24 - 28 mmol/L 24.2   POC SATURATED O2 Latest Ref Range: 95 - 100 % 95   POC TCO2 Latest Ref Range: 23 - 27 mmol/L 25   FiO2 Unknown 50   Sample Unknown ARTERIAL   DelSys Unknown CPAP/BiPAP   Allens Test Unknown Pass   Site Unknown RR   Mode Unknown BiPAP   Rate Unknown 12   Reported to Dr. Oliver, will continue to monitor pt for alertness and vitals

## 2020-02-01 NOTE — PLAN OF CARE
"Patient more alert, requesting the BiPAP mask to be removed, explained to patient she needed to stay on the BiPAP until Respiratory therapy comes to do her noon treatment. She still requested to take it off. I then explained to her the reason she needed the BiPAP is due to the Bumex gtt not helping, her kidney levels continue to rise, I also informed the patient the Cardiovascular Surgeon has been consulted to place a line for Dialysis, patient began to shake her head no, the  appeared to get a little upset, so I asked him to come over and be a part of this conversation. I repeated everything again with patient, patient spouse and PCU Birdie rosa at bedside. Patient again shook her head "NO" to the dialysis.  said "she knows what is going on and she wants the dialysis" patient shook her head "NO" once again.  at bedside, upset and attempting to talk patient into doing the dialysis, she is still adamant about not having dialysis.   "

## 2020-02-01 NOTE — CODE DOCUMENTATION
Patient changed code status to DNR. I discussed her wishes for end of life. She does not want endotracheal tube intubation now or incase of cardiopulmonary collapse. This was also confirmed with her  and friend at bedside.    Will continue bipap now and HD after venous access.

## 2020-02-01 NOTE — ASSESSMENT & PLAN NOTE
Chronic, uncontrolled.  Latest blood pressure and vitals reviewed-   Temp:  [96.2 °F (35.7 °C)-97.5 °F (36.4 °C)]   Pulse:  []   Resp:  [16-22]   BP: (113-189)/(57-83)   SpO2:  [89 %-94 %] .   Home meds for hypertension were reviewed and noted below. Hospital anti-hypertensive changes were made as shown below.  Hypertension Medications             carvedilol (COREG) 12.5 MG tablet Take 1 tablet (12.5 mg total) by mouth 2 (two) times daily.    hydrALAZINE (APRESOLINE) 100 MG tablet Take 1 tablet (100 mg total) by mouth every 8 (eight) hours.    torsemide (DEMADEX) 10 MG Tab Take 1 tablet (10 mg total) by mouth every Tues, Fri. Take extra dose on Thursday and Saturday this week then resume Tuesday and Friday dosing      Hospital Medications             carvedilol tablet 12.5 mg 12.5 mg, Oral, 2 times daily    enalaprilat injection 0.625 mg 0.625 mg, Intravenous, ED 1 Time    furosemide injection 40 mg 40 mg, Intravenous, Once    furosemide injection 60 mg (Completed) 60 mg, Intravenous, ED 1 Time    hydrALAZINE tablet 100 mg 100 mg, Oral, Every 8 hours    nitroGLYCERIN 50 mg in dextrose 5 % 250 mL infusion (TITRATING) 40 mcg/min (40 mcg/min), Intravenous, Continuous @ 12 mL/hr, Use non-sorb tubing.    nitroGLYCERIN 50 mg in dextrose 5 % 250 mL infusion (TITRATING) 5 mcg/min (5 mcg/min), Intravenous, Continuous @ 1.5 mL/hr, Use non-sorb tubing.        Will treat with PRN antihypertensives, as needed, to maintain BP less than 180/110 or if patient becomes symptomatic.

## 2020-02-01 NOTE — PROGRESS NOTES
Patient received from PCU via bed and transferred to ICU bed, Monitors applied, Bipap in place per RT. Spoke with Dr. Mo regarding pulmonology consult and he gave orders for patient to be intubated with vents settings and also gave order for propofol infusion while intubated. Spoke with patient regarding Dr. Mo's concern that patient will not be able to tolerate laying flat for diaysis catheter insertion and wants to intubate to protect airway, patient is refusing intubation at this time, will relay to MDs.

## 2020-02-01 NOTE — ASSESSMENT & PLAN NOTE
CHF currently uncontrolled due to volume overload due to: Continued edema of extremities and JVD and Pulmonary edema. Latest ECHO shows ejection fraction of 65%. Stop Lasix and continue BB and monitor clinical status closely. Monitor on telemetry. Patient is off CHF pathway due to criteria of pathway.  Monitor strict Is&Os and daily weights. Continue to stress to patient importance of self efficacy and  on diet for CHF. Last BNP reviewed- and noted below   Recent Labs   Lab 01/26/20  2120   BNP 1,152*   .  Recent echocardiogram reviewed - follow Cardiology and Nephrology recommendations.      Starting HD per nephrology. Stop Bumex and Metolazone.

## 2020-02-01 NOTE — PLAN OF CARE
01/31/20 1922   Patient Assessment/Suction   Level of Consciousness (AVPU) alert   Respiratory Effort Normal;Unlabored   Expansion/Accessory Muscles/Retractions expansion symmetric;no retractions;no use of accessory muscles   All Lung Fields Breath Sounds diminished   Cough Frequency infrequent   Cough Type nonproductive   PRE-TX-O2   O2 Device (Oxygen Therapy) nasal cannula   $ Is the patient on Low Flow Oxygen? Yes   Flow (L/min) 3   Oxygen Concentration (%) 32   SpO2 (!) 92 %   Pulse Oximetry Type Intermittent   Pulse 70   Resp (!) 21   Aerosol Therapy   $ Aerosol Therapy Charges Aerosol Treatment   Respiratory Treatment Status (SVN) given   Treatment Route (SVN) mask;oxygen   Patient Position (SVN) semi-Philippe's   Signs of Intolerance (SVN) none   Breath Sounds Post-Respiratory Treatment   Throughout All Fields Post-Treatment All Fields   Throughout All Fields Post-Treatment no change   Post-treatment Heart Rate (beats/min) 62   Post-treatment Resp Rate (breaths/min) 20   Ready to Wean/Extubation Screen   FIO2<=50 (chart decimal) 0.32

## 2020-02-01 NOTE — RESPIRATORY THERAPY
02/01/20 0710   Patient Assessment/Suction   Level of Consciousness (AVPU) alert   Respiratory Effort Short of breath;Mild   All Lung Fields Breath Sounds crackles, coarse   Cough Frequency infrequent   PRE-TX-O2   O2 Device (Oxygen Therapy) BiPAP   $ Is the patient on Low Flow Oxygen? Yes   SpO2 (!) 93 %   Pulse Oximetry Type Intermittent   $ Pulse Oximetry - Multiple Charge Pulse Oximetry - Multiple   Preset CPAP/BiPAP Settings   Mode Of Delivery BiPAP   $ CPAP/BiPAP Daily Charge BiPAP/CPAP Daily   $ Initial CPAP/BiPAP Setup? Yes   $ Is patient using? Yes   Size of Mask Small/Medium   Sized Appropriately? Yes   Equipment Type V60   Airway Device Type medium full face mask   Humidifier not applicable   Ipap 10   EPAP (cm H2O) 5   Pressure Support (cm H2O) 5   ITime (sec) 1   Rise Time (sec) 3   Patient CPAP/BiPAP Settings   Timed Inspiration (Sec) 1   IPAP Rise Time (sec) 3   Tidal Volume (mL) 438   VE Minute Ventilation (L/min) 8.1 L/min   Peak Inspiratory Pressure (cm H2O) 16   TiTOT (%) 33   Total Leak (L/Min) 5   Patient Trigger - ST Mode Only (%) 80

## 2020-02-02 PROBLEM — R79.89 ELEVATED LFTS: Status: RESOLVED | Noted: 2020-01-01 | Resolved: 2020-01-01

## 2020-02-02 NOTE — PLAN OF CARE
Plan of care reviewed with pt and family. Pt on cont bipap. Trialysis cath placed by Dr. Bray, xray complete, ok to use per Dr. Bray. Dialysis complete, 2l removed by HD RN.  Pt tolerated well.  Bathed pt, chgd linen, applied mepilex to sacral area, chgd drx. De Luna draining yellow urine. Will continue to monitor closely.

## 2020-02-02 NOTE — CONSULTS
"[facility]  Pulmonology  Consult Note    Patient Name: Izabella Fulton  MRN: [id number]  Admission Date: 1/26/2020  Hospital Length of Stay: 7 days  Code Status: DNR  Attending Physician: John Oliver MD  Primary Care Provider: Corinne Fletcher NP   Principal Problem: <principal problem not specified>    Inpatient consult to Pulmonology  Consult performed by: Jose Alejandro Aj MD  Consult ordered by: John Oliver MD        Subjective:     HPI:  85 yo female very frail transferred to ICU last PM with some respiratory difficulties and plans to start dialysis.  CXR looked bad and initially there were plans to intubate pt.  On arrival to ICU she refused intubation and is DNR but they wanted to proceed with dialysis.  She had catheter placed amnd was dialyzed last PM with about 4 liters removed.  She has remained stable with BiPAP and today feels "more comfortable" wearing it.  She reaffirms DNR status.  Getting history is difficult due to illness and BiPAP.  Case was discussed with  at bedside and with nursing.  She is no distress while on BiPAP and has adequate O2 sats.    Past Medical History:   Diagnosis Date    Anticoagulant long-term use     baby aspirin    Closed fracture of multiple ribs of left side with routine healing 1/16/2020    Hypertension     Thyroid disease     TIA (transient ischemic attack)     Ulcerative colitis     Wears hearing aid        Past Surgical History:   Procedure Laterality Date    COLON SURGERY  05/17/12    exp lap, perfered colon    EYE SURGERY      left eye cataract       Review of patient's allergies indicates:  No Known Allergies    Family History     Problem Relation (Age of Onset)    Alzheimer's disease Mother    Heart disease Father        Tobacco Use    Smoking status: Never Smoker    Smokeless tobacco: Never Used   Substance and Sexual Activity    Alcohol use: No    Drug use: No    Sexual activity: Not Currently     Partners: " Male         Review of Systems   Reason unable to perform ROS: difficult to obtain due BiPAP and illness.     Objective:     Vital Signs (Most Recent):  Temp: 97.3 °F (36.3 °C) (02/02/20 1130)  Pulse: (!) 54 (02/02/20 1201)  Resp: 18 (02/02/20 1201)  BP: (!) 155/67 (02/02/20 1200)  SpO2: (!) 92 % (02/02/20 1201) Vital Signs (24h Range):  Temp:  [96.4 °F (35.8 °C)-98.1 °F (36.7 °C)] 97.3 °F (36.3 °C)  Pulse:  [47-70] 54  Resp:  [15-40] 18  SpO2:  [91 %-99 %] 92 %  BP: ()/() 155/67     Weight: 48.9 kg (107 lb 12.9 oz)  Body mass index is 23.33 kg/m².      Intake/Output Summary (Last 24 hours) at 2/2/2020 1409  Last data filed at 2/2/2020 1200  Gross per 24 hour   Intake 553.23 ml   Output 3365 ml   Net -2811.77 ml       Physical Exam   Constitutional: No distress.   Elderly frail  Wearing BiPAP  No distress  Responds to voice   HENT:   Head: Normocephalic and atraumatic.   Right Ear: External ear normal.   Left Ear: External ear normal.   Nose: Nose normal.   Mouth/Throat: Oropharynx is clear and moist.   Wearing bipap   Eyes: Pupils are equal, round, and reactive to light. Conjunctivae and EOM are normal.   Neck: Normal range of motion. Neck supple. No JVD present. No tracheal deviation present. No thyromegaly present.   prominent external jugular veins   Cardiovascular: Normal rate and intact distal pulses. Exam reveals no gallop and no friction rub.   Murmur heard.  irregular   Pulmonary/Chest: Effort normal. No stridor. No respiratory distress. She has no wheezes. She has rales (scattered). She exhibits no tenderness.   On BiPAP  Decreased at bases  No acc m use  Shallow effort   Abdominal: Soft. She exhibits no distension. There is no tenderness. There is no rebound and no guarding.   hypoactive   Genitourinary:   Genitourinary Comments: harris   Musculoskeletal: Normal range of motion. She exhibits edema. She exhibits no tenderness.   Poor muscle tone   Lymphadenopathy:     She has no cervical  adenopathy.   Neurological: She is alert. No cranial nerve deficit.   Very weak   Skin: Skin is warm and dry. She is not diaphoretic.   Psychiatric:   calm   Nursing note and vitals reviewed.      Vents:  Oxygen Concentration (%): 40 (02/02/20 0748)    Lines/Drains/Airways     Central Venous Catheter Line                 Hemodialysis Catheter 02/01/20 right internal jugular;right subclavian 1 day          Drain                 Urethral Catheter 01/26/20 2311 Straight-tip 16 Fr. 6 days          Peripheral Intravenous Line                 Peripheral IV - Single Lumen 01/28/20 1200 20 G Right Forearm 5 days                Significant Labs:    CBC/Anemia Profile:  Recent Labs   Lab 02/01/20  0506 02/02/20  0402   WBC 8.53 10.21   HGB 9.6* 9.4*   HCT 29.5* 30.0*    165   MCV 98 98   RDW 15.6* 15.4*        Chemistries:  Recent Labs   Lab 02/01/20  0506 02/02/20  0402    138  138   K 4.5 3.7  3.7    100  100   CO2 23 23  23   * 59*  59*   CREATININE 4.3* 2.9*  2.9*   CALCIUM 9.8 9.0  9.0   ALBUMIN 2.6* 2.5*   PROT 6.2 6.4   BILITOT 0.4 0.6   ALKPHOS 69 72   ALT 36 37   AST 27 31   MG 2.5 2.1   PHOS 6.8* 5.6*       ABGs:   Recent Labs   Lab 02/02/20  0522   PH 7.377   PCO2 41.4   HCO3 24.3   POCSATURATED 99   BE -1     Lactic Acid: No results for input(s): LACTATE in the last 48 hours.  Troponin: No results for input(s): TROPONINI in the last 48 hours.  All pertinent labs within the past 24 hours have been reviewed.    Microbiology Results (last 7 days)     ** No results found for the last 168 hours. **            Significant Imaging:   I have reviewed and interpreted all pertinent imaging results/findings within the past 24 hours.     XR CHEST AP PORTABLE    CLINICAL HISTORY:  ETT placement;    TECHNIQUE:  Single frontal view of the chest was performed.    COMPARISON:  02/01/2020    FINDINGS:  Heart is minimally enlarged.    Bibasilar pleural effusions.    Mild perihilar interstitial and  ground-glass infiltrates.    Mild improved from the prior study.    Remote fracture of the 6th rib on the right posteriorly.    No endotracheal tube is identified.  Recommend clinical correlation.      Impression       Cardiomegaly and bibasilar pleural effusions with perihilar infiltrates.  Findings may be associated with mild edema and/or pneumonia.  Mild improvement from the earlier study.      Electronically signed by: Garrick Rowe  Date: 02/01/2020  Time: 20:45     ECHO  · Concentric left ventricular hypertrophy.  · The mean diastolic gradient across the mitral valve is 2 mmHg at a heart rate of bpm.  · Normal left ventricular systolic function. The estimated ejection fraction is 65%.  · Grade I (mild) left ventricular diastolic dysfunction consistent with impaired relaxation.  · No wall motion abnormalities.  · Mild left atrial enlargement.  · Mild mitral regurgitation.  · Mild tricuspid regurgitation.  · Trivial pericardial effusion.  · No intracardiac thrombi/vegetations    Assessment/Plan:     Acute on chronic respiratory failure  · Due to volume overload, pleural effusions  · Refused intubation and is DNR  · Tolerating BiPAP with adequate ABG  · Continue same  · Can try some time off of BiPAP as tolerated  · For repeat UF today  · Condition is very tenuous and she could decompensate at any time    ESRD (end stage renal disease)  · Dialysis/UF per renal    Acute diastolic congestive heart failure  · Last ECHO noted  · Follow   · Needs volume removal    Hypertensive emergency  · BP better controlled  · Renal note read    Symptomatic anemia  · No acute blood loss reported, follow   · Would transfuse for hgb < 7    Elevated troponin  · No recent levels  · Follow     Metabolic acidosis  · Better, follow     Hypoalbuminemia due to protein-calorie malnutrition  · Aware  · Not eating well by report  · Try to encourage po intake    Hyperlipidemia  · Aware     Hypothyroid  · Aware     Essential  hypertension  · Aware     Critical Care Time    I have spent > 35 minutes providing critical care services for this pt for the above diagnoses.  These services have included pt evaluation, pt exam, BiPAP assessment, discussions with staff, chart review, data review, note preparation and .  The patient has life threatening illness with a high risk of decompensation and/or death.    Overall condition is very poor - now DNR - ? If we may need to consider hospice evaluation (if family agreeable)        Thank you for your consult. I will follow-up with patient. Please contact us if you have any additional questions.     Jose Alejandro Aj MD  Pulmonology  [facility]

## 2020-02-02 NOTE — SUBJECTIVE & OBJECTIVE
Interval History:  Patient continues to not feel well and reporting sense of shortness of breath.  HD UF yesterday with improvement in breathing overnight. No other events. Nurse c/o bleeding at cath site requiring pressure dressing    Review of Systems   Unable to perform ROS: Acuity of condition     Objective:     Vital Signs (Most Recent):  Temp: 97 °F (36.1 °C) (02/02/20 1245)  Pulse: (!) 52 (02/02/20 1430)  Resp: 18 (02/02/20 1430)  BP: (!) 143/66 (02/02/20 1430)  SpO2: 95 % (02/02/20 1430) Vital Signs (24h Range):  Temp:  [96.4 °F (35.8 °C)-97.6 °F (36.4 °C)] 97 °F (36.1 °C)  Pulse:  [44-67] 52  Resp:  [15-33] 18  SpO2:  [89 %-99 %] 95 %  BP: ()/() 143/66     Weight: 48.9 kg (107 lb 12.9 oz)  Body mass index is 23.33 kg/m².    Intake/Output Summary (Last 24 hours) at 2/2/2020 1450  Last data filed at 2/2/2020 1200  Gross per 24 hour   Intake 553.23 ml   Output 2915 ml   Net -2361.77 ml      Physical Exam   Constitutional: She appears well-developed and well-nourished. She appears lethargic. No distress.   HENT:   Head: Normocephalic and atraumatic.   Bipap on face   Eyes: Pupils are equal, round, and reactive to light. EOM are normal. Right eye exhibits no discharge. Left eye exhibits no discharge.   Neck: Normal range of motion. JVD present.   Cardiovascular: Regular rhythm, normal heart sounds and intact distal pulses.   No murmur heard.  Bradycardia with a HR of 45 bpm noted on bedside continuous cardiac monitoring.     Pulmonary/Chest: Accessory muscle usage present. Tachypnea noted. No respiratory distress. She has no wheezes. She has rales. She exhibits no tenderness.   Bipap therapy. Course breath sounds noted throughout all lung fields upon auscultation, no wheezing noted. Patient with no labored respirations upon my initial exam.         Abdominal: Soft. Bowel sounds are normal. She exhibits no distension. There is no tenderness. There is no rebound and no guarding.   Genitourinary:    Genitourinary Comments: Exam Deferred   [place] in place     Musculoskeletal: Normal range of motion. She exhibits edema. She exhibits no tenderness or deformity.   1+ bilateral lower extremity pitting edema.   Neurological: She appears lethargic. No cranial nerve deficit or sensory deficit.   Skin: Skin is warm and dry. Capillary refill takes 2 to 3 seconds. No rash noted. She is not diaphoretic. No erythema.   Generalized bruising noted throughout bilateral upper extremities   Psychiatric: Her behavior is normal. Judgment and thought content normal.   Nursing note and vitals reviewed.      Significant Labs:   CBC:   Recent Labs   Lab 02/01/20  0506 02/02/20  0402   WBC 8.53 10.21   HGB 9.6* 9.4*   HCT 29.5* 30.0*    165     CMP:   Recent Labs   Lab 02/01/20  0506 02/02/20  0402    138  138   K 4.5 3.7  3.7    100  100   CO2 23 23  23   GLU 92 96  96   * 59*  59*   CREATININE 4.3* 2.9*  2.9*   CALCIUM 9.8 9.0  9.0   PROT 6.2 6.4   ALBUMIN 2.6* 2.5*   BILITOT 0.4 0.6   ALKPHOS 69 72   AST 27 31   ALT 36 37   ANIONGAP 14 15  15   EGFRNONAA 9* 14*  14*     Microbiology Results (last 7 days)     ** No results found for the last 168 hours. **          Significant Imaging:   Echocardiogram (January 16, 2020):  · Concentric left ventricular hypertrophy.  · The mean diastolic gradient across the mitral valve is 2 mmHg at a heart rate of bpm.  · Normal left ventricular systolic function. The estimated ejection fraction is 65%.  · Grade I (mild) left ventricular diastolic dysfunction consistent with impaired relaxation.  · No wall motion abnormalities.  · Mild left atrial enlargement.  · Mild mitral regurgitation.  · Mild tricuspid regurgitation.  · Trivial pericardial effusion.  · No intracardiac thrombi/vegetations.    Right lower extremity venous Doppler:  No evidence of deep venous thrombosis in the right lower extremity.    Chest x-ray:  Cardiomegaly with worsening fluid overload  state, most consistent with decompensated CHF.

## 2020-02-02 NOTE — ASSESSMENT & PLAN NOTE
Acute respiratory failure due to hypervolemia related to ESRD. Failed Bumex diuresis. Patient appears fatigued. DNR. Wean to NC oxygen today and bipap at night.   - Dialysis per nephrology  - Follow up pulmonology recommendations - consulted at time of intubation for vent mgmt but patient wished not to be intubated

## 2020-02-02 NOTE — ASSESSMENT & PLAN NOTE
Chronic, uncontrolled.  Latest blood pressure and vitals reviewed-   Temp:  [96.4 °F (35.8 °C)-97.6 °F (36.4 °C)]   Pulse:  [44-67]   Resp:  [15-33]   BP: ()/()   SpO2:  [89 %-99 %] .   Home meds for hypertension were reviewed and noted below. Hospital anti-hypertensive changes were made as shown below.  Hypertension Medications             carvedilol (COREG) 12.5 MG tablet Take 1 tablet (12.5 mg total) by mouth 2 (two) times daily.    hydrALAZINE (APRESOLINE) 100 MG tablet Take 1 tablet (100 mg total) by mouth every 8 (eight) hours.    torsemide (DEMADEX) 10 MG Tab Take 1 tablet (10 mg total) by mouth every Tues, Fri. Take extra dose on Thursday and Saturday this week then resume Tuesday and Friday dosing      Hospital Medications             carvedilol tablet 12.5 mg 12.5 mg, Oral, 2 times daily    enalaprilat injection 0.625 mg 0.625 mg, Intravenous, ED 1 Time    furosemide injection 40 mg 40 mg, Intravenous, Once    furosemide injection 60 mg (Completed) 60 mg, Intravenous, ED 1 Time    hydrALAZINE tablet 100 mg 100 mg, Oral, Every 8 hours    nitroGLYCERIN 50 mg in dextrose 5 % 250 mL infusion (TITRATING) 40 mcg/min (40 mcg/min), Intravenous, Continuous @ 12 mL/hr, Use non-sorb tubing.    nitroGLYCERIN 50 mg in dextrose 5 % 250 mL infusion (TITRATING) 5 mcg/min (5 mcg/min), Intravenous, Continuous @ 1.5 mL/hr, Use non-sorb tubing.        Will treat with PRN antihypertensives, as needed, to maintain BP less than 180/110 or if patient becomes symptomatic.

## 2020-02-02 NOTE — PLAN OF CARE
Received to ICU this afternoon with the plan of Trialysis cath placement for HD. Spoke with Dr. Aj concerning consult for vent management and that patient had not yet been intubated. Patient again voiced concern about being intubated, which she did not want. I spoke with her and her spouse, Don and patient and spouse both voiced  that patient did not want to be intubated. Dr. Oliver informed of conversation and he spoke with both patient and spouse and DNR placed on chart. Spoke with Dr. Hernadez again and informed him of DNR status. Dr. Bray consulted prior to patient transfer to ICU per report. Catheter has not yet been placed. Spoke with Marivel HD RN and we will have to call when line in place for HD. Safety maintained.

## 2020-02-02 NOTE — SUBJECTIVE & OBJECTIVE
Past Medical History:   Diagnosis Date    Anticoagulant long-term use     baby aspirin    Closed fracture of multiple ribs of left side with routine healing 1/16/2020    Hypertension     Thyroid disease     TIA (transient ischemic attack)     Ulcerative colitis     Wears hearing aid        Past Surgical History:   Procedure Laterality Date    COLON SURGERY  05/17/12    exp lap, perfered colon    EYE SURGERY      left eye cataract       Review of patient's allergies indicates:  No Known Allergies    Family History     Problem Relation (Age of Onset)    Alzheimer's disease Mother    Heart disease Father        Tobacco Use    Smoking status: Never Smoker    Smokeless tobacco: Never Used   Substance and Sexual Activity    Alcohol use: No    Drug use: No    Sexual activity: Not Currently     Partners: Male         Review of Systems   Reason unable to perform ROS: difficult to obtain due BiPAP and illness.     Objective:     Vital Signs (Most Recent):  Temp: 97.3 °F (36.3 °C) (02/02/20 1130)  Pulse: (!) 54 (02/02/20 1201)  Resp: 18 (02/02/20 1201)  BP: (!) 155/67 (02/02/20 1200)  SpO2: (!) 92 % (02/02/20 1201) Vital Signs (24h Range):  Temp:  [96.4 °F (35.8 °C)-98.1 °F (36.7 °C)] 97.3 °F (36.3 °C)  Pulse:  [47-70] 54  Resp:  [15-40] 18  SpO2:  [91 %-99 %] 92 %  BP: ()/() 155/67     Weight: 48.9 kg (107 lb 12.9 oz)  Body mass index is 23.33 kg/m².      Intake/Output Summary (Last 24 hours) at 2/2/2020 1409  Last data filed at 2/2/2020 1200  Gross per 24 hour   Intake 553.23 ml   Output 3365 ml   Net -2811.77 ml       Physical Exam   Constitutional: No distress.   Elderly frail  Wearing BiPAP  No distress  Responds to voice   HENT:   Head: Normocephalic and atraumatic.   Right Ear: External ear normal.   Left Ear: External ear normal.   Nose: Nose normal.   Mouth/Throat: Oropharynx is clear and moist.   Wearing bipap   Eyes: Pupils are equal, round, and reactive to light. Conjunctivae and EOM are  normal.   Neck: Normal range of motion. Neck supple. No JVD present. No tracheal deviation present. No thyromegaly present.   prominent external jugular veins   Cardiovascular: Normal rate and intact distal pulses. Exam reveals no gallop and no friction rub.   Murmur heard.  irregular   Pulmonary/Chest: Effort normal. No stridor. No respiratory distress. She has no wheezes. She has rales (scattered). She exhibits no tenderness.   On BiPAP  Decreased at bases  No acc m use  Shallow effort   Abdominal: Soft. She exhibits no distension. There is no tenderness. There is no rebound and no guarding.   hypoactive   Genitourinary:   Genitourinary Comments: harris   Musculoskeletal: Normal range of motion. She exhibits edema. She exhibits no tenderness.   Poor muscle tone   Lymphadenopathy:     She has no cervical adenopathy.   Neurological: She is alert. No cranial nerve deficit.   Very weak   Skin: Skin is warm and dry. She is not diaphoretic.   Psychiatric:   calm   Nursing note and vitals reviewed.      Vents:  Oxygen Concentration (%): 40 (02/02/20 0748)    Lines/Drains/Airways     Central Venous Catheter Line                 Hemodialysis Catheter 02/01/20 right internal jugular;right subclavian 1 day          Drain                 Urethral Catheter 01/26/20 2311 Straight-tip 16 Fr. 6 days          Peripheral Intravenous Line                 Peripheral IV - Single Lumen 01/28/20 1200 20 G Right Forearm 5 days                Significant Labs:    CBC/Anemia Profile:  Recent Labs   Lab 02/01/20  0506 02/02/20  0402   WBC 8.53 10.21   HGB 9.6* 9.4*   HCT 29.5* 30.0*    165   MCV 98 98   RDW 15.6* 15.4*        Chemistries:  Recent Labs   Lab 02/01/20  0506 02/02/20  0402    138  138   K 4.5 3.7  3.7    100  100   CO2 23 23  23   * 59*  59*   CREATININE 4.3* 2.9*  2.9*   CALCIUM 9.8 9.0  9.0   ALBUMIN 2.6* 2.5*   PROT 6.2 6.4   BILITOT 0.4 0.6   ALKPHOS 69 72   ALT 36 37   AST 27 31   MG 2.5  2.1   PHOS 6.8* 5.6*       ABGs:   Recent Labs   Lab 02/02/20  0522   PH 7.377   PCO2 41.4   HCO3 24.3   POCSATURATED 99   BE -1     Lactic Acid: No results for input(s): LACTATE in the last 48 hours.  Troponin: No results for input(s): TROPONINI in the last 48 hours.  All pertinent labs within the past 24 hours have been reviewed.    Microbiology Results (last 7 days)     ** No results found for the last 168 hours. **            Significant Imaging:   I have reviewed and interpreted all pertinent imaging results/findings within the past 24 hours.     XR CHEST AP PORTABLE    CLINICAL HISTORY:  ETT placement;    TECHNIQUE:  Single frontal view of the chest was performed.    COMPARISON:  02/01/2020    FINDINGS:  Heart is minimally enlarged.    Bibasilar pleural effusions.    Mild perihilar interstitial and ground-glass infiltrates.    Mild improved from the prior study.    Remote fracture of the 6th rib on the right posteriorly.    No endotracheal tube is identified.  Recommend clinical correlation.      Impression       Cardiomegaly and bibasilar pleural effusions with perihilar infiltrates.  Findings may be associated with mild edema and/or pneumonia.  Mild improvement from the earlier study.      Electronically signed by: Garrick Rowe  Date: 02/01/2020  Time: 20:45     ECHO  · Concentric left ventricular hypertrophy.  · The mean diastolic gradient across the mitral valve is 2 mmHg at a heart rate of bpm.  · Normal left ventricular systolic function. The estimated ejection fraction is 65%.  · Grade I (mild) left ventricular diastolic dysfunction consistent with impaired relaxation.  · No wall motion abnormalities.  · Mild left atrial enlargement.  · Mild mitral regurgitation.  · Mild tricuspid regurgitation.  · Trivial pericardial effusion.  · No intracardiac thrombi/vegetations

## 2020-02-02 NOTE — PROGRESS NOTES
NET UF: 2000 ML     02/02/20 0045   Post-Hemodialysis Assessment   Rinseback Volume (mL) 250 mL   Blood Volume Processed (Liters) 40.9 L   Dialyzer Clearance Lightly streaked   Duration of Treatment (minutes) 180 minutes   Hemodialysis Intake (mL) 500 mL   Total UF (mL) 2500 mL   Net Fluid Removal 2000   Patient Response to Treatment tolerated well   Post-Treatment Weight 55.3 kg (121 lb 14.6 oz)   Treatment Weight Change -2   Post-Hemodialysis Comments tx complete, pt stable. lines reinfused, catheter capped and clamped. dsg changed d/t excess drainage. some light pink drainage continues.

## 2020-02-02 NOTE — RESPIRATORY THERAPY
02/02/20 0748   Patient Assessment/Suction   Level of Consciousness (AVPU) responds to voice   All Lung Fields Breath Sounds crackles;diminished   Cough Frequency no cough   PRE-TX-O2   O2 Device (Oxygen Therapy) BiPAP   $ Is the patient on Low Flow Oxygen? Yes   Oxygen Concentration (%) 40   SpO2 98 %   Pulse Oximetry Type Continuous   $ Pulse Oximetry - Multiple Charge Pulse Oximetry - Multiple   Pulse (!) 52   Resp (!) 23   Aerosol Therapy   $ Aerosol Therapy Charges Aerosol Treatment   Respiratory Treatment Status (SVN) given   Treatment Route (SVN) in-line   Patient Position (SVN) HOB elevated   Post Treatment Assessment (SVN) breath sounds unchanged   Signs of Intolerance (SVN) none   Breath Sounds Post-Respiratory Treatment   Post-treatment Heart Rate (beats/min) 50   Post-treatment Resp Rate (breaths/min) 19   Wound Care   $ Wound Care Tech Time 15 min   Area of Concern Cheek;Bridge of nose   Skin Color/Characteristics without discoloration   Skin Temperature warm   Ready to Wean/Extubation Screen   FIO2<=50 (chart decimal) 0.4   Preset CPAP/BiPAP Settings   Mode Of Delivery BiPAP   $ CPAP/BiPAP Daily Charge BiPAP/CPAP Daily   $ Initial CPAP/BiPAP Setup? No   $ Is patient using? Yes   Equipment Type V60   Airway Device Type medium full face mask   Ipap 15   EPAP (cm H2O) 8   Pressure Support (cm H2O) 7   Set Rate (Breaths/Min) 12   ITime (sec) 1   Rise Time (sec) 3   Patient CPAP/BiPAP Settings   RR Total (Breaths/Min) 23   Tidal Volume (mL) 643   VE Minute Ventilation (L/min) 10 L/min   Peak Inspiratory Pressure (cm H2O) 15   TiTOT (%) 32   Total Leak (L/Min) 27   Patient Trigger - ST Mode Only (%) 90   CPAP/BiPAP Backup Settings   Backup Rate 12 breaths per minute (bpm)   CPAP/BiPAP Alarms   High Pressure (cm H2O) 20   Low Pressure (cm H2O) 10   Minute Ventilation (L/Min) 3   High RR (breaths/min) 50   Low RR (breaths/min) 8

## 2020-02-02 NOTE — PROGRESS NOTES
[facility] Hospital Medicine  Progress Note    Patient Name: Izabella Fulton  MRN: [id number]  Patient Class: IP- Inpatient   Admission Date: 1/26/2020  Length of Stay: 7 days  Attending Physician: John Oliver MD  Primary Care Provider: Corinne Fletcher NP        Subjective:     Principal Problem:Acute on chronic respiratory failure        HPI:  Izabella Fulton is an 86-year-old female with a past medical history of hypertension, hypothyroidism, hyperlipidemia, and congestive heart failure who presents to the emergency room tonight with reports of increasing shortness of breath.  Patient was recently discharged from the hospital approximately 1 week ago in which she was admitted for a CHF exacerbation.  Unsure if patient was diagnosed with congestive heart failure during that hospitalization or prior.  Patient's  states that patient's breathing never did improve since discharge home and she has since persisted with a nonproductive cough.  Patient was discharged in told to set up a cardiology and pulmonology appointment, have not been to either appointments yet.  Patient also reportedly not eating, daily p.o. intake consist of a boost twice a day.  Information for HPI obtained from patient's , Don, and patient's family friend who is at bedside during my initial interview and physical exam.  Upon arrival to the emergency room patient noted to be in acute respiratory distress requiring BiPAP therapy in association with a CHF exacerbation with an elevated BNP.  Patient also noted to have hypertensive emergency upon admission.  Patient was initiated on IV Lasix in emergency room and IV nitroglycerin drip.  Patient admitted to ICU on January 26, 2020 at approximately 11:30 p.m..  Patient reportedly resides at with her  utilizes a walker for ambulatory assist device, denies the use of supplemental oxygen dependence while at home.    Overview/Hospital Course:  No notes on  file    Interval History:  Patient continues to not feel well and reporting sense of shortness of breath.  HD UF yesterday with improvement in breathing overnight. No other events. Nurse c/o bleeding at cath site requiring pressure dressing    Review of Systems   Unable to perform ROS: Acuity of condition     Objective:     Vital Signs (Most Recent):  Temp: 97 °F (36.1 °C) (02/02/20 1245)  Pulse: (!) 52 (02/02/20 1430)  Resp: 18 (02/02/20 1430)  BP: (!) 143/66 (02/02/20 1430)  SpO2: 95 % (02/02/20 1430) Vital Signs (24h Range):  Temp:  [96.4 °F (35.8 °C)-97.6 °F (36.4 °C)] 97 °F (36.1 °C)  Pulse:  [44-67] 52  Resp:  [15-33] 18  SpO2:  [89 %-99 %] 95 %  BP: ()/() 143/66     Weight: 48.9 kg (107 lb 12.9 oz)  Body mass index is 23.33 kg/m².    Intake/Output Summary (Last 24 hours) at 2/2/2020 1450  Last data filed at 2/2/2020 1200  Gross per 24 hour   Intake 553.23 ml   Output 2915 ml   Net -2361.77 ml      Physical Exam   Constitutional: She appears well-developed and well-nourished. She appears lethargic. No distress.   HENT:   Head: Normocephalic and atraumatic.   Bipap on face   Eyes: Pupils are equal, round, and reactive to light. EOM are normal. Right eye exhibits no discharge. Left eye exhibits no discharge.   Neck: Normal range of motion. JVD present.   Cardiovascular: Regular rhythm, normal heart sounds and intact distal pulses.   No murmur heard.  Bradycardia with a HR of 45 bpm noted on bedside continuous cardiac monitoring.     Pulmonary/Chest: Accessory muscle usage present. Tachypnea noted. No respiratory distress. She has no wheezes. She has rales. She exhibits no tenderness.   Bipap therapy. Course breath sounds noted throughout all lung fields upon auscultation, no wheezing noted. Patient with no labored respirations upon my initial exam.         Abdominal: Soft. Bowel sounds are normal. She exhibits no distension. There is no tenderness. There is no rebound and no guarding.    Genitourinary:   Genitourinary Comments: Exam Deferred   De Luna in place     Musculoskeletal: Normal range of motion. She exhibits edema. She exhibits no tenderness or deformity.   1+ bilateral lower extremity pitting edema.   Neurological: She appears lethargic. No cranial nerve deficit or sensory deficit.   Skin: Skin is warm and dry. Capillary refill takes 2 to 3 seconds. No rash noted. She is not diaphoretic. No erythema.   Generalized bruising noted throughout bilateral upper extremities   Psychiatric: Her behavior is normal. Judgment and thought content normal.   Nursing note and vitals reviewed.      Significant Labs:   CBC:   Recent Labs   Lab 02/01/20  0506 02/02/20  0402   WBC 8.53 10.21   HGB 9.6* 9.4*   HCT 29.5* 30.0*    165     CMP:   Recent Labs   Lab 02/01/20  0506 02/02/20  0402    138  138   K 4.5 3.7  3.7    100  100   CO2 23 23  23   GLU 92 96  96   * 59*  59*   CREATININE 4.3* 2.9*  2.9*   CALCIUM 9.8 9.0  9.0   PROT 6.2 6.4   ALBUMIN 2.6* 2.5*   BILITOT 0.4 0.6   ALKPHOS 69 72   AST 27 31   ALT 36 37   ANIONGAP 14 15  15   EGFRNONAA 9* 14*  14*     Microbiology Results (last 7 days)     ** No results found for the last 168 hours. **          Significant Imaging:   Echocardiogram (January 16, 2020):  · Concentric left ventricular hypertrophy.  · The mean diastolic gradient across the mitral valve is 2 mmHg at a heart rate of bpm.  · Normal left ventricular systolic function. The estimated ejection fraction is 65%.  · Grade I (mild) left ventricular diastolic dysfunction consistent with impaired relaxation.  · No wall motion abnormalities.  · Mild left atrial enlargement.  · Mild mitral regurgitation.  · Mild tricuspid regurgitation.  · Trivial pericardial effusion.  · No intracardiac thrombi/vegetations.    Right lower extremity venous Doppler:  No evidence of deep venous thrombosis in the right lower extremity.    Chest x-ray:  Cardiomegaly with worsening  fluid overload state, most consistent with decompensated CHF.        Assessment/Plan:      * Acute on chronic respiratory failure  Acute respiratory failure due to hypervolemia related to ESRD. Failed Bumex diuresis. Patient appears fatigued. DNR. Wean to NC oxygen today and bipap at night.   - Dialysis per nephrology  - Follow up pulmonology recommendations - consulted at time of intubation for vent mgmt but patient wished not to be intubated      ESRD (end stage renal disease)  BMP reviewed- noted Estimated Creatinine Clearance: 10.7 mL/min (A) (based on SCr of 2.9 mg/dL (H)). according to latest data. Monitor UOP. Nephrology consulted and recommending dialysis for hypervolemia.   - Holding diuretics   - HD orders per nephrology   - Appreciate recommendations and assistance             Symptomatic anemia  Transfuse 2 units of packed red blood cells with hemodialysis treatment.  Follow CBC and transfuse as needed.  Patient would benefit with erythromycin subcutaneous injection therapy as per Nephrology team.      Elevated troponin  Likely in the setting of acute CHF exacerbation, patient denies CP on admit - trend troponin - tele monitoring.        Metabolic acidosis  Noted, trend with CMP.        Hypoalbuminemia due to protein-calorie malnutrition  Dietary consult.        Hyperkalemia  Tele - trend with CMP.        Hypertensive emergency  Chronic problem. Will continue chronic medications and monitor for any changes, adjusting as needed.  Tele monitoring.        Acute diastolic congestive heart failure  CHF currently uncontrolled due to volume overload due to: Continued edema of extremities and JVD and Pulmonary edema. Latest ECHO shows ejection fraction of 65%. Stop Lasix and continue BB and monitor clinical status closely. Monitor on telemetry. Patient is off CHF pathway due to criteria of pathway.  Monitor strict Is&Os and daily weights. Continue to stress to patient importance of self efficacy and  on  diet for CHF. Last BNP reviewed- and noted below   Recent Labs   Lab 01/26/20 2120   BNP 1,152*   .  Recent echocardiogram reviewed - follow Cardiology and Nephrology recommendations.      Starting HD per nephrology. Stop Bumex and Metolazone.         Hyperlipidemia  Chronic, controlled. Will continue home medication.    Lab Results   Component Value Date    CHOL 154 09/19/2019     Lab Results   Component Value Date    HDL 68 09/19/2019     Lab Results   Component Value Date    LDLCALC 72 09/19/2019     Lab Results   Component Value Date    TRIG 60 09/19/2019     Lab Results   Component Value Date    CHOLHDL 2.3 09/19/2019               Hypothyroid  Lab Results   Component Value Date    TSH 6.425 (H) 01/16/2020       Chronic, controlled.  Will continue home medication per NGT    Essential hypertension  Chronic, uncontrolled.  Latest blood pressure and vitals reviewed-   Temp:  [96.4 °F (35.8 °C)-97.6 °F (36.4 °C)]   Pulse:  [44-67]   Resp:  [15-33]   BP: ()/()   SpO2:  [89 %-99 %] .   Home meds for hypertension were reviewed and noted below. Hospital anti-hypertensive changes were made as shown below.  Hypertension Medications             carvedilol (COREG) 12.5 MG tablet Take 1 tablet (12.5 mg total) by mouth 2 (two) times daily.    hydrALAZINE (APRESOLINE) 100 MG tablet Take 1 tablet (100 mg total) by mouth every 8 (eight) hours.    torsemide (DEMADEX) 10 MG Tab Take 1 tablet (10 mg total) by mouth every Tues, Fri. Take extra dose on Thursday and Saturday this week then resume Tuesday and Friday dosing      Hospital Medications             carvedilol tablet 12.5 mg 12.5 mg, Oral, 2 times daily    enalaprilat injection 0.625 mg 0.625 mg, Intravenous, ED 1 Time    furosemide injection 40 mg 40 mg, Intravenous, Once    furosemide injection 60 mg (Completed) 60 mg, Intravenous, ED 1 Time    hydrALAZINE tablet 100 mg 100 mg, Oral, Every 8 hours    nitroGLYCERIN 50 mg in dextrose 5 % 250 mL infusion  (TITRATING) 40 mcg/min (40 mcg/min), Intravenous, Continuous @ 12 mL/hr, Use non-sorb tubing.    nitroGLYCERIN 50 mg in dextrose 5 % 250 mL infusion (TITRATING) 5 mcg/min (5 mcg/min), Intravenous, Continuous @ 1.5 mL/hr, Use non-sorb tubing.        Will treat with PRN antihypertensives, as needed, to maintain BP less than 180/110 or if patient becomes symptomatic.            VTE Risk Mitigation (From admission, onward)         Ordered     heparin (porcine) injection 4,000 Units  As needed (PRN)      02/02/20 1418     heparin (porcine) injection 5,000 Units  Every 8 hours      01/27/20 0212     IP VTE HIGH RISK PATIENT  Once      01/27/20 0212     Place ESTUARDO hose  Until discontinued      01/27/20 0212     Place sequential compression device  Until discontinued      01/27/20 0212                Critical care time spent on the evaluation and treatment of severe organ dysfunction, review of pertinent labs and imaging studies, discussions with consulting providers and discussions with patient/family: 35 minutes.      John Oliver MD  Department of Hospital Medicine   [facility]

## 2020-02-02 NOTE — PROGRESS NOTES
After informed consent obtained a trialysis catheter was inserted via the R subclavian vein in the usual sterile fashion without complication  Tolerated well  Follow up CXR catheter appears to be in good position and no pneumothorax seen    OK to use catheter for hemodialysis

## 2020-02-02 NOTE — NURSING
UF tx only complete. Total UF achieved per bp 2.1 liters. Changed hd catheter dressing. Reinfused pt per policy. Locked ports x 2 with heparin and clamped. New caps secured. Report given to Maris Caldwell RN.

## 2020-02-02 NOTE — ASSESSMENT & PLAN NOTE
BMP reviewed- noted Estimated Creatinine Clearance: 10.7 mL/min (A) (based on SCr of 2.9 mg/dL (H)). according to latest data. Monitor UOP. Nephrology consulted and recommending dialysis for hypervolemia.   - Holding diuretics   - HD orders per nephrology   - Appreciate recommendations and assistance

## 2020-02-02 NOTE — PROGRESS NOTES
Progress Note  Nephrology    Admit Date: 1/26/2020   LOS: 7 days     SUBJECTIVE:     CC:  FERCHO on CKD3, anemia    Past medical, surgical and social history reviewed    HPI: 86F with hypertension, hypothyroidism, congestive heart failure  presents with increasing shortness of breath. She was recently discharged from the hospital approximately 1 week ago after admission for CHF exacerbation. Patient's  states that patient's breathing never did improve since discharge home and she has since persisted with a nonproductive cough. Patient was discharged in told to set up a cardiology and pulmonology appointment, have not been to either appointments yet. She was not eating, daily p.o. intake consist of a boost twice a day.     Upon arrival to the emergency room patient noted to be in acute respiratory distress requiring BiPAP therapy in association with a CHF exacerbation with an elevated BNP. Patient also noted to have hypertensive emergency and was treated with IV Lasix and IV nitroglycerin drip.     1/28 VSS, no new complains.  1/29 VSS, no new complains.  1/30 VSS, no new complains, UO is unimpressive despite torsemide 20 daily, started 1/29. sCr is up too.  1/31 VSS, no new complains, UO is unimpressive despite escalation from torsemide 20 daily, started 1/29 to Lasix 80mg IV TID. Will escalate to Bumex drip + metolazone... D/w Dr. Chavis.   2/1  Renal function worsening.  UOP 1.1L despite aggressive diuresis attempts.  Pt is on BIPAP, d/w bedside nurse.  She reports pt w/lethargy, says stat ABG was ordered per primary team.  Spouse at bedside reports some transient confusion.  ?uremia vs poor oxygenation.  D/w Dr. Chacon, will order line placement for dialysis.  Spoke to pt's spouse at length, he is aware that she may need dialysis, states that Mrs. Fulton was also made aware of this, and they are agreeable.  Discussed risks/benefits, explained that she would need a special type of line that a surgeon would come  and put in.  Answered questions, provided support.  He voices understanding of all.  2/2  Had first HD last night, 2L UF.  She refused to be intubated.  UOP 815cc.  She remains on bipap, ill-appearing.  Will order IUF today.    Assessment/plan:    FERCHO, sCr not better.  CKD stage 3-4.  CHF exacerbation with volume overload.  No NSAIDs, ACEI/ARB, IV contrast or other nephrotoxins.  Keep MAP > 60, SBP > 100.  Dose meds for GFR < 30 ml/min.  Renal diet - low K, low phos. Restrict oral liquids.  Continue diuretics, escalate as above.  Cards consult apprecaited.     Anemia of CKD  Hgb and HCT are acceptable. Monitor.  Will provide JONNY and/or IV iron PRN.     HTN  BP seem controlled. But remains hypertensive, continue UF 2L as tolerated.  Tolerate asymptomatic HTN up to -160.  Continue home meds.  Low sodium diet.     Thank you for allowing us to participate in the care of your patient!   We will follow the patient and provide recommendations as needed.    Review of Systems:  Unable to obtain 2/2 pt acuity, bipap    OBJECTIVE:     Vital Signs (Most Recent)  Temp: 97.6 °F (36.4 °C) (2030)  Pulse: (!) 52 (20)  Resp: (!) 23 (20)  BP: (!) 160/69 (20 0745)  SpO2: 98 % (20)    Vital Signs Range (Last 24H):  Temp:  [96.4 °F (35.8 °C)-98.1 °F (36.7 °C)]   Pulse:  [47-70]   Resp:  [18-40]   BP: ()/()   SpO2:  [92 %-99 %]     Temp (24hrs), Av.2 °F (36.2 °C), Min:96.4 °F (35.8 °C), Max:98.1 °F (36.7 °C)    Systolic (24hrs), Av , Min:86 , Max:212     Diastolic (24hrs), Av, Min:48, Max:126      I & O (Last 24H):    Intake/Output Summary (Last 24 hours) at 2020 0827  Last data filed at 2020 0100  Gross per 24 hour   Intake 553.23 ml   Output 3315 ml   Net -2761.77 ml     Physical Exam:  General appearance: pale  Eyes:  wnl  Neck: supple  Lungs:  on bipap, diminished breath sounds  Heart: regular rate and rhythm, S1, S2 normal, no murmur, click, rub  or gallop  Abdomen: abd soft, +bs  Extremities: edema  Skin: Skin color, texture, turgor normal. No rashes or lesions  Neurologic: Mental status: sleeping, arouses briefly, goes right back to sleep    Laboratory:  CBC:  Recent Labs   Lab 02/02/20 0402   WBC 10.21   RBC 3.05*   HGB 9.4*   HCT 30.0*      MCV 98   MCH 30.8   MCHC 31.3*     BMP:  Recent Labs   Lab 02/02/20  0402     138   K 3.7  3.7     100   CO2 23  23   BUN 59*  59*   CREATININE 2.9*  2.9*   CALCIUM 9.0  9.0      CMP:   Recent Labs   Lab 02/02/20 0402   GLU 96  96   CALCIUM 9.0  9.0   ALBUMIN 2.5*   PROT 6.4     138   K 3.7  3.7   CO2 23  23     100   BUN 59*  59*   CREATININE 2.9*  2.9*   ALKPHOS 72   ALT 37   AST 31   BILITOT 0.6     All other lab data reviewed and negative unless otherwise specified   Diagnostic Results:  Labs: Reviewed    ASSESSMENT/PLAN:     Active Hospital Problems    Diagnosis  POA    *Acute diastolic congestive heart failure [I50.31]  Yes    Hyperkalemia [E87.5]  Yes    Hypoalbuminemia due to protein-calorie malnutrition [E46]  Yes    Metabolic acidosis [E87.2]  Yes    Elevated LFTs [R94.5]  Yes    Elevated troponin [R79.89]  Yes    Symptomatic anemia [D64.9]  Yes    Acute on chronic respiratory failure [J96.20]  Yes    Hypertensive emergency [I16.1]  Yes    ESRD (end stage renal disease) [N18.6]  Yes     Chronic    Essential hypertension [I10]  Yes    Hypothyroid [E03.9]  Yes    Hyperlipidemia [E78.5]  Yes      Resolved Hospital Problems   No resolved problems to display.

## 2020-02-02 NOTE — PLAN OF CARE
BP is labile.  High BP treated with PRN hydralazine.  Pt did not feel comfortable taking off BiPAP this morning to take morning meds.  Pt had dialysis today; 2.1L off.  Morphine given for air hunger. Spoke to pt's  multiple times regarding runny nose and him wiping his nose with hand, coughing on pt.  Pt's spouse is also having a hard time ambulating.  He is not leaving the hospital at all, not sleeping well.  He agrees to wear mask in room, but is not keeping it on.  Religious members inform us that he has prostate CA that has spread to his bone; he is not eating or performing ADLs appropriate d/t dementia.  Religious members also inform us that the pt and 's  is coming into town from [place] later this week.  Spoke to Gloria (house supervisor) regarding all of this.  Case management is to be involved soon regarding all of these issues.  POC discussed with pt and spouse.  Safety maintained entire shift.

## 2020-02-02 NOTE — HPI
"87 yo female very frail transferred to ICU last PM with some respiratory difficulties and plans to start dialysis.  CXR looked bad and initially there were plans to intubate pt.  On arrival to ICU she refused intubation and is DNR but they wanted to proceed with dialysis.  She had catheter placed amnd was dialyzed last PM with about 4 liters removed.  She has remained stable with BiPAP and today feels "more comfortable" wearing it.  She reaffirms DNR status.  Getting history is difficult due to illness and BiPAP.  Case was discussed with  at bedside and with nursing.  She is no distress while on BiPAP and has adequate O2 sats.  "

## 2020-02-02 NOTE — ASSESSMENT & PLAN NOTE
· Due to volume overload, pleural effusions  · Refused intubation and is DNR  · Tolerating BiPAP with adequate ABG  · Continue same  · Can try some time off of BiPAP as tolerated  · For repeat UF today  · Condition is very tenuous and she could decompensate at any time

## 2020-02-02 NOTE — PLAN OF CARE
02/01/20 1922   Patient Assessment/Suction   Level of Consciousness (AVPU) alert   Respiratory Effort Moderate;Labored   Expansion/Accessory Muscles/Retractions accessory muscle use   All Lung Fields Breath Sounds coarse;diminished   PRE-TX-O2   O2 Device (Oxygen Therapy) BiPAP   $ Is the patient on Low Flow Oxygen? Yes   Oxygen Concentration (%) 50   SpO2 98 %   Pulse Oximetry Type Continuous   Pulse (!) 56   Resp (!) 26   Aerosol Therapy   $ Aerosol Therapy Charges Aerosol Treatment   Respiratory Treatment Status (SVN) given   Treatment Route (SVN) in-line;oxygen   Patient Position (SVN) HOB elevated   Post Treatment Assessment (SVN) breath sounds unchanged   Signs of Intolerance (SVN) none   Breath Sounds Post-Respiratory Treatment   Throughout All Fields Post-Treatment All Fields   Throughout All Fields Post-Treatment no change   Post-treatment Heart Rate (beats/min) 58   Post-treatment Resp Rate (breaths/min) 26   Wound Care   $ Wound Care Tech Time 15 min   Area of Concern Left;Right;Cheek;Bridge of nose;Chin   Skin Color/Characteristics without discoloration   Skin Temperature warm   Preset CPAP/BiPAP Settings   Mode Of Delivery BiPAP   $ CPAP/BiPAP Daily Charge BiPAP/CPAP Daily   $ Initial CPAP/BiPAP Setup? No   $ Is patient using? Yes   Sized Appropriately? Yes   Equipment Type V60   Airway Device Type medium full face mask   Ipap 15   EPAP (cm H2O) 8   Pressure Support (cm H2O) 7   Set Rate (Breaths/Min) 12   ITime (sec) 1   Rise Time (sec) 0.3   Patient CPAP/BiPAP Settings   RR Total (Breaths/Min) 26   Tidal Volume (mL) 424   VE Minute Ventilation (L/min) 10.9 L/min   Peak Inspiratory Pressure (cm H2O) 16   TiTOT (%) 34   Total Leak (L/Min) 0   Patient Trigger - ST Mode Only (%) 99   CPAP/BiPAP Alarms   High Pressure (cm H2O) 20   Low Pressure (cm H2O) 10   Low Pressure Delay (Sec) 20   Minute Ventilation (L/Min) 3   High RR (breaths/min) 50   Low RR (breaths/min) 8   Pt tolerates treatments and BiPAP  well.

## 2020-02-03 PROBLEM — I16.1 HYPERTENSIVE EMERGENCY: Status: RESOLVED | Noted: 2020-01-01 | Resolved: 2020-01-01

## 2020-02-03 PROBLEM — E87.20 METABOLIC ACIDOSIS: Status: RESOLVED | Noted: 2020-01-01 | Resolved: 2020-01-01

## 2020-02-03 PROBLEM — I13.10 CARDIORENAL SYNDROME WITH RENAL FAILURE: Status: ACTIVE | Noted: 2019-01-01

## 2020-02-03 PROBLEM — R53.2 COMPLETE IMMOBILITY DUE TO SEVERE PHYSICAL DISABILITY OR FRAILTY: Status: ACTIVE | Noted: 2020-01-01

## 2020-02-03 NOTE — PLAN OF CARE
Plan of care reviewed with pt.  Pt AAO, weak and lethargic, with nonproductive cough.  Plan for dialysis today. BG down this am to 66, notified Np, ordered prn d50, adm 1/2 amp. De Luna draining scant urine.  Bathed pt, marilu linen. Safety maintained with frequent checks, no falls or injuries this shift.  Will continue to monitor.

## 2020-02-03 NOTE — PLAN OF CARE
Report given to ANDI Fuentes.  Pt on dialysis currently.  Living will placed in pt's folder to be scanned; MD aware.  Pt audibly gurgling; scopolomine patch ordered.  On BiPAP majority of the day.  Did eat about 10% of lunch.  POC discussed with pt.  Safety maintained this shift.

## 2020-02-03 NOTE — PT/OT/SLP RE-EVAL
Physical Therapy Re-evaluation    Patient Name:  Izabella Fulton   MRN:  [id number]    Recommendations:     Discharge Recommendations:  home, home health PT, nursing facility, skilled   Discharge Equipment Recommendations: none   Barriers to discharge: lives with spouse who has some difficulty also so may go home or need SNF placement depending on progress while in hospital    Assessment:     Izabella Fulton is a 86 y.o. female admitted with a medical diagnosis of Acute on chronic respiratory failure.  She presents with the following impairments/functional limitations:  weakness, impaired endurance, impaired balance, impaired functional mobilty, gait instability, decreased lower extremity function, decreased coordination .  Patient readily agreeable to PT reevaluation.  Patient presented supine in bed and was able to transfer supine to sit with mod assist and sit to stand with min assist.  Patient then able to stand x 3-4 minutes with RW with min assist and then transferred back supine with mod assist.      Rehab Prognosis:  good; patient would benefit from acute skilled PT services to address these deficits and reach maximum level of function.      Recent Surgery: * No surgery found *      Plan:     During this hospitalization, patient to be seen 6 x/week to address the above listed problems via gait training, therapeutic activities, therapeutic exercises  · Plan of Care Expires:  03/02/20   Plan of Care Reviewed with: patient, spouse    Subjective     Communicated with nurse Pollock prior to session.  Patient found supine with peripheral IV, oxygen, SCD, telemetry, bed alarm upon PT entry to room, agreeable to evaluation.      Chief Complaint: fatigue  Patient comments/goals: go home  Pain/Comfort:  ·      Patients cultural, spiritual, Yarsanism conflicts given the current situation:        Objective:     Patient found with: peripheral IV, oxygen, SCD, telemetry, bed alarm     General Precautions: Standard, fall    Orthopedic Precautions:N/A   Braces:       Exams:  · RLE ROM: WFL  · RLE Strength: Deficits: hip 3-/5, knee 3/5, ankle 3/5  · LLE ROM: WFL  · LLE Strength: Deficits: hip 3-/5, knee 3-/5, ankle 4-/5    Functional Mobility:  · Bed Mobility:     · Supine to Sit: moderate assistance  · Sit to Supine: moderate assistance  · Transfers:     · Sit to Stand:  minimum assistance with rolling walker    AM-PAC 6 CLICK MOBILITY  Total Score:11       Therapeutic Activities and Exercises:   transfer training supine to/from sit with mod assist, sit to stand with min assist  Standing tolerance/balance x 3.5 minutes with min assist and RW    Patient left supine with call button in reach, nurse notified and spouse present.    GOALS:   Multidisciplinary Problems     Physical Therapy Goals        Problem: Physical Therapy Goal    Goal Priority Disciplines Outcome Goal Variances Interventions   Physical Therapy Goal     PT, PT/OT Ongoing, Progressing     Description:  Goals to be met by: 2020     Patient will increase functional independence with mobility by performin. Supine to sit with MInimal Assistance  2. Sit to stand transfer with Minimal Assistance  3. Bed to chair transfer with Minimal Assistance using Rolling Walker  4. Gait  x 250 feet with Minimal Assistance using Rolling Walker.   5. Lower extremity exercise program x20 reps                     History:     Past Medical History:   Diagnosis Date    Anticoagulant long-term use     baby aspirin    Closed fracture of multiple ribs of left side with routine healing 2020    Hypertension     Thyroid disease     TIA (transient ischemic attack)     Ulcerative colitis     Wears hearing aid        Past Surgical History:   Procedure Laterality Date    COLON SURGERY  12    exp lap, perfered colon    EYE SURGERY      left eye cataract       Time Tracking:     PT Received On: 20  PT Start Time: 1115     PT Stop Time: 1149  PT Total Time (min): 34  min     Billable Minutes: Re-eval 20 and Therapeutic Activity 14      Chris Megilligan, PT  02/03/2020

## 2020-02-03 NOTE — PLAN OF CARE
02/03/20 0737   Patient Assessment/Suction   Level of Consciousness (AVPU) alert   Respiratory Effort Unlabored   All Lung Fields Breath Sounds clear   Rhythm/Pattern, Respiratory no shortness of breath reported   PRE-TX-O2   O2 Device (Oxygen Therapy) BiPAP   $ Is the patient on Low Flow Oxygen? Yes   Oxygen Concentration (%) 30   SpO2 (!) 93 %   Pulse Oximetry Type Continuous   $ Pulse Oximetry - Multiple Charge Pulse Oximetry - Multiple   Pulse (!) 57   Resp (!) 22   Aerosol Therapy   $ Aerosol Therapy Charges Aerosol Treatment   Respiratory Treatment Status (SVN) given   Treatment Route (SVN) in-line   Patient Position (SVN) HOB elevated   Post Treatment Assessment (SVN) breath sounds unchanged   Signs of Intolerance (SVN) none   Breath Sounds Post-Respiratory Treatment   Post-treatment Heart Rate (beats/min) 57   Post-treatment Resp Rate (breaths/min) 22   Ready to Wean/Extubation Screen   FIO2<=50 (chart decimal) 0.3   Preset CPAP/BiPAP Settings   Mode Of Delivery BiPAP S/T   $ CPAP/BiPAP Daily Charge BiPAP/CPAP Daily   $ Initial CPAP/BiPAP Setup? No   $ Is patient using? Yes   Size of Mask Medium/Large   Sized Appropriately? Yes   Equipment Type V60   Ipap 15   EPAP (cm H2O) 8   Pressure Support (cm H2O) 7   ITime (sec) 1   Rise Time (sec) 0.3   Patient CPAP/BiPAP Settings   RR Total (Breaths/Min) 22   Tidal Volume (mL) 369   VE Minute Ventilation (L/min) 8.1 L/min   Peak Inspiratory Pressure (cm H2O) 15   TiTOT (%) 25   Total Leak (L/Min) 6   Patient Trigger - ST Mode Only (%) 94   CPAP/BiPAP Backup Settings   Backup Rate 12 breaths per minute (bpm)   CPAP/BiPAP Alarms   High Pressure (cm H2O) 20   Low Pressure (cm H2O) 10   Minute Ventilation (L/Min) 3   High RR (breaths/min) 50   Low RR (breaths/min) 8   Apnea (Sec) 20

## 2020-02-03 NOTE — PROGRESS NOTES
Cut off pt's wedding set (engagement ring and band) with both  and pt's permission.  Pt is very edematous and finger turned blue.  Wedding set placed in specimen cup and given to .

## 2020-02-03 NOTE — PROGRESS NOTES
Progress Note  Nephrology    Admit Date: 1/26/2020   LOS: 8 days     SUBJECTIVE:     CC:  FERCHO on CKD3, anemia    Past medical, surgical and social history reviewed    HPI: 86F with hypertension, hypothyroidism, congestive heart failure  presents with increasing shortness of breath. She was recently discharged from the hospital approximately 1 week ago after admission for CHF exacerbation. Patient's  states that patient's breathing never did improve since discharge home and she has since persisted with a nonproductive cough. Patient was discharged in told to set up a cardiology and pulmonology appointment, have not been to either appointments yet. She was not eating, daily p.o. intake consist of a boost twice a day.     Upon arrival to the emergency room patient noted to be in acute respiratory distress requiring BiPAP therapy in association with a CHF exacerbation with an elevated BNP. Patient also noted to have hypertensive emergency and was treated with IV Lasix and IV nitroglycerin drip.     1/28 VSS, no new complains.  1/29 VSS, no new complains.  1/30 VSS, no new complains, UO is unimpressive despite torsemide 20 daily, started 1/29. sCr is up too.  1/31 VSS, no new complains, UO is unimpressive despite escalation from torsemide 20 daily, started 1/29 to Lasix 80mg IV TID. Will escalate to Bumex drip + metolazone... D/w Dr. Chavis.   2/1  Renal function worsening.  UOP 1.1L despite aggressive diuresis attempts.  Pt is on BIPAP, d/w bedside nurse.  She reports pt w/lethargy, says stat ABG was ordered per primary team.  Spouse at bedside reports some transient confusion.  ?uremia vs poor oxygenation.  D/w Dr. Chacon, will order line placement for dialysis.  Spoke to pt's spouse at length, he is aware that she may need dialysis, states that Mrs. Fulton was also made aware of this, and they are agreeable.  Discussed risks/benefits, explained that she would need a special type of line that a surgeon would come  and put in.  Answered questions, provided support.  He voices understanding of all.  2/2  Had first HD last night, 2L UF.  She refused to be intubated.  UOP 815cc.  She remains on bipap, ill-appearing.  Will order IUF today.  2/3  On bipap.  Sleeping.      Assessment/plan:    FERCHO, sCr not better.  CKD stage 3-4.  CHF exacerbation with volume overload.  Dialysis with UF today  No NSAIDs, ACEI/ARB, IV contrast or other nephrotoxins.  Keep MAP > 60, SBP > 100.  Dose meds for GFR < 30 ml/min.  Renal diet - low K, low phos. Restrict oral liquids.     Cards consult apprecaited.     Anemia of CKD  Hgb and HCT are acceptable. Monitor.  Will provide JONNY and/or IV iron PRN.     HTN  BP seem controlled. But remains hypertensive, continue UF 3l as tolerated.  Tolerate asymptomatic HTN up to -160.  Continue home meds.  Low sodium diet.     Thank you for allowing us to participate in the care of your patient!   We will follow the patient and provide recommendations as needed.    Review of Systems:  Unable to obtain 2/2 pt acuity, bipap    OBJECTIVE:     Vital Signs (Most Recent)  Temp: 97.5 °F (36.4 °C) (20 0745)  Pulse: (!) 58 (20 0815)  Resp: 20 (20)  BP: (!) 166/74 (20)  SpO2: (!) 92 % (20)    Vital Signs Range (Last 24H):  Temp:  [97 °F (36.1 °C)-97.5 °F (36.4 °C)]   Pulse:  [44-63]   Resp:  [14-35]   BP: ()/(37-80)   SpO2:  [89 %-99 %]     Temp (24hrs), Av.3 °F (36.3 °C), Min:97 °F (36.1 °C), Max:97.5 °F (36.4 °C)    Systolic (24hrs), Av , Min:87 , Max:192     Diastolic (24hrs), Av, Min:37, Max:80      I & O (Last 24H):    Intake/Output Summary (Last 24 hours) at 2/3/2020 0838  Last data filed at 2/3/2020 0600  Gross per 24 hour   Intake 760 ml   Output 3012 ml   Net -2252 ml     Physical Exam:  General appearance: pale  Eyes:  wnl  Neck: supple  Lungs:  on bipap, diminished breath sounds  Heart: regular rate and rhythm, S1, S2 normal, no murmur, click,  rub or gallop  Abdomen: abd soft, +bs  Extremities: edema  Skin: Skin color, texture, turgor normal. No rashes or lesions  Neurologic: Mental status: sleeping, arouses briefly, goes right back to sleep    Laboratory:  CBC:  Recent Labs   Lab 02/03/20  0340   WBC 10.03   RBC 2.90*   HGB 9.4*   HCT 28.4*      MCV 98   MCH 32.4*   MCHC 33.1     BMP:  Recent Labs   Lab 02/03/20  0340     138   K 4.0  4.0     100   CO2 20*  20*   BUN 75*  75*   CREATININE 3.6*  3.6*   CALCIUM 9.2  9.2      CMP:   Recent Labs   Lab 02/03/20  0340   GLU 66*  66*   CALCIUM 9.2  9.2   ALBUMIN 2.4*   PROT 6.5     138   K 4.0  4.0   CO2 20*  20*     100   BUN 75*  75*   CREATININE 3.6*  3.6*   ALKPHOS 76   ALT 33   AST 29   BILITOT 0.5     All other lab data reviewed and negative unless otherwise specified   Diagnostic Results:  Labs: Reviewed    ASSESSMENT/PLAN:     Active Hospital Problems    Diagnosis  POA    *Acute on chronic respiratory failure [J96.20]  Yes    Hypoalbuminemia due to protein-calorie malnutrition [E46]  Yes    Metabolic acidosis [E87.2]  Yes    Elevated troponin [R79.89]  Yes    Symptomatic anemia [D64.9]  Yes    Hypertensive emergency [I16.1]  Yes    ESRD (end stage renal disease) [N18.6]  Yes     Chronic    Acute diastolic congestive heart failure [I50.31]  Yes    Essential hypertension [I10]  Yes    Hypothyroid [E03.9]  Yes    Hyperlipidemia [E78.5]  Yes      Resolved Hospital Problems    Diagnosis Date Resolved POA    Elevated LFTs [R94.5] 02/02/2020 Yes

## 2020-02-03 NOTE — PROGRESS NOTES
Spoke with Dr. Miller (pulmonologist). Regarding nutrition.  VORB for pureed renal diet with boost added.

## 2020-02-03 NOTE — PROGRESS NOTES
Patient is sleeping.  She had dialysis today.  Her pressure is now improved her BUN and creatinine also decreased    On examination no JVD.  Regular cardiac rhythm no murmur gallop lungs diminished breath sounds he  Mild peripheral edema

## 2020-02-03 NOTE — PROGRESS NOTES
Progress Note  PULMONARY    Admit Date: 1/26/2020 02/03/2020      SUBJECTIVE:   2/2- seen by Dr. Aj, resp failure requiring BIPAP- transferred to ICU and started on hemodialysis for FERCHO  2/3- patient endorses continued shortness of breath. A/w thick secretions, cough and difficulty clearing. Better after dialysis, still not back to baseline. BIPAP feels uncomfortable on her face, taking break w/ nasal cannula now.       PFSH and Allergies reviewed.    OBJECTIVE:     Vitals (Most recent):  Vitals:    02/03/20 0815   BP: (!) 166/74   Pulse: (!) 58   Resp: 20   Temp:        Vitals (24 hour range):  Temp:  [97 °F (36.1 °C)-97.5 °F (36.4 °C)]   Pulse:  [44-63]   Resp:  [14-35]   BP: ()/(37-80)   SpO2:  [89 %-99 %]       Intake/Output Summary (Last 24 hours) at 2/3/2020 0922  Last data filed at 2/3/2020 0600  Gross per 24 hour   Intake 760 ml   Output 3012 ml   Net -2252 ml          Physical Exam:  The patient's neuro status (alertness,orientation,cognitive function,motor skills,), pharyngeal exam (oral lesions, hygiene, abn dentition,), Neck (jvd,mass,thyroid,nodes in neck and above/below clavicle),RESPIRATORY(symmetry,effort,fremitus,percussion,auscultation),  Cor(rhythm,heart tones including gallops,perfusion,edema)ABD(distention,hepatic&splenomegaly,tenderness,masses), Skin(rash,cyanosis),Psyc(affect,judgement,).  Exam negative except for these pertinent findings:    Elderly, frail appearing  No acute distress  Nasal cannula in place  Multiple dark red ulcers in the oropharynx  Mucous membranes dry  Diminished breath sounds bilateral  Trace pitting edema bilateral ankles    Radiographs reviewed: view by direct vision   CXR 2/3- bilateral patchy infiltrates, improved compared to 2/1  Results for orders placed during the hospital encounter of 01/26/20   X-Ray Chest 1 View    Narrative EXAMINATION:  XR CHEST 1 VIEW    CLINICAL HISTORY:  CHF, pleural effusions;    TECHNIQUE:  Single frontal view of the chest  was performed.    COMPARISON:  Chest of February 1, 2020.    FINDINGS:  The mediastinal and cardiac size and contours are within normal limits.  There is continued bilateral lower lobe infiltrates and small bilateral perihilar infiltrates improved on the left.  No pneumothorax is seen.  A double lumen central line enters on the right and ends in the right atrium.  There is a small left pleural effusion.      Impression Mild improvement in left lung infiltrates centrally.  There remains mild bilateral perihilar and lower lobe consolidated infiltrates.  A small left pleural effusion is noted.  Central line ends at the level of the right atrium      Electronically signed by: Milton Penny MD  Date:    02/03/2020  Time:    08:16   ]    Labs     Recent Labs   Lab 02/03/20  0340   WBC 10.03   HGB 9.4*   HCT 28.4*        Recent Labs   Lab 02/03/20  0340     138   K 4.0  4.0     100   CO2 20*  20*   BUN 75*  75*   CREATININE 3.6*  3.6*   GLU 66*  66*   CALCIUM 9.2  9.2   MG 2.4   PHOS 7.2*   AST 29   ALT 33   ALKPHOS 76   BILITOT 0.5   PROT 6.5   ALBUMIN 2.4*   PREALBUMIN 20   .5*   *     Recent Labs   Lab 02/03/20  0439   PH 7.363   PCO2 38.4   PO2 72*   HCO3 21.8*     Microbiology Results (last 7 days)     ** No results found for the last 168 hours. **          Impression:  Active Hospital Problems    Diagnosis  POA    *Acute on chronic respiratory failure [J96.20]  Yes    Hypoalbuminemia due to protein-calorie malnutrition [E46]  Yes    Metabolic acidosis [E87.2]  Yes    Elevated troponin [R79.89]  Yes    Symptomatic anemia [D64.9]  Yes    Hypertensive emergency [I16.1]  Yes    ESRD (end stage renal disease) [N18.6]  Yes     Chronic    Acute diastolic congestive heart failure [I50.31]  Yes    Essential hypertension [I10]  Yes    Hypothyroid [E03.9]  Yes    Hyperlipidemia [E78.5]  Yes      Resolved Hospital Problems    Diagnosis Date Resolved POA    Elevated LFTs  [R94.5] 02/02/2020 Yes               Plan:   Acute on chronic respiratory failure  · Due to volume overload, pleural effusions  · Continue BiPAP with breaks as tolerated  · Due for repeat UF today  · Condition is very tenuous and she could decompensate at any time     ESRD (end stage renal disease)  · Dialysis/UF per renal     Acute diastolic congestive heart failure  · Last ECHO noted  · Needs volume removal     Hypertensive emergency  · BP better controlled  · Continue BP meds     Symptomatic anemia  · No acute blood loss reported, follow   · Would transfuse for hgb < 7     Metabolic acidosis  · Continue HD per nephro     Hypoalbuminemia due to protein-calorie malnutrition  · Encourage oral intake  · Pureed renal diet      Hyperlipidemia  · Aware           The following were evaluated and adjusted as needed: intravenous fluids and nutritional status, acid base balance and oxygenation needs and input and output and renal status       Critical Care  - THE PATIENT HAS A HIGH PROBABILITY OF IMMINENT OR LIFE THREATENING DETERIORATION.  Over 50%time of encounter was in direct care at bedside.  Time was 30 to 74 minutes required for patient care.  Time needed for all of the above totaled 34 minutes.    Lindsey Miller MD  Pulmonary & Critical Care Medicine                                      .

## 2020-02-03 NOTE — PROGRESS NOTES
Report received from Maris. Patient awake and alert, HD in progress and tolerating well. Bed locked in low position. Call bell in hand. Will continue to monitor.

## 2020-02-03 NOTE — PLAN OF CARE
02/02/20 2663   Patient Assessment/Suction   Level of Consciousness (AVPU) responds to voice   Respiratory Effort Unlabored   Expansion/Accessory Muscles/Retractions no use of accessory muscles   All Lung Fields Breath Sounds coarse;crackles   Rhythm/Pattern, Respiratory pattern regular;unlabored   PRE-TX-O2   O2 Device (Oxygen Therapy) nasal cannula w/ humidification   $ Is the patient on Low Flow Oxygen? Yes   Flow (L/min) 5   SpO2 (!) 92 %   Pulse Oximetry Type Continuous   Pulse (!) 50   Resp 15   Aerosol Therapy   $ Aerosol Therapy Charges Aerosol Treatment   Respiratory Treatment Status (SVN) given   Treatment Route (SVN) mask;oxygen   Patient Position (SVN) semi-Philippe's   Post Treatment Assessment (SVN) breath sounds unchanged   Signs of Intolerance (SVN) none   Breath Sounds Post-Respiratory Treatment   Throughout All Fields Post-Treatment All Fields   Throughout All Fields Post-Treatment no change   Post-treatment Heart Rate (beats/min) 51   Post-treatment Resp Rate (breaths/min) 16   Wound Care   $ Wound Care Tech Time 15 min   Area of Concern Left;Right;Cheek;Bridge of nose;Chin   Skin Color/Characteristics without discoloration  (skin is intact)   Skin Temperature warm   Preset CPAP/BiPAP Settings   Mode Of Delivery Standby   Pt tolerates NC and treatments well.

## 2020-02-03 NOTE — PROGRESS NOTES
Patient is getting dialysis.  No problems reported.  Cardiac exam is unchanged.  Blood pressure is now better controlled ,decrease the dose of amlodipine 10-5

## 2020-02-04 NOTE — PLAN OF CARE
Pt has been in and out of Afib this shift; long pauses on the monitor.  HR got down to 30 BPM at one point; MD aware.  Minimal UO.  Scheduled meds d/c.  Placed on regular diet for comfort by MD.    Steve Pearl (executor of will/) called.  He will be here Friday at hospital.  He would like to discuss placement of pt after d/c dut to pt and 's inability to take of self.  POC discussed with pt, , POA.  Safety maintained entire shift.

## 2020-02-04 NOTE — ASSESSMENT & PLAN NOTE
Chronic, uncontrolled.  Latest blood pressure and vitals reviewed-   Temp:  [97.3 °F (36.3 °C)-98.6 °F (37 °C)]   Pulse:  [53-80]   Resp:  [15-55]   BP: ()/(47-98)   SpO2:  [88 %-99 %] .   Home meds for hypertension were reviewed and noted below. Hospital anti-hypertensive changes were made as shown below.  Hypertension Medications             carvedilol (COREG) 12.5 MG tablet Take 1 tablet (12.5 mg total) by mouth 2 (two) times daily.    hydrALAZINE (APRESOLINE) 100 MG tablet Take 1 tablet (100 mg total) by mouth every 8 (eight) hours.    torsemide (DEMADEX) 10 MG Tab Take 1 tablet (10 mg total) by mouth every Tues, Fri. Take extra dose on Thursday and Saturday this week then resume Tuesday and Friday dosing      Hospital Medications             amLODIPine tablet 5 mg Starting on 2/4/2020. 5 mg, Oral, Daily    carvedilol tablet 12.5 mg 12.5 mg, Oral, 2 times daily    hydrALAZINE injection 10 mg 10 mg, Intravenous, Every 2 hours PRN        Will utilize p.r.n. blood pressure medication only if patient's blood pressure greater than  180/110 and she develops symptoms such as worsening chest pain or shortness of breath.

## 2020-02-04 NOTE — PT/OT/SLP PROGRESS
Physical Therapy Treatment    Patient Name:  Izabella Fultno   MRN:  [id number]    Recommendations:     Discharge Recommendations:  home, home health PT, nursing facility, skilled   Discharge Equipment Recommendations: none   Barriers to discharge: None    Assessment:     Izabella Fulton is a 86 y.o. female admitted with a medical diagnosis of Acute on chronic respiratory failure.  She presents with the following impairments/functional limitations:  weakness, impaired endurance, impaired self care skills, impaired functional mobilty, gait instability, impaired balance, impaired cardiopulmonary response to activity. Tolerated treatment. Transferred from supine to sitting EOB with Min A. Transferred sit to stand with no AD and Min A x 2 for safety and stood on side of bed for a minute with Min A x 2 for balance with O2 @ 5L.  Transferred back to supine with Min A and completed B LE exercises: SLR, HS, Hip Abd/Add, and APs x10.       Rehab Prognosis: Good; patient would benefit from acute skilled PT services to address these deficits and reach maximum level of function.    Recent Surgery: * No surgery found *      Plan:     During this hospitalization, patient to be seen 6 x/week to address the identified rehab impairments via gait training, therapeutic activities, therapeutic exercises and progress toward the following goals:    · Plan of Care Expires:  03/02/20    Subjective     Chief Complaint: none stated   Patient/Family Comments/goals: none stated   Pain/Comfort:  · Pain Rating 1: 0/10      Objective:     Communicated with nurse Pollock prior to session.  Patient found supine with peripheral IV, oxygen, harris catheter, telemetry upon PT entry to room.     General Precautions: Standard, fall, respiratory   Orthopedic Precautions:N/A   Braces:       Functional Mobility:  · Bed Mobility:     · Rolling Right: minimum assistance  · Supine to Sit: minimum assistance  · Sit to Supine: minimum assistance  · Transfers:     · Sit  to Stand:  minimum assistance and of 2 persons with no AD      AM-PAC 6 CLICK MOBILITY          Therapeutic Activities and Exercises:  Transferred supine to sitting EOB and sat EOB for couple of minutes.   Stood at side of bed for a minute with no AD and Min A x2 for balance and O2@5L.   Returned to supine with Min A.  B LE exercises completed: SLR, HS, Hip Abd/Add, and APs x10.    Patient left supine with all lines intact, call button in reach, bed alarm on and nurse Maris  notified..    GOALS:   Multidisciplinary Problems     Physical Therapy Goals        Problem: Physical Therapy Goal    Goal Priority Disciplines Outcome Goal Variances Interventions   Physical Therapy Goal     PT, PT/OT Ongoing, Progressing     Description:  Goals to be met by: 2020     Patient will increase functional independence with mobility by performin. Supine to sit with MInimal Assistance  2. Sit to stand transfer with Minimal Assistance  3. Bed to chair transfer with Minimal Assistance using Rolling Walker  4. Gait  x 250 feet with Minimal Assistance using Rolling Walker.   5. Lower extremity exercise program x20 reps                     Time Tracking:     PT Received On: 20  PT Start Time: 1328     PT Stop Time: 1345  PT Total Time (min): 17 min     Billable Minutes: Therapeutic Activity 8min and Therapeutic Exercise 9min    Treatment Type: Treatment  PT/PTA: PTA     PTA Visit Number: 1     KARYN Hinds  2020

## 2020-02-04 NOTE — PLAN OF CARE
Intervention: fat/sodium/potassium/phosphorus modified diet, nutrition supplement therapy-commercial beverage and nutrition education     Recommendation:  1) Continue Adult regular diet + Boost plus TID, depending on goals of care consider appetite stimulant   3) Weigh pt weekly or as needed   4) Nutrition education and handouts given   5) Check iron B12 and folate If needed     Goals: 1) PO intakes > 50% of meals and supplements at f/u   Nutrition Goal Status: 1) progressing toward goal   Communication of RD Recs: (POC, sticky note)

## 2020-02-04 NOTE — PROGRESS NOTES
Progress Note  PULMONARY    Admit Date: 1/26/2020 02/04/2020      SUBJECTIVE:   2/2- seen by Dr. Aj, resp failure requiring BIPAP- transferred to ICU and started on hemodialysis for FERCHO  2/3- patient endorses continued shortness of breath. A/w thick secretions, cough and difficulty clearing. Better after dialysis, still not back to baseline. BIPAP feels uncomfortable on her face, taking break w/ nasal cannula now.   2/4- with relative hypotension, SBP in the 90s this am. On BIPAP, no new complaints      PFSH and Allergies reviewed.    OBJECTIVE:     Vitals (Most recent):  Vitals:    02/04/20 0745   BP: (!) 96/56   Pulse: (!) 112   Resp: (!) 21   Temp: 97.7 °F (36.5 °C)       Vitals (24 hour range):  Temp:  [97.2 °F (36.2 °C)-98.6 °F (37 °C)]   Pulse:  []   Resp:  [19-55]   BP: ()/(47-81)   SpO2:  [88 %-100 %]       Intake/Output Summary (Last 24 hours) at 2/4/2020 1113  Last data filed at 2/4/2020 0400  Gross per 24 hour   Intake 740 ml   Output 2530 ml   Net -1790 ml          Physical Exam:  The patient's neuro status (alertness,orientation,cognitive function,motor skills,), pharyngeal exam (oral lesions, hygiene, abn dentition,), Neck (jvd,mass,thyroid,nodes in neck and above/below clavicle),RESPIRATORY(symmetry,effort,fremitus,percussion,auscultation),  Cor(rhythm,heart tones including gallops,perfusion,edema)ABD(distention,hepatic&splenomegaly,tenderness,masses), Skin(rash,cyanosis),Psyc(affect,judgement,).  Exam negative except for these pertinent findings:    Elderly, frail appearing  No acute distress  On BIPAP, sleeping, arousable  Breath sounds clear bilaterally anteriorly  Edema improved    Radiographs reviewed: view by direct vision   CXR 2/3- bilateral patchy infiltrates, improved compared to 2/1  Results for orders placed during the hospital encounter of 01/26/20   X-Ray Chest 1 View    Narrative EXAMINATION:  XR CHEST 1 VIEW    CLINICAL HISTORY:  CHF, pleural  effusions;    TECHNIQUE:  Single frontal view of the chest was performed.    COMPARISON:  Chest of February 1, 2020.    FINDINGS:  The mediastinal and cardiac size and contours are within normal limits.  There is continued bilateral lower lobe infiltrates and small bilateral perihilar infiltrates improved on the left.  No pneumothorax is seen.  A double lumen central line enters on the right and ends in the right atrium.  There is a small left pleural effusion.      Impression Mild improvement in left lung infiltrates centrally.  There remains mild bilateral perihilar and lower lobe consolidated infiltrates.  A small left pleural effusion is noted.  Central line ends at the level of the right atrium      Electronically signed by: Milton Penny MD  Date:    02/03/2020  Time:    08:16   ]    Labs     Recent Labs   Lab 02/04/20  0326   WBC 7.71   HGB 8.5*   HCT 26.3*   *   BAND 31.0     Recent Labs   Lab 02/04/20  0326   *   K 4.2   CL 98   CO2 22*   BUN 32*   CREATININE 2.4*   GLU 97   CALCIUM 8.6*   MG 2.2   PHOS 3.6     Recent Labs   Lab 02/04/20  0423   PH 7.501*   PCO2 32.9*   PO2 182*   HCO3 25.7     Microbiology Results (last 7 days)     ** No results found for the last 168 hours. **          Impression:  Active Hospital Problems    Diagnosis  POA    *Acute on chronic respiratory failure [J96.20]  Yes    Complete immobility due to severe physical disability or frailty [R53.2]  Yes    Hypoalbuminemia due to protein-calorie malnutrition [E46]  Yes    Elevated troponin [R79.89]  Yes    Symptomatic anemia [D64.9]  Yes    Cardiorenal syndrome with renal failure [I13.10]  Yes    Acute diastolic congestive heart failure [I50.31]  Yes    Essential hypertension [I10]  Yes    Hypothyroid [E03.9]  Yes    Hyperlipidemia [E78.5]  Yes      Resolved Hospital Problems    Diagnosis Date Resolved POA    Metabolic acidosis [E87.2] 02/03/2020 Yes    Elevated LFTs [R94.5] 02/02/2020 Yes    Hypertensive  emergency [I16.1] 02/03/2020 Yes               Plan:   Acute on chronic respiratory failure  · ABG with respiratory alkalosis- BIPAP settings adjusted  · Due to volume overload, pleural effusions  · Continue BiPAP with breaks as tolerated  · Due for repeat UF today    ESRD (end stage renal disease)  · Dialysis/UF per renal     Acute diastolic congestive heart failure  · Last ECHO noted  · Needs volume removal     Hypertensive emergency  · Now relatively hypotensive  · HTN meds held     Symptomatic anemia  · No acute blood loss reported, follow   · Would transfuse for hgb < 7     Metabolic acidosis, improved  · Continue HD per nephro     Hypoalbuminemia due to protein-calorie malnutrition  · Encourage oral intake  · Pureed renal diet      Hyperlipidemia  · Aware           The following were evaluated and adjusted as needed: mechanical ventilator settings and weaning status, intravenous fluids and nutritional status, acid base balance and oxygenation needs and input and output and renal status       Critical Care  - THE PATIENT HAS A HIGH PROBABILITY OF IMMINENT OR LIFE THREATENING DETERIORATION.  Over 50%time of encounter was in direct care at bedside.  Time was 30 to 74 minutes required for patient care.  Time needed for all of the above totaled 32 minutes.    Lindsey Miller MD  Pulmonary & Critical Care Medicine                                      .

## 2020-02-04 NOTE — PLAN OF CARE
Problem: Physical Therapy Goal  Goal: Physical Therapy Goal  Description  Goals to be met by: 2020     Patient will increase functional independence with mobility by performin. Supine to sit with MInimal Assistance  2. Sit to stand transfer with Minimal Assistance  3. Bed to chair transfer with Minimal Assistance using Rolling Walker  4. Gait  x 250 feet with Minimal Assistance using Rolling Walker.   5. Lower extremity exercise program x20 reps    Outcome: Ongoing, Progressing   Therapeutic activity: bed mobility, transfers, and LE exercises with O2 attached.

## 2020-02-04 NOTE — PROGRESS NOTES
Progress Note  Nephrology    Admit Date: 1/26/2020   LOS: 9 days     SUBJECTIVE:     CC:  FERCHO on CKD3, anemia    Past medical, surgical and social history reviewed    HPI: 86F with hypertension, hypothyroidism, congestive heart failure  presents with increasing shortness of breath. She was recently discharged from the hospital approximately 1 week ago after admission for CHF exacerbation. Patient's  states that patient's breathing never did improve since discharge home and she has since persisted with a nonproductive cough. Patient was discharged in told to set up a cardiology and pulmonology appointment, have not been to either appointments yet. She was not eating, daily p.o. intake consist of a boost twice a day.     Upon arrival to the emergency room patient noted to be in acute respiratory distress requiring BiPAP therapy in association with a CHF exacerbation with an elevated BNP. Patient also noted to have hypertensive emergency and was treated with IV Lasix and IV nitroglycerin drip.     1/28 VSS, no new complains.  1/29 VSS, no new complains.  1/30 VSS, no new complains, UO is unimpressive despite torsemide 20 daily, started 1/29. sCr is up too.  1/31 VSS, no new complains, UO is unimpressive despite escalation from torsemide 20 daily, started 1/29 to Lasix 80mg IV TID. Will escalate to Bumex drip + metolazone... D/w Dr. Chavis.   2/1  Renal function worsening.  UOP 1.1L despite aggressive diuresis attempts.  Pt is on BIPAP, d/w bedside nurse.  She reports pt w/lethargy, says stat ABG was ordered per primary team.  Spouse at bedside reports some transient confusion.  ?uremia vs poor oxygenation.  D/w Dr. Chacon, will order line placement for dialysis.  Spoke to pt's spouse at length, he is aware that she may need dialysis, states that Mrs. Fulton was also made aware of this, and they are agreeable.  Discussed risks/benefits, explained that she would need a special type of line that a surgeon would come  and put in.  Answered questions, provided support.  He voices understanding of all.  2/  Had first HD last night, 2L UF.  She refused to be intubated.  UOP 815cc.  She remains on bipap, ill-appearing.  Will order IUF today.  2/3  On bipap.  Sleeping.    2/4  Sitting up, eating. No distress    Assessment/plan:    FERCHO, sCr not better.  CKD stage 3-4.  CHF exacerbation with volume overload.--better after multiple days of UF  Dialysis with UF today  No NSAIDs, ACEI/ARB, IV contrast or other nephrotoxins.  Keep MAP > 60, SBP > 100.  Dose meds for GFR < 30 ml/min.  Renal diet - low K, low phos. Restrict oral liquids.      hold dialysis today     Anemia of CKD  Hgb and HCT are acceptable. Monitor.  Will provide JONNY and/or IV iron PRN.     HTN  BP seem controlled. But remains hypertensive, continue UF 3l as tolerated.  Tolerate asymptomatic HTN up to -160.  Continue home meds.  Low sodium diet.     Thank you for allowing us to participate in the care of your patient!   We will follow the patient and provide recommendations as needed.       OBJECTIVE:     Vital Signs (Most Recent)  Temp: 97.7 °F (36.5 °C) (20 0745)  Pulse: (!) 58 (20 1115)  Resp: (!) 25 (20 1115)  BP: (!) 183/71 (20 1115)  SpO2: 96 % (20 1115)    Vital Signs Range (Last 24H):  Temp:  [97.2 °F (36.2 °C)-98.6 °F (37 °C)]   Pulse:  []   Resp:  [17-45]   BP: ()/(47-83)   SpO2:  [88 %-100 %]     Temp (24hrs), Av.7 °F (36.5 °C), Min:97.2 °F (36.2 °C), Max:98.6 °F (37 °C)    Systolic (24hrs), Av , Min:82 , Max:204     Diastolic (24hrs), Av, Min:47, Max:83      I & O (Last 24H):    Intake/Output Summary (Last 24 hours) at 2020 1157  Last data filed at 2020 0400  Gross per 24 hour   Intake 740 ml   Output 2530 ml   Net -1790 ml     Physical Exam:  General appearance: pale  Eyes:  wnl  Neck: supple  Lungs:  on bipap, diminished breath sounds  Heart: regular rate and rhythm, S1, S2 normal, no  murmur, click, rub or gallop  Abdomen: abd soft, +bs  Extremities: edema  Skin: Skin color, texture, turgor normal. No rashes or lesions  Neurologic: Mental status: sleeping, arouses briefly, goes right back to sleep    Laboratory:  CBC:  Recent Labs   Lab 02/04/20 0326   WBC 7.71   RBC 2.69*   HGB 8.5*   HCT 26.3*   *   MCV 98   MCH 31.6*   MCHC 32.3     BMP:  Recent Labs   Lab 02/04/20 0326   *   K 4.2   CL 98   CO2 22*   BUN 32*   CREATININE 2.4*   CALCIUM 8.6*      CMP:   Recent Labs   Lab 02/04/20 0326   GLU 97   CALCIUM 8.6*   *   K 4.2   CO2 22*   CL 98   BUN 32*   CREATININE 2.4*     All other lab data reviewed and negative unless otherwise specified   Diagnostic Results:  Labs: Reviewed    ASSESSMENT/PLAN:     Active Hospital Problems    Diagnosis  POA    *Acute on chronic respiratory failure [J96.20]  Yes    Complete immobility due to severe physical disability or frailty [R53.2]  Yes    Hypoalbuminemia due to protein-calorie malnutrition [E46]  Yes    Elevated troponin [R79.89]  Yes    Symptomatic anemia [D64.9]  Yes    Cardiorenal syndrome with renal failure [I13.10]  Yes    Acute diastolic congestive heart failure [I50.31]  Yes    Essential hypertension [I10]  Yes    Hypothyroid [E03.9]  Yes    Hyperlipidemia [E78.5]  Yes      Resolved Hospital Problems    Diagnosis Date Resolved POA    Metabolic acidosis [E87.2] 02/03/2020 Yes    Elevated LFTs [R94.5] 02/02/2020 Yes    Hypertensive emergency [I16.1] 02/03/2020 Yes

## 2020-02-04 NOTE — ASSESSMENT & PLAN NOTE
Acute respiratory failure due to hypervolemia related to ESRD. Failed Bumex diuresis. Patient appears fatigued. DNR. Wean to NC oxygen today and bipap at night. Dialysis per nephrology.  Increased morphine for respiratory distress. Patient appears to be worsening.

## 2020-02-04 NOTE — PLAN OF CARE
02/03/20 1948   Patient Assessment/Suction   Level of Consciousness (AVPU) alert   Respiratory Effort Slight;Labored   Expansion/Accessory Muscles/Retractions accessory muscle use   All Lung Fields Breath Sounds crackles;diminished   PRE-TX-O2   O2 Device (Oxygen Therapy) BiPAP   $ Is the patient on Low Flow Oxygen? Yes   Oxygen Concentration (%) 50   SpO2 99 %   Pulse Oximetry Type Continuous   Pulse 76   Resp (!) 30   Aerosol Therapy   $ Aerosol Therapy Charges Aerosol Treatment   Respiratory Treatment Status (SVN) given   Treatment Route (SVN) in-line;oxygen   Patient Position (SVN) semi-Philippe's   Post Treatment Assessment (SVN) breath sounds unchanged   Signs of Intolerance (SVN) none   Breath Sounds Post-Respiratory Treatment   Throughout All Fields Post-Treatment All Fields   Throughout All Fields Post-Treatment no change   Post-treatment Heart Rate (beats/min) 77   Post-treatment Resp Rate (breaths/min) 30   Preset CPAP/BiPAP Settings   Mode Of Delivery BiPAP   $ CPAP/BiPAP Daily Charge BiPAP/CPAP Daily   $ Initial CPAP/BiPAP Setup? No   $ Is patient using? Yes   Sized Appropriately? Yes   Equipment Type V60   Airway Device Type medium full face mask   Ipap 15   EPAP (cm H2O) 8   Pressure Support (cm H2O) 7   Set Rate (Breaths/Min) 12   ITime (sec) 1   Rise Time (sec) 0.3   Patient CPAP/BiPAP Settings   RR Total (Breaths/Min) 30   Tidal Volume (mL) 473   VE Minute Ventilation (L/min) 12.1 L/min   Peak Inspiratory Pressure (cm H2O) 15   TiTOT (%) 35   Total Leak (L/Min) 0   Patient Trigger - ST Mode Only (%) 100   CPAP/BiPAP Alarms   High Pressure (cm H2O) 20   Low Pressure (cm H2O) 10   Low Pressure Delay (Sec) 20   Minute Ventilation (L/Min) 3   High RR (breaths/min) 50   Low RR (breaths/min) 8   Pt tolerates BiPAP and treatments well.

## 2020-02-04 NOTE — ASSESSMENT & PLAN NOTE
CHF currently uncontrolled due to volume overload due to: Continued edema of extremities and JVD and Pulmonary edema. Latest ECHO shows ejection fraction of 65%. Stop Lasix and continue BB and monitor clinical status closely. Monitor on telemetry.  Monitor strict Is&Os and daily weights.

## 2020-02-04 NOTE — PLAN OF CARE
Plan of care reviewed with pt, spouse.  Spouse at bedside. Pt alternating between nc, bipap, more alert, watching tv, smiling, interacting more, though still frail with weak nonproductive cough. Hr was 80-90s this shift, with bp improving through night. Safety maintained with frequent checks, no falls or injuries this shift, blood glucose monitoring. Lateral rotation. Will continue to monitor.

## 2020-02-04 NOTE — PROGRESS NOTES
[facility]  Adult Nutrition  Progress Note    SUMMARY     Intervention: general healthful diet + commercial beverage      Recommendation:  1) Continue Adult regular  + Boost plus TID, depending on goals of care consider appetite stimulant   3) Weigh pt weekly or as needed   4) Nutrition education and handouts given   5) Check iron B12 and folate If needed     Goals: 1) PO intakes > 50% of meals and supplements at f/u   Nutrition Goal Status: 1) progressing toward goal   Communication of RD Recs: (POC, sticky note)    1. Acute on chronic respiratory failure, unspecified whether with hypoxia or hypercapnia    2. SOB (shortness of breath)    3. Heart failure    4. Acute diastolic congestive heart failure    5. Acute on chronic respiratory failure      Past Medical History:   Diagnosis Date    Anticoagulant long-term use     baby aspirin    Closed fracture of multiple ribs of left side with routine healing 1/16/2020    Hypertension     Thyroid disease     TIA (transient ischemic attack)     Ulcerative colitis     Wears hearing aid           Reason for Assessment  Reason For Assessment: Follow up  General Information Comments: 87 y/o female admitted with acute CHF. Educated by this service 1 week ago on diet for CHF. Reviewed renal, cardiac low sodium diet with pt and . NFPE done 1/27/20, mild-moderate wasting seen. PTA claims to have been eatnig well.   1/29/20 Pt now on HD. PO intakes variable, stating she likes Suplena, would prefer a chocolate supplement, but has no appetite. Brought pt a boost glucose control ( low K and Phos) for breakfast as she was only able to take 2-3 bites.   2/4/20:  at bedside feeding patient during Rd assessment. Consumed ~75% of dinner last night + drank a boost. Per MD note, will likely shift to comfort care due to poor prognosis.      Nutrition Discharge Planning: to be determined- renal, cardiac diet + Novasource renal BID     Nutrition Risk  "Screen     Nutrition Risk Screen: no indicators present     Nutrition/Diet History     Patient Reported Diet/Restrictions/Preferences: heart healthy  Typical Food/Fluid Intake: avoids sodium at home. but had been eating a lot of canned soup lately  Spiritual, Cultural Beliefs, Shinto Practices, Values that Affect Care: no  Food Allergies: NKFA  Factors Affecting Nutritional Intake: decreased appetite     Anthropometrics    Height Method: Measured(20)  Height: 4' 9"  Height (inches): 57 in  Weight Method: Bed Scale  Weight: 58.7 kg 20   Weight (lb): 129 lb ( 10 lb wt gain since admit)  Ideal Body Weight (IBW), Female: 85 lb  % Ideal Body Weight, Female (lb): 140.84 %  BMI (Calculated): 25.9  BMI Grade: 25 - 29.9 - overweight  Usual Body Weight (UBW), k.8 kg(1/3/20)  54.3 kg admission     Lab/Procedures/Meds     Pertinent Labs Reviewed: reviewed  No results found for: [id number]  No results found for: FOLATE  No results found for: IRON, TIBC, FERRITIN, SATURATEDIRO  BMP  Lab Results   Component Value Date     (L) 2020    K 4.2 2020    CL 98 2020    CO2 22 (L) 2020    BUN 32 (H) 2020    CREATININE 2.4 (H) 2020    CALCIUM 8.6 (L) 2020    ANIONGAP 15 2020    ESTGFRAFRICA 20 (A) 2020    EGFRNONAA 18 (A) 2020     Lab Results   Component Value Date    CALCIUM 8.6 (L) 2020    PHOS 3.6 2020     Lab Results   Component Value Date    ALBUMIN 2.4 (L) 2020        Pertinent Medications Reviewed: reviewed  Pertinent Medications Comments: polyethylene glycol, Ca, VitamiN D, Folic acid, docusate, omega 3    Estimated/Assessed Needs   admission  Weight Used For Calorie Calculations: 50.8 kg (111 lb 15.9 oz)(estimated dry weight)  Energy Calorie Requirements (kcal): 25-30 kcal/kg ( CHF/CKD IV) = 5825-7115 kcal  Energy Need Method: Kcal/kg  Protein Requirements: 1.0 g protein/kg ( wasting and age vs. CKD IV)=  51 g protein  Weight Used " For Protein Calculations: 50.8 kg (111 lb 15.9 oz)(estimated dry weight)  Fluid Requirements (mL): 1500 ml or per MD  Estimated Fluid Requirement Method: RDA Method  CHO Requirement: N/A        Nutrition Prescription Ordered     Current Diet Order: Cardiac, renal  Suplena BID     Evaluation of Received Nutrient/Fluid Intake     Energy Calories Required: not meeting needs  Protein Required: not meeting needs  Fluid Required: not meeting needs  Tolerance: other (see comments)(NPO)  % Intake of Estimated Energy Needs: 0-100% with suplena ( most 0%)  % Meal Intake:0-75%     Nutrition Risk     Level of Risk/Frequency of Follow-up: moderate 2 x weekly     Assessment and Plan     Hypoalbuminemia due to protein-calorie malnutrition  Contributing Nutrition Diagnosis  Moderate chronic condition related malnutrition    Related to (etiology):   Increased needs d/t CKD    Signs and Symptoms (as evidenced by):   1) mild edema  2) Mild-moderate fat/muscle wasting as charted below  Interventions/Recommendations (treatment strategy):  Above    Nutrition Diagnosis Status:   Continues      Monitor and Evaluation     Food and Nutrient Intake: energy intake, food and beverage intake  Food and Nutrient Adminstration: diet order  Knowledge/Beliefs/Attitudes: food and nutrition knowledge/skill  Anthropometric Measurements: weight  Biochemical Data, Medical Tests and Procedures: electrolyte and renal panel  Nutrition-Focused Physical Findings: overall appearance      Malnutrition Assessment  Skin (Micronutrient): (WDL) Adam = 17, calf wound  Teeth (Micronutrient): (denies chewing trouble)   Micronutrient Evaluation: suspected deficiency, suspected toxicity  Micronutrient Evaluation Comments: check iron, B12, folate/ elevated K and Phos       Orbital Region (Subcutaneous Fat Loss): moderate depletion  Upper Arm Region (Subcutaneous Fat Loss): moderate depletion  Thoracic and Lumbar Region: mild depletion   [place] Region (Muscle Loss):  moderate depletion  Clavicle Bone Region (Muscle Loss): moderate depletion  Clavicle and Acromion Bone Region (Muscle Loss): moderate depletion  Scapular Bone Region (Muscle Loss): mild depletion  Dorsal Hand (Muscle Loss): mild depletion  Patellar Region (Muscle Loss): mild depletion  Anterior Thigh Region (Muscle Loss): mild depletion  Posterior Calf Region (Muscle Loss): mild depletion   Edema (Fluid Accumulation): 3-->moderate   Subcutaneous Fat Loss (Final Summary): moderate protein-calorie malnutrition  Muscle Loss Evaluation (Final Summary): moderate protein-calorie malnutrition       Nutrition Follow-Up     RD Follow-up?: Yes

## 2020-02-04 NOTE — ASSESSMENT & PLAN NOTE
Severe frailty portends very poor prognosis in the face of multiple system organ failure (respiratory failure, renal failure) will likely start to shift towards comfort measures.  Patient seems to understand that she is likely dying.

## 2020-02-04 NOTE — ASSESSMENT & PLAN NOTE
Nutrition consulted. Body mass index is 23.42 kg/m².. Encourage maximal PO intake. Diet supplementation ordered per nutrition approval. Will encourage PO and monitor closely for weight changes.

## 2020-02-04 NOTE — NURSING
Pt had HD TX this afternoon, uf net 2000ml off pt without complaints. Post vss report given to vikram Montez RN.

## 2020-02-04 NOTE — PLAN OF CARE
02/04/20 0652   Patient Assessment/Suction   Level of Consciousness (AVPU) alert   Respiratory Effort Unlabored   Expansion/Accessory Muscles/Retractions no use of accessory muscles   All Lung Fields Breath Sounds diminished   NOEL Breath Sounds diminished;clear   LLL Breath Sounds coarse;diminished   RUL Breath Sounds diminished;clear   RML Breath Sounds diminished   RLL Breath Sounds diminished;coarse   Rhythm/Pattern, Respiratory unlabored   Cough Frequency infrequent   Cough Type congested   PRE-TX-O2   O2 Device (Oxygen Therapy) nasal cannula   $ Is the patient on Low Flow Oxygen? Yes   Flow (L/min) 5   SpO2 95 %   Pulse Oximetry Type Continuous   $ Pulse Oximetry - Multiple Charge Pulse Oximetry - Multiple   Pulse 97   Resp 20   Aerosol Therapy   $ Aerosol Therapy Charges Aerosol Treatment   Respiratory Treatment Status (SVN) given   Treatment Route (SVN) mask;oxygen   Patient Position (SVN) semi-Philippe's   Post Treatment Assessment (SVN) breath sounds unchanged   Signs of Intolerance (SVN) none   Breath Sounds Post-Respiratory Treatment   Post-treatment Heart Rate (beats/min) 91   Post-treatment Resp Rate (breaths/min) 20   Preset CPAP/BiPAP Settings   Mode Of Delivery BiPAP S/T;Standby   $ CPAP/BiPAP Daily Charge BiPAP/CPAP Daily

## 2020-02-04 NOTE — ASSESSMENT & PLAN NOTE
BMP reviewed- noted Estimated Creatinine Clearance: 8.7 mL/min (A) (based on SCr of 3.6 mg/dL (H)). according to latest data. Monitor UOP. Nephrology consulted and recommending dialysis for hypervolemia.

## 2020-02-04 NOTE — PROGRESS NOTES
[facility] Medicine  Progress Note    Patient Name: Izabella Fulton  MRN: [id number]  Patient Class: IP- Inpatient   Admission Date: 1/26/2020  Length of Stay: 8 days  Attending Physician: Billy Jones MD  Primary Care Provider: Corinne Fletcher NP        Subjective:     Principal Problem:Acute on chronic respiratory failure        HPI:  Izabella Fulton is an 86-year-old female with a past medical history of hypertension, hypothyroidism, hyperlipidemia, and congestive heart failure who presents to the emergency room tonight with reports of increasing shortness of breath.  Patient was recently discharged from the hospital approximately 1 week ago in which she was admitted for a CHF exacerbation.  Unsure if patient was diagnosed with congestive heart failure during that hospitalization or prior.  Patient's  states that patient's breathing never did improve since discharge home and she has since persisted with a nonproductive cough.  Patient was discharged in told to set up a cardiology and pulmonology appointment, have not been to either appointments yet.  Patient also reportedly not eating, daily p.o. intake consist of a boost twice a day.  Information for HPI obtained from patient's , Don, and patient's family friend who is at bedside during my initial interview and physical exam.  Upon arrival to the emergency room patient noted to be in acute respiratory distress requiring BiPAP therapy in association with a CHF exacerbation with an elevated BNP.  Patient also noted to have hypertensive emergency upon admission.  Patient was initiated on IV Lasix in emergency room and IV nitroglycerin drip.  Patient admitted to ICU on January 26, 2020 at approximately 11:30 p.m..  Patient reportedly resides at with her  utilizes a walker for ambulatory assist device, denies the use of supplemental oxygen dependence while at home.    Overview/Hospital Course:  No notes on  file    Interval History:  Patient seen and examined.  Her respiratory disease seems to have advanced rapidly since I last saw her.  Patient having significant difficulty breathing, but firmly does not want to be intubated and kept alive on machines.    Review of Systems   Constitutional: Positive for activity change, appetite change and fatigue.   Respiratory: Positive for cough and shortness of breath.    Cardiovascular: Negative for chest pain and leg swelling.   Gastrointestinal: Negative for abdominal pain and nausea.   Neurological: Positive for weakness.   Psychiatric/Behavioral: Positive for confusion.   All other systems reviewed and are negative.    Objective:     Vital Signs (Most Recent):  Temp: 97.4 °F (36.3 °C) (02/03/20 1930)  Pulse: 72 (02/03/20 2200)  Resp: (!) 45 (02/03/20 2000)  BP: (!) 161/70 (02/03/20 2200)  SpO2: 99 % (02/03/20 2000) Vital Signs (24h Range):  Temp:  [97.3 °F (36.3 °C)-98.6 °F (37 °C)] 97.4 °F (36.3 °C)  Pulse:  [53-80] 72  Resp:  [15-55] 45  SpO2:  [88 %-99 %] 99 %  BP: ()/(47-98) 161/70     Weight: 49.1 kg (108 lb 3.9 oz)  Body mass index is 23.42 kg/m².    Intake/Output Summary (Last 24 hours) at 2/3/2020 2306  Last data filed at 2/3/2020 1930  Gross per 24 hour   Intake 620 ml   Output 2567 ml   Net -1947 ml      Physical Exam   Constitutional:   Markedly frail female who appears acutely ill   Eyes: Pupils are equal, round, and reactive to light. EOM are normal.   Cardiovascular: Normal rate, regular rhythm and intact distal pulses.   No murmur heard.  Pulmonary/Chest: She has wheezes.   Markedly increased respiratory effort noted on and off BiPAP.  Wet, gurgling breath sounds noted   Abdominal: Soft. She exhibits no distension. There is no tenderness.   Musculoskeletal: She exhibits edema.   Neurological:   Intermittent confusion noted   Skin: No rash noted. No pallor.   Nursing note and vitals reviewed.      Significant Labs: All pertinent labs within the past 24  hours have been reviewed.    Significant Imaging: I have reviewed all pertinent imaging results/findings within the past 24 hours.      Assessment/Plan:      * Acute on chronic respiratory failure  Acute respiratory failure due to hypervolemia related to ESRD. Failed Bumex diuresis. Patient appears fatigued. DNR. Wean to NC oxygen today and bipap at night. Dialysis per nephrology.  Increased morphine for respiratory distress. Patient appears to be worsening.      Complete immobility due to severe physical disability or frailty  Severe frailty portends very poor prognosis in the face of multiple system organ failure (respiratory failure, renal failure) will likely start to shift towards comfort measures.  Patient seems to understand that she is likely dying.      Essential hypertension  Chronic, uncontrolled.  Latest blood pressure and vitals reviewed-   Temp:  [97.3 °F (36.3 °C)-98.6 °F (37 °C)]   Pulse:  [53-80]   Resp:  [15-55]   BP: ()/(47-98)   SpO2:  [88 %-99 %] .   Home meds for hypertension were reviewed and noted below. Hospital anti-hypertensive changes were made as shown below.  Hypertension Medications             carvedilol (COREG) 12.5 MG tablet Take 1 tablet (12.5 mg total) by mouth 2 (two) times daily.    hydrALAZINE (APRESOLINE) 100 MG tablet Take 1 tablet (100 mg total) by mouth every 8 (eight) hours.    torsemide (DEMADEX) 10 MG Tab Take 1 tablet (10 mg total) by mouth every Tues, Fri. Take extra dose on Thursday and Saturday this week then resume Tuesday and Friday dosing      Hospital Medications             amLODIPine tablet 5 mg Starting on 2/4/2020. 5 mg, Oral, Daily    carvedilol tablet 12.5 mg 12.5 mg, Oral, 2 times daily    hydrALAZINE injection 10 mg 10 mg, Intravenous, Every 2 hours PRN        Will utilize p.r.n. blood pressure medication only if patient's blood pressure greater than  180/110 and she develops symptoms such as worsening chest pain or shortness of  breath.      Symptomatic anemia  Patient's anemia is currently uncontrolled. S/p 1 units of PRBCs. Etiology likely d/t chronic disease  Current CBC reviewed-   Lab Results   Component Value Date    HGB 9.4 (L) 02/03/2020    HCT 28.4 (L) 02/03/2020     Monitor serial CBC and transfuse if patient becomes hemodynamically unstable, symptomatic or H/H drops below 7/21.         Elevated troponin  Likely in the setting of acute CHF exacerbation, patient denies CP on admit - trend troponin - tele monitoring.        Hypoalbuminemia due to protein-calorie malnutrition  Nutrition consulted. Body mass index is 23.42 kg/m².. Encourage maximal PO intake. Diet supplementation ordered per nutrition approval. Will encourage PO and monitor closely for weight changes.          Hyperkalemia  Tele - trend with CMP.        Acute diastolic congestive heart failure  CHF currently uncontrolled due to volume overload due to: Continued edema of extremities and JVD and Pulmonary edema. Latest ECHO shows ejection fraction of 65%. Stop Lasix and continue BB and monitor clinical status closely. Monitor on telemetry.  Monitor strict Is&Os and daily weights.           Cardiorenal syndrome with renal failure  BMP reviewed- noted Estimated Creatinine Clearance: 8.7 mL/min (A) (based on SCr of 3.6 mg/dL (H)). according to latest data. Monitor UOP. Nephrology consulted and recommending dialysis for hypervolemia.               Hyperlipidemia  Will stop statin for now.  Given severe respiratory disease, risk greater than benefit currently.    Hypothyroid  Lab Results   Component Value Date    TSH 6.425 (H) 01/16/2020     Chronic, controlled.  Will continue home medication per NGT      VTE Risk Mitigation (From admission, onward)         Ordered     heparin (porcine) injection 4,000 Units  As needed (PRN)      02/02/20 1418     IP VTE HIGH RISK PATIENT  Once      01/27/20 0212     Place ESTUARDO hose  Until discontinued      01/27/20 0212     Place sequential  compression device  Until discontinued      01/27/20 0212                Critical care time spent on the evaluation and treatment of severe organ dysfunction, review of pertinent labs and imaging studies, discussions with consulting providers and discussions with patient/family: 47 minutes.      Billy Jones MD  Department of Hospital Medicine   [facility]

## 2020-02-04 NOTE — SUBJECTIVE & OBJECTIVE
Interval History:  Patient seen and examined.  Her respiratory disease seems to have advanced rapidly since I last saw her.  Patient having significant difficulty breathing, but firmly does not want to be intubated and kept alive on machines.    Review of Systems   Constitutional: Positive for activity change, appetite change and fatigue.   Respiratory: Positive for cough and shortness of breath.    Cardiovascular: Negative for chest pain and leg swelling.   Gastrointestinal: Negative for abdominal pain and nausea.   Neurological: Positive for weakness.   Psychiatric/Behavioral: Positive for confusion.   All other systems reviewed and are negative.    Objective:     Vital Signs (Most Recent):  Temp: 97.4 °F (36.3 °C) (02/03/20 1930)  Pulse: 72 (02/03/20 2200)  Resp: (!) 45 (02/03/20 2000)  BP: (!) 161/70 (02/03/20 2200)  SpO2: 99 % (02/03/20 2000) Vital Signs (24h Range):  Temp:  [97.3 °F (36.3 °C)-98.6 °F (37 °C)] 97.4 °F (36.3 °C)  Pulse:  [53-80] 72  Resp:  [15-55] 45  SpO2:  [88 %-99 %] 99 %  BP: ()/(47-98) 161/70     Weight: 49.1 kg (108 lb 3.9 oz)  Body mass index is 23.42 kg/m².    Intake/Output Summary (Last 24 hours) at 2/3/2020 2306  Last data filed at 2/3/2020 1930  Gross per 24 hour   Intake 620 ml   Output 2567 ml   Net -1947 ml      Physical Exam   Constitutional:   Markedly frail female who appears acutely ill   Eyes: Pupils are equal, round, and reactive to light. EOM are normal.   Cardiovascular: Normal rate, regular rhythm and intact distal pulses.   No murmur heard.  Pulmonary/Chest: She has wheezes.   Markedly increased respiratory effort noted on and off BiPAP.  Wet, gurgling breath sounds noted   Abdominal: Soft. She exhibits no distension. There is no tenderness.   Musculoskeletal: She exhibits edema.   Neurological:   Intermittent confusion noted   Skin: No rash noted. No pallor.   Nursing note and vitals reviewed.      Significant Labs: All pertinent labs within the past 24 hours have  been reviewed.    Significant Imaging: I have reviewed all pertinent imaging results/findings within the past 24 hours.

## 2020-02-04 NOTE — PT/OT/SLP DISCHARGE
Occupational Therapy Discharge Summary    Izabella Fulton  MRN: [id number]   Principal Problem: Acute on chronic respiratory failure      Patient Discharged from acute Occupational Therapy on 2/4/20.  Please refer to prior OT note dated 1/31/20 for functional status.    Assessment:      Per MD, patient has had significant decline in functional status; patient has been unable to therapeutically participate in therapy due to decline    Objective:     GOALS:   Multidisciplinary Problems     Occupational Therapy Goals        Problem: Occupational Therapy Goal    Goal Priority Disciplines Outcome Interventions   Occupational Therapy Goal     OT, PT/OT Ongoing, Progressing    Description:  Goals to be met by: 2/14/20     Patient will increase functional independence with ADLs by performing:    LE Dressing with Modified [place].  Grooming while standing at sink with Modified [place].  Toileting from toilet with Modified [place] for hygiene and clothing management.   Toilet transfer to toilet with Modified [place] with AD as needed.                      Reasons for Discontinuation of Therapy Services  Patient is unable to continue work toward goals because of medical or psychosocial complications.      Plan:     Patient Discharged to: Medical management    ELÍAS Anderson LOTR  2/4/2020

## 2020-02-04 NOTE — ASSESSMENT & PLAN NOTE
Patient's anemia is currently uncontrolled. S/p 1 units of PRBCs. Etiology likely d/t chronic disease  Current CBC reviewed-   Lab Results   Component Value Date    HGB 9.4 (L) 02/03/2020    HCT 28.4 (L) 02/03/2020     Monitor serial CBC and transfuse if patient becomes hemodynamically unstable, symptomatic or H/H drops below 7/21.

## 2020-02-05 PROBLEM — R41.0 DELIRIUM: Status: ACTIVE | Noted: 2020-01-01

## 2020-02-05 NOTE — PROGRESS NOTES
[facility] Medicine  Progress Note    Patient Name: Izabella Fulton  MRN: [id number]  Patient Class: IP- Inpatient   Admission Date: 1/26/2020  Length of Stay: 9 days  Attending Physician: Billy Jones MD  Primary Care Provider: Corinne Fletcher NP        Subjective:     Principal Problem:Acute on chronic respiratory failure        HPI:  Izabella Fulton is an 86-year-old female with a past medical history of hypertension, hypothyroidism, hyperlipidemia, and congestive heart failure who presents to the emergency room tonight with reports of increasing shortness of breath.  Patient was recently discharged from the hospital approximately 1 week ago in which she was admitted for a CHF exacerbation.  Unsure if patient was diagnosed with congestive heart failure during that hospitalization or prior.  Patient's  states that patient's breathing never did improve since discharge home and she has since persisted with a nonproductive cough.  Patient was discharged in told to set up a cardiology and pulmonology appointment, have not been to either appointments yet.  Patient also reportedly not eating, daily p.o. intake consist of a boost twice a day.  Information for HPI obtained from patient's , Don, and patient's family friend who is at bedside during my initial interview and physical exam.  Upon arrival to the emergency room patient noted to be in acute respiratory distress requiring BiPAP therapy in association with a CHF exacerbation with an elevated BNP.  Patient also noted to have hypertensive emergency upon admission.  Patient was initiated on IV Lasix in emergency room and IV nitroglycerin drip.  Patient admitted to ICU on January 26, 2020 at approximately 11:30 p.m..  Patient reportedly resides at with her  utilizes a walker for ambulatory assist device, denies the use of supplemental oxygen dependence while at home.    Overview/Hospital Course:  No notes on  file    Interval History:  Patient seen and examined.  Her respiratory disease seems to have advanced rapidly since I last saw her.  Patient having significant difficulty breathing, but firmly does not want to be intubated and kept alive on machines.    Review of Systems   Constitutional: Positive for activity change, appetite change and fatigue.   Respiratory: Positive for cough and shortness of breath.    Cardiovascular: Negative for chest pain and leg swelling.   Gastrointestinal: Negative for abdominal pain and nausea.   Neurological: Positive for weakness.   Psychiatric/Behavioral: Positive for confusion.   All other systems reviewed and are negative.    Objective:     Vital Signs (Most Recent):  Temp: 97.7 °F (36.5 °C) (02/04/20 1930)  Pulse: 71 (02/04/20 2037)  Resp: (!) 30 (02/04/20 2037)  BP: (!) 155/72 (02/04/20 1930)  SpO2: (!) 92 % (02/04/20 2037) Vital Signs (24h Range):  Temp:  [97.2 °F (36.2 °C)-97.7 °F (36.5 °C)] 97.7 °F (36.5 °C)  Pulse:  [] 71  Resp:  [15-37] 30  SpO2:  [90 %-100 %] 92 %  BP: ()/(49-92) 155/72     Weight: 49.1 kg (108 lb 3.9 oz)  Body mass index is 23.42 kg/m².    Intake/Output Summary (Last 24 hours) at 2/4/2020 2237  Last data filed at 2/4/2020 1930  Gross per 24 hour   Intake 580 ml   Output 25 ml   Net 555 ml      Physical Exam   Constitutional:   Markedly frail female who appears acutely ill   Eyes: Pupils are equal, round, and reactive to light. EOM are normal.   Cardiovascular: Normal rate, regular rhythm and intact distal pulses.   No murmur heard.  Pulmonary/Chest: She has wheezes.   Markedly increased respiratory effort noted on and off BiPAP.  Wet, gurgling breath sounds noted   Abdominal: Soft. She exhibits no distension. There is no tenderness.   Musculoskeletal: She exhibits edema.   Neurological:   Intermittent confusion noted   Skin: No rash noted. No pallor.   Nursing note and vitals reviewed.      Significant Labs: All pertinent labs within the past 24  hours have been reviewed.    Significant Imaging: I have reviewed all pertinent imaging results/findings within the past 24 hours.      Assessment/Plan:      * Acute on chronic respiratory failure  Acute respiratory failure due to hypervolemia related to ESRD. Failed Bumex diuresis. Patient appears fatigued. DNR. Wean to NC oxygen today and bipap at night. Dialysis per nephrology.  Increased morphine for respiratory distress.  Not sure if the patient will survive.      Complete immobility due to severe physical disability or frailty  Severe frailty portends very poor prognosis in the face of multiple system organ failure (respiratory failure, renal failure) will likely start to shift towards comfort measures.  Patient seems to understand that she is likely dying.      Essential hypertension  Chronic, uncontrolled.  Latest blood pressure and vitals reviewed-   Temp:  [97.2 °F (36.2 °C)-97.7 °F (36.5 °C)]   Pulse:  []   Resp:  [15-37]   BP: ()/(49-92)   SpO2:  [90 %-100 %] .   Home meds for hypertension were reviewed and noted below. Hospital anti-hypertensive changes were made as shown below.  Hypertension Medications             carvedilol (COREG) 12.5 MG tablet Take 1 tablet (12.5 mg total) by mouth 2 (two) times daily.    hydrALAZINE (APRESOLINE) 100 MG tablet Take 1 tablet (100 mg total) by mouth every 8 (eight) hours.    torsemide (DEMADEX) 10 MG Tab Take 1 tablet (10 mg total) by mouth every Tues, Fri. Take extra dose on Thursday and Saturday this week then resume Tuesday and Friday dosing      Hospital Medications             hydrALAZINE injection 10 mg 10 mg, Intravenous, Every 2 hours PRN        Will utilize p.r.n. blood pressure medication only if patient's blood pressure greater than  180/110 and she develops symptoms such as worsening chest pain or shortness of breath.    Holding blood pressure medications secondary to hypotension.    Symptomatic anemia  Patient's anemia is currently  uncontrolled. S/p 1 units of PRBCs. Etiology likely d/t chronic disease  Current CBC reviewed-   Lab Results   Component Value Date    HGB 8.5 (L) 02/04/2020    HCT 26.3 (L) 02/04/2020     Monitor serial CBC and transfuse if patient becomes hemodynamically unstable, symptomatic or H/H drops below 7/21.         Elevated troponin  Likely in the setting of acute CHF exacerbation, patient denies CP on admit - trend troponin - tele monitoring.        Hypoalbuminemia due to protein-calorie malnutrition  Nutrition consulted. Body mass index is 23.42 kg/m².. Encourage maximal PO intake. Diet supplementation ordered per nutrition approval. Will encourage PO and monitor closely for weight changes.          Hyperkalemia  Tele - trend with CMP.        Acute diastolic congestive heart failure  CHF currently uncontrolled due to volume overload due to: Continued edema of extremities and JVD and Pulmonary edema. Latest ECHO shows ejection fraction of 65%. Stop Lasix and continue BB and monitor clinical status closely. Monitor on telemetry.  Monitor strict Is&Os and daily weights.           Cardiorenal syndrome with renal failure  BMP reviewed- noted Estimated Creatinine Clearance: 13 mL/min (A) (based on SCr of 2.4 mg/dL (H)). according to latest data. Monitor UOP. Nephrology consulted and recommending dialysis for hypervolemia.               Hyperlipidemia  Will stop statin for now.  Given severe respiratory disease, risk greater than benefit currently.    Hypothyroid  Lab Results   Component Value Date    TSH 6.425 (H) 01/16/2020     Chronic, controlled.  Will continue home medication per NGT    VTE Risk Mitigation (From admission, onward)         Ordered     heparin (porcine) injection 4,000 Units  As needed (PRN)      02/02/20 1418     IP VTE HIGH RISK PATIENT  Once      01/27/20 0212     Place ESTUARDO hose  Until discontinued      01/27/20 0212     Place sequential compression device  Until discontinued      01/27/20 0212                 Critical care time spent on the evaluation and treatment of severe organ dysfunction, review of pertinent labs and imaging studies, discussions with consulting providers and discussions with patient/family: 36 minutes.      Billy Jones MD  Department of Hospital Medicine   [facility]

## 2020-02-05 NOTE — ASSESSMENT & PLAN NOTE
BMP reviewed- noted Estimated Creatinine Clearance: 13 mL/min (A) (based on SCr of 2.4 mg/dL (H)). according to latest data. Monitor UOP. Nephrology consulted and recommending dialysis for hypervolemia.

## 2020-02-05 NOTE — PLAN OF CARE
Pt remains in ICU on bipap. Pt lethargic and not following commands. Starts gasping and tachypneic immediately on bipap removal. [place] cath intact with very minimal output - MD aware/pt on HD. Dialysis today with 2L off.  DNR status maintained. Extensive conversation with family regarding comfort care today. Pt's POA is expected to come Friday to help with those decisions. Safety maintained.

## 2020-02-05 NOTE — ASSESSMENT & PLAN NOTE
Chronic, uncontrolled.  Latest blood pressure and vitals reviewed-   Temp:  [97.2 °F (36.2 °C)-97.7 °F (36.5 °C)]   Pulse:  []   Resp:  [15-37]   BP: ()/(49-92)   SpO2:  [90 %-100 %] .   Home meds for hypertension were reviewed and noted below. Hospital anti-hypertensive changes were made as shown below.  Hypertension Medications             carvedilol (COREG) 12.5 MG tablet Take 1 tablet (12.5 mg total) by mouth 2 (two) times daily.    hydrALAZINE (APRESOLINE) 100 MG tablet Take 1 tablet (100 mg total) by mouth every 8 (eight) hours.    torsemide (DEMADEX) 10 MG Tab Take 1 tablet (10 mg total) by mouth every Tues, Fri. Take extra dose on Thursday and Saturday this week then resume Tuesday and Friday dosing      Hospital Medications             hydrALAZINE injection 10 mg 10 mg, Intravenous, Every 2 hours PRN        Will utilize p.r.n. blood pressure medication only if patient's blood pressure greater than  180/110 and she develops symptoms such as worsening chest pain or shortness of breath.    Holding blood pressure medications secondary to hypotension.

## 2020-02-05 NOTE — SUBJECTIVE & OBJECTIVE
Interval History:  Patient seen and examined.  Her respiratory disease seems to have advanced rapidly since I last saw her.  Patient having significant difficulty breathing, but firmly does not want to be intubated and kept alive on machines.    Review of Systems   Constitutional: Positive for activity change, appetite change and fatigue.   Respiratory: Positive for cough and shortness of breath.    Cardiovascular: Negative for chest pain and leg swelling.   Gastrointestinal: Negative for abdominal pain and nausea.   Neurological: Positive for weakness.   Psychiatric/Behavioral: Positive for confusion.   All other systems reviewed and are negative.    Objective:     Vital Signs (Most Recent):  Temp: 97.7 °F (36.5 °C) (02/04/20 1930)  Pulse: 71 (02/04/20 2037)  Resp: (!) 30 (02/04/20 2037)  BP: (!) 155/72 (02/04/20 1930)  SpO2: (!) 92 % (02/04/20 2037) Vital Signs (24h Range):  Temp:  [97.2 °F (36.2 °C)-97.7 °F (36.5 °C)] 97.7 °F (36.5 °C)  Pulse:  [] 71  Resp:  [15-37] 30  SpO2:  [90 %-100 %] 92 %  BP: ()/(49-92) 155/72     Weight: 49.1 kg (108 lb 3.9 oz)  Body mass index is 23.42 kg/m².    Intake/Output Summary (Last 24 hours) at 2/4/2020 2237  Last data filed at 2/4/2020 1930  Gross per 24 hour   Intake 580 ml   Output 25 ml   Net 555 ml      Physical Exam   Constitutional:   Markedly frail female who appears acutely ill   Eyes: Pupils are equal, round, and reactive to light. EOM are normal.   Cardiovascular: Normal rate, regular rhythm and intact distal pulses.   No murmur heard.  Pulmonary/Chest: She has wheezes.   Markedly increased respiratory effort noted on and off BiPAP.  Wet, gurgling breath sounds noted   Abdominal: Soft. She exhibits no distension. There is no tenderness.   Musculoskeletal: She exhibits edema.   Neurological:   Intermittent confusion noted   Skin: No rash noted. No pallor.   Nursing note and vitals reviewed.      Significant Labs: All pertinent labs within the past 24 hours  have been reviewed.    Significant Imaging: I have reviewed all pertinent imaging results/findings within the past 24 hours.

## 2020-02-05 NOTE — PLAN OF CARE
02/04/20 2037   Patient Assessment/Suction   Level of Consciousness (AVPU) alert   Respiratory Effort Slight;Labored   Expansion/Accessory Muscles/Retractions accessory muscle use   All Lung Fields Breath Sounds coarse;diminished   PRE-TX-O2   O2 Device (Oxygen Therapy) nasal cannula w/ humidification   $ Is the patient on Low Flow Oxygen? Yes   Flow (L/min) 5   SpO2 (!) 92 %   Pulse Oximetry Type Continuous   Pulse 71   Resp (!) 30   Aerosol Therapy   $ Aerosol Therapy Charges Aerosol Treatment   Respiratory Treatment Status (SVN) given   Treatment Route (SVN) mask;oxygen   Patient Position (SVN) semi-Philippe's   Post Treatment Assessment (SVN) breath sounds unchanged   Signs of Intolerance (SVN) none   Breath Sounds Post-Respiratory Treatment   Throughout All Fields Post-Treatment All Fields   Throughout All Fields Post-Treatment no change   Post-treatment Heart Rate (beats/min) 71   Post-treatment Resp Rate (breaths/min) 30   Wound Care   $ Wound Care Tech Time 15 min   Area of Concern Left;Right;Cheek;Bridge of nose;Chin   Skin Color/Characteristics without discoloration  (skin is intact)   Skin Temperature warm   Preset CPAP/BiPAP Settings   Mode Of Delivery Standby   Pt tolerates NC and treatments well.

## 2020-02-05 NOTE — PROGRESS NOTES
Progress Note  PULMONARY    Admit Date: 1/26/2020 02/05/2020      SUBJECTIVE:   2/2- seen by Dr. Aj, resp failure requiring BIPAP- transferred to ICU and started on hemodialysis for FERCHO  2/3- patient endorses continued shortness of breath. A/w thick secretions, cough and difficulty clearing. Better after dialysis, still not back to baseline. BIPAP feels uncomfortable on her face, taking break w/ nasal cannula now.   2/4- with relative hypotension, SBP in the 90s this am. On BIPAP, no new complaints  2/5- with discomfort overnight, given morphine and Ativan.  This a.m. very sleepy, on BiPAP.  Unable to communicate.  Unable to tolerate going off BiPAP    PFSH and Allergies reviewed.    OBJECTIVE:     Vitals (Most recent):  Vitals:    02/05/20 1110   BP:    Pulse: 67   Resp: (!) 27   Temp:        Vitals (24 hour range):  Temp:  [97.3 °F (36.3 °C)-97.9 °F (36.6 °C)]   Pulse:  [56-81]   Resp:  [17-43]   BP: (121-179)/()   SpO2:  [90 %-98 %]       Intake/Output Summary (Last 24 hours) at 2/5/2020 1214  Last data filed at 2/4/2020 1930  Gross per 24 hour   Intake 400 ml   Output 5 ml   Net 395 ml          Physical Exam:  The patient's neuro status (alertness,orientation,cognitive function,motor skills,), pharyngeal exam (oral lesions, hygiene, abn dentition,), Neck (jvd,mass,thyroid,nodes in neck and above/below clavicle),RESPIRATORY(symmetry,effort,fremitus,percussion,auscultation),  Cor(rhythm,heart tones including gallops,perfusion,edema)ABD(distention,hepatic&splenomegaly,tenderness,masses), Skin(rash,cyanosis),Psyc(affect,judgement,).  Exam negative except for these pertinent findings:    Elderly, frail appearing  Sleeping, difficult to arouse  On BIPAP-- when taken off patient gasping and agitated  Breath sounds clear bilaterally anteriorly  Edema improved    Radiographs reviewed: view by direct vision   CXR 2/3- bilateral patchy infiltrates, improved compared to 2/1  Results for orders placed during  the hospital encounter of 01/26/20   X-Ray Chest 1 View    Narrative EXAMINATION:  XR CHEST 1 VIEW    CLINICAL HISTORY:  CHF, pleural effusions;    TECHNIQUE:  Single frontal view of the chest was performed.    COMPARISON:  Chest of February 1, 2020.    FINDINGS:  The mediastinal and cardiac size and contours are within normal limits.  There is continued bilateral lower lobe infiltrates and small bilateral perihilar infiltrates improved on the left.  No pneumothorax is seen.  A double lumen central line enters on the right and ends in the right atrium.  There is a small left pleural effusion.      Impression Mild improvement in left lung infiltrates centrally.  There remains mild bilateral perihilar and lower lobe consolidated infiltrates.  A small left pleural effusion is noted.  Central line ends at the level of the right atrium      Electronically signed by: Milton Penny MD  Date:    02/03/2020  Time:    08:16   ]    Labs     Recent Labs   Lab 02/05/20  0339   WBC 9.04   HGB 8.1*   HCT 26.2*   *   BAND 11.0   METAMYELOCYT 1.0   MYELOPCT 2.0     Recent Labs   Lab 02/05/20  0339   *   K 4.8   CL 99   CO2 20*   BUN 47*   CREATININE 3.5*   GLU 97   CALCIUM 8.5*   MG 2.3   PHOS 2.9     No results for input(s): PH, PCO2, PO2, HCO3 in the last 24 hours.  Microbiology Results (last 7 days)     ** No results found for the last 168 hours. **          Impression:  Active Hospital Problems    Diagnosis  POA    *Acute on chronic respiratory failure [J96.20]  Yes    Complete immobility due to severe physical disability or frailty [R53.2]  Yes    Hypoalbuminemia due to protein-calorie malnutrition [E46]  Yes    Elevated troponin [R79.89]  Yes    Symptomatic anemia [D64.9]  Yes    Cardiorenal syndrome with renal failure [I13.10]  Yes    Acute diastolic congestive heart failure [I50.31]  Yes    Essential hypertension [I10]  Yes    Hypothyroid [E03.9]  Yes    Hyperlipidemia [E78.5]  Yes      Resolved  "Hospital Problems    Diagnosis Date Resolved POA    Metabolic acidosis [E87.2] 02/03/2020 Yes    Elevated LFTs [R94.5] 02/02/2020 Yes    Hypertensive emergency [I16.1] 02/03/2020 Yes               Plan:   Delirium   · Encephalopathy due to her overall condition and worsened in the setting of benzos/narcotics    Acute on chronic respiratory failure  · Due to volume overload, pleural effusions  · Continue BiPAP with breaks as tolerated    Acute renal failure on CKD, HD started this admission  · Dialysis/UF per renal  · Long-term HD does not seem to be compatible with her wishes     Acute diastolic congestive heart failure  · Last ECHO noted  · Continue volume removal with dialysis     Hypertensive emergency  · Now relatively hypotensive  · HTN meds held     Symptomatic anemia  · No acute blood loss reported, follow   · Would transfuse for hgb < 7     Hypoalbuminemia due to protein-calorie malnutrition  · Encourage oral intake  · Pureed renal diet      Hyperlipidemia  · Aware      I had a conversation with patient's  about the goals of her care.  He wishes to see if she can become more awake and interactive.  I let him know that that may not be possible, and especially not if she is requiring medications for agitation which make her more sleepy.  He knows she would not like to remain bed-bound as a vegetable" like her mother experienced.  He reports prior to hospitalization she was very active and able to take care of herself.  Her likelihood of returning to this state is very low.  We discussed changing to comfort measures if she is not going to improve.  He is not ready at this time to proceed with comfort care but will consider it if she is not improving in the next few days.  He seems to have a hard time understanding completely but there is no other family other than his sister who lives across the country and they have no children.    The following were evaluated and adjusted as needed: mechanical " ventilator settings and weaning status, intravenous fluids and nutritional status, sedation and neurologic status, acid base balance and oxygenation needs, input and output and renal status and CODE STATUS/OUTLOOK DISCUSSED WITH AVAILABLE NEXT OF  KIN       Critical Care  - THE PATIENT HAS A HIGH PROBABILITY OF IMMINENT OR LIFE THREATENING DETERIORATION.  Over 50%time of encounter was in direct care at bedside.  Time was 30 to 74 minutes required for patient care.  Time needed for all of the above totaled 42 minutes.    Lindsey Miller MD  Pulmonary & Critical Care Medicine                                      .

## 2020-02-05 NOTE — ASSESSMENT & PLAN NOTE
Patient's anemia is currently uncontrolled. S/p 1 units of PRBCs. Etiology likely d/t chronic disease  Current CBC reviewed-   Lab Results   Component Value Date    HGB 8.5 (L) 02/04/2020    HCT 26.3 (L) 02/04/2020     Monitor serial CBC and transfuse if patient becomes hemodynamically unstable, symptomatic or H/H drops below 7/21.

## 2020-02-05 NOTE — PROGRESS NOTES
Progress Note  Nephrology    Admit Date: 1/26/2020   LOS: 10 days     SUBJECTIVE:     CC:  FERCHO on CKD3, anemia    Past medical, surgical and social history reviewed    HPI: 86F with hypertension, hypothyroidism, congestive heart failure  presents with increasing shortness of breath. She was recently discharged from the hospital approximately 1 week ago after admission for CHF exacerbation. Patient's  states that patient's breathing never did improve since discharge home and she has since persisted with a nonproductive cough. Patient was discharged in told to set up a cardiology and pulmonology appointment, have not been to either appointments yet. She was not eating, daily p.o. intake consist of a boost twice a day.     Upon arrival to the emergency room patient noted to be in acute respiratory distress requiring BiPAP therapy in association with a CHF exacerbation with an elevated BNP. Patient also noted to have hypertensive emergency and was treated with IV Lasix and IV nitroglycerin drip.     1/28 VSS, no new complains.  1/29 VSS, no new complains.  1/30 VSS, no new complains, UO is unimpressive despite torsemide 20 daily, started 1/29. sCr is up too.  1/31 VSS, no new complains, UO is unimpressive despite escalation from torsemide 20 daily, started 1/29 to Lasix 80mg IV TID. Will escalate to Bumex drip + metolazone... D/w Dr. Chavis.   2/1  Renal function worsening.  UOP 1.1L despite aggressive diuresis attempts.  Pt is on BIPAP, d/w bedside nurse.  She reports pt w/lethargy, says stat ABG was ordered per primary team.  Spouse at bedside reports some transient confusion.  ?uremia vs poor oxygenation.  D/w Dr. Chacon, will order line placement for dialysis.  Spoke to pt's spouse at length, he is aware that she may need dialysis, states that Mrs. Fulton was also made aware of this, and they are agreeable.  Discussed risks/benefits, explained that she would need a special type of line that a surgeon would come  and put in.  Answered questions, provided support.  He voices understanding of all.  2/2  Had first HD last night, 2L UF.  She refused to be intubated.  UOP 815cc.  She remains on bipap, ill-appearing.  Will order IUF today.  2/3  On bipap.  Sleeping.    2/4  Sitting up, eating. No distress  2/  Seen on dialysis.  sleeping    Assessment/plan:    FERCHO, sCr not better.  CKD stage 3-4.  CHF exacerbation with volume overload.--better after multiple days of UF  Dialysis with UF today  No NSAIDs, ACEI/ARB, IV contrast or other nephrotoxins.  Keep MAP > 60, SBP > 100.  Dose meds for GFR < 30 ml/min.  Renal diet - low K, low phos. Restrict oral liquids.            Anemia of CKD  Hgb and HCT are acceptable. Monitor.  Epogen today     HTN  BP seem controlled. But remains hypertensive, continue UF 3l as tolerated.  Tolerate asymptomatic HTN up to -160.  Continue home meds.  Low sodium diet.     Thank you for allowing us to participate in the care of your patient!   We will follow the patient and provide recommendations as needed.       OBJECTIVE:     Vital Signs (Most Recent)  Temp: 97.3 °F (36.3 °C) (20 1255)  Pulse: 64 (20 1345)  Resp: (!) 36 (20 1345)  BP: (!) 118/56 (20 1345)  SpO2: (!) 93 % (20 1345)    Vital Signs Range (Last 24H):  Temp:  [97.3 °F (36.3 °C)-97.9 °F (36.6 °C)]   Pulse:  [54-81]   Resp:  [17-43]   BP: ()/()   SpO2:  [90 %-97 %]     Temp (24hrs), Av.6 °F (36.4 °C), Min:97.3 °F (36.3 °C), Max:97.9 °F (36.6 °C)    Systolic (24hrs), Av , Min:88 , Max:179     Diastolic (24hrs), Av, Min:52, Max:139      I & O (Last 24H):    Intake/Output Summary (Last 24 hours) at 2020 1347  Last data filed at 2020 1930  Gross per 24 hour   Intake 400 ml   Output 5 ml   Net 395 ml     Physical Exam:  General appearance: pale  Eyes:  wnl  Neck: supple  Lungs:  on bipap, diminished breath sounds  Heart: regular rate and rhythm, S1, S2 normal, no murmur, click,  rub or gallop  Abdomen: abd soft, +bs  Extremities: edema  Skin: Skin color, texture, turgor normal. No rashes or lesions  Neurologic: Mental status: sleeping, arouses briefly, goes right back to sleep    Laboratory:  CBC:  Recent Labs   Lab 02/05/20 0339   WBC 9.04   RBC 2.52*   HGB 8.1*   HCT 26.2*   *   *   MCH 32.1*   MCHC 30.9*     BMP:  Recent Labs   Lab 02/05/20 0339   *   K 4.8   CL 99   CO2 20*   BUN 47*   CREATININE 3.5*   CALCIUM 8.5*      CMP:   Recent Labs   Lab 02/05/20 0339   GLU 97   CALCIUM 8.5*   *   K 4.8   CO2 20*   CL 99   BUN 47*   CREATININE 3.5*     All other lab data reviewed and negative unless otherwise specified   Diagnostic Results:  Labs: Reviewed    ASSESSMENT/PLAN:     Active Hospital Problems    Diagnosis  POA    *Acute on chronic respiratory failure [J96.20]  Yes    Delirium [R41.0]  No    Complete immobility due to severe physical disability or frailty [R53.2]  Yes    Hypoalbuminemia due to protein-calorie malnutrition [E46]  Yes    Elevated troponin [R79.89]  Yes    Symptomatic anemia [D64.9]  Yes    Cardiorenal syndrome with renal failure [I13.10]  Yes    Acute diastolic congestive heart failure [I50.31]  Yes    Essential hypertension [I10]  Yes    Hypothyroid [E03.9]  Yes    Hyperlipidemia [E78.5]  Yes      Resolved Hospital Problems    Diagnosis Date Resolved POA    Metabolic acidosis [E87.2] 02/03/2020 Yes    Elevated LFTs [R94.5] 02/02/2020 Yes    Hypertensive emergency [I16.1] 02/03/2020 Yes

## 2020-02-05 NOTE — NURSING
Pt had HD TX this afternoon, uf net 2000ml off pt without complaint. Post vss report given to adriana perez.

## 2020-02-05 NOTE — CARE UPDATE
02/05/20 0729   Patient Assessment/Suction   Level of Consciousness (AVPU) alert   Respiratory Effort Unlabored   All Lung Fields Breath Sounds diminished   PRE-TX-O2   O2 Device (Oxygen Therapy) BiPAP   $ Is the patient on Low Flow Oxygen? Yes   Oxygen Concentration (%) 40   SpO2 (!) 94 %   Pulse Oximetry Type Continuous   $ Pulse Oximetry - Multiple Charge Pulse Oximetry - Multiple   Pulse (!) 59   Resp 17   Aerosol Therapy   $ Aerosol Therapy Charges Aerosol Treatment   Respiratory Treatment Status (SVN) given   Treatment Route (SVN) mask   Patient Position (SVN) semi-Philippe's   Post Treatment Assessment (SVN) breath sounds unchanged   Signs of Intolerance (SVN) none   Breath Sounds Post-Respiratory Treatment   Throughout All Fields Post-Treatment All Fields   Throughout All Fields Post-Treatment no change   Post-treatment Heart Rate (beats/min) 59   Post-treatment Resp Rate (breaths/min) 21   Wound Care   $ Wound Care Tech Time 15 min   Area of Concern Bridge of nose;Chin   Skin Color/Characteristics redness nonblanchable   Skin Temperature warm   Ready to Wean/Extubation Screen   FIO2<=50 (chart decimal) 0.4   Preset CPAP/BiPAP Settings   Mode Of Delivery BiPAP   $ Initial CPAP/BiPAP Setup? No   $ Is patient using? Yes   Size of Mask Medium/Large   Sized Appropriately? Yes   Equipment Type V60   Airway Device Type medium full face mask   Humidifier not applicable   Ipap 12   EPAP (cm H2O) 6   Pressure Support (cm H2O) 6   Set Rate (Breaths/Min) 12   ITime (sec) 1   Rise Time (sec) 3   Patient CPAP/BiPAP Settings   Timed Inspiration (Sec) 1   IPAP Rise Time (sec) 3   RR Total (Breaths/Min) 21   Tidal Volume (mL) 331   VE Minute Ventilation (L/min) 6.5 L/min   Peak Inspiratory Pressure (cm H2O) 12   TiTOT (%) 21   Total Leak (L/Min) 0   Patient Trigger - ST Mode Only (%) 99   CPAP/BiPAP Alarms   High Pressure (cm H2O) 20   Low Pressure (cm H2O) 10   Low Pressure Delay (Sec) 20   Minute Ventilation (L/Min) 3    High RR (breaths/min) 50   Low RR (breaths/min) 8

## 2020-02-05 NOTE — ASSESSMENT & PLAN NOTE
Acute respiratory failure due to hypervolemia related to ESRD. Failed Bumex diuresis. Patient appears fatigued. DNR. Wean to NC oxygen today and bipap at night. Dialysis per nephrology.  Increased morphine for respiratory distress.  Not sure if the patient will survive.

## 2020-02-05 NOTE — PLAN OF CARE
"Plan of care reviewed with pt and spouse.  Spouse at bedside. Pt stayed on NC5L until sats began to drop in night, and placed on bipap at that time. Pt began to become confused, agitated, possibly hallucinating, acting like she was picking at something or sewing.  Pt also tried to get out of bed a few times.  Bed alarm on. Adm prn morphine, continued to be agitated,with high rr.  Adm prn ativan, 0.5mg, relief noted, pt asleep. When trying to draw labs,  asked why we were drawing labs, and stated "what's the use in doing that?"  I explained to him that there were still orders in place for labs at this time and if he wanted to decline I could note it.  He said it was ok to go ahead. Pt resting at this time.  [place] draining scant dark urine. Pt drank sips of water. Safety maintained with frequent checks, no falls or injuries this shift, bed alarm on, catheter care, lip moisturizer applied.  Monitoring closely.   "

## 2020-02-05 NOTE — PT/OT/SLP PROGRESS
Physical Therapy      Patient Name:  Izabella Fulton   MRN:  [id number]    Patient not seen today secondary to Other (Comment)(2 attempts made. Unable to fully arouse first attempt, nurse present reports patient with SOB. Treatment held .). Will follow-up 2/6/20.    Tessy Rodriguez PTA

## 2020-02-06 NOTE — SUBJECTIVE & OBJECTIVE
Interval History:  Patient seen and examined.  Her respiratory disease seems to have advanced rapidly since I last saw her.  Patient having significant difficulty breathing, but firmly does not want to be intubated and kept alive on machines.    Review of Systems   Constitutional: Positive for activity change, appetite change and fatigue.   Respiratory: Positive for cough and shortness of breath.    Cardiovascular: Negative for chest pain and leg swelling.   Gastrointestinal: Negative for abdominal pain and nausea.   Neurological: Positive for weakness.   Psychiatric/Behavioral: Positive for confusion.   All other systems reviewed and are negative.    Objective:     Vital Signs (Most Recent):  Temp: 97.3 °F (36.3 °C) (02/05/20 1615)  Pulse: 66 (02/05/20 1910)  Resp: (!) 24 (02/05/20 1910)  BP: (!) 170/86 (02/05/20 1800)  SpO2: (!) 92 % (02/05/20 1910) Vital Signs (24h Range):  Temp:  [97.3 °F (36.3 °C)-97.9 °F (36.6 °C)] 97.3 °F (36.3 °C)  Pulse:  [54-81] 66  Resp:  [17-43] 24  SpO2:  [90 %-97 %] 92 %  BP: ()/() 170/86     Weight: 49.1 kg (108 lb 3.9 oz)  Body mass index is 23.42 kg/m².    Intake/Output Summary (Last 24 hours) at 2/5/2020 2157  Last data filed at 2/5/2020 1825  Gross per 24 hour   Intake 500 ml   Output 2515 ml   Net -2015 ml      Physical Exam   Constitutional:   Markedly frail female who appears acutely ill   Eyes: Pupils are equal, round, and reactive to light. EOM are normal.   Cardiovascular: Normal rate, regular rhythm and intact distal pulses.   No murmur heard.  Pulmonary/Chest: She has wheezes.   Markedly increased respiratory effort noted on and off BiPAP.  Wet, gurgling breath sounds noted   Abdominal: Soft. She exhibits no distension. There is no tenderness.   Musculoskeletal: She exhibits edema.   Neurological:   Intermittent confusion noted   Skin: No rash noted. No pallor.   Nursing note and vitals reviewed.      Significant Labs: All pertinent labs within the past 24  hours have been reviewed.    Significant Imaging: I have reviewed all pertinent imaging results/findings within the past 24 hours.

## 2020-02-06 NOTE — PLAN OF CARE
Wore BIPAP most of the night. Had two 15 minute breaks which ended with her saying that she couldn't breath and getting anxious. Heart rhythm now in A-fib with controlled rate. Only 30cc's of urine this shift. Patient is a DNR but  does not quite understand the difference between DNR and comfort measures only. He wants her comfortable and some things stopped but still wants her to have dialysis and the BIPAP. MASOUD Woodruff is suppose to come tomorrow to help patient and  make decisions.Safety maintained and turned. Labs this AM are showing a decreasing H/H and elevated CO2.

## 2020-02-06 NOTE — PT/OT/SLP PROGRESS
Physical Therapy      Patient Name:  Izabella Fulton   MRN:  [id number]    Patient not seen today secondary to Other (Comment)(Patient on hold. Declining medical condition per nurse. May be comfort measures only soon.). .    Tessy Rodriguez, PTA

## 2020-02-06 NOTE — NURSING
Family expressed concerns regarding taking bipap off. Secure chat sent to Dr. Jones whom stated it is ok to take off.   This nurse explained to family process of taking bipap off and that the patient's Oxygen may desaturate, they verbalized understanding.   Bipap removed and placed on 3L high flow nasal cannula. Sats 90-93%.   Family and  at bedside.

## 2020-02-06 NOTE — NURSING
Morphine gtt initiated per order. Care explained to  and family friend, both in understanding of poc.

## 2020-02-06 NOTE — NURSING
Secure chat with MD regarding AFib that began overnight. No new orders at this time, as we are keeping patient comfortable at this time. Leave harris in per Dr. Jones for comfort. Minimal output noted; pt is dialysis.

## 2020-02-06 NOTE — ASSESSMENT & PLAN NOTE
BMP reviewed- noted Estimated Creatinine Clearance: 8.9 mL/min (A) (based on SCr of 3.5 mg/dL (H)). according to latest data. Monitor UOP. Nephrology consulted and recommending dialysis for hypervolemia.

## 2020-02-06 NOTE — CARE UPDATE
02/06/20 1255   Patient Assessment/Suction   Level of Consciousness (AVPU) responds to voice   PRE-TX-O2   O2 Device (Oxygen Therapy) BiPAP   SpO2 97 %   Pulse 90   Resp (!) 21   Preset CPAP/BiPAP Settings   Mode Of Delivery BiPAP S/T   $ CPAP/BiPAP Daily Charge BiPAP/CPAP Daily   $ Initial CPAP/BiPAP Setup? No   $ Is patient using? Yes   Size of Mask Small   Sized Appropriately? Yes   Equipment Type V60   Airway Device Type small full face mask   Ipap 12   EPAP (cm H2O) 6   Pressure Support (cm H2O) 6   ITime (sec) 1   Rise Time (sec) 3   Patient CPAP/BiPAP Settings   RR Total (Breaths/Min) 21   Tidal Volume (mL) 610   VE Minute Ventilation (L/min) 16 L/min   Peak Inspiratory Pressure (cm H2O) 12   TiTOT (%) 24   Total Leak (L/Min) 15   Patient Trigger - ST Mode Only (%) 94   CPAP/BiPAP Backup Settings   Backup Rate 12 breaths per minute (bpm)   Family member at bedside and requested to hold breathing treatment, states she feels it   Prolonging process and possibly could agitate the patient more, so with held tx this round and   Reported to the RN Meka.

## 2020-02-06 NOTE — PROGRESS NOTES
[facility] Medicine  Progress Note    Patient Name: Izabella Fulton  MRN: [id number]  Patient Class: IP- Inpatient   Admission Date: 1/26/2020  Length of Stay: 10 days  Attending Physician: Billy Jones MD  Primary Care Provider: Corinne Fletcher NP        Subjective:     Principal Problem:Acute on chronic respiratory failure        HPI:  Izabella Fulton is an 86-year-old female with a past medical history of hypertension, hypothyroidism, hyperlipidemia, and congestive heart failure who presents to the emergency room tonight with reports of increasing shortness of breath.  Patient was recently discharged from the hospital approximately 1 week ago in which she was admitted for a CHF exacerbation.  Unsure if patient was diagnosed with congestive heart failure during that hospitalization or prior.  Patient's  states that patient's breathing never did improve since discharge home and she has since persisted with a nonproductive cough.  Patient was discharged in told to set up a cardiology and pulmonology appointment, have not been to either appointments yet.  Patient also reportedly not eating, daily p.o. intake consist of a boost twice a day.  Information for HPI obtained from patient's , Don, and patient's family friend who is at bedside during my initial interview and physical exam.  Upon arrival to the emergency room patient noted to be in acute respiratory distress requiring BiPAP therapy in association with a CHF exacerbation with an elevated BNP.  Patient also noted to have hypertensive emergency upon admission.  Patient was initiated on IV Lasix in emergency room and IV nitroglycerin drip.  Patient admitted to ICU on January 26, 2020 at approximately 11:30 p.m..  Patient reportedly resides at with her  utilizes a walker for ambulatory assist device, denies the use of supplemental oxygen dependence while at home.    Overview/Hospital Course:  No notes on  file    Interval History:  Patient seen and examined.  Her respiratory disease seems to have advanced rapidly since I last saw her.  Patient having significant difficulty breathing, but firmly does not want to be intubated and kept alive on machines.    Review of Systems   Constitutional: Positive for activity change, appetite change and fatigue.   Respiratory: Positive for cough and shortness of breath.    Cardiovascular: Negative for chest pain and leg swelling.   Gastrointestinal: Negative for abdominal pain and nausea.   Neurological: Positive for weakness.   Psychiatric/Behavioral: Positive for confusion.   All other systems reviewed and are negative.    Objective:     Vital Signs (Most Recent):  Temp: 97.3 °F (36.3 °C) (02/05/20 1615)  Pulse: 66 (02/05/20 1910)  Resp: (!) 24 (02/05/20 1910)  BP: (!) 170/86 (02/05/20 1800)  SpO2: (!) 92 % (02/05/20 1910) Vital Signs (24h Range):  Temp:  [97.3 °F (36.3 °C)-97.9 °F (36.6 °C)] 97.3 °F (36.3 °C)  Pulse:  [54-81] 66  Resp:  [17-43] 24  SpO2:  [90 %-97 %] 92 %  BP: ()/() 170/86     Weight: 49.1 kg (108 lb 3.9 oz)  Body mass index is 23.42 kg/m².    Intake/Output Summary (Last 24 hours) at 2/5/2020 2157  Last data filed at 2/5/2020 1825  Gross per 24 hour   Intake 500 ml   Output 2515 ml   Net -2015 ml      Physical Exam   Constitutional:   Markedly frail female who appears acutely ill   Eyes: Pupils are equal, round, and reactive to light. EOM are normal.   Cardiovascular: Normal rate, regular rhythm and intact distal pulses.   No murmur heard.  Pulmonary/Chest: She has wheezes.   Markedly increased respiratory effort noted on and off BiPAP.  Wet, gurgling breath sounds noted   Abdominal: Soft. She exhibits no distension. There is no tenderness.   Musculoskeletal: She exhibits edema.   Neurological:   Intermittent confusion noted   Skin: No rash noted. No pallor.   Nursing note and vitals reviewed.      Significant Labs: All pertinent labs within the past  24 hours have been reviewed.    Significant Imaging: I have reviewed all pertinent imaging results/findings within the past 24 hours.      Assessment/Plan:      * Acute on chronic respiratory failure  Acute respiratory failure due to hypervolemia related to ESRD. Failed Bumex diuresis. Patient appears fatigued. DNR. Wean to NC oxygen today and bipap at night. Dialysis per nephrology.  Increased morphine for respiratory distress.  Not sure if the patient will survive.      Complete immobility due to severe physical disability or frailty  Severe frailty portends very poor prognosis in the face of multiple system organ failure (respiratory failure, renal failure) will likely start to shift towards comfort measures.  Patient seems to understand that she is likely dying.      Essential hypertension  Chronic, uncontrolled.  Latest blood pressure and vitals reviewed-   Temp:  [97.3 °F (36.3 °C)-97.9 °F (36.6 °C)]   Pulse:  [54-81]   Resp:  [17-43]   BP: ()/()   SpO2:  [90 %-97 %] .   Home meds for hypertension were reviewed and noted below. Hospital anti-hypertensive changes were made as shown below.  Hypertension Medications             carvedilol (COREG) 12.5 MG tablet Take 1 tablet (12.5 mg total) by mouth 2 (two) times daily.    hydrALAZINE (APRESOLINE) 100 MG tablet Take 1 tablet (100 mg total) by mouth every 8 (eight) hours.    torsemide (DEMADEX) 10 MG Tab Take 1 tablet (10 mg total) by mouth every Tues, Fri. Take extra dose on Thursday and Saturday this week then resume Tuesday and Friday dosing      Hospital Medications             hydrALAZINE injection 10 mg 10 mg, Intravenous, Every 2 hours PRN        Will utilize p.r.n. blood pressure medication only if patient's blood pressure greater than  180/110 and she develops symptoms such as worsening chest pain or shortness of breath.    Holding blood pressure medications secondary to hypotension.    Symptomatic anemia  Patient's anemia is currently  uncontrolled. S/p 1 units of PRBCs. Etiology likely d/t chronic disease  Current CBC reviewed-   Lab Results   Component Value Date    HGB 8.1 (L) 02/05/2020    HCT 26.2 (L) 02/05/2020     Monitor serial CBC and transfuse if patient becomes hemodynamically unstable, symptomatic or H/H drops below 7/21.         Elevated troponin  Likely in the setting of acute CHF exacerbation, patient denies CP on admit - trend troponin - tele monitoring.        Hypoalbuminemia due to protein-calorie malnutrition  Nutrition consulted. Body mass index is 23.42 kg/m².. Encourage maximal PO intake. Diet supplementation ordered per nutrition approval. Will encourage PO and monitor closely for weight changes.          Hyperkalemia  Tele - trend with CMP.        Acute diastolic congestive heart failure  CHF currently uncontrolled due to volume overload due to: Continued edema of extremities and JVD and Pulmonary edema. Latest ECHO shows ejection fraction of 65%. Stop Lasix and continue BB and monitor clinical status closely. Monitor on telemetry.  Monitor strict Is&Os and daily weights.           Cardiorenal syndrome with renal failure  BMP reviewed- noted Estimated Creatinine Clearance: 8.9 mL/min (A) (based on SCr of 3.5 mg/dL (H)). according to latest data. Monitor UOP. Nephrology consulted and recommending dialysis for hypervolemia.               Hyperlipidemia  Will stop statin for now.  Given severe respiratory disease, risk greater than benefit currently.    Hypothyroid  Lab Results   Component Value Date    TSH 6.425 (H) 01/16/2020     Chronic, controlled.  Will continue home medication per NGT      VTE Risk Mitigation (From admission, onward)         Ordered     heparin (porcine) injection 4,000 Units  As needed (PRN)      02/02/20 1418     IP VTE HIGH RISK PATIENT  Once      01/27/20 0212     Place ESTUARDO hose  Until discontinued      01/27/20 0212     Place sequential compression device  Until discontinued      01/27/20 0212                 Critical care time spent on the evaluation and treatment of severe organ dysfunction, review of pertinent labs and imaging studies, discussions with consulting providers and discussions with patient/family: 32 minutes.      Billy Jones MD  Department of Hospital Medicine   [facility]

## 2020-02-06 NOTE — ASSESSMENT & PLAN NOTE
Patient's anemia is currently uncontrolled. S/p 1 units of PRBCs. Etiology likely d/t chronic disease  Current CBC reviewed-   Lab Results   Component Value Date    HGB 8.1 (L) 02/05/2020    HCT 26.2 (L) 02/05/2020     Monitor serial CBC and transfuse if patient becomes hemodynamically unstable, symptomatic or H/H drops below 7/21.

## 2020-02-06 NOTE — PROGRESS NOTES
02/06/20 1212        Wound 01/27/20 0205 Other (comment) lower Calf #1   Date First Assessed/Time First Assessed: 01/27/20 0205   Pre-existing: Yes  Primary Wound Type: Other (comment)  Side: Right  Orientation: lower  Location: Calf  Wound/PI Number (optional): #1   Appearance Closed/resurfaced

## 2020-02-06 NOTE — PROGRESS NOTES
Progress Note  PULMONARY    Admit Date: 1/26/2020 02/06/2020      SUBJECTIVE:   2/2- seen by Dr. Aj, resp failure requiring BIPAP- transferred to ICU and started on hemodialysis for FERCHO  2/3- patient endorses continued shortness of breath. A/w thick secretions, cough and difficulty clearing. Better after dialysis, still not back to baseline. BIPAP feels uncomfortable on her face, taking break w/ nasal cannula now.   2/4- with relative hypotension, SBP in the 90s this am. On BIPAP, no new complaints  2/5- with discomfort overnight, given morphine and Ativan.  This a.m. very sleepy, on BiPAP.  Unable to communicate.  Unable to tolerate going off BiPAP  2/6- HD yesterday. On BIPAP. Denies pain. Waking up more today    PFSH and Allergies reviewed.    OBJECTIVE:     Vitals (Most recent):  Vitals:    02/06/20 0600   BP: (!) 153/58   Pulse: 89   Resp: 20   Temp:        Vitals (24 hour range):  Temp:  [97.3 °F (36.3 °C)-99 °F (37.2 °C)]   Pulse:  []   Resp:  [15-43]   BP: ()/()   SpO2:  [88 %-100 %]       Intake/Output Summary (Last 24 hours) at 2/6/2020 0730  Last data filed at 2/6/2020 0600  Gross per 24 hour   Intake 500 ml   Output 2545 ml   Net -2045 ml          Physical Exam:  The patient's neuro status (alertness,orientation,cognitive function,motor skills,), pharyngeal exam (oral lesions, hygiene, abn dentition,), Neck (jvd,mass,thyroid,nodes in neck and above/below clavicle),RESPIRATORY(symmetry,effort,fremitus,percussion,auscultation),  Cor(rhythm,heart tones including gallops,perfusion,edema)ABD(distention,hepatic&splenomegaly,tenderness,masses), Skin(rash,cyanosis),Psyc(affect,judgement,).  Exam negative except for these pertinent findings:    Elderly, frail appearing  Sleeping, on BIPAP- arouses to voice. Follows commands with bilateral upper ext (squeeze hands)  Breath sounds clear bilaterally anteriorly  Edema improved    Radiographs reviewed: view by direct vision   CXR 2/3-  bilateral patchy infiltrates, improved compared to 2/1  Results for orders placed during the hospital encounter of 01/26/20   X-Ray Chest 1 View    Narrative EXAMINATION:  XR CHEST 1 VIEW    CLINICAL HISTORY:  CHF, pleural effusions;    TECHNIQUE:  Single frontal view of the chest was performed.    COMPARISON:  Chest of February 1, 2020.    FINDINGS:  The mediastinal and cardiac size and contours are within normal limits.  There is continued bilateral lower lobe infiltrates and small bilateral perihilar infiltrates improved on the left.  No pneumothorax is seen.  A double lumen central line enters on the right and ends in the right atrium.  There is a small left pleural effusion.      Impression Mild improvement in left lung infiltrates centrally.  There remains mild bilateral perihilar and lower lobe consolidated infiltrates.  A small left pleural effusion is noted.  Central line ends at the level of the right atrium      Electronically signed by: Milton Penny MD  Date:    02/03/2020  Time:    08:16   ]    Labs     Recent Labs   Lab 02/06/20  0411   WBC 10.31   HGB 7.8*   HCT 25.2*      BAND 30.0     Recent Labs   Lab 02/06/20  0411      K 3.6   CL 95   CO2 32*   BUN 24*   CREATININE 2.3*   GLU 83   CALCIUM 8.5*   MG 2.0   PHOS 3.0     No results for input(s): PH, PCO2, PO2, HCO3 in the last 24 hours.  Microbiology Results (last 7 days)     ** No results found for the last 168 hours. **          Impression:  Active Hospital Problems    Diagnosis  POA    *Acute on chronic respiratory failure [J96.20]  Yes    Delirium [R41.0]  No    Complete immobility due to severe physical disability or frailty [R53.2]  Yes    Hypoalbuminemia due to protein-calorie malnutrition [E46]  Yes    Elevated troponin [R79.89]  Yes    Symptomatic anemia [D64.9]  Yes    Cardiorenal syndrome with renal failure [I13.10]  Yes    Acute diastolic congestive heart failure [I50.31]  Yes    Essential hypertension [I10]  Yes     Hypothyroid [E03.9]  Yes    Hyperlipidemia [E78.5]  Yes      Resolved Hospital Problems    Diagnosis Date Resolved POA    Metabolic acidosis [E87.2] 02/03/2020 Yes    Elevated LFTs [R94.5] 02/02/2020 Yes    Hypertensive emergency [I16.1] 02/03/2020 Yes               Plan:   Delirium   · Encephalopathy due to her overall condition and worsened in the setting of benzos/narcotics    Acute on chronic respiratory failure  · Due to volume overload, pleural effusions  · Continue BiPAP with breaks as tolerated    Acute renal failure on CKD, HD started this admission  · Dialysis/UF per renal  · Long-term HD does not seem to be compatible with her wishes     Acute diastolic congestive heart failure  · Last ECHO noted  · Continue volume removal with dialysis     Hypertensive emergency  · Monitor BP, add back meds vs PRN Hydralazine if remains elevated     Symptomatic anemia  · No acute blood loss reported, follow   · Would transfuse for hgb < 7     Hypoalbuminemia due to protein-calorie malnutrition  · Encourage oral intake  · Pureed renal diet      Hyperlipidemia  · Aware     Continue to discuss goals of care with ; longterm prognosis remains poor.    The following were evaluated and adjusted as needed: mechanical ventilator settings and weaning status, acid base balance and oxygenation needs and input and output and renal status       Critical Care  - THE PATIENT HAS A HIGH PROBABILITY OF IMMINENT OR LIFE THREATENING DETERIORATION.  Over 50%time of encounter was in direct care at bedside.  Time was 30 to 74 minutes required for patient care.  Time needed for all of the above totaled 32 minutes.    Lindsey Miller MD  Pulmonary & Critical Care Medicine                                      .

## 2020-02-06 NOTE — ASSESSMENT & PLAN NOTE
Chronic, uncontrolled.  Latest blood pressure and vitals reviewed-   Temp:  [97.3 °F (36.3 °C)-97.9 °F (36.6 °C)]   Pulse:  [54-81]   Resp:  [17-43]   BP: ()/()   SpO2:  [90 %-97 %] .   Home meds for hypertension were reviewed and noted below. Hospital anti-hypertensive changes were made as shown below.  Hypertension Medications             carvedilol (COREG) 12.5 MG tablet Take 1 tablet (12.5 mg total) by mouth 2 (two) times daily.    hydrALAZINE (APRESOLINE) 100 MG tablet Take 1 tablet (100 mg total) by mouth every 8 (eight) hours.    torsemide (DEMADEX) 10 MG Tab Take 1 tablet (10 mg total) by mouth every Tues, Fri. Take extra dose on Thursday and Saturday this week then resume Tuesday and Friday dosing      Hospital Medications             hydrALAZINE injection 10 mg 10 mg, Intravenous, Every 2 hours PRN        Will utilize p.r.n. blood pressure medication only if patient's blood pressure greater than  180/110 and she develops symptoms such as worsening chest pain or shortness of breath.    Holding blood pressure medications secondary to hypotension.

## 2020-02-06 NOTE — PLAN OF CARE
On BIPAP hallucinating and picking at the air. Follows commands but very weak. Blood pressure elevated and given Hydralazine with good result. Coughing at intervals with some tachypnea noted, given Morphine for air hunger with good result. Harris with scant amount of dark taryn urine noted,received dialysis today. Will assess discontinuing harris. Patient is a DNR but  seems a little confused of the process and in understanding the management of patient's care.  stays at bedside but is very frail himself. Bed alarm on and bed on rotation.

## 2020-02-07 NOTE — CARE UPDATE
02/06/20 2000   Patient Assessment/Suction   Level of Consciousness (AVPU) unresponsive   Expansion/Accessory Muscles/Retractions no use of accessory muscles;no retractions   Rhythm/Pattern, Respiratory shallow   Cough Frequency no cough   PRE-TX-O2   SpO2 (!) 93 %   Pulse 69   Resp 16   BP (!) 111/54   Preset CPAP/BiPAP Settings   $ Is patient using? No/refused     and Son in room refused BiPAP ... Magi Singer Resp therapist present at time as witness.

## 2020-02-07 NOTE — CARE UPDATE
02/06/20 1900   PRE-TX-O2   O2 Device (Oxygen Therapy) nasal cannula   $ Is the patient on Low Flow Oxygen? Yes   Flow (L/min) 4   SpO2 (!) 92 %   Pulse Oximetry Type Continuous   $ Pulse Oximetry - Multiple Charge Pulse Oximetry - Multiple   Pulse 69   Resp 13   BP (!) 107/51

## 2020-02-07 NOTE — NURSING
Able to get in touch with  Latrell Silva. Will come immediately.  1707 Pt's  and  Silva at bedside. Updated.

## 2020-02-07 NOTE — SUBJECTIVE & OBJECTIVE
Interval History:  Patient seen and examined.  Her respiratory disease seems to have advanced rapidly since I last saw her.  Patient having significant difficulty breathing, but firmly does not want to be intubated and kept alive on machines.    Review of Systems   Constitutional: Positive for activity change, appetite change and fatigue.   Respiratory: Positive for cough and shortness of breath.    Cardiovascular: Negative for chest pain and leg swelling.   Gastrointestinal: Negative for abdominal pain and nausea.   Neurological: Positive for weakness.   Psychiatric/Behavioral: Positive for confusion.   All other systems reviewed and are negative.    Objective:     Vital Signs (Most Recent):  Temp: 99 °F (37.2 °C) (02/06/20 2230)  Pulse: 74 (02/06/20 2245)  Resp: 17 (02/06/20 2245)  BP: (!) 118/58 (02/06/20 2230)  SpO2: (!) 93 % (02/06/20 2245) Vital Signs (24h Range):  Temp:  [96 °F (35.6 °C)-99 °F (37.2 °C)] 99 °F (37.2 °C)  Pulse:  [] 74  Resp:  [13-35] 17  SpO2:  [88 %-100 %] 93 %  BP: ()/(44-92) 118/58     Weight: 45.8 kg (100 lb 15.5 oz)  Body mass index is 21.85 kg/m².    Intake/Output Summary (Last 24 hours) at 2/6/2020 0942  Last data filed at 2/6/2020 1900  Gross per 24 hour   Intake 13.7 ml   Output 30 ml   Net -16.3 ml      Physical Exam   Constitutional:   Markedly frail female who appears acutely ill   Eyes: Pupils are equal, round, and reactive to light. EOM are normal.   Cardiovascular: Normal rate, regular rhythm and intact distal pulses.   No murmur heard.  Pulmonary/Chest: She has wheezes.   Markedly increased respiratory effort noted on and off BiPAP.  Wet, gurgling breath sounds noted   Abdominal: Soft. She exhibits no distension. There is no tenderness.   Musculoskeletal: She exhibits edema.   Neurological:   Intermittent confusion noted   Skin: No rash noted. No pallor.   Nursing note and vitals reviewed.      Significant Labs: All pertinent labs within the past 24 hours have been  reviewed.    Significant Imaging: I have reviewed all pertinent imaging results/findings within the past 24 hours.

## 2020-02-07 NOTE — NURSING
Called [facility] for GCS 5. Talked to Iain. Stated unable to take call unless pt is on ventilator or has passed at this time.

## 2020-02-07 NOTE — NURSING
Resting on Morphine drip at 2mg/hour.O2 4L HF. Sinus rhythm HR 68 with rare PAC. Lungs with decrease Breath sounds. Resp shallow. Sat 92%%. at bedside and family friend Latrell. Response only to tactile stimulation when her foot is touched. Appears comfortable. Offered pt's  any assistance. Pt's  stated he had made peace with it.

## 2020-02-07 NOTE — PLAN OF CARE
20 1630   Final Note   Assessment Type Final Discharge Note   Anticipated Discharge Disposition

## 2020-02-07 NOTE — PLAN OF CARE
Pt remains in ICU on morphine gtt for comfort. DNR status maintained. 3L high flow nasal cannula and harris remain for comfort. Taken off bipap today and is tolerating fairly. Family at bedside. MASOUD Woodruff to be here tomorrow if further healthcare directions are needed. Comfort and safety maintained.

## 2020-02-07 NOTE — PLAN OF CARE
Pt  at 0846 asystole on monitor pronounced by Dr Weir,  and friends at the bedside, alyce and  called,post mortem care complete

## 2020-02-07 NOTE — CARE UPDATE
I personally saw and assessed the patient. No respirations were identified by visualization or auscultation. she had no carotid pulsations and no heart rate was identified by auscultation. Patient's pupils were fixed and dilated. she was pronounced dead by myself at 8:24 am on 2/7/2020

## 2020-02-07 NOTE — HOSPITAL COURSE
Patient 86-year-old female who was admitted the hospital with congestive heart failure.  She was started on aggressive therapy for CHF, but developed worsening shortness of breath and cardiorenal syndrome resulting and ultimately end-stage renal disease. Patient was very medically frail and continued to decompensate throughout the course of her hospitalization.  Multiple conversations were had with her and her  in regards to advanced care planning, and the patient did not want to be kept alive on machines not want to be intubated or resuscitated.  Patient did agree to noninvasive positive-pressure ventilation and dialysis.  She was initiated on dialysis, and her shortness of breath improved and she was able to tolerate an IPPV.  Despite adequate medical care, the patient continued to worsen and was transitioned to comfort care and was started on morphine drip. I personally saw and assessed the patient. No respirations were identified by visualization or auscultation. she had no carotid pulsations and no heart rate was identified by auscultation. Patient's pupils were fixed and dilated. she was pronounced dead by myself at 0824 am on 2/7/2020

## 2020-02-07 NOTE — PROGRESS NOTES
[facility] Medicine  Progress Note    Patient Name: Izabella Fulton  MRN: [id number]  Patient Class: IP- Inpatient   Admission Date: 1/26/2020  Length of Stay: 11 days  Attending Physician: Billy Jones MD  Primary Care Provider: Corinne Fletcher NP        Subjective:     Principal Problem:Acute on chronic respiratory failure        HPI:  Izabella Fulton is an 86-year-old female with a past medical history of hypertension, hypothyroidism, hyperlipidemia, and congestive heart failure who presents to the emergency room tonight with reports of increasing shortness of breath.  Patient was recently discharged from the hospital approximately 1 week ago in which she was admitted for a CHF exacerbation.  Unsure if patient was diagnosed with congestive heart failure during that hospitalization or prior.  Patient's  states that patient's breathing never did improve since discharge home and she has since persisted with a nonproductive cough.  Patient was discharged in told to set up a cardiology and pulmonology appointment, have not been to either appointments yet.  Patient also reportedly not eating, daily p.o. intake consist of a boost twice a day.  Information for HPI obtained from patient's , Don, and patient's family friend who is at bedside during my initial interview and physical exam.  Upon arrival to the emergency room patient noted to be in acute respiratory distress requiring BiPAP therapy in association with a CHF exacerbation with an elevated BNP.  Patient also noted to have hypertensive emergency upon admission.  Patient was initiated on IV Lasix in emergency room and IV nitroglycerin drip.  Patient admitted to ICU on January 26, 2020 at approximately 11:30 p.m..  Patient reportedly resides at with her  utilizes a walker for ambulatory assist device, denies the use of supplemental oxygen dependence while at home.    Overview/Hospital Course:  No notes on  file    Interval History:  Patient seen and examined.  Her respiratory disease seems to have advanced rapidly since I last saw her.  Patient having significant difficulty breathing, but firmly does not want to be intubated and kept alive on machines.    Review of Systems   Constitutional: Positive for activity change, appetite change and fatigue.   Respiratory: Positive for cough and shortness of breath.    Cardiovascular: Negative for chest pain and leg swelling.   Gastrointestinal: Negative for abdominal pain and nausea.   Neurological: Positive for weakness.   Psychiatric/Behavioral: Positive for confusion.   All other systems reviewed and are negative.    Objective:     Vital Signs (Most Recent):  Temp: 99 °F (37.2 °C) (02/06/20 2230)  Pulse: 74 (02/06/20 2245)  Resp: 17 (02/06/20 2245)  BP: (!) 118/58 (02/06/20 2230)  SpO2: (!) 93 % (02/06/20 2245) Vital Signs (24h Range):  Temp:  [96 °F (35.6 °C)-99 °F (37.2 °C)] 99 °F (37.2 °C)  Pulse:  [] 74  Resp:  [13-35] 17  SpO2:  [88 %-100 %] 93 %  BP: ()/(44-92) 118/58     Weight: 45.8 kg (100 lb 15.5 oz)  Body mass index is 21.85 kg/m².    Intake/Output Summary (Last 24 hours) at 2/6/2020 8693  Last data filed at 2/6/2020 1900  Gross per 24 hour   Intake 13.7 ml   Output 30 ml   Net -16.3 ml      Physical Exam   Constitutional:   Markedly frail female who appears acutely ill   Eyes: Pupils are equal, round, and reactive to light. EOM are normal.   Cardiovascular: Normal rate, regular rhythm and intact distal pulses.   No murmur heard.  Pulmonary/Chest: She has wheezes.   Markedly increased respiratory effort noted on and off BiPAP.  Wet, gurgling breath sounds noted   Abdominal: Soft. She exhibits no distension. There is no tenderness.   Musculoskeletal: She exhibits edema.   Neurological:   Intermittent confusion noted   Skin: No rash noted. No pallor.   Nursing note and vitals reviewed.      Significant Labs: All pertinent labs within the past 24 hours  have been reviewed.    Significant Imaging: I have reviewed all pertinent imaging results/findings within the past 24 hours.      Assessment/Plan:      * Acute on chronic respiratory failure  Acute respiratory failure due to hypervolemia related to ESRD. Failed Bumex diuresis. Patient appears fatigued. DNR. Wean to NC oxygen today and bipap at night. Dialysis per nephrology.  Increased morphine for respiratory distress.  Not sure if the patient will survive.      Complete immobility due to severe physical disability or frailty  Severe frailty portends very poor prognosis in the face of multiple system organ failure (respiratory failure, renal failure) will likely start to shift towards comfort measures.  Patient seems to understand that she is likely dying.      Essential hypertension  Chronic, uncontrolled.  Latest blood pressure and vitals reviewed-   Temp:  [97.3 °F (36.3 °C)-97.9 °F (36.6 °C)]   Pulse:  [54-81]   Resp:  [17-43]   BP: ()/()   SpO2:  [90 %-97 %] .   Home meds for hypertension were reviewed and noted below. Hospital anti-hypertensive changes were made as shown below.  Hypertension Medications             carvedilol (COREG) 12.5 MG tablet Take 1 tablet (12.5 mg total) by mouth 2 (two) times daily.    hydrALAZINE (APRESOLINE) 100 MG tablet Take 1 tablet (100 mg total) by mouth every 8 (eight) hours.    torsemide (DEMADEX) 10 MG Tab Take 1 tablet (10 mg total) by mouth every Tues, Fri. Take extra dose on Thursday and Saturday this week then resume Tuesday and Friday dosing      Hospital Medications             hydrALAZINE injection 10 mg 10 mg, Intravenous, Every 2 hours PRN        Will utilize p.r.n. blood pressure medication only if patient's blood pressure greater than  180/110 and she develops symptoms such as worsening chest pain or shortness of breath.    Holding blood pressure medications secondary to hypotension.    Symptomatic anemia  Patient's anemia is currently uncontrolled.  S/p 1 units of PRBCs. Etiology likely d/t chronic disease  Current CBC reviewed-   Lab Results   Component Value Date    HGB 8.1 (L) 02/05/2020    HCT 26.2 (L) 02/05/2020     Monitor serial CBC and transfuse if patient becomes hemodynamically unstable, symptomatic or H/H drops below 7/21.         Elevated troponin  Likely in the setting of acute CHF exacerbation, patient denies CP on admit - trend troponin - tele monitoring.        Hypoalbuminemia due to protein-calorie malnutrition  Nutrition consulted. Body mass index is 23.42 kg/m².. Encourage maximal PO intake. Diet supplementation ordered per nutrition approval. Will encourage PO and monitor closely for weight changes.          Hyperkalemia  Tele - trend with CMP.        Acute diastolic congestive heart failure  CHF currently uncontrolled due to volume overload due to: Continued edema of extremities and JVD and Pulmonary edema. Latest ECHO shows ejection fraction of 65%. Stop Lasix and continue BB and monitor clinical status closely. Monitor on telemetry.  Monitor strict Is&Os and daily weights.           Cardiorenal syndrome with renal failure  BMP reviewed- noted Estimated Creatinine Clearance: 8.9 mL/min (A) (based on SCr of 3.5 mg/dL (H)). according to latest data. Monitor UOP. Nephrology consulted and recommending dialysis for hypervolemia.               Hyperlipidemia  Will stop statin for now.  Given severe respiratory disease, risk greater than benefit currently.    Hypothyroid  Lab Results   Component Value Date    TSH 6.425 (H) 01/16/2020     Chronic, controlled.  Will continue home medication per NGT      VTE Risk Mitigation (From admission, onward)         Ordered     heparin (porcine) injection 4,000 Units  As needed (PRN)      02/02/20 1418     IP VTE HIGH RISK PATIENT  Once      01/27/20 0212     Place ESTUARDO hose  Until discontinued      01/27/20 0212     Place sequential compression device  Until discontinued      01/27/20 0212                     Billy Jones MD  Department of Hospital Medicine   [facility]

## 2020-02-07 NOTE — PLAN OF CARE
Pt on Morphine drip, resting at this time.  Sinus rhythm HR 82. Sat 92 on 8L HF. and friend at bedside. Care explained. Aware of pt's poor prognosis. Made comfortable.

## 2020-02-07 NOTE — DISCHARGE SUMMARY
[facility] Hospital Medicine  Discharge Summary      Patient Name: Izabella Fulton  MRN: [id number]  Admission Date: 1/26/2020  Hospital Length of Stay: 12 days  Discharge Date and Time:  02/07/2020 3:18 PM  Attending Physician: Dayami att. providers found   Discharging Provider: Amilcar Weir MD  Primary Care Provider: Corinne Fletcher NP      HPI:   Izabella Fulton is an 86-year-old female with a past medical history of hypertension, hypothyroidism, hyperlipidemia, and congestive heart failure who presents to the emergency room tonight with reports of increasing shortness of breath.  Patient was recently discharged from the hospital approximately 1 week ago in which she was admitted for a CHF exacerbation.  Unsure if patient was diagnosed with congestive heart failure during that hospitalization or prior.  Patient's  states that patient's breathing never did improve since discharge home and she has since persisted with a nonproductive cough.  Patient was discharged in told to set up a cardiology and pulmonology appointment, have not been to either appointments yet.  Patient also reportedly not eating, daily p.o. intake consist of a boost twice a day.  Information for HPI obtained from patient's , Don, and patient's family friend who is at bedside during my initial interview and physical exam.  Upon arrival to the emergency room patient noted to be in acute respiratory distress requiring BiPAP therapy in association with a CHF exacerbation with an elevated BNP.  Patient also noted to have hypertensive emergency upon admission.  Patient was initiated on IV Lasix in emergency room and IV nitroglycerin drip.  Patient admitted to ICU on January 26, 2020 at approximately 11:30 p.m..  Patient reportedly resides at with her  utilizes a walker for ambulatory assist device, denies the use of supplemental oxygen dependence while at home.    * No surgery found *      Hospital Course:    Patient 86-year-old female who was admitted the hospital with congestive heart failure.  She was started on aggressive therapy for CHF, but developed worsening shortness of breath and cardiorenal syndrome resulting and ultimately end-stage renal disease. Patient was very medically frail and continued to decompensate throughout the course of her hospitalization.  Multiple conversations were had with her and her  in regards to advanced care planning, and the patient did not want to be kept alive on machines not want to be intubated or resuscitated.  Patient did agree to noninvasive positive-pressure ventilation and dialysis.  She was initiated on dialysis, and her shortness of breath improved and she was able to tolerate an IPPV.  Despite adequate medical care, the patient continued to worsen and was transitioned to comfort care and was started on morphine drip. I personally saw and assessed the patient. No respirations were identified by visualization or auscultation. she had no carotid pulsations and no heart rate was identified by auscultation. Patient's pupils were fixed and dilated. she was pronounced dead by myself at 0824 am on 2/7/2020        Consults:   Consults (From admission, onward)        Status Ordering Provider     Inpatient consult to Nephrology  Once     Provider:  Alexei Araiza MD    Completed TAWANDA ROMO     Inpatient consult to Pulmonology  Once     Provider:  Jose Alejandro Aj MD    Completed ARUN GAINES     Inpatient consult to Registered Dietitian/Nutritionist  Once     Provider:  (Not yet assigned)    Completed STEPHANIE ZAVALA     Inpatient consult to Registered Dietitian/Nutritionist  Once     Provider:  (Not yet assigned)    Completed TAWANDA ROMO     Inpatient consult to Registered Dietitian/Nutritionist  Once     Provider:  (Not yet assigned)    Completed ARUN GAINES     Inpatient consult to Social Work/Case Management  Once     Provider:  (Not yet  assigned)    Completed SULTAN, AQIB     IP consult to case management  Once     Provider:  (Not yet assigned)    Completed SULTAN, AQIB          No new Assessment & Plan notes have been filed under this hospital service since the last note was generated.  Service: Hospital Medicine    Final Active Diagnoses:    Diagnosis Date Noted POA    PRINCIPAL PROBLEM:  Acute on chronic respiratory failure [J96.20] 2020 Yes    Delirium [R41.0] 2020 No    Complete immobility due to severe physical disability or frailty [R53.2] 2020 Yes    Hypoalbuminemia due to protein-calorie malnutrition [E46] 2020 Yes    Elevated troponin [R79.89] 2020 Yes    Symptomatic anemia [D64.9] 2020 Yes    Cardiorenal syndrome with renal failure [I13.10] 2019 Yes    Acute diastolic congestive heart failure [I50.31] 2018 Yes    Essential hypertension [I10] 2018 Yes    Hypothyroid [E03.9] 2018 Yes    Hyperlipidemia [E78.5] 2018 Yes      Problems Resolved During this Admission:    Diagnosis Date Noted Date Resolved POA    Hypertensive emergency [I16.1] 01/15/2020 2020 Yes    Metabolic acidosis [E87.2] 2020 Yes    Elevated LFTs [R94.5] 2020 Yes       Discharged Condition:     Disposition:     Follow Up:    Patient Instructions:   No discharge procedures on file.    Significant Diagnostic Studies: Labs:   BMP:   Recent Labs   Lab 20  0411   GLU 83      K 3.6   CL 95   CO2 32*   BUN 24*   CREATININE 2.3*   CALCIUM 8.5*   MG 2.0    and CBC   Recent Labs   Lab 20  0411   WBC 10.31   HGB 7.8*   HCT 25.2*        Microbiology:   Blood Culture No results found for: LABBLOO, Sputum Culture No results found for: GSRESP, RESPIRATORYC and Urine Culture    Lab Results   Component Value Date    LABURIN  2019      Comment:        CULTURE, URINE, ROUTINE         MICRO NUMBER:      [id number]    TEST STATUS:        FINAL    SPECIMEN SOURCE:   URINE    SPECIMEN QUALITY:  ADEQUATE    RESULT:            Three or more organisms present, each greater                       than 10,000 cu/mL. May represent normal momo                       contamination from external genitalia. No further                       testing is required.         Pending Diagnostic Studies:     None         Medications:  None (patient  at medical facility)    Indwelling Lines/Drains at time of discharge:   Lines/Drains/Airways     Central Venous Catheter Line                 Hemodialysis Catheter 20 right internal jugular;right subclavian 6 days          Drain                 Urethral Catheter 20 2311 Straight-tip 16 Fr. 11 days                Time spent on the discharge of patient: 60 minutes  Patient was seen and examined on the date of discharge and determined to be suitable for discharge.    Critical care time spent on the evaluation and treatment of severe organ dysfunction, review of pertinent labs and imaging studies, discussions with consulting providers and discussions with patient/family: 60 minutes.     Amilcar Weir MD  Department of Hospital Medicine  [facility]

## 2020-02-07 NOTE — NURSING
Had long conversation with pt's  and friend. Respiration more shallow. Sat dropping. O2 at 15L HF.BP in the 90's now. They just step out to get breakfast and that he will bring pt's  home to change. Got friend's cell phone number.

## 2020-02-10 ENCOUNTER — EXTERNAL HOME HEALTH (OUTPATIENT)
Dept: HOME HEALTH SERVICES | Facility: HOSPITAL | Age: 85
End: 2020-02-10
Payer: MEDICARE

## 2020-03-21 LAB
ACID FAST MOD KINY STN SPEC: NORMAL
MYCOBACTERIUM SPEC QL CULT: NORMAL

## 2020-12-08 NOTE — PLAN OF CARE
Problem: Physical Therapy Goal  Goal: Physical Therapy Goal  Description  Goals to be met by: 2020     Patient will increase functional independence with mobility by performin. Supine to sit with MInimal Assistance  2. Sit to stand transfer with Minimal Assistance  3. Bed to chair transfer with Minimal Assistance using Rolling Walker  4. Gait  x 250 feet with Minimal Assistance using Rolling Walker.   5. Lower extremity exercise program x20 reps    Outcome: Ongoing, Progressing      Never

## 2021-07-04 NOTE — SUBJECTIVE & OBJECTIVE
Goal Outcome Evaluation:  Plan of Care Reviewed With: patient, family        Progress: improving  Outcome Summary: Much more alert and lucid today. Pt slept last night with precedex and is much more appropriate. Ambulated twice, 120 and 180 feet with 2 assist, gait belt and walker. SLP re-eval- plan for FEES in AM, ok for occasional ice chips today. Oriented, but stil confused conversation here and there. Pt able to sit in chair twice and read book. BM x2. No incontinence. Orders to PCU, waiting on bed.   Past Medical History:   Diagnosis Date    Anticoagulant long-term use     baby aspirin    Closed fracture of multiple ribs of left side with routine healing 1/16/2020    Hypertension     Thyroid disease     TIA (transient ischemic attack)     Ulcerative colitis     Wears hearing aid        Past Surgical History:   Procedure Laterality Date    COLON SURGERY  05/17/12    exp lap, perfered colon    EYE SURGERY      left eye cataract       Review of patient's allergies indicates:  No Known Allergies    No current facility-administered medications on file prior to encounter.      Current Outpatient Medications on File Prior to Encounter   Medication Sig    aspirin (ECOTRIN) 81 MG EC tablet Take 81 mg by mouth once daily.      calcitRIOL (ROCALTROL) 0.25 MCG Cap Take 0.25 mcg by mouth twice a week. Sunday and Thursday    calcium-vitamin D 500-125 mg-unit tablet Take 1 tablet by mouth 2 (two) times daily.      carvedilol (COREG) 12.5 MG tablet Take 1 tablet (12.5 mg total) by mouth 2 (two) times daily.    denosumab (PROLIA) 60 mg/mL Syrg Inject 60 mg into the skin every 6 (six) months.     DOCOSAHEXANOIC ACID/VIT C/LUT (EYE HEALTH ORAL) Take 1 tablet by mouth 2 (two) times daily.      fish oil-omega-3 fatty acids 300-1,000 mg capsule Take 1 g by mouth once daily.      folic acid (FOLVITE) 800 MCG tablet Take 800 mcg by mouth once daily.      hydrALAZINE (APRESOLINE) 100 MG tablet Take 1 tablet (100 mg total) by mouth every 8 (eight) hours.    levothyroxine (SYNTHROID) 100 MCG tablet Take 1 tablet (100 mcg total) by mouth once daily.    pravastatin (PRAVACHOL) 20 MG tablet Take 1 tablet (20 mg total) by mouth once daily.    torsemide (DEMADEX) 10 MG Tab Take 1 tablet (10 mg total) by mouth every Tues, Fri. Take extra dose on Thursday and Saturday this week then resume Tuesday and Friday dosing     Family History     Problem Relation (Age of Onset)    Alzheimer's disease Mother    Heart disease Father         Tobacco Use    Smoking status: Never Smoker    Smokeless tobacco: Never Used   Substance and Sexual Activity    Alcohol use: No    Drug use: No    Sexual activity: Not Currently     Partners: Male     Review of Systems   Unable to perform ROS: Acuity of condition     Objective:     Vital Signs (Most Recent):  Temp: 98.8 °F (37.1 °C) (01/26/20 2054)  Pulse: (!) 49 (01/27/20 0115)  Resp: (!) 24 (01/26/20 2054)  BP: (!) 178/80 (01/27/20 0115)  SpO2: 97 % (01/27/20 0115) Vital Signs (24h Range):  Temp:  [98.8 °F (37.1 °C)] 98.8 °F (37.1 °C)  Pulse:  [46-70] 49  Resp:  [24] 24  SpO2:  [88 %-100 %] 97 %  BP: (130-237)/() 178/80     Weight: 53.5 kg (118 lb)  Body mass index is 25.53 kg/m².    Physical Exam   Constitutional: She is oriented to person, place, and time. She appears well-developed and well-nourished. No distress.   HENT:   Head: Normocephalic and atraumatic.   Eyes: Pupils are equal, round, and reactive to light. EOM are normal. Right eye exhibits no discharge. Left eye exhibits no discharge.   Neck: Normal range of motion. JVD present.   Cardiovascular: Regular rhythm, normal heart sounds and intact distal pulses.   No murmur heard.  Bradycardia with a HR of 45 bpm noted on bedside continuous cardiac monitoring.     Pulmonary/Chest: Effort normal and breath sounds normal. No respiratory distress. She has no wheezes. She has no rales. She exhibits no tenderness.   Bipap therapy. Course breath sounds noted throughout all lung fields upon auscultation, no wheezing noted. Patient with no labored respirations upon my initial exam.     Abdominal: Soft. Bowel sounds are normal. She exhibits no distension. There is no tenderness. There is no rebound and no guarding.   Genitourinary:   Genitourinary Comments: Exam Deferred   [place] in place     Musculoskeletal: Normal range of motion. She exhibits edema. She exhibits no tenderness or deformity.   Plus four pitting edema noted to bilateral lower  extremities, discoloration noted to bilateral lower extremities.   Neurological: She is alert and oriented to person, place, and time. No cranial nerve deficit or sensory deficit.   Skin: Skin is warm and dry. Capillary refill takes 2 to 3 seconds. No rash noted. She is not diaphoretic. No erythema.   Generalized bruising noted throughout bilateral upper extremities   Psychiatric: She has a normal mood and affect. Her behavior is normal. Judgment and thought content normal.   Nursing note and vitals reviewed.        CRANIAL NERVES     CN III, IV, VI   Pupils are equal, round, and reactive to light.  Extraocular motions are normal.        Significant Labs:   ABGs: No results for input(s): PH, PCO2, HCO3, POCSATURATED, BE, TOTALHB, COHB, METHB, O2HB, POCFIO2 in the last 48 hours.  BMP:   Recent Labs   Lab 01/26/20 2120   *      K 5.2*      CO2 17*   BUN 80*   CREATININE 2.7*   CALCIUM 9.9     CBC:   Recent Labs   Lab 01/26/20 2120   WBC 7.12   HGB 12.1   HCT 37.7        CMP:   Recent Labs   Lab 01/26/20 2120      K 5.2*      CO2 17*   *   BUN 80*   CREATININE 2.7*   CALCIUM 9.9   PROT 7.2   ALBUMIN 3.2*   BILITOT 0.5   ALKPHOS 93   AST 71*   ALT 77*   ANIONGAP 13   EGFRNONAA 15*     Troponin:   Recent Labs   Lab 01/26/20 2120   TROPONINI 0.037*     INR  1.1    BNP  1,152      Significant Imaging: I have reviewed all pertinent imaging results/findings within the past 24 hours.     Chest Xray:  Impression       Cardiomegaly with worsening fluid overload state, most consistent with decompensated CHF.     EKG performed on January 26, 2020, impression as below:  Sinus rhythm with 1st degree A-V block  Nonspecific ST and T wave abnormality  Abnormal ECG  When compared with ECG of 17-JAN-2020 09:17,  T wave amplitude has increased in Anterior leads

## 2021-11-23 NOTE — PROGRESS NOTES
Progress Note  Nephrology    Admit Date: 1/26/2020   LOS: 11 days     SUBJECTIVE:     CC:  FERCHO on CKD3, anemia    Past medical, surgical and social history reviewed    HPI: 86F with hypertension, hypothyroidism, congestive heart failure  presents with increasing shortness of breath. She was recently discharged from the hospital approximately 1 week ago after admission for CHF exacerbation. Patient's  states that patient's breathing never did improve since discharge home and she has since persisted with a nonproductive cough. Patient was discharged in told to set up a cardiology and pulmonology appointment, have not been to either appointments yet. She was not eating, daily p.o. intake consist of a boost twice a day.     Upon arrival to the emergency room patient noted to be in acute respiratory distress requiring BiPAP therapy in association with a CHF exacerbation with an elevated BNP. Patient also noted to have hypertensive emergency and was treated with IV Lasix and IV nitroglycerin drip.     1/28 VSS, no new complains.  1/29 VSS, no new complains.  1/30 VSS, no new complains, UO is unimpressive despite torsemide 20 daily, started 1/29. sCr is up too.  1/31 VSS, no new complains, UO is unimpressive despite escalation from torsemide 20 daily, started 1/29 to Lasix 80mg IV TID. Will escalate to Bumex drip + metolazone... D/w Dr. Chavis.   2/1  Renal function worsening.  UOP 1.1L despite aggressive diuresis attempts.  Pt is on BIPAP, d/w bedside nurse.  She reports pt w/lethargy, says stat ABG was ordered per primary team.  Spouse at bedside reports some transient confusion.  ?uremia vs poor oxygenation.  D/w Dr. Chacon, will order line placement for dialysis.  Spoke to pt's spouse at length, he is aware that she may need dialysis, states that Mrs. Fulton was also made aware of this, and they are agreeable.  Discussed risks/benefits, explained that she would need a special type of line that a surgeon would come  and put in.  Answered questions, provided support.  He voices understanding of all.  2/2  Had first HD last night, 2L UF.  She refused to be intubated.  UOP 815cc.  She remains on bipap, ill-appearing.  Will order IUF today.  2/3  On bipap.  Sleeping.    2/4  Sitting up, eating. No distress  2/5  Seen on dialysis.  Sleeping  2/6  On bipap, sleeping, no distress    Assessment/plan:    FERCHO, sCr not better.  CKD stage 3-4.  CHF exacerbation with volume overload.--better after multiple days of UF  Hold dialysis today  No NSAIDs, ACEI/ARB, IV contrast or other nephrotoxins.  Keep MAP > 60, SBP > 100.  Dose meds for GFR < 30 ml/min.  Renal diet - low K, low phos. Restrict oral liquids.  Prognosis poor.           Anemia of CKD  Hgb and HCT are acceptable. Monitor.  Epogen given with hd,   Transfusion at discretion of primary team     HTN  BP seem controlled. But remains hypertensive,    Tolerate asymptomatic HTN up to -160.  Continue home meds.  Low sodium diet.  PRN iv hydralazine for bp excesses     Thank you for allowing us to participate in the care of your patient!   We will follow the patient and provide recommendations as needed.       OBJECTIVE:     Vital Signs (Most Recent)  Temp: 98.2 °F (36.8 °C) (20 0800)  Pulse: 92 (20 0830)  Resp: 16 (20 0830)  BP: (!) 191/92(will monitor another cycle before prn hydralazine) (20 0830)  SpO2: 100 % (2030)    Vital Signs Range (Last 24H):  Temp:  [97.3 °F (36.3 °C)-99 °F (37.2 °C)]   Pulse:  []   Resp:  [15-43]   BP: ()/()   SpO2:  [88 %-100 %]     Temp (24hrs), Av °F (36.7 °C), Min:97.3 °F (36.3 °C), Max:99 °F (37.2 °C)    Systolic (24hrs), Av , Min:88 , Max:191     Diastolic (24hrs), Av, Min:52, Max:115      I & O (Last 24H):    Intake/Output Summary (Last 24 hours) at 2020 0847  Last data filed at 2020 0600  Gross per 24 hour   Intake 500 ml   Output 2545 ml   Net -2045 ml     Physical  Exam:  General appearance: pale  Eyes:  wnl  Neck: supple  Lungs:  on bipap, diminished breath sounds  Heart: regular rate and rhythm, S1, S2 normal, no murmur, click, rub or gallop  Abdomen: abd soft, +bs  Extremities: edema  Skin: Skin color, texture, turgor normal. No rashes or lesions  Neurologic: Mental status: sleeping, arouses briefly, goes right back to sleep    Laboratory:  CBC:  Recent Labs   Lab 02/06/20 0411   WBC 10.31   RBC 2.55*   HGB 7.8*   HCT 25.2*      MCV 99*   MCH 30.6   MCHC 31.0*     BMP:  Recent Labs   Lab 02/06/20 0411      K 3.6   CL 95   CO2 32*   BUN 24*   CREATININE 2.3*   CALCIUM 8.5*      CMP:   Recent Labs   Lab 02/06/20 0411   GLU 83   CALCIUM 8.5*      K 3.6   CO2 32*   CL 95   BUN 24*   CREATININE 2.3*     All other lab data reviewed and negative unless otherwise specified   Diagnostic Results:  Labs: Reviewed    ASSESSMENT/PLAN:     Active Hospital Problems    Diagnosis  POA    *Acute on chronic respiratory failure [J96.20]  Yes    Delirium [R41.0]  No    Complete immobility due to severe physical disability or frailty [R53.2]  Yes    Hypoalbuminemia due to protein-calorie malnutrition [E46]  Yes    Elevated troponin [R79.89]  Yes    Symptomatic anemia [D64.9]  Yes    Cardiorenal syndrome with renal failure [I13.10]  Yes    Acute diastolic congestive heart failure [I50.31]  Yes    Essential hypertension [I10]  Yes    Hypothyroid [E03.9]  Yes    Hyperlipidemia [E78.5]  Yes      Resolved Hospital Problems    Diagnosis Date Resolved POA    Metabolic acidosis [E87.2] 02/03/2020 Yes    Elevated LFTs [R94.5] 02/02/2020 Yes    Hypertensive emergency [I16.1] 02/03/2020 Yes        11/2021 - EF 65%, no major valvular abnormalities 11/2021 - EF 65%, no major valvular abnormalities 11/2021 - EF 65%, no major valvular abnormalities

## 2024-10-07 NOTE — ASSESSMENT & PLAN NOTE
Nitroglycerin gtt initiated in ER - goal SBP of 180 - do not decreased MAP by > 25% in first 2-6 hours.  Tele monitoring.       no